# Patient Record
Sex: FEMALE | Race: WHITE | NOT HISPANIC OR LATINO | Employment: OTHER | URBAN - METROPOLITAN AREA
[De-identification: names, ages, dates, MRNs, and addresses within clinical notes are randomized per-mention and may not be internally consistent; named-entity substitution may affect disease eponyms.]

---

## 2017-01-02 DIAGNOSIS — E78.5 HYPERLIPIDEMIA: Chronic | ICD-10-CM

## 2017-01-03 RX ORDER — SIMVASTATIN 40 MG
TABLET ORAL
Qty: 90 TABLET | Refills: 0 | Status: SHIPPED | OUTPATIENT
Start: 2017-01-03 | End: 2017-03-31 | Stop reason: SDUPTHER

## 2017-02-15 ENCOUNTER — OFFICE VISIT (OUTPATIENT)
Dept: INTERNAL MEDICINE | Age: 72
End: 2017-02-15

## 2017-02-15 VITALS
WEIGHT: 129 LBS | HEIGHT: 62 IN | SYSTOLIC BLOOD PRESSURE: 132 MMHG | OXYGEN SATURATION: 98 % | DIASTOLIC BLOOD PRESSURE: 64 MMHG | HEART RATE: 70 BPM | BODY MASS INDEX: 23.74 KG/M2 | TEMPERATURE: 97.8 F

## 2017-02-15 DIAGNOSIS — E78.2 MIXED HYPERLIPIDEMIA: Chronic | ICD-10-CM

## 2017-02-15 DIAGNOSIS — I10 ESSENTIAL HYPERTENSION: Chronic | ICD-10-CM

## 2017-02-15 DIAGNOSIS — Z12.31 ENCOUNTER FOR SCREENING MAMMOGRAM FOR MALIGNANT NEOPLASM OF BREAST: ICD-10-CM

## 2017-02-15 DIAGNOSIS — E11.9 TYPE 2 DIABETES MELLITUS WITHOUT COMPLICATION, WITHOUT LONG-TERM CURRENT USE OF INSULIN (HCC): Primary | Chronic | ICD-10-CM

## 2017-02-15 PROCEDURE — 99214 OFFICE O/P EST MOD 30 MIN: CPT | Performed by: INTERNAL MEDICINE

## 2017-02-15 NOTE — PROGRESS NOTES
"Renee J From / 71 y.o. / female  02/15/2017    VITALS    Visit Vitals   • /64   • Pulse 70   • Temp 97.8 °F (36.6 °C)   • Ht 62\" (157.5 cm)   • Wt 129 lb (58.5 kg)   • SpO2 98%   • BMI 23.59 kg/m2     BP Readings from Last 3 Encounters:   02/15/17 132/64   11/27/16 159/83   08/12/16 160/70     Wt Readings from Last 3 Encounters:   02/15/17 129 lb (58.5 kg)   11/27/16 127 lb (57.6 kg)   08/12/16 132 lb (59.9 kg)      Body mass index is 23.59 kg/(m^2).    CC:  Main reason(s) for today's visit: Diabetes      HPI:     Chronic type 2 diabetes :   Home blood sugar levels: consistently in acceptable range. Compliant with medication(s).  Denies significant problems / side effects.      Most recent A1c level(s):    Lab Results   Component Value Date    HGBA1C 6.7 (H) 08/12/2016    HGBA1C 7.0 (H) 02/12/2016    HGBA1C 6.4 (H) 08/20/2015     Chronic essential hypertension :  Home BP readings: DOES NOT CHECK BP AT HOME. Compliant with medication(s).  Denies significant problems or side effects. Most recent in-office blood pressure readings:   BP Readings from Last 3 Encounters:   02/15/17 132/64   11/27/16 159/83   08/12/16 160/70     Chronic hyperlipidemia :  Compliant with therapy.  Denies significant problems with medication(s).  Most recent labs:   Lab Results   Component Value Date    LDL 69 08/12/2016    LDL 69 08/20/2015    HDL 48 08/12/2016    HDL 55 08/20/2015    TRIG 96 08/12/2016    TRIG 92 08/20/2015    CHOLHDLRATIO 2.8 08/12/2016    CHOLHDLRATIO 2.6 08/20/2015         ____________________________________________________________________    ASSESSMENT & PLAN:    Problem List Items Addressed This Visit        High    Type 2 diabetes mellitus - Primary (Chronic)    Overview     Stable. Continue metformin.          Relevant Medications    metFORMIN XR (GLUCOPHAGE-XR) 500 MG 24 hr tablet    glucose blood (MAIA CONTOUR NEXT TEST) test strip    Other Relevant Orders    Hemoglobin A1c    Microalbumin / Creatinine " Urine Ratio    Hypertension (Chronic)    Overview     Stable. Continue lisinopril.          Relevant Medications    lisinopril (PRINIVIL,ZESTRIL) 20 MG tablet    Hyperlipidemia (Chronic)    Overview     Stable. Continue simvastatin.           Relevant Medications    simvastatin (ZOCOR) 40 MG tablet      Other Visit Diagnoses     Encounter for screening mammogram for malignant neoplasm of breast         Relevant Orders    Mammo Screening Bilateral With CAD        Orders Placed This Encounter   Procedures   • Mammo Screening Bilateral With CAD   • Hemoglobin A1c   • Microalbumin / Creatinine Urine Ratio       Summary/Discussion:     ·       Return in about 6 months (around 8/15/2017) for 6 mos for chronic f/u;  mos for initial AWV (20 min).    Future Appointments  Date Time Provider Department Center   8/16/2017 9:20 AM [facility] None       ____________________________________________________________________    REVIEW OF SYSTEMS    Review of Systems  As noted per HPI  Constitutional neg   Resp neg   CV neg    Msk: trigger finger of right thumb (sees [facility])  Other: As noted per HPI      PHYSICAL EXAMINATION    Physical Exam  Constitutional  No distress   Cardiovascular Rate  normal . Rhythm: regular . Heart sounds:  normal    Pulmonary/Chest  Effort normal. Breath sounds:  normal   Psychiatric  Alert. Judgment and thought content normal. Mood normal      REVIEWED DATA:    Labs:   Lab Results   Component Value Date     08/12/2016    K 4.4 08/12/2016    AST 15 08/12/2016    ALT 17 08/12/2016    BUN 13 08/12/2016    CREATININE 0.83 08/12/2016    CREATININE 0.87 08/20/2015    CREATININE 0.84 07/17/2015    EGFRIFNONA 71 08/12/2016    EGFRIFAFRI 81 08/12/2016       Lab Results   Component Value Date     (H) 08/12/2016     (H) 08/20/2015     (H) 07/17/2015    HGBA1C 6.7 (H) 08/12/2016    HGBA1C 7.0 (H) 02/12/2016    HGBA1C 6.4 (H) 08/20/2015       Lab Results   Component Value Date     LDL 69 08/12/2016    LDL 69 08/20/2015    HDL 48 08/12/2016    TRIG 96 08/12/2016    CHOLHDLRATIO 2.8 08/12/2016       No results found for: TSH, FREET4     No results found for: WBC, HGB, PLT     Imaging:        Medical Tests:        Summary of old records / correspondence / consultant report:        Request outside records:          ALLERGIES:    Tetanus toxoids    MEDICATIONS:  Outpatient Medications Prior to Visit   Medication Sig Dispense Refill   • aspirin 81 MG EC tablet Take 81 mg by mouth daily.     • Cholecalciferol (VITAMIN D) 2000 UNITS capsule Take 1 capsule by mouth daily.     • dorzolamide-timolol (COSOPT) 22.3-6.8 MG/ML ophthalmic solution Apply 1 drop to eye 2 (two) times a day.     • lisinopril (PRINIVIL,ZESTRIL) 20 MG tablet TAKE 1 TABLET BY MOUTH EVERY DAY 90 tablet 1   • mesalamine (LIALDA) 1.2 G EC tablet Take 1,200 mg by mouth 2 (two) times a day.     • metFORMIN XR (GLUCOPHAGE-XR) 500 MG 24 hr tablet Take 2 tablets by mouth 2 (Two) Times a Day. 360 tablet 1   • omeprazole (PriLOSEC) 40 MG capsule Take 1 capsule by mouth daily.     • simvastatin (ZOCOR) 40 MG tablet TAKE 1 TABLET BY MOUTH EVERY NIGHT AT BEDTIME 90 tablet 0   • glucose blood test strip FreeStyle Lite Test In Vitro Strip; Patient Sig: FreeStyle Lite Test In Vitro Strip TEST 1-2 TIMES DAILY AS DIRECTED; 1; 3; 05-May-2014; Active       No facility-administered medications prior to visit.        [facility]    The following portions of the patient's history were reviewed and updated as appropriate: Allergies / Current Medications / Past Medical History / Surgical History / Social History / Family History    PROBLEM LIST:    Patient Active Problem List   Diagnosis   • Type 2 diabetes mellitus   • Osteopenia   • Hypertension   • Hyperlipidemia   • Colon polyp   • Esophageal reflux       PAST MEDICAL HX:    Past Medical History   Diagnosis Date   • Diabetes mellitus    • Dizziness    • GERD (gastroesophageal reflux disease)    • Glaucoma     • Hyperlipidemia    • Knee fracture, left        PAST SURGICAL HX:    Past Surgical History   Procedure Laterality Date   • Cataract extraction     • Eye surgery       cataract surgery       SOCIAL HX:    Social History     Social History   • Marital status:      Spouse name: N/A   • Number of children: N/A   • Years of education: N/A     Occupational History   • retail      retired     Social History Main Topics   • Smoking status: Never Smoker   • Smokeless tobacco: Never Used   • Alcohol use No   • Drug use: None   • Sexual activity: Not Asked     Other Topics Concern   • None     Social History Narrative   • None       FAMILY HX:    Family History   Problem Relation Age of Onset   • Heart disease Mother    • Heart disease Father    • Heart disease Sister    • Heart disease Brother    • Hypertension Other    • Diabetes Other      type 2

## 2017-02-16 LAB
ALBUMIN/CREAT UR: 14.2 MG/G CREAT (ref 0–30)
CREAT UR-MCNC: 112.3 MG/DL
HBA1C MFR BLD: 6.7 % (ref 4.8–5.6)
MICROALBUMIN UR-MCNC: 15.9 UG/ML

## 2017-02-22 ENCOUNTER — APPOINTMENT (OUTPATIENT)
Dept: WOMENS IMAGING | Facility: HOSPITAL | Age: 72
End: 2017-02-22

## 2017-02-22 PROCEDURE — G0202 SCR MAMMO BI INCL CAD: HCPCS | Performed by: RADIOLOGY

## 2017-02-22 PROCEDURE — 77063 BREAST TOMOSYNTHESIS BI: CPT | Performed by: RADIOLOGY

## 2017-02-28 DIAGNOSIS — E11.9 TYPE 2 DIABETES MELLITUS WITHOUT COMPLICATION (HCC): Chronic | ICD-10-CM

## 2017-02-28 RX ORDER — METFORMIN HYDROCHLORIDE 500 MG/1
1000 TABLET, EXTENDED RELEASE ORAL 2 TIMES DAILY
Qty: 360 TABLET | Refills: 1 | Status: SHIPPED | OUTPATIENT
Start: 2017-02-28 | End: 2017-12-07 | Stop reason: SDUPTHER

## 2017-03-29 DIAGNOSIS — I10 ESSENTIAL HYPERTENSION: Chronic | ICD-10-CM

## 2017-03-29 RX ORDER — LISINOPRIL 20 MG/1
TABLET ORAL
Qty: 90 TABLET | Refills: 1 | Status: SHIPPED | OUTPATIENT
Start: 2017-03-29 | End: 2017-09-11 | Stop reason: SDUPTHER

## 2017-03-31 DIAGNOSIS — E78.5 HYPERLIPIDEMIA: Chronic | ICD-10-CM

## 2017-03-31 RX ORDER — SIMVASTATIN 40 MG
TABLET ORAL
Qty: 90 TABLET | Refills: 1 | Status: SHIPPED | OUTPATIENT
Start: 2017-03-31 | End: 2018-02-10 | Stop reason: SDUPTHER

## 2017-04-10 ENCOUNTER — TELEPHONE (OUTPATIENT)
Dept: ORTHOPEDIC SURGERY | Facility: CLINIC | Age: 72
End: 2017-04-10

## 2017-04-10 ENCOUNTER — OFFICE VISIT (OUTPATIENT)
Dept: ORTHOPEDIC SURGERY | Facility: CLINIC | Age: 72
End: 2017-04-10

## 2017-04-10 VITALS — TEMPERATURE: 97.8 F | BODY MASS INDEX: 23.37 KG/M2 | HEIGHT: 62 IN | WEIGHT: 127 LBS

## 2017-04-10 DIAGNOSIS — S49.92XA SHOULDER INJURY, LEFT, INITIAL ENCOUNTER: Primary | ICD-10-CM

## 2017-04-10 PROCEDURE — 99214 OFFICE O/P EST MOD 30 MIN: CPT | Performed by: ORTHOPAEDIC SURGERY

## 2017-04-10 PROCEDURE — 73020 X-RAY EXAM OF SHOULDER: CPT | Performed by: ORTHOPAEDIC SURGERY

## 2017-04-10 RX ORDER — HYDROCODONE BITARTRATE AND ACETAMINOPHEN 5; 325 MG/1; MG/1
1 TABLET ORAL EVERY 4 HOURS PRN
Qty: 50 TABLET | Refills: 0 | Status: SHIPPED | OUTPATIENT
Start: 2017-04-10 | End: 2017-05-19

## 2017-04-10 NOTE — PROGRESS NOTES
History & Physical       Patient: Renee PARIKH From    YOB: 1945    Medical Record Number: [id number]    Attending Physician: No att. providers found    Chief Complaints: Left shoulder injury    History of Present Illness: 72 y.o. female presents for evaluation of her left shoulder.  She fell this past Sunday when her left knee gave out.  She landed awkwardly on her left shoulder.  She noticed immediate pain and swelling of the upper arm.  She describes her current pain as moderate, intermittent, and aching.  She has noticed associated bruising and swelling down the arm.  Pain is worse with any movement.  Ice, Tylenol, and use of the sling have all helped.  She is right-hand-dominant but she was very active and independent before this injury.  She had full use and function of her left arm prior to this injury as well.  Denies having suffered loss of consciousness.  Denies any other associated complaints or issues.    Allergies:   Allergies   Allergen Reactions   • Tetanus Toxoids Swelling     Hand and arm       Home Medications:      Current Outpatient Prescriptions:   •  aspirin 81 MG EC tablet, Take 81 mg by mouth daily., Disp: , Rfl:   •  Cholecalciferol (VITAMIN D) 2000 UNITS capsule, Take 1 capsule by mouth daily., Disp: , Rfl:   •  dorzolamide-timolol (COSOPT) 22.3-6.8 MG/ML ophthalmic solution, Apply 1 drop to eye 2 (two) times a day., Disp: , Rfl:   •  glucose blood (MAIA CONTOUR NEXT TEST) test strip, Check BS once daily, Disp: 100 each, Rfl: 3  •  lisinopril (PRINIVIL,ZESTRIL) 20 MG tablet, TAKE 1 TABLET BY MOUTH EVERY DAY, Disp: 90 tablet, Rfl: 1  •  mesalamine (LIALDA) 1.2 G EC tablet, Take 1,200 mg by mouth 2 (two) times a day., Disp: , Rfl:   •  metFORMIN ER (GLUCOPHAGE-XR) 500 MG 24 hr tablet, Take 2 tablets by mouth 2 (Two) Times a Day., Disp: 360 tablet, Rfl: 1  •  metFORMIN XR (GLUCOPHAGE-XR) 500 MG 24 hr tablet, Take 2 tablets by mouth 2 (Two) Times a Day., Disp: 360 tablet, Rfl:  1  •  omeprazole (PriLOSEC) 40 MG capsule, Take 1 capsule by mouth daily., Disp: , Rfl:   •  simvastatin (ZOCOR) 40 MG tablet, TAKE 1 TABLET BY MOUTH EVERY NIGHT AT BEDTIME, Disp: 90 tablet, Rfl: 1  •  HYDROcodone-acetaminophen (NORCO) 5-325 MG per tablet, Take 1 tablet by mouth Every 4 (Four) Hours As Needed for Moderate Pain (4-6)., Disp: 50 tablet, Rfl: 0    Past Medical History:   Diagnosis Date   • Diabetes mellitus    • Dizziness    • GERD (gastroesophageal reflux disease)    • Glaucoma    • Hyperlipidemia    • Knee fracture, left           Past Surgical History:   Procedure Laterality Date   • CATARACT EXTRACTION     • EYE SURGERY      cataract surgery          Social History     Occupational History   • retail      retired     Social History Main Topics   • Smoking status: Never Smoker   • Smokeless tobacco: Never Used   • Alcohol use No   • Drug use: No   • Sexual activity: Defer      Social History     Social History Narrative          Family History   Problem Relation Age of Onset   • Heart disease Mother    • Heart disease Father    • Heart disease Sister    • Heart disease Brother    • Hypertension Other    • Diabetes Other      type 2       Review of Systems:      Constitutional: Denies fever, shaking or chills   Eyes: Denies change in visual acuity   HEENT: Denies nasal congestion or sore throat   Respiratory: Denies cough or shortness of breath   Cardiovascular: Denies chest pain or edema  Endocrine: Denies tremors, palpitations, intolerance of heat or cold, polyuria, polydipsia.  GI: Denies abdominal pain, nausea, vomiting, bloody stools or diarrhea  : Denies frequency, urgency, incontinence, retention, or nocturia.  Musculoskeletal: Denies numbness tingling or loss of motor function except as above  Integument: Denies rash, lesion or ulceration   Neurologic: Denies headache or focal weakness, deficits  Heme: Denies epistaxis, spontaneous or excessive bleeding, epistaxis, hematuria, melena,  "fatigue, enlarged or tender lymph nodes.      All other pertinent positives and negatives as noted above in HPI.    Physical Exam: 72 y.o. female    Vitals:    04/10/17 1622   Temp: 97.8 °F (36.6 °C)   Weight: 127 lb (57.6 kg)   Height: 62\" (157.5 cm)       General:  Patient is awake and alert.  Appears in no acute distress or discomfort.    Psych:  Affect and demeanor are appropriate.    Eyes:  Conjunctiva and sclera appear grossly normal.  Eyes track well and EOM seem to be intact.    Dentition:  No gross abnormalities noted.    Ears:  No gross abnormalities.  Hearing adequate for the exam.    Cardiovascular:  Regular rate and rhythm.    Lungs:  Good chest expansion.  Breathing unlabored.    Lymph:  No palpable masses or adenopathy in the affected extremity    Left upper extremity:  The sling was in place and removed.  Skin appears benign.  No gross malalignment, lacerations or abrasions.  She does have ecchymosis down the upper arm.  Focal tenderness noted over the proximal humerus.  No palpable masses or adenopathy.  Compartments soft.  Painful, limited ROM of the shoulder.  Could not assess instability due to her very limited motion..  No tenderness noted over the lower arm, elbow, forearm, wrist, or hand.  Good strength in the hand and wrist with flexion, extension, , pinch, finger and thumb abduction.  Intact sensation.  Brisk cap refill.      Diagnostic Tests:  Lab Results   Component Value Date    CALCIUM 9.6 08/12/2016     08/12/2016    K 4.4 08/12/2016    CO2 23 08/12/2016     08/12/2016    BUN 13 08/12/2016    CREATININE 0.83 08/12/2016    EGFRIFAFRI 81 08/12/2016    EGFRIFNONA 71 08/12/2016    BCR 16 08/12/2016     No results found for: WBC, HGB, HCT, MCV, PLT  No results found for: INR, PROTIME    Imaging:  AP and scapular Y views of the left shoulder are ordered and reviewed to evaluate the patient's complaint.  No comparison films are immediately available.  She has what appears to be " a three-part proximal humerus fracture with displacement of the greater tuberosity.  I measured the displacement at right at 5 mm but it is difficult to evaluate on the limited views provided.  The scapular Y view gives the appearance that the fragment may be displaced posteriorly more so than the AP view suggests.    Assessment:  Left proximal humerus fracture    Plan:  I can't tell exactly how displaced the tuberosity is.  On the limited views provided, I suspect that it's in acceptable position for closed treatment.  I want to get a CT scan to better evaluate this.  If the displacement is more significant, we may need to consider fixing this to optimize her outcome.  I'm going to order a CT scan today and we will get that done urgently.  I will see her back Friday to discuss the results and come up with a plan for her.  We will have her continue use of the sling in the interim.  I did agree to give her a prescription for Lortab 5 mg.  The risk of this medicine were discussed.    Ministerio Wells MD    CC to Esequiel Pacheco MD

## 2017-04-12 ENCOUNTER — HOSPITAL ENCOUNTER (OUTPATIENT)
Dept: CT IMAGING | Facility: HOSPITAL | Age: 72
Discharge: HOME | End: 2017-04-12
Attending: ORTHOPAEDIC SURGERY | Admitting: ORTHOPAEDIC SURGERY

## 2017-04-12 DIAGNOSIS — S49.92XA SHOULDER INJURY, LEFT, INITIAL ENCOUNTER: ICD-10-CM

## 2017-04-12 PROCEDURE — 73200 CT UPPER EXTREMITY W/O DYE: CPT

## 2017-04-14 ENCOUNTER — OFFICE VISIT (OUTPATIENT)
Dept: ORTHOPEDIC SURGERY | Facility: CLINIC | Age: 72
End: 2017-04-14

## 2017-04-14 DIAGNOSIS — Z87.81 HISTORY OF FRACTURE OF HUMERUS: Primary | ICD-10-CM

## 2017-04-14 DIAGNOSIS — S49.92XA SHOULDER INJURY, LEFT, INITIAL ENCOUNTER: ICD-10-CM

## 2017-04-14 PROCEDURE — 73030 X-RAY EXAM OF SHOULDER: CPT | Performed by: ORTHOPAEDIC SURGERY

## 2017-04-14 PROCEDURE — 23600 CLTX PROX HUMRL FX W/O MNPJ: CPT | Performed by: ORTHOPAEDIC SURGERY

## 2017-04-14 PROCEDURE — 99024 POSTOP FOLLOW-UP VISIT: CPT | Performed by: ORTHOPAEDIC SURGERY

## 2017-04-16 NOTE — PROGRESS NOTES
Chief Complaint:  Left proximal humerus fracture    HPI:  Mrs. Stevenson comes in today for follow-up of the left shoulder.  She has been compliant with use of the sling.  Denies any new problems or issues.  Describes her current pain as mild, constant, and aching.    Exam:  Contour of her shoulder looks normal with the exception of mild edema over the proximal humerus.  There is also ecchymosis tracking down the inner aspect of her upper arm.  Mild tenderness over the proximal humerus.  The arm and forearm are soft.  No tenderness along the lower arm, elbow, forearm, wrist, or hand.  Good strength in her wrist and hand.  Intact sensation.  Brisk capillary refill.    Imaging:  Repeat AP and scapular Y views of left shoulder are ordered and reviewed today to compare to her previous x-rays.  I have also reviewed her recent CT scan and the associated report.  She has what appears to be a comminuted fracture of the proximal humerus.  There is approximately 5 mm of displacement of the tuberosity.  The majority of the displacement appears to be posteroateral rather than superior.    Assessment:  Left proximal humerus fracture    Plan:  I do not know that surgical fixation is going to improve her outcome.  She came in today with her son and I showed them the x-rays and CT scan and we discussed options. We discussed surgery and all that that would entail.  We also discussed closed treatment and the associated risks including nonunion, malunion, persistent pain and/or dysfunction as result of potential further displacement of the fracture.   I'm not convinced that surgery would really improve her outcome here.  If we can get it to heal in its current alignment, I expect that she should do well.  She and her son agree.  We will have her continue use the sling.  I want to see her back in 2 weeks for repeat x-rays.     Ministerio Wells MD  04/14/2017

## 2017-05-05 ENCOUNTER — OFFICE VISIT (OUTPATIENT)
Dept: ORTHOPEDIC SURGERY | Facility: CLINIC | Age: 72
End: 2017-05-05

## 2017-05-05 DIAGNOSIS — Z87.81 HISTORY OF FRACTURE OF HUMERUS: Primary | ICD-10-CM

## 2017-05-05 DIAGNOSIS — Z09 FRACTURE FOLLOW-UP: ICD-10-CM

## 2017-05-05 PROCEDURE — 73030 X-RAY EXAM OF SHOULDER: CPT | Performed by: ORTHOPAEDIC SURGERY

## 2017-05-05 PROCEDURE — 99024 POSTOP FOLLOW-UP VISIT: CPT | Performed by: ORTHOPAEDIC SURGERY

## 2017-05-19 ENCOUNTER — OFFICE VISIT (OUTPATIENT)
Dept: ORTHOPEDIC SURGERY | Facility: CLINIC | Age: 72
End: 2017-05-19

## 2017-05-19 VITALS — WEIGHT: 127 LBS | TEMPERATURE: 97.7 F | HEIGHT: 62 IN | BODY MASS INDEX: 23.37 KG/M2

## 2017-05-19 DIAGNOSIS — Z87.81 HISTORY OF FRACTURE OF HUMERUS: Primary | ICD-10-CM

## 2017-05-19 PROCEDURE — 73030 X-RAY EXAM OF SHOULDER: CPT | Performed by: ORTHOPAEDIC SURGERY

## 2017-05-19 PROCEDURE — 99024 POSTOP FOLLOW-UP VISIT: CPT | Performed by: ORTHOPAEDIC SURGERY

## 2017-05-22 ENCOUNTER — OFFICE (OUTPATIENT)
Dept: URBAN - METROPOLITAN AREA CLINIC 2 | Facility: CLINIC | Age: 72
End: 2017-05-22
Payer: COMMERCIAL

## 2017-05-22 VITALS
HEART RATE: 69 BPM | DIASTOLIC BLOOD PRESSURE: 76 MMHG | HEIGHT: 61 IN | WEIGHT: 127 LBS | SYSTOLIC BLOOD PRESSURE: 124 MMHG

## 2017-05-22 DIAGNOSIS — Z86.010 PERSONAL HISTORY OF COLONIC POLYPS: ICD-10-CM

## 2017-05-22 DIAGNOSIS — K92.1 MELENA: ICD-10-CM

## 2017-05-22 DIAGNOSIS — K21.9 GASTRO-ESOPHAGEAL REFLUX DISEASE WITHOUT ESOPHAGITIS: ICD-10-CM

## 2017-05-22 DIAGNOSIS — K64.8 OTHER HEMORRHOIDS: ICD-10-CM

## 2017-05-22 DIAGNOSIS — R19.7 DIARRHEA, UNSPECIFIED: ICD-10-CM

## 2017-05-22 DIAGNOSIS — K51.50 LEFT SIDED COLITIS WITHOUT COMPLICATIONS: ICD-10-CM

## 2017-05-22 DIAGNOSIS — F45.8 OTHER SOMATOFORM DISORDERS: ICD-10-CM

## 2017-05-22 PROCEDURE — 99213 OFFICE O/P EST LOW 20 MIN: CPT | Performed by: INTERNAL MEDICINE

## 2017-05-22 RX ORDER — MESALAMINE 1.2 G/1
TABLET, DELAYED RELEASE ORAL
Qty: 120 | Refills: 5 | Status: ACTIVE

## 2017-06-16 ENCOUNTER — APPOINTMENT (OUTPATIENT)
Dept: CARDIOLOGY | Facility: HOSPITAL | Age: 72
End: 2017-06-16
Attending: INTERNAL MEDICINE

## 2017-06-16 ENCOUNTER — HOSPITAL ENCOUNTER (INPATIENT)
Facility: HOSPITAL | Age: 72
LOS: 2 days | Discharge: HOME | End: 2017-06-18
Attending: EMERGENCY MEDICINE | Admitting: INTERNAL MEDICINE

## 2017-06-16 ENCOUNTER — APPOINTMENT (OUTPATIENT)
Dept: GENERAL RADIOLOGY | Facility: HOSPITAL | Age: 72
End: 2017-06-16

## 2017-06-16 DIAGNOSIS — I21.4 NON-STEMI (NON-ST ELEVATED MYOCARDIAL INFARCTION) (HCC): Primary | ICD-10-CM

## 2017-06-16 LAB
ALBUMIN SERPL-MCNC: 4.1 G/DL (ref 3.5–5.2)
ALBUMIN/GLOB SERPL: 1.3 G/DL
ALP SERPL-CCNC: 55 U/L (ref 39–117)
ALT SERPL W P-5'-P-CCNC: 12 U/L (ref 1–33)
ANION GAP SERPL CALCULATED.3IONS-SCNC: 15.2 MMOL/L
AORTIC ARCH: 2.4 CM
ASCENDING AORTA: 2.7 CM
AST SERPL-CCNC: 12 U/L (ref 1–32)
BASOPHILS # BLD AUTO: 0.01 10*3/MM3 (ref 0–0.2)
BASOPHILS NFR BLD AUTO: 0.2 % (ref 0–1.5)
BH CV ECHO MEAS - ACS: 1.4 CM
BH CV ECHO MEAS - AO MAX PG: 8 MMHG
BH CV ECHO MEAS - AO MEAN PG (FULL): 2 MMHG
BH CV ECHO MEAS - AO MEAN PG: 4 MMHG
BH CV ECHO MEAS - AO ROOT AREA (BSA CORRECTED): 1.7
BH CV ECHO MEAS - AO ROOT AREA: 5.3 CM^2
BH CV ECHO MEAS - AO ROOT DIAM: 2.6 CM
BH CV ECHO MEAS - AO V2 MAX: 137 CM/SEC
BH CV ECHO MEAS - AO V2 MEAN: 99.7 CM/SEC
BH CV ECHO MEAS - AO V2 VTI: 30.4 CM
BH CV ECHO MEAS - ASC AORTA: 2.7 CM
BH CV ECHO MEAS - AVA(I,A): 1.8 CM^2
BH CV ECHO MEAS - AVA(I,D): 1.8 CM^2
BH CV ECHO MEAS - BSA(HAYCOCK): 1.6 M^2
BH CV ECHO MEAS - BSA: 1.6 M^2
BH CV ECHO MEAS - BZI_BMI: 23 KILOGRAMS/M^2
BH CV ECHO MEAS - BZI_METRIC_HEIGHT: 157.5 CM
BH CV ECHO MEAS - BZI_METRIC_WEIGHT: 57.2 KG
BH CV ECHO MEAS - CONTRAST EF (2CH): 41.3 ML/M^2
BH CV ECHO MEAS - CONTRAST EF 4CH: 42.4 ML/M^2
BH CV ECHO MEAS - EDV(CUBED): 74.1 ML
BH CV ECHO MEAS - EDV(MOD-SP2): 92 ML
BH CV ECHO MEAS - EDV(MOD-SP4): 99 ML
BH CV ECHO MEAS - EDV(TEICH): 78.6 ML
BH CV ECHO MEAS - EF(CUBED): 63.6 %
BH CV ECHO MEAS - EF(MOD-SP2): 41.3 %
BH CV ECHO MEAS - EF(MOD-SP4): 42.4 %
BH CV ECHO MEAS - EF(TEICH): 55.5 %
BH CV ECHO MEAS - ESV(CUBED): 27 ML
BH CV ECHO MEAS - ESV(MOD-SP2): 54 ML
BH CV ECHO MEAS - ESV(MOD-SP4): 57 ML
BH CV ECHO MEAS - ESV(TEICH): 35 ML
BH CV ECHO MEAS - FS: 28.6 %
BH CV ECHO MEAS - IVS/LVPW: 0.88
BH CV ECHO MEAS - IVSD: 0.7 CM
BH CV ECHO MEAS - LAT PEAK E' VEL: 7 CM/SEC
BH CV ECHO MEAS - LV DIASTOLIC VOL/BSA (35-75): 63 ML/M^2
BH CV ECHO MEAS - LV MASS(C)D: 93 GRAMS
BH CV ECHO MEAS - LV MASS(C)DI: 59.2 GRAMS/M^2
BH CV ECHO MEAS - LV MEAN PG: 2 MMHG
BH CV ECHO MEAS - LV SYSTOLIC VOL/BSA (12-30): 36.3 ML/M^2
BH CV ECHO MEAS - LV V1 MAX: 77 CM/SEC
BH CV ECHO MEAS - LV V1 MEAN: 58.5 CM/SEC
BH CV ECHO MEAS - LV V1 VTI: 19.1 CM
BH CV ECHO MEAS - LVIDD: 4.2 CM
BH CV ECHO MEAS - LVIDS: 3 CM
BH CV ECHO MEAS - LVLD AP2: 7 CM
BH CV ECHO MEAS - LVLD AP4: 7.5 CM
BH CV ECHO MEAS - LVLS AP2: 6.1 CM
BH CV ECHO MEAS - LVLS AP4: 6.7 CM
BH CV ECHO MEAS - LVOT AREA (M): 2.8 CM^2
BH CV ECHO MEAS - LVOT AREA: 2.8 CM^2
BH CV ECHO MEAS - LVOT DIAM: 1.9 CM
BH CV ECHO MEAS - LVPWD: 0.8 CM
BH CV ECHO MEAS - MED PEAK E' VEL: 7 CM/SEC
BH CV ECHO MEAS - MV A DUR: 0.1 SEC
BH CV ECHO MEAS - MV A MAX VEL: 123 CM/SEC
BH CV ECHO MEAS - MV DEC SLOPE: 681 CM/SEC^2
BH CV ECHO MEAS - MV DEC TIME: 0.16 SEC
BH CV ECHO MEAS - MV E MAX VEL: 97.2 CM/SEC
BH CV ECHO MEAS - MV E/A: 0.79
BH CV ECHO MEAS - MV MAX PG: 7 MMHG
BH CV ECHO MEAS - MV MEAN PG: 2 MMHG
BH CV ECHO MEAS - MV P1/2T MAX VEL: 107 CM/SEC
BH CV ECHO MEAS - MV P1/2T: 46 MSEC
BH CV ECHO MEAS - MV V2 MEAN: 61.3 CM/SEC
BH CV ECHO MEAS - MV V2 VTI: 35.7 CM
BH CV ECHO MEAS - MVA P1/2T LCG: 2.1 CM^2
BH CV ECHO MEAS - MVA(P1/2T): 4.8 CM^2
BH CV ECHO MEAS - MVA(VTI): 1.5 CM^2
BH CV ECHO MEAS - PA ACC SLOPE: 23.4 CM/SEC^2
BH CV ECHO MEAS - PA ACC TIME: 0.12 SEC
BH CV ECHO MEAS - PA MAX PG (FULL): 0.67 MMHG
BH CV ECHO MEAS - PA MAX PG: 3.9 MMHG
BH CV ECHO MEAS - PA PR(ACCEL): 26.8 MMHG
BH CV ECHO MEAS - PA V2 MAX: 99.1 CM/SEC
BH CV ECHO MEAS - PULM A REVS DUR: 0.1 SEC
BH CV ECHO MEAS - PULM A REVS VEL: 39 CM/SEC
BH CV ECHO MEAS - PULM DIAS VEL: 47.9 CM/SEC
BH CV ECHO MEAS - PULM S/D: 1.5
BH CV ECHO MEAS - PULM SYS VEL: 72.1 CM/SEC
BH CV ECHO MEAS - PVA(V,A): 2.3 CM^2
BH CV ECHO MEAS - PVA(V,D): 2.3 CM^2
BH CV ECHO MEAS - QP/QS: 0.92
BH CV ECHO MEAS - RV MAX PG: 3.3 MMHG
BH CV ECHO MEAS - RV MEAN PG: 2 MMHG
BH CV ECHO MEAS - RV V1 MAX: 90.2 CM/SEC
BH CV ECHO MEAS - RV V1 MEAN: 61.6 CM/SEC
BH CV ECHO MEAS - RV V1 VTI: 19.5 CM
BH CV ECHO MEAS - RVOT AREA: 2.5 CM^2
BH CV ECHO MEAS - RVOT DIAM: 1.8 CM
BH CV ECHO MEAS - RVSP: 32 MMHG
BH CV ECHO MEAS - SI(AO): 102.8 ML/M^2
BH CV ECHO MEAS - SI(CUBED): 30 ML/M^2
BH CV ECHO MEAS - SI(LVOT): 34.5 ML/M^2
BH CV ECHO MEAS - SI(MOD-SP2): 24.2 ML/M^2
BH CV ECHO MEAS - SI(MOD-SP4): 26.7 ML/M^2
BH CV ECHO MEAS - SI(TEICH): 27.7 ML/M^2
BH CV ECHO MEAS - SUP REN AO DIAM: 1.8 CM
BH CV ECHO MEAS - SV(AO): 161.4 ML
BH CV ECHO MEAS - SV(CUBED): 47.1 ML
BH CV ECHO MEAS - SV(LVOT): 54.2 ML
BH CV ECHO MEAS - SV(MOD-SP2): 38 ML
BH CV ECHO MEAS - SV(MOD-SP4): 42 ML
BH CV ECHO MEAS - SV(RVOT): 49.6 ML
BH CV ECHO MEAS - SV(TEICH): 43.6 ML
BH CV ECHO MEAS - TAPSE (>1.6): 1.6 CM2
BH CV ECHO MEAS - TR MAX V: 40 MMHG
BH CV ECHO MEAS - TR MAX VEL: 281 CM/SEC
BH CV VAS BP RIGHT ARM: NORMAL MMHG
BH CV XLRA - RV BASE: 2.8 CM
BH CV XLRA - TDI S': 13 CM/SEC
BILIRUB SERPL-MCNC: 0.2 MG/DL (ref 0.1–1.2)
BUN BLD-MCNC: 11 MG/DL (ref 8–23)
BUN/CREAT SERPL: 13.1 (ref 7–25)
CALCIUM SPEC-SCNC: 9.2 MG/DL (ref 8.6–10.5)
CHLORIDE SERPL-SCNC: 99 MMOL/L (ref 98–107)
CO2 SERPL-SCNC: 21.8 MMOL/L (ref 22–29)
CREAT BLD-MCNC: 0.84 MG/DL (ref 0.57–1)
DEPRECATED RDW RBC AUTO: 45.4 FL (ref 37–54)
E/E' RATIO: 14
EOSINOPHIL # BLD AUTO: 0.01 10*3/MM3 (ref 0–0.7)
EOSINOPHIL NFR BLD AUTO: 0.2 % (ref 0.3–6.2)
ERYTHROCYTE [DISTWIDTH] IN BLOOD BY AUTOMATED COUNT: 13.1 % (ref 11.7–13)
GFR SERPL CREATININE-BSD FRML MDRD: 66 ML/MIN/1.73
GLOBULIN UR ELPH-MCNC: 3.2 GM/DL
GLUCOSE BLD-MCNC: 194 MG/DL (ref 65–99)
GLUCOSE BLDC GLUCOMTR-MCNC: 143 MG/DL (ref 70–130)
HCT VFR BLD AUTO: 33.7 % (ref 35.6–45.5)
HGB BLD-MCNC: 10.6 G/DL (ref 11.9–15.5)
HOLD SPECIMEN: NORMAL
HOLD SPECIMEN: NORMAL
IMM GRANULOCYTES # BLD: 0.02 10*3/MM3 (ref 0–0.03)
IMM GRANULOCYTES NFR BLD: 0.3 % (ref 0–0.5)
INR PPP: 0.98 (ref 0.9–1.1)
LEFT ATRIUM VOLUME INDEX: 18 ML/M2
LYMPHOCYTES # BLD AUTO: 1.87 10*3/MM3 (ref 0.9–4.8)
LYMPHOCYTES NFR BLD AUTO: 30.6 % (ref 19.6–45.3)
MCH RBC QN AUTO: 29.7 PG (ref 26.9–32)
MCHC RBC AUTO-ENTMCNC: 31.5 G/DL (ref 32.4–36.3)
MCV RBC AUTO: 94.4 FL (ref 80.5–98.2)
MONOCYTES # BLD AUTO: 0.38 10*3/MM3 (ref 0.2–1.2)
MONOCYTES NFR BLD AUTO: 6.2 % (ref 5–12)
NEUTROPHILS # BLD AUTO: 3.83 10*3/MM3 (ref 1.9–8.1)
NEUTROPHILS NFR BLD AUTO: 62.5 % (ref 42.7–76)
PLATELET # BLD AUTO: 227 10*3/MM3 (ref 140–500)
PMV BLD AUTO: 9.9 FL (ref 6–12)
POTASSIUM BLD-SCNC: 4.5 MMOL/L (ref 3.5–5.2)
PROT SERPL-MCNC: 7.3 G/DL (ref 6–8.5)
PROTHROMBIN TIME: 12.6 SECONDS (ref 11.7–14.2)
RBC # BLD AUTO: 3.57 10*6/MM3 (ref 3.9–5.2)
SODIUM BLD-SCNC: 136 MMOL/L (ref 136–145)
TROPONIN T SERPL-MCNC: 0.12 NG/ML (ref 0–0.03)
WBC NRBC COR # BLD: 6.12 10*3/MM3 (ref 4.5–10.7)
WHOLE BLOOD HOLD SPECIMEN: NORMAL
WHOLE BLOOD HOLD SPECIMEN: NORMAL

## 2017-06-16 PROCEDURE — 93005 ELECTROCARDIOGRAM TRACING: CPT | Performed by: EMERGENCY MEDICINE

## 2017-06-16 PROCEDURE — 93458 L HRT ARTERY/VENTRICLE ANGIO: CPT | Performed by: INTERNAL MEDICINE

## 2017-06-16 PROCEDURE — 99152 MOD SED SAME PHYS/QHP 5/>YRS: CPT | Performed by: INTERNAL MEDICINE

## 2017-06-16 PROCEDURE — B2151ZZ FLUOROSCOPY OF LEFT HEART USING LOW OSMOLAR CONTRAST: ICD-10-PCS | Performed by: INTERNAL MEDICINE

## 2017-06-16 PROCEDURE — C8929 TTE W OR WO FOL WCON,DOPPLER: HCPCS

## 2017-06-16 PROCEDURE — 99153 MOD SED SAME PHYS/QHP EA: CPT | Performed by: INTERNAL MEDICINE

## 2017-06-16 PROCEDURE — C1769 GUIDE WIRE: HCPCS | Performed by: INTERNAL MEDICINE

## 2017-06-16 PROCEDURE — 80053 COMPREHEN METABOLIC PANEL: CPT | Performed by: EMERGENCY MEDICINE

## 2017-06-16 PROCEDURE — B2111ZZ FLUOROSCOPY OF MULTIPLE CORONARY ARTERIES USING LOW OSMOLAR CONTRAST: ICD-10-PCS | Performed by: INTERNAL MEDICINE

## 2017-06-16 PROCEDURE — 85610 PROTHROMBIN TIME: CPT | Performed by: EMERGENCY MEDICINE

## 2017-06-16 PROCEDURE — C1894 INTRO/SHEATH, NON-LASER: HCPCS | Performed by: INTERNAL MEDICINE

## 2017-06-16 PROCEDURE — 84484 ASSAY OF TROPONIN QUANT: CPT | Performed by: EMERGENCY MEDICINE

## 2017-06-16 PROCEDURE — 4A023N7 MEASUREMENT OF CARDIAC SAMPLING AND PRESSURE, LEFT HEART, PERCUTANEOUS APPROACH: ICD-10-PCS | Performed by: INTERNAL MEDICINE

## 2017-06-16 PROCEDURE — 99223 1ST HOSP IP/OBS HIGH 75: CPT | Performed by: INTERNAL MEDICINE

## 2017-06-16 PROCEDURE — 85025 COMPLETE CBC W/AUTO DIFF WBC: CPT | Performed by: EMERGENCY MEDICINE

## 2017-06-16 PROCEDURE — 25010000002 FENTANYL CITRATE (PF) 100 MCG/2ML SOLUTION: Performed by: INTERNAL MEDICINE

## 2017-06-16 PROCEDURE — 93010 ELECTROCARDIOGRAM REPORT: CPT | Performed by: INTERNAL MEDICINE

## 2017-06-16 PROCEDURE — 0 IOPAMIDOL PER 1 ML: Performed by: INTERNAL MEDICINE

## 2017-06-16 PROCEDURE — 25010000002 MIDAZOLAM PER 1 MG: Performed by: INTERNAL MEDICINE

## 2017-06-16 PROCEDURE — 71010 HC CHEST PA OR AP: CPT

## 2017-06-16 PROCEDURE — 99285 EMERGENCY DEPT VISIT HI MDM: CPT

## 2017-06-16 PROCEDURE — 25010000002 PERFLUTREN (DEFINITY) 8.476 MG IN SODIUM CHLORIDE 10 ML INJECTION: Performed by: INTERNAL MEDICINE

## 2017-06-16 PROCEDURE — 82962 GLUCOSE BLOOD TEST: CPT

## 2017-06-16 PROCEDURE — 93306 TTE W/DOPPLER COMPLETE: CPT | Performed by: INTERNAL MEDICINE

## 2017-06-16 RX ORDER — LIDOCAINE HYDROCHLORIDE 20 MG/ML
INJECTION, SOLUTION INFILTRATION; PERINEURAL AS NEEDED
Status: DISCONTINUED | OUTPATIENT
Start: 2017-06-16 | End: 2017-06-16 | Stop reason: HOSPADM

## 2017-06-16 RX ORDER — MULTIVIT-MIN/IRON/FOLIC ACID/K 18-600-40
2000 CAPSULE ORAL DAILY
Status: DISCONTINUED | OUTPATIENT
Start: 2017-06-16 | End: 2017-06-17 | Stop reason: SDUPTHER

## 2017-06-16 RX ORDER — PANTOPRAZOLE SODIUM 40 MG/1
40 TABLET, DELAYED RELEASE ORAL EVERY MORNING
Status: DISCONTINUED | OUTPATIENT
Start: 2017-06-17 | End: 2017-06-18 | Stop reason: HOSPADM

## 2017-06-16 RX ORDER — DORZOLAMIDE HYDROCHLORIDE AND TIMOLOL MALEATE 20; 5 MG/ML; MG/ML
1 SOLUTION/ DROPS OPHTHALMIC 2 TIMES DAILY
Status: DISCONTINUED | OUTPATIENT
Start: 2017-06-16 | End: 2017-06-18 | Stop reason: HOSPADM

## 2017-06-16 RX ORDER — MESALAMINE 1.2 G/1
1200 TABLET, DELAYED RELEASE ORAL 2 TIMES DAILY
Status: DISCONTINUED | OUTPATIENT
Start: 2017-06-16 | End: 2017-06-18 | Stop reason: HOSPADM

## 2017-06-16 RX ORDER — NITROGLYCERIN 5 MG/ML
INJECTION, SOLUTION INTRAVENOUS AS NEEDED
Status: DISCONTINUED | OUTPATIENT
Start: 2017-06-16 | End: 2017-06-16 | Stop reason: HOSPADM

## 2017-06-16 RX ORDER — FENTANYL CITRATE 50 UG/ML
INJECTION, SOLUTION INTRAMUSCULAR; INTRAVENOUS AS NEEDED
Status: DISCONTINUED | OUTPATIENT
Start: 2017-06-16 | End: 2017-06-16 | Stop reason: HOSPADM

## 2017-06-16 RX ORDER — SODIUM CHLORIDE 0.9 % (FLUSH) 0.9 %
10 SYRINGE (ML) INJECTION AS NEEDED
Status: DISCONTINUED | OUTPATIENT
Start: 2017-06-16 | End: 2017-06-18 | Stop reason: HOSPADM

## 2017-06-16 RX ORDER — MIDAZOLAM HYDROCHLORIDE 1 MG/ML
INJECTION INTRAMUSCULAR; INTRAVENOUS AS NEEDED
Status: DISCONTINUED | OUTPATIENT
Start: 2017-06-16 | End: 2017-06-16 | Stop reason: HOSPADM

## 2017-06-16 RX ORDER — SODIUM CHLORIDE 9 MG/ML
INJECTION, SOLUTION INTRAVENOUS CONTINUOUS PRN
Status: DISCONTINUED | OUTPATIENT
Start: 2017-06-16 | End: 2017-06-16 | Stop reason: HOSPADM

## 2017-06-16 RX ORDER — SODIUM CHLORIDE 0.9 % (FLUSH) 0.9 %
10 SYRINGE (ML) INJECTION AS NEEDED
Status: DISCONTINUED | OUTPATIENT
Start: 2017-06-16 | End: 2017-06-16

## 2017-06-16 RX ORDER — ASPIRIN 81 MG/1
81 TABLET ORAL DAILY
Status: DISCONTINUED | OUTPATIENT
Start: 2017-06-16 | End: 2017-06-18 | Stop reason: HOSPADM

## 2017-06-16 RX ORDER — METFORMIN HYDROCHLORIDE 500 MG/1
1000 TABLET, EXTENDED RELEASE ORAL 2 TIMES DAILY
Status: DISCONTINUED | OUTPATIENT
Start: 2017-06-16 | End: 2017-06-17

## 2017-06-16 RX ORDER — NITROGLYCERIN 0.4 MG/1
0.4 TABLET SUBLINGUAL
Status: DISCONTINUED | OUTPATIENT
Start: 2017-06-16 | End: 2017-06-18 | Stop reason: HOSPADM

## 2017-06-16 RX ORDER — LISINOPRIL 20 MG/1
20 TABLET ORAL
Status: DISCONTINUED | OUTPATIENT
Start: 2017-06-16 | End: 2017-06-18 | Stop reason: HOSPADM

## 2017-06-16 RX ORDER — ATORVASTATIN CALCIUM 20 MG/1
20 TABLET, FILM COATED ORAL DAILY
Status: DISCONTINUED | OUTPATIENT
Start: 2017-06-16 | End: 2017-06-17

## 2017-06-16 RX ORDER — ASPIRIN 325 MG
325 TABLET ORAL ONCE
Status: DISCONTINUED | OUTPATIENT
Start: 2017-06-16 | End: 2017-06-16

## 2017-06-16 RX ORDER — SODIUM CHLORIDE 9 MG/ML
100 INJECTION, SOLUTION INTRAVENOUS CONTINUOUS
Status: ACTIVE | OUTPATIENT
Start: 2017-06-16 | End: 2017-06-16

## 2017-06-16 RX ORDER — DIPHENOXYLATE HYDROCHLORIDE AND ATROPINE SULFATE 2.5; .025 MG/1; MG/1
1 TABLET ORAL DAILY
Status: DISCONTINUED | OUTPATIENT
Start: 2017-06-16 | End: 2017-06-18 | Stop reason: HOSPADM

## 2017-06-16 RX ADMIN — METOPROLOL TARTRATE 25 MG: 25 TABLET ORAL at 20:31

## 2017-06-16 RX ADMIN — ATORVASTATIN CALCIUM 20 MG: 20 TABLET, FILM COATED ORAL at 20:30

## 2017-06-16 RX ADMIN — Medication 1 TABLET: at 17:51

## 2017-06-16 RX ADMIN — SODIUM CHLORIDE 100 ML/HR: 9 INJECTION, SOLUTION INTRAVENOUS at 15:54

## 2017-06-16 RX ADMIN — MESALAMINE 1.2 G: 1.2 TABLET, DELAYED RELEASE ORAL at 17:52

## 2017-06-16 RX ADMIN — NITROGLYCERIN 1 INCH: 20 OINTMENT TOPICAL at 12:00

## 2017-06-16 RX ADMIN — NITROGLYCERIN 0.4 MG: 0.4 TABLET SUBLINGUAL at 11:42

## 2017-06-16 RX ADMIN — DORZOLAMIDE HYDROCHLORIDE AND TIMOLOL MALEATE 1 DROP: 20; 5 SOLUTION/ DROPS OPHTHALMIC at 20:30

## 2017-06-16 RX ADMIN — PERFLUTREN 2 ML: 6.52 INJECTION, SUSPENSION INTRAVENOUS at 18:33

## 2017-06-16 NOTE — NURSING NOTE
"Met with pt, , & son Terry at [facility] on CVI s/p heart cath & Takotsubo diagnosis.  Provided & reviewed [place] [facility] article \"A Different Kind of Heart Attack\".  We discussed stress/stress management.  Pt doesn't feel that she has been stressed recently but her brother-in-law is very ill and she has had several falls with broken bones in the past 6-9 months.  Discussed benefits of cardiac rehab and provided Phase II information packet which includes [facility] Cardiac Rehab Programs handout, [employer] \"What Is Cardiac Rehab?\" information sheet, and two [place] [facility] articles that stress the importance of cardiac rehab after a heart event.  Also provided [facility] booklet \"Understanding Cardiac Rehabilitation.\"  She lives closest to [facility] and will likely do her cardiac rehab here, but she does not wish to make an appointment at this time.  She prefers to look over the information and possibly discuss it with her doctor.   Encouraged to call as soon as possible after discharge to set up her first appointment.  Also encouraged pt to call her insurance company to determine if she has any co-pays or deductibles and to take her discharge instructions (AVS) from this hospitalization to her first cardiac rehab appointment.  Provided my name & phone number if pt/family have any questions later.  "

## 2017-06-16 NOTE — ED PROVIDER NOTES
Subjective   HPI Comments: The patient is a 72-year-old female with a chief complaint of chest pain that started approximately 90 minutes prior to arrival.  She describes it as tightness located in the center of her chest that does not radiate.  She said she was rather nauseated, however she did not vomit.  She also describes shortness of breath.  She has had 2×325 mg of aspirin by mouth.  She was given one sublingual nitroglycerin and ambulance that eased her pain.  He denies any prior history of coronary artery disease, however her family history is positive.      History provided by:  Parent and EMS personnel   used: No        Review of Systems    Past Medical History:   Diagnosis Date   • Diabetes mellitus    • Dizziness    • GERD (gastroesophageal reflux disease)    • Glaucoma    • Hyperlipidemia    • Knee fracture, left        Allergies   Allergen Reactions   • Tetanus Toxoids Swelling     Hand and arm       Past Surgical History:   Procedure Laterality Date   • CATARACT EXTRACTION     • EYE SURGERY      cataract surgery       Family History   Problem Relation Age of Onset   • Heart disease Mother    • Heart disease Father    • Heart disease Sister    • Heart disease Brother    • Hypertension Other    • Diabetes Other      type 2       Social History     Social History   • Marital status:      Spouse name: N/A   • Number of children: N/A   • Years of education: N/A     Occupational History   • retail      retired     Social History Main Topics   • Smoking status: Never Smoker   • Smokeless tobacco: Never Used   • Alcohol use No   • Drug use: No   • Sexual activity: Defer     Other Topics Concern   • None     Social History Narrative           Objective   Physical Exam    Procedures         ED Course  ED Course                  [facility]    Final diagnoses:   None            Tad Villalobos  06/16/17 1114

## 2017-06-16 NOTE — ED PROVIDER NOTES
" EMERGENCY DEPARTMENT ENCOUNTER    CHIEF COMPLAINT  Chief Complaint: Chest Pain  History given by: Patient  History limited by: N/A  Room Number: 14/14  PMD: Esequiel Pacheco MD      HPI:  Pt is a 72 y.o. female who presents complaining of chest pain that started approximately 90 minutes prior to arrival.  She describes it as tightness located in the center of her chest that does not radiate.  She said she was rather nauseated, however she did not vomit.  She also describes shortness of breath.  She has had 2×325 mg of aspirin by mouth PTA.  She was given one sublingual nitroglycerin and ambulance that eased her pain.  He denies any prior history of coronary artery disease, however her family history is strongly positive.    Duration: 90 minutes  Onset: Gradual  Timing: Constant  Location: Chest  Radiation: Does not radiate  Quality: \"tightness\"  Intensity/Severity: Moderate  Progression: Worsening  Associated Symptoms: Nausea and SOA  Aggravating Factors: None  Alleviating Factors: None  Previous Episodes: None  Treatment before arrival: 2 Aspirin 325mg per EMS    PAST MEDICAL HISTORY  Active Ambulatory Problems     Diagnosis Date Noted   • Type 2 diabetes mellitus 01/19/2016   • Osteopenia 01/19/2016   • Hypertension 01/19/2016   • Hyperlipidemia 01/19/2016   • Colon polyp 01/19/2016   • Esophageal reflux 01/19/2016     Resolved Ambulatory Problems     Diagnosis Date Noted   • Non-specific colitis 01/19/2016   • Atopic rhinitis 01/19/2016     Past Medical History:   Diagnosis Date   • Diabetes mellitus    • Dizziness    • GERD (gastroesophageal reflux disease)    • Glaucoma    • Hyperlipidemia    • Knee fracture, left        PAST SURGICAL HISTORY  Past Surgical History:   Procedure Laterality Date   • CATARACT EXTRACTION     • EYE SURGERY      cataract surgery       FAMILY HISTORY  Family History   Problem Relation Age of Onset   • Heart disease Mother    • Heart disease Father    • Heart disease Sister    • Heart " disease Brother    • Hypertension Other    • Diabetes Other      type 2       SOCIAL HISTORY  Social History     Social History   • Marital status:      Spouse name: N/A   • Number of children: N/A   • Years of education: N/A     Occupational History   • retail      retired     Social History Main Topics   • Smoking status: Never Smoker   • Smokeless tobacco: Never Used   • Alcohol use No   • Drug use: No   • Sexual activity: Defer     Other Topics Concern   • Not on file     Social History Narrative       ALLERGIES  Tetanus toxoids    REVIEW OF SYSTEMS  Review of Systems   Constitutional: Negative for chills and fever.   HENT: Negative for congestion and sore throat.    Respiratory: Positive for chest tightness and shortness of breath. Negative for cough.    Cardiovascular: Positive for chest pain.   Gastrointestinal: Positive for nausea. Negative for abdominal pain, diarrhea and vomiting.   Genitourinary: Negative for difficulty urinating and dysuria.   Musculoskeletal: Negative for neck pain.   Skin: Negative for pallor and rash.   Neurological: Negative for weakness, numbness and headaches.   All other systems reviewed and are negative.      PHYSICAL EXAM  ED Triage Vitals   Temp Pulse Resp BP SpO2   -- -- -- -- --             Temp src Heart Rate Source Patient Position BP Location FiO2 (%)   -- -- -- -- --              Physical Exam   Constitutional: She is oriented to person, place, and time and well-developed, well-nourished, and in no distress. No distress.   HENT:   Head: Normocephalic and atraumatic.   Eyes: EOM are normal. Pupils are equal, round, and reactive to light.   Neck: Normal range of motion. Neck supple.   Cardiovascular: Normal rate, regular rhythm and normal heart sounds.    Pulmonary/Chest: Effort normal and breath sounds normal. No respiratory distress.   Abdominal: Soft. There is no tenderness. There is no rebound and no guarding.   Musculoskeletal: Normal range of motion. She  exhibits no edema.   Neurological: She is alert and oriented to person, place, and time. She has normal sensation and normal strength.   Skin: Skin is warm and dry. No rash noted.   Psychiatric: Mood and affect normal.   Nursing note and vitals reviewed.      LAB RESULTS  Lab Results (last 24 hours)     Procedure Component Value Units Date/Time    CBC & Differential [[id number]] Collected:  06/16/17 1116    Specimen:  Blood Updated:  06/16/17 1132    Narrative:       The following orders were created for panel order CBC & Differential.  Procedure                               Abnormality         Status                     ---------                               -----------         ------                     CBC Auto Differential[[id number]]        Abnormal            Final result                 Please view results for these tests on the individual orders.    Comprehensive Metabolic Panel [[id number]]  (Abnormal) Collected:  06/16/17 1116    Specimen:  Blood Updated:  06/16/17 1151     Glucose 194 (H) mg/dL      BUN 11 mg/dL      Creatinine 0.84 mg/dL      Sodium 136 mmol/L      Potassium 4.5 mmol/L      Chloride 99 mmol/L      CO2 21.8 (L) mmol/L      Calcium 9.2 mg/dL      Total Protein 7.3 g/dL      Albumin 4.10 g/dL      ALT (SGPT) 12 U/L      AST (SGOT) 12 U/L      Alkaline Phosphatase 55 U/L      Total Bilirubin 0.2 mg/dL      eGFR Non African Amer 66 mL/min/1.73      Globulin 3.2 gm/dL      A/G Ratio 1.3 g/dL      BUN/Creatinine Ratio 13.1     Anion Gap 15.2 mmol/L     Narrative:       The MDRD GFR formula is only valid for adults with stable renal function between ages 18 and 70.    Troponin [[id number]]  (Abnormal) Collected:  06/16/17 1116    Specimen:  Blood Updated:  06/16/17 1153     Troponin T 0.123 (C) ng/mL     Narrative:       Troponin T Reference Ranges:  Less than 0.03 ng/mL:    Negative for AMI  0.03 to 0.09 ng/mL:      Indeterminant for AMI  Greater than 0.09 ng/mL: Positive for AMI    CBC Auto  Differential [[id number]]  (Abnormal) Collected:  06/16/17 1116    Specimen:  Blood Updated:  06/16/17 1132     WBC 6.12 10*3/mm3      RBC 3.57 (L) 10*6/mm3      Hemoglobin 10.6 (L) g/dL      Hematocrit 33.7 (L) %      MCV 94.4 fL      MCH 29.7 pg      MCHC 31.5 (L) g/dL      RDW 13.1 (H) %      RDW-SD 45.4 fl      MPV 9.9 fL      Platelets 227 10*3/mm3      Neutrophil % 62.5 %      Lymphocyte % 30.6 %      Monocyte % 6.2 %      Eosinophil % 0.2 (L) %      Basophil % 0.2 %      Immature Grans % 0.3 %      Neutrophils, Absolute 3.83 10*3/mm3      Lymphocytes, Absolute 1.87 10*3/mm3      Monocytes, Absolute 0.38 10*3/mm3      Eosinophils, Absolute 0.01 10*3/mm3      Basophils, Absolute 0.01 10*3/mm3      Immature Grans, Absolute 0.02 10*3/mm3     Protime-INR [[id number]]  (Normal) Collected:  06/16/17 1116    Specimen:  Blood Updated:  06/16/17 1143     Protime 12.6 Seconds      INR 0.98          I ordered the above labs and reviewed the results.    RADIOLOGY  XR Chest 1 View    (Results Pending)      CXR - Shows NAD. Interpreted by the radiologist and reviewed by me.    I ordered the above noted radiological studies. Interpreted by radiologist. Reviewed by me in PACS.     EMS EKG (pre nitro)   EKG time: 1032  Rhythm/Rate: NSR at 75 bpm  P waves and WI: Normal  QRS, axis: Normal  ST and T waves: ST depression in lead 3 and AVF with minimal ST elevation in lead AVL  Interpreted contemporaneously by me and independently viewed.    ER EKG         EKG time: 1100  Rhythm/Rate: NSR at 61 bpm  P waves and WI: Normal  QRS, axis: Normal  ST and T waves: Normal  Interpreted contemporaneously by me and independently viewed.  Changed compared to prior (6/16/2017, today).        PROCEDURES  Procedures      PROGRESS AND CONSULTS  ED Course     11:00 AM:  Vitals: BP: 124/69 HR: 61 Temp: 97.9 °F (36.6 °C) (Tympanic) O2 sat: 100%  D/w pt plan for sublingual nitro, Aspirin, CXR, EKG, and labs for further evaluation. Will consult  cardio.    Time 1115  Discussed case with Dr. Vale  Reviewed history, exam, results and treatments. Discussed concerns and plan of care. Dr. Vale agrees to consult the pt.    Time 1208  Discussed case with Dr. Vale  Reviewed history, exam, results and treatments. Discussed concerns and plan of care. Updated him on the pt's elevated troponin. Dr. Vale accepts pt to be admitted to telemetry.    12:10 PM:  Vitals: BP: 110/70 HR: 58 Temp:   O2 sat: 100%  Rechecked pt. Pt is resting comfortably. Discussed with pt test results showing an elevated troponin and plan for a cardiac stent. Discussed with pt plan for admission. Pt understands and agrees with the plan, all questions answered.      MEDICAL DECISION MAKING  Results were reviewed/discussed with the patient and they were also made aware of online access. Pt also made aware that some labs, such as cultures, will not be resulted during ER visit and follow up with PMD is necessary.     MDM  Number of Diagnoses or Management Options     Amount and/or Complexity of Data Reviewed  Clinical lab tests: ordered and reviewed  Tests in the radiology section of CPT®: ordered and reviewed  Tests in the medicine section of CPT®: reviewed and ordered  Decide to obtain previous medical records or to obtain history from someone other than the patient: yes  Review and summarize past medical records: yes (The EMS EKG shows ST depression in lead 3 and AF and elevation in AVL. Pt's current EKG looks normal.)           DIAGNOSIS  Final diagnoses:   Non-STEMI (non-ST elevated myocardial infarction)       DISPOSITION  12:08 PM - Pt will be admitted by Dr. Vale to a telemetry bed. Pt is stable at this time.     Latest Documented Vital Signs:  As of 12:19 PM  BP- 124/69 HR- 61 Temp- 97.9 °F (36.6 °C) (Tympanic) O2 sat- 100%    --  Documentation assistance provided by jagruti Villalobos for Enrique Sher MD.  Information recorded by the jagruti was done at my direction and  has been verified and validated by me.       Tad Villalobos  06/16/17 1220       Enrique Sher MD  06/16/17 1955

## 2017-06-16 NOTE — H&P
Patient Name: Renee PARIKH From  :1945  72 y.o.    Date of Admission: 2017  Date of Consultation:  17  Encounter Provider: Abhay Wooten MD  Place of Service: [facility] CARDIOLOGY  Referring Provider: Wil Vale MD  Patient Care Team:  Esequiel Pacheco MD as PCP - General  Lisette Woods MD as PCP - Claims Attributed  Andre Adams MD as Consulting Physician (Gastroenterology)      Chief complaint:  angina    History of Present Illness:     The patient is a 72 year old woman with history of DM.  She has HTN, hyperlipidemia, and a family history of CAD.  She now presents with one day of intermittent angina associated with nausea.    She now has less than 1/10 pain.    Echo 12  Conclusions:    There is normal left ventricular systolic function.    There is mild to moderate mitral regurgitation observed.    There is moderate tricuspid regurgitation.    There is mild pulmonic regurgitation present.    There is evidence of borderline pulmonary hypertension.    Past Medical History:   Diagnosis Date   • Diabetes mellitus    • Dizziness    • GERD (gastroesophageal reflux disease)    • Glaucoma    • Hyperlipidemia    • Knee fracture, left        Past Surgical History:   Procedure Laterality Date   • CATARACT EXTRACTION     • EYE SURGERY      cataract surgery         Prior to Admission medications    Medication Sig Start Date End Date Taking? Authorizing Provider   Acetaminophen (TYLENOL PO) Take  by mouth As Needed.    Historical Provider, MD   aspirin 81 MG EC tablet Take 81 mg by mouth daily. 14   Esequiel Pacheco MD   Cholecalciferol (VITAMIN D) 2000 UNITS capsule Take 1 capsule by mouth daily. 13   Historical Provider, MD   dorzolamide-timolol (COSOPT) 22.3-6.8 MG/ML ophthalmic solution Apply 1 drop to eye 2 (two) times a day. 13   Historical Provider, MD   glucose blood (MAIA CONTOUR NEXT TEST) test strip Check BS once daily 2/15/17   Esequiel Pacheco MD  "  lisinopril (PRINIVIL,ZESTRIL) 20 MG tablet TAKE 1 TABLET BY MOUTH EVERY DAY 3/29/17   Esequiel Pacheco MD   mesalamine (LIALDA) 1.2 G EC tablet Take 1,200 mg by mouth 2 (two) times a day. 8/25/15   Historical Provider, MD   metFORMIN ER (GLUCOPHAGE-XR) 500 MG 24 hr tablet Take 2 tablets by mouth 2 (Two) Times a Day. 2/28/17   Esequiel Pacheco MD   Multiple Vitamin (MULTI-VITAMIN PO) Take  by mouth.    Historical Provider, MD   omeprazole (PriLOSEC) 40 MG capsule Take 1 capsule by mouth daily. 8/20/15   Esequiel Pacheco MD   simvastatin (ZOCOR) 40 MG tablet TAKE 1 TABLET BY MOUTH EVERY NIGHT AT BEDTIME 3/31/17   Esequiel Pacheco MD       Allergies   Allergen Reactions   • Tetanus Toxoids Swelling     Hand and arm       Social History     Social History   • Marital status:      Spouse name: N/A   • Number of children: N/A   • Years of education: N/A     Occupational History   • retail      retired     Social History Main Topics   • Smoking status: Never Smoker   • Smokeless tobacco: Never Used   • Alcohol use No   • Drug use: No   • Sexual activity: Defer     Other Topics Concern   • None     Social History Narrative       Family History   Problem Relation Age of Onset   • Heart disease Mother    • Heart disease Father    • Heart disease Sister    • Heart disease Brother    • Hypertension Other    • Diabetes Other      type 2       REVIEW OF SYSTEMS:   All systems reviewed.  Pertinent positives identified in HPI.  All other systems are negative.      Objective:     Vitals:    06/16/17 1152 06/16/17 1208 06/16/17 1213 06/16/17 1253   BP: 110/70 124/69  116/71   BP Location:    Right arm   Patient Position:  Lying  Lying   Pulse:  61  76   Resp:    20   Temp:   97.9 °F (36.6 °C) 97.5 °F (36.4 °C)   TempSrc:   Tympanic Oral   SpO2: 100% 100%  100%   Weight:    126 lb 3.2 oz (57.2 kg)   Height:    62\" (157.5 cm)     Body mass index is 23.08 kg/(m^2).    General Appearance:    Alert, cooperative, in no acute distress   Head:    " Normocephalic, without obvious abnormality, atraumatic   Eyes:            Lids and lashes normal, conjunctivae and sclerae normal, no   icterus, no pallor, corneas clear, PERRLA   Ears:    Ears appear intact with no abnormalities noted   Throat:   No oral lesions, no thrush, oral mucosa moist   Neck:   No adenopathy, supple, trachea midline, no thyromegaly, no   carotid bruit, no JVD   Back:     No kyphosis present, no scoliosis present, no skin lesions, erythema or scars, no tenderness to percussion or palpation, range of motion normal   Lungs:     Clear to auscultation,respirations regular, even and unlabored    Heart:    Regular rhythm and normal rate, normal S1 and S2, no murmur, no gallop, no rub, no click   Chest Wall:    No abnormalities observed   Abdomen:     Normal bowel sounds, no masses, no organomegaly, soft        non-tender, non-distended, no guarding, no rebound  tenderness   Extremities:   Moves all extremities well, no edema, no cyanosis, no redness   Pulses:   Pulses palpable and equal bilaterally. Normal radial, carotid, femoral, dorsalis pedis and posterior tibial pulses bilaterally. Normal abdominal aorta   Skin:  Psychiatric:   No bleeding, bruising or rash    Alert and oriented x 3, normal mood and affect   Lab Review:       Results from last 7 days  Lab Units 06/16/17  1116   SODIUM mmol/L 136   POTASSIUM mmol/L 4.5   CHLORIDE mmol/L 99   TOTAL CO2 mmol/L 21.8*   BUN mg/dL 11   CREATININE mg/dL 0.84   CALCIUM mg/dL 9.2   BILIRUBIN mg/dL 0.2   ALK PHOS U/L 55   ALT (SGPT) U/L 12   AST (SGOT) U/L 12   GLUCOSE mg/dL 194*       Results from last 7 days  Lab Units 06/16/17  1116   TROPONIN T ng/mL 0.123*       Results from last 7 days  Lab Units 06/16/17  1116   WBC 10*3/mm3 6.12   HEMOGLOBIN g/dL 10.6*   HEMATOCRIT % 33.7*   PLATELETS 10*3/mm3 227        Results from last 7 days  Lab Units 06/16/17  1116   INR  0.98                       I personally viewed and interpreted the patient's  EKG/Telemetry data.        Assessment and Plan:       Unstable angina/NSTEMI.  Positive troponin.  EKG shows inferolateral dynamic ST/T wave abnormalities.  I would recommend cath.    Abhay Wooten MD  06/16/17  1:24 PM

## 2017-06-17 LAB
ANION GAP SERPL CALCULATED.3IONS-SCNC: 13.4 MMOL/L
BUN BLD-MCNC: 9 MG/DL (ref 8–23)
BUN/CREAT SERPL: 14.1 (ref 7–25)
CALCIUM SPEC-SCNC: 8.8 MG/DL (ref 8.6–10.5)
CHLORIDE SERPL-SCNC: 104 MMOL/L (ref 98–107)
CO2 SERPL-SCNC: 22.6 MMOL/L (ref 22–29)
CREAT BLD-MCNC: 0.64 MG/DL (ref 0.57–1)
DEPRECATED RDW RBC AUTO: 45.5 FL (ref 37–54)
ERYTHROCYTE [DISTWIDTH] IN BLOOD BY AUTOMATED COUNT: 13.3 % (ref 11.7–13)
GFR SERPL CREATININE-BSD FRML MDRD: 91 ML/MIN/1.73
GLUCOSE BLD-MCNC: 147 MG/DL (ref 65–99)
GLUCOSE BLDC GLUCOMTR-MCNC: 185 MG/DL (ref 70–130)
HCT VFR BLD AUTO: 31.4 % (ref 35.6–45.5)
HGB BLD-MCNC: 10.1 G/DL (ref 11.9–15.5)
MCH RBC QN AUTO: 30.1 PG (ref 26.9–32)
MCHC RBC AUTO-ENTMCNC: 32.2 G/DL (ref 32.4–36.3)
MCV RBC AUTO: 93.7 FL (ref 80.5–98.2)
PLATELET # BLD AUTO: 194 10*3/MM3 (ref 140–500)
PMV BLD AUTO: 9.9 FL (ref 6–12)
POTASSIUM BLD-SCNC: 4.2 MMOL/L (ref 3.5–5.2)
RBC # BLD AUTO: 3.35 10*6/MM3 (ref 3.9–5.2)
SODIUM BLD-SCNC: 140 MMOL/L (ref 136–145)
WBC NRBC COR # BLD: 7.41 10*3/MM3 (ref 4.5–10.7)

## 2017-06-17 PROCEDURE — 80048 BASIC METABOLIC PNL TOTAL CA: CPT | Performed by: INTERNAL MEDICINE

## 2017-06-17 PROCEDURE — 82962 GLUCOSE BLOOD TEST: CPT

## 2017-06-17 PROCEDURE — 93010 ELECTROCARDIOGRAM REPORT: CPT | Performed by: INTERNAL MEDICINE

## 2017-06-17 PROCEDURE — 99232 SBSQ HOSP IP/OBS MODERATE 35: CPT | Performed by: INTERNAL MEDICINE

## 2017-06-17 PROCEDURE — 85027 COMPLETE CBC AUTOMATED: CPT | Performed by: INTERNAL MEDICINE

## 2017-06-17 PROCEDURE — 93005 ELECTROCARDIOGRAM TRACING: CPT | Performed by: INTERNAL MEDICINE

## 2017-06-17 RX ORDER — MELATONIN
2000 DAILY
Status: DISCONTINUED | OUTPATIENT
Start: 2017-06-17 | End: 2017-06-18 | Stop reason: HOSPADM

## 2017-06-17 RX ORDER — METOPROLOL TARTRATE 50 MG/1
50 TABLET, FILM COATED ORAL EVERY 12 HOURS SCHEDULED
Status: DISCONTINUED | OUTPATIENT
Start: 2017-06-17 | End: 2017-06-18 | Stop reason: HOSPADM

## 2017-06-17 RX ORDER — ATORVASTATIN CALCIUM 20 MG/1
20 TABLET, FILM COATED ORAL NIGHTLY
Status: DISCONTINUED | OUTPATIENT
Start: 2017-06-17 | End: 2017-06-18 | Stop reason: HOSPADM

## 2017-06-17 RX ADMIN — Medication 1 TABLET: at 08:53

## 2017-06-17 RX ADMIN — MESALAMINE 1.2 G: 1.2 TABLET, DELAYED RELEASE ORAL at 08:53

## 2017-06-17 RX ADMIN — DORZOLAMIDE HYDROCHLORIDE AND TIMOLOL MALEATE 1 DROP: 20; 5 SOLUTION/ DROPS OPHTHALMIC at 08:53

## 2017-06-17 RX ADMIN — VITAMIN D, TAB 1000IU (100/BT) 2000 UNITS: 25 TAB at 08:53

## 2017-06-17 RX ADMIN — PANTOPRAZOLE SODIUM 40 MG: 40 TABLET, DELAYED RELEASE ORAL at 06:14

## 2017-06-17 RX ADMIN — ATORVASTATIN CALCIUM 20 MG: 20 TABLET, FILM COATED ORAL at 20:20

## 2017-06-17 RX ADMIN — MESALAMINE 1.2 G: 1.2 TABLET, DELAYED RELEASE ORAL at 17:51

## 2017-06-17 RX ADMIN — ASPIRIN 81 MG: 81 TABLET ORAL at 08:53

## 2017-06-17 RX ADMIN — LISINOPRIL 20 MG: 20 TABLET ORAL at 08:54

## 2017-06-17 RX ADMIN — DORZOLAMIDE HYDROCHLORIDE AND TIMOLOL MALEATE 1 DROP: 20; 5 SOLUTION/ DROPS OPHTHALMIC at 17:51

## 2017-06-17 RX ADMIN — METOPROLOL TARTRATE 25 MG: 25 TABLET ORAL at 08:54

## 2017-06-17 NOTE — PROGRESS NOTES
Hospital Follow Up    LOS:  LOS: 1 day   Patient Name: Renee Stevenson  Age/Sex: 72 y.o. female  : 1945  MRN: [id number]    Day of Service: 17   Length of Stay: 1  Encounter Provider: Wil Vale MD  Place of Service: [facility] CARDIOLOGY  Patient Care Team:  Esequiel Pacheco MD as PCP - General  R Ministerio Garibay MD as PCP - Claims Attributed  Andre Adams MD as Consulting Physician (Gastroenterology)    Subjective:     Chief Complaint:Takotsubo Cardiomyopathy.    Interval History: Patient doing well today catheter site looks great.    Objective:     Objective:  Temp:  [97.2 °F (36.2 °C)-98 °F (36.7 °C)] 97.6 °F (36.4 °C)  Heart Rate:  [55-76] 74  Resp:  [16-20] 18  BP: ()/(51-71) 114/68     Intake/Output Summary (Last 24 hours) at 17 0905  Last data filed at 17 1409   Gross per 24 hour   Intake              100 ml   Output                0 ml   Net              100 ml     Body mass index is 23.08 kg/(m^2).  Last 3 weights    17  1120 17  1253   Weight: 127 lb (57.6 kg) 126 lb 3.2 oz (57.2 kg)     Weight change:       Physical Exam:   General : Alert, cooperative, in no acute distress.  Neuro: alert,cooperative and oriented  Lungs: CTAB. Normal respiratory effort and rate.  CV:: Regular rate and rhythm, normal S1 and S2, no murmurs, gallops or rubs.  ABD: Soft, nontender, non-distended. positive bowel sounds  Extr: No edema or cyanosis, moves all extremities    Lab Review:     Results from last 7 days  Lab Units 17  0447 17  1116   SODIUM mmol/L 140 136   POTASSIUM mmol/L 4.2 4.5   CHLORIDE mmol/L 104 99   TOTAL CO2 mmol/L 22.6 21.8*   BUN mg/dL 9 11   CREATININE mg/dL 0.64 0.84   GLUCOSE mg/dL 147* 194*   CALCIUM mg/dL 8.8 9.2   AST (SGOT) U/L  --  12   ALT (SGPT) U/L  --  12       Results from last 7 days  Lab Units 17  1116   TROPONIN T ng/mL 0.123*       Results from last 7 days  Lab Units 17  0447 17  1116    WBC 10*3/mm3 7.41 6.12   HEMOGLOBIN g/dL 10.1* 10.6*   HEMATOCRIT % 31.4* 33.7*   PLATELETS 10*3/mm3 194 227       Results from last 7 days  Lab Units 06/16/17  1116   INR  0.98                 Current Medications:   Scheduled Meds:  aspirin 81 mg Oral Daily   atorvastatin 20 mg Oral Daily   cholecalciferol 2,000 Units Oral Daily   dorzolamide-timolol 1 drop Both Eyes BID   lisinopril 20 mg Oral Q24H   mesalamine 1,200 mg Oral BID   metFORMIN ER 1,000 mg Oral BID   metoprolol tartrate 25 mg Oral Q12H   multivitamin 1 tablet Oral Daily   pantoprazole 40 mg Oral QAM     Continuous Infusions:     Allergies:  Allergies   Allergen Reactions   • Tetanus Toxoids Swelling     Hand and arm       Assessment:     Active Problems:    Non-STEMI (non-ST elevated myocardial infarction)        Plan:      1. Takotsubo Cardiomyopathy.  Clinically patient's doing okay this morning.  I am going to increase her beta blocker some more.  We'll have her ambulate follow heart rates if doing well plan on discharge tomorrow.  She has had 2 family members die in the last year and currently her brother-in-law who is very ill in [place].  This is the only stressors she can take up off hand    Wil Vale MD  06/17/17  9:05 AM

## 2017-06-17 NOTE — PLAN OF CARE
Problem: Patient Care Overview (Adult)  Goal: Plan of Care Review  Outcome: Ongoing (interventions implemented as appropriate)    06/17/17 1619   Coping/Psychosocial Response Interventions   Plan Of Care Reviewed With patient   Patient Care Overview   Progress improving   Outcome Evaluation   Outcome Summary/Follow up Plan s/p heart cath yesterday. dx with Takotsubo. Beta blocker increased today, pt tolerating well. No c/o noted. Will continue to monitor/       Goal: Adult Individualization and Mutuality  Outcome: Ongoing (interventions implemented as appropriate)  Goal: Discharge Needs Assessment  Outcome: Ongoing (interventions implemented as appropriate)    06/16/17 1645 06/16/17 1648   Living Environment   Transportation Available --  car   Self-Care   Equipment Currently Used at Home none --          Problem: Acute Coronary Syndrome (ACS) (Adult)  Goal: Signs and Symptoms of Listed Potential Problems Will be Absent or Manageable (Acute Coronary Syndrome)  Outcome: Ongoing (interventions implemented as appropriate)    06/17/17 1619   Acute Coronary Syndrome (ACS)   Problems Assessed (Acute Coronary Syndrome (ACS)) all   Problems Present (Acute Coronary Syndrome (ACS)) none

## 2017-06-18 VITALS
RESPIRATION RATE: 18 BRPM | WEIGHT: 126.2 LBS | BODY MASS INDEX: 23.22 KG/M2 | HEART RATE: 60 BPM | HEIGHT: 62 IN | SYSTOLIC BLOOD PRESSURE: 140 MMHG | TEMPERATURE: 97.9 F | DIASTOLIC BLOOD PRESSURE: 78 MMHG | OXYGEN SATURATION: 100 %

## 2017-06-18 PROCEDURE — 99238 HOSP IP/OBS DSCHRG MGMT 30/<: CPT | Performed by: INTERNAL MEDICINE

## 2017-06-18 RX ORDER — METOPROLOL TARTRATE 50 MG/1
50 TABLET, FILM COATED ORAL EVERY 12 HOURS SCHEDULED
Qty: 60 TABLET | Refills: 11 | Status: SHIPPED | OUTPATIENT
Start: 2017-06-18 | End: 2017-06-23 | Stop reason: SDUPTHER

## 2017-06-18 RX ADMIN — Medication 1 TABLET: at 08:45

## 2017-06-18 RX ADMIN — LISINOPRIL 20 MG: 20 TABLET ORAL at 08:45

## 2017-06-18 RX ADMIN — MESALAMINE 1.2 G: 1.2 TABLET, DELAYED RELEASE ORAL at 08:45

## 2017-06-18 RX ADMIN — METOPROLOL TARTRATE 50 MG: 50 TABLET ORAL at 08:45

## 2017-06-18 RX ADMIN — ASPIRIN 81 MG: 81 TABLET ORAL at 08:45

## 2017-06-18 RX ADMIN — DORZOLAMIDE HYDROCHLORIDE AND TIMOLOL MALEATE 1 DROP: 20; 5 SOLUTION/ DROPS OPHTHALMIC at 08:46

## 2017-06-18 RX ADMIN — VITAMIN D, TAB 1000IU (100/BT) 2000 UNITS: 25 TAB at 08:45

## 2017-06-18 RX ADMIN — PANTOPRAZOLE SODIUM 40 MG: 40 TABLET, DELAYED RELEASE ORAL at 06:28

## 2017-06-18 NOTE — DISCHARGE SUMMARY
Hospital Discharge    Patient Name: Renee Stevenson  Age/Sex: 72 y.o. female  : 1945  MRN: [id number]    Encounter Provider: Wil Vale MD  Referring Provider: Wil Vale MD  Place of Service: [facility] CARDIOLOGY  Patient Care Team:  Esequiel Pacheco MD as PCP - General  R Ministerio Garibay MD as PCP - Claims Attributed  Andre Adams MD as Consulting Physician (Gastroenterology)         Date of Discharge:  2017   Date of Admit: 2017    Discharge Condition: Good  Discharge Diagnosis:  Active Problems:    Non-STEMI (non-ST elevated myocardial infarction)      Hospital Course:   Renee Stevenson is a 72 y.o. female who presented with Elevated troponin and unstable angina.  Ultimately she was diagnosed with Takosubo cardiomyopathy.  Patient was treated with a beta blocker she rapidly defervesced.  She ambulate with no issues and was discharged home.     Objective:  Temp:  [97.6 °F (36.4 °C)-98.1 °F (36.7 °C)] 97.9 °F (36.6 °C)  Heart Rate:  [54-60] 60  Resp:  [16-20] 18  BP: ()/(53-78) 140/78    Intake/Output Summary (Last 24 hours) at 17 0929  Last data filed at 17 1756   Gross per 24 hour   Intake              480 ml   Output                0 ml   Net              480 ml     Body mass index is 23.08 kg/(m^2).  Last 3 weights    17  1120 17  1253   Weight: 127 lb (57.6 kg) 126 lb 3.2 oz (57.2 kg)     Weight change:     Physical Exam:  CV regular rate and rhythm  Lungs clear  Abdomen soft  Extremities cath site.  No edema    Procedures Performed  Procedure(s):  Left Heart Cath  Left ventriculography  Coronary angiography       Consults:  Consults     Date and Time Order Name Status Description    2017 1158 LCG (on-call MD unless specified) Completed     2017 1107 LCG (on-call MD unless specified) Completed           Pertinent Test Results:    Results from last 7 days  Lab Units 17  0447 17  1116   SODIUM mmol/L 140  136   POTASSIUM mmol/L 4.2 4.5   CHLORIDE mmol/L 104 99   TOTAL CO2 mmol/L 22.6 21.8*   BUN mg/dL 9 11   CREATININE mg/dL 0.64 0.84   GLUCOSE mg/dL 147* 194*   CALCIUM mg/dL 8.8 9.2   AST (SGOT) U/L  --  12   ALT (SGPT) U/L  --  12       Results from last 7 days  Lab Units 06/16/17  1116   TROPONIN T ng/mL 0.123*       Results from last 7 days  Lab Units 06/17/17  0447 06/16/17  1116   WBC 10*3/mm3 7.41 6.12   HEMOGLOBIN g/dL 10.1* 10.6*   HEMATOCRIT % 31.4* 33.7*   PLATELETS 10*3/mm3 194 227       Results from last 7 days  Lab Units 06/16/17  1116   INR  0.98                       Discharge Medications   FromRenee   [place] Medication Instructions DARRELL:[id number]    Printed on:06/18/17 2330   Medication Information                      Acetaminophen (TYLENOL PO)  Take  by mouth As Needed.             aspirin 81 MG EC tablet  Take 81 mg by mouth daily.             Cholecalciferol (VITAMIN D) 2000 UNITS capsule  Take 1 capsule by mouth daily.             dorzolamide-timolol (COSOPT) 22.3-6.8 MG/ML ophthalmic solution  Apply 1 drop to eye 2 (two) times a day.             glucose blood (MAIA CONTOUR NEXT TEST) test strip  Check BS once daily             lisinopril (PRINIVIL,ZESTRIL) 20 MG tablet  TAKE 1 TABLET BY MOUTH EVERY DAY             mesalamine (LIALDA) 1.2 G EC tablet  Take 1,200 mg by mouth 2 (two) times a day.             metFORMIN ER (GLUCOPHAGE-XR) 500 MG 24 hr tablet  Take 2 tablets by mouth 2 (Two) Times a Day.             metoprolol tartrate (LOPRESSOR) 50 MG tablet  Take 1 tablet by mouth Every 12 (Twelve) Hours.             Multiple Vitamin (MULTI-VITAMIN PO)  Take  by mouth.             omeprazole (PriLOSEC) 40 MG capsule  Take 1 capsule by mouth daily.             simvastatin (ZOCOR) 40 MG tablet  TAKE 1 TABLET BY MOUTH EVERY NIGHT AT BEDTIME                 Discharge Diet:         Dietary Orders            Start     Ordered    06/16/17 1523  Diet Regular; Cardiac  Diet Effective Now     Question  Answer Comment   Diet Texture / Consistency Regular    Common Modifiers Cardiac        06/16/17 1522          Activity at Discharge:    Activity Instructions     Return to normal activity slowly  You may shower  No submerging site in any water for 7 - 10 days  No lifting anything heavier than 10 lbs for 7 - 10 days  Watch for signs and symptoms of bleeding  Watch for signs and symptoms of infection  Return to ER for any reoccurring symptoms                Discharge disposition: home     Discharge Instructions and Follow ups:  Future Appointments  Date Time Provider Department Center   6/30/2017 4:10 PM MD AVERY [facility] None   8/16/2017 9:20 AM MD AVERY [facility] None     Follow-up Information     Follow up with Esequiel Pacheco MD .    Specialty:  Internal Medicine    Contact information:    [address] [address]  [place] [address]  [phone]          Follow up with PEE Cruz. Schedule an appointment as soon as possible for a visit in 1 week(s).    Specialty:  Cardiology    Contact information:    [address] [address]  [place] [address]  [phone]          Follow up with Richard Larose MD. Schedule an appointment as soon as possible for a visit in 4 week(s).    Specialty:  Cardiology    Contact information:    [address] [facility] [address]  [place] [address]  [phone]            Test Results Pending at Discharge:      Wil Vale MD  06/18/17  9:29 AM    Time: 25    EMR Dragon/Transcription disclaimer:   Much of this encounter note is an electronic transcription/translation of spoken language to printed text. The electronic translation of spoken language may permit erroneous, or at times, nonsensical words or phrases to be inadvertently transcribed; Although I have reviewed the note for such errors, some may still exist.

## 2017-06-18 NOTE — DISCHARGE INSTR - ACTIVITY
Return to normal activity slowly  You may shower  No submerging site in any water for 7 - 10 days  No lifting anything heavier than 10 lbs for 7 - 10 days  Watch for signs and symptoms of bleeding  Watch for signs and symptoms of infection  Return to ER for any reoccurring symptoms

## 2017-06-18 NOTE — PLAN OF CARE
Problem: Patient Care Overview (Adult)  Goal: Plan of Care Review  Outcome: Ongoing (interventions implemented as appropriate)  Goal: Adult Individualization and Mutuality  Outcome: Ongoing (interventions implemented as appropriate)  Goal: Discharge Needs Assessment  Outcome: Ongoing (interventions implemented as appropriate)    Problem: Cardiac Catheterization with/without PCI (Adult)  Goal: Signs and Symptoms of Listed Potential Problems Will be Absent or Manageable (Cardiac Catheterization with/without PCI)  Outcome: Outcome(s) achieved Date Met:  06/17/17    Problem: Acute Coronary Syndrome (ACS) (Adult)  Goal: Signs and Symptoms of Listed Potential Problems Will be Absent or Manageable (Acute Coronary Syndrome)  Outcome: Ongoing (interventions implemented as appropriate)

## 2017-06-22 ENCOUNTER — OFFICE VISIT (OUTPATIENT)
Dept: CARDIOLOGY | Facility: CLINIC | Age: 72
End: 2017-06-22

## 2017-06-22 VITALS
HEART RATE: 60 BPM | WEIGHT: 127 LBS | BODY MASS INDEX: 23.37 KG/M2 | DIASTOLIC BLOOD PRESSURE: 70 MMHG | HEIGHT: 62 IN | SYSTOLIC BLOOD PRESSURE: 128 MMHG | OXYGEN SATURATION: 99 %

## 2017-06-22 DIAGNOSIS — I25.10 CORONARY ARTERY DISEASE INVOLVING NATIVE CORONARY ARTERY OF NATIVE HEART WITHOUT ANGINA PECTORIS: ICD-10-CM

## 2017-06-22 DIAGNOSIS — I10 ESSENTIAL HYPERTENSION: Chronic | ICD-10-CM

## 2017-06-22 DIAGNOSIS — E78.2 MIXED HYPERLIPIDEMIA: Primary | Chronic | ICD-10-CM

## 2017-06-22 DIAGNOSIS — I51.81 TAKOTSUBO CARDIOMYOPATHY: ICD-10-CM

## 2017-06-22 PROBLEM — H40.9 GLAUCOMA: Status: ACTIVE | Noted: 2017-06-22

## 2017-06-22 PROBLEM — E03.9 HYPOTHYROIDISM: Status: ACTIVE | Noted: 2017-06-22

## 2017-06-22 PROBLEM — I21.4 NON-STEMI (NON-ST ELEVATED MYOCARDIAL INFARCTION): Status: RESOLVED | Noted: 2017-06-16 | Resolved: 2017-06-22

## 2017-06-22 PROCEDURE — 99214 OFFICE O/P EST MOD 30 MIN: CPT | Performed by: PHYSICIAN ASSISTANT

## 2017-06-22 PROCEDURE — 93000 ELECTROCARDIOGRAM COMPLETE: CPT | Performed by: PHYSICIAN ASSISTANT

## 2017-06-22 RX ORDER — GLIMEPIRIDE 2 MG/1
1 TABLET ORAL 2 TIMES DAILY
COMMUNITY
End: 2018-10-02 | Stop reason: SDUPTHER

## 2017-06-23 ENCOUNTER — OFFICE VISIT (OUTPATIENT)
Dept: INTERNAL MEDICINE | Age: 72
End: 2017-06-23

## 2017-06-23 VITALS
SYSTOLIC BLOOD PRESSURE: 120 MMHG | DIASTOLIC BLOOD PRESSURE: 63 MMHG | OXYGEN SATURATION: 99 % | BODY MASS INDEX: 23.37 KG/M2 | WEIGHT: 127 LBS | TEMPERATURE: 97.5 F | HEIGHT: 62 IN | HEART RATE: 63 BPM

## 2017-06-23 DIAGNOSIS — E78.2 MIXED HYPERLIPIDEMIA: Chronic | ICD-10-CM

## 2017-06-23 DIAGNOSIS — I51.81 TAKOTSUBO CARDIOMYOPATHY: Primary | Chronic | ICD-10-CM

## 2017-06-23 DIAGNOSIS — E11.59 TYPE 2 DIABETES MELLITUS WITH OTHER CIRCULATORY COMPLICATION, WITHOUT LONG-TERM CURRENT USE OF INSULIN (HCC): Chronic | ICD-10-CM

## 2017-06-23 DIAGNOSIS — I10 ESSENTIAL HYPERTENSION: Chronic | ICD-10-CM

## 2017-06-23 DIAGNOSIS — I21.4 NON-STEMI (NON-ST ELEVATED MYOCARDIAL INFARCTION) (HCC): ICD-10-CM

## 2017-06-23 PROBLEM — I25.2 HISTORY OF NON-ST ELEVATION MYOCARDIAL INFARCTION (NSTEMI): Chronic | Status: ACTIVE | Noted: 2017-06-16

## 2017-06-23 PROBLEM — I25.10 CORONARY ARTERY DISEASE INVOLVING NATIVE CORONARY ARTERY OF NATIVE HEART WITHOUT ANGINA PECTORIS: Chronic | Status: ACTIVE | Noted: 2017-06-23

## 2017-06-23 PROCEDURE — 99214 OFFICE O/P EST MOD 30 MIN: CPT | Performed by: INTERNAL MEDICINE

## 2017-06-23 RX ORDER — ASPIRIN 81 MG/1
81 TABLET ORAL DAILY
COMMUNITY
Start: 2017-06-23 | End: 2018-11-04 | Stop reason: HOSPADM

## 2017-06-23 RX ORDER — METOPROLOL TARTRATE 50 MG/1
50 TABLET, FILM COATED ORAL EVERY 12 HOURS SCHEDULED
Qty: 60 TABLET | Refills: 11 | COMMUNITY
Start: 2017-06-23 | End: 2018-10-02 | Stop reason: SDUPTHER

## 2017-06-23 NOTE — PROGRESS NOTES
"Renee PARIKH From / 72 y.o. / female  06/23/2017    ASSESSMENT & PLAN:    1. Takotsubo cardiomyopathy    2. Non-STEMI (non-ST elevated myocardial infarction)    3. Essential hypertension    4. Mixed hyperlipidemia    5. Type 2 diabetes mellitus with other circulatory complication, without long-term current use of insulin      No orders of the defined types were placed in this encounter.         Summary/Discussion:     · Continue ASA, bblocker, statin, acei  · Continue all other meds  · F/u cards as directed      Return for Next scheduled follow up.    Future Appointments  Date Time Provider Department Center   6/30/2017 4:10 PM MD AVERY Zabala [facility] None   7/19/2017 11:45 AM MD AVERY [facility] None   8/16/2017 9:20 AM MD AVERY [facility] None       ____________________________________________________________________    VITALS    /63  Pulse 63  Temp 97.5 °F (36.4 °C)  Ht 62\" (157.5 cm)  Wt 127 lb (57.6 kg)  SpO2 99%  BMI 23.23 kg/m2  BP Readings from Last 3 Encounters:   06/23/17 120/63   06/22/17 128/70   06/18/17 140/78     Wt Readings from Last 3 Encounters:   06/23/17 127 lb (57.6 kg)   06/22/17 127 lb (57.6 kg)   06/16/17 126 lb 3.2 oz (57.2 kg)      Body mass index is 23.23 kg/(m^2).    CC:  Main reason(s) for today's visit: Myocardial infarction (hospital ER F/U 6/16/17)      HPI:     S/p nstemi 6/16/17 cath mild cad, LVEF mildly reduced 41%, dx'ed Takotsubo cardiomyopathy.  Started on metoprolol tart 50 bid, on lisinopril, ASA, statin med.   Denies recurrent chest pain, hernández, palpitations, pnd/orthopnea, edema.    Chronic essential hypertension: Home BP readings: DOES NOT CHECK BP AT HOME. Compliant with medication(s).  Denies significant problems or side effects. Most recent in-office blood pressure readings:   BP Readings from Last 3 Encounters:   06/23/17 120/63   06/22/17 128/70   06/18/17 140/78     Chronic type 2 diabetes     Home blood sugar levels: about the same as " before  Current therapy: metformin    Most recent relevant labs:   Lab Results   Component Value Date    HGBA1C 6.70 (H) 02/15/2017    HGBA1C 6.7 (H) 08/12/2016    HGBA1C 7.0 (H) 02/12/2016    CREATININE 0.64 06/17/2017    LDL 69 08/12/2016    MICROALBUR 15.9 02/15/2017     Chronic hyperlipidemia: Current therapy include simvastatin.  Denies significant problems with medication(s).  Most recent labs:   Lab Results   Component Value Date    LDL 69 08/12/2016    LDL 69 08/20/2015    HDL 48 08/12/2016    HDL 55 08/20/2015    TRIG 96 08/12/2016    TRIG 92 08/20/2015    CHOLHDLRATIO 2.8 08/12/2016    CHOLHDLRATIO 2.6 08/20/2015        Patient Care Team:  Esequiel Pacheco MD as PCP - General  R Ministerio Garibay MD as PCP - Claims Attributed  Andre Adams MD as Consulting Physician (Gastroenterology)  Ministerio Wells MD as Consulting Physician (Orthopedic Surgery)  Abhay Wooten MD as Consulting Physician (Cardiology)  ____________________________________________________________________    REVIEW OF SYSTEMS    Review of Systems  As noted per HPI  Constitutional neg   Resp neg   CV neg    Other: As noted per HPI      PHYSICAL EXAMINATION    Physical Exam  Constitutional  No distress   Cardiovascular Rate  normal . Rhythm: regular . Heart sounds:  normal    Pulmonary/Chest  Effort normal. Breath sounds:  normal   Psychiatric  Alert. Judgment and thought content normal. Mood normal      REVIEWED DATA:    Labs:   Lab Results   Component Value Date     06/17/2017    K 4.2 06/17/2017    AST 12 06/16/2017    ALT 12 06/16/2017    BUN 9 06/17/2017    CREATININE 0.64 06/17/2017    CREATININE 0.84 06/16/2017    CREATININE 0.83 08/12/2016    EGFRIFNONA 91 06/17/2017    EGFRIFAFRI 81 08/12/2016       Lab Results   Component Value Date     (H) 08/12/2016     (H) 08/20/2015     (H) 07/17/2015    HGBA1C 6.70 (H) 02/15/2017    HGBA1C 6.7 (H) 08/12/2016    HGBA1C 7.0 (H) 02/12/2016    LevasyALBUR 15.9 02/15/2017       Lab  Results   Component Value Date    LDL 69 08/12/2016    LDL 69 08/20/2015    HDL 48 08/12/2016    TRIG 96 08/12/2016    CHOLHDLRATIO 2.8 08/12/2016       No results found for: TSH, FREET4       Lab Results   Component Value Date    WBC 7.41 06/17/2017    HGB 10.1 (L) 06/17/2017    HGB 10.6 (L) 06/16/2017     06/17/2017        Imaging:        Medical Tests:   Acquired echocardiogram 2D complete with contrast   Interpretation Summary   · Calculated EF = 42.4%  · Left ventricular systolic function is mildly decreased.  · The left ventricular cavity is mildly dilated.  · The findings are consistent with stress-induced (Takotsubo) cardiomyopathy.  · Left ventricular diastolic dysfunction.  · Mild mitral valve regurgitation is present  · Moderate tricuspid valve regurgitation is present.  · Calculated right ventricular systolic pressure from tricuspid regurgitation is 32 mmHg.     CARDIAC CATHETERIZATION REPORT     DATE OF PROCEDURE: 6/16/17     INDICATION FOR PROCEDURE: NSTEMI     PROCEDURE PERFORMED: Left heart catheterization, left ventriculography, coronary angiography.     FINDINGS:     1. HEMODYNAMICS: /6, /48/68.     2. LEFT VENTRICULOGRAPHY: EF 45%, focal anterior and inferior hypokinesis.     3. CORONARY ANGIOGRAPHY: Right dominant system, mild coronary disease. The left main is normal. The proximal LAD has 10% stenosis. The mid LAD has 30% stenosis. The distal LAD has 30% stenosis. There are two small diagonal branches with 50% proximal stenosis. The circumflex is has 20% mid vessel stenosis. The right coronary is normal.     SUMMARY: Mild LV systolic dysfunction with mild CAD.     RECOMMENDATIONS: Medical management for takotsubo cardiomyopathy.         Summary of old records / correspondence / consultant report:        Request outside records:        Allergies   Allergen Reactions   • Tetanus Toxoids Swelling     Hand and arm        Current Outpatient Prescriptions   Medication Sig Dispense  Refill   • Acetaminophen (TYLENOL PO) Take 2 tablets by mouth As Needed.     • aspirin 81 MG EC tablet Take 1 tablet by mouth Daily.     • Cholecalciferol (VITAMIN D) 2000 UNITS capsule Take 1 capsule by mouth daily.     • dorzolamide-timolol (COSOPT) 22.3-6.8 MG/ML ophthalmic solution Apply 1 drop to eye 2 (two) times a day.     • glucose blood (MAIA CONTOUR NEXT TEST) test strip Check BS once daily 100 each 3   • lisinopril (PRINIVIL,ZESTRIL) 20 MG tablet TAKE 1 TABLET BY MOUTH EVERY DAY 90 tablet 1   • mesalamine (LIALDA) 1.2 G EC tablet Take 1,200 mg by mouth 2 (two) times a day.     • metFORMIN ER (GLUCOPHAGE-XR) 500 MG 24 hr tablet Take 2 tablets by mouth 2 (Two) Times a Day. 360 tablet 1   • metoprolol tartrate (LOPRESSOR) 50 MG tablet Take 1 tablet by mouth Every 12 (Twelve) Hours. 60 tablet 11   • Multiple Vitamin (MULTI-VITAMIN PO) Take 1 tablet by mouth Daily.     • omeprazole (PriLOSEC) 40 MG capsule Take 1 capsule by mouth daily.     • simvastatin (ZOCOR) 40 MG tablet TAKE 1 TABLET BY MOUTH EVERY NIGHT AT BEDTIME 90 tablet 1   • timolol (TIMOPTIC) 0.25 % ophthalmic solution 1 drop 2 (Two) Times a Day.       No current facility-administered medications for this visit.      Current outpatient and discharge medications have been reconciled for the patient.  Esequiel Pacheco MD     [facility]:     The following portions of the patient's history were reviewed and updated as appropriate: Allergies / Current Medications / Past Medical History / Surgical History / Social History / Family History    Patient Active Problem List   Diagnosis   • Type 2 diabetes mellitus with circulatory disorder   • Osteopenia   • Hypertension   • Hyperlipidemia   • Colon polyp   • Gastroesophageal reflux disease   • History of non-ST elevation myocardial infarction (NSTEMI)   • Glaucoma   • Hypothyroidism   • Coronary artery disease involving native coronary artery of native heart without angina pectoris   • Takotsubo cardiomyopathy   •  Coronary artery disease involving native coronary artery of native heart without angina pectoris       Past Medical History:   Diagnosis Date   • Diabetes mellitus    • Dizziness    • GERD (gastroesophageal reflux disease)    • Glaucoma    • Hyperlipidemia    • Knee fracture, left    • Non-STEMI (non-ST elevated myocardial infarction) 6/16/2017       Past Surgical History:   Procedure Laterality Date   • CARDIAC CATHETERIZATION N/A 6/16/2017    Procedure: Left Heart Cath;  Surgeon: Abhay Wooten MD;  Location: [facility] CATH INVASIVE LOCATION;  Service:    • CARDIAC CATHETERIZATION N/A 6/16/2017    Procedure: Left ventriculography;  Surgeon: Abhay Wooten MD;  Location: [facility] CATH INVASIVE LOCATION;  Service:    • CARDIAC CATHETERIZATION N/A 6/16/2017    Procedure: Coronary angiography;  Surgeon: Abhay Wooten MD;  Location: [facility] CATH INVASIVE LOCATION;  Service:    • CATARACT EXTRACTION     • EYE SURGERY      cataract surgery       Social History     Social History   • Marital status:      Spouse name: N/A   • Number of children: N/A   • Years of education: N/A     Occupational History   • retail      retired     Social History Main Topics   • Smoking status: Never Smoker   • Smokeless tobacco: Never Used   • Alcohol use No   • Drug use: No   • Sexual activity: Defer     Other Topics Concern   • None     Social History Narrative       Family History   Problem Relation Age of Onset   • Heart disease Mother    • Heart disease Father    • Heart disease Sister    • Heart disease Brother    • Hypertension Other    • Diabetes Other      type 2   • No Known Problems Maternal Grandmother    • No Known Problems Maternal Grandfather    • No Known Problems Paternal Grandmother    • No Known Problems Paternal Grandfather          **Cheryl Disclaimer:   Much of this encounter note is an electronic transcription/translation of spoken language to printed text. The electronic translation of spoken language may permit erroneous, or  at times, nonsensical words or phrases to be inadvertently transcribed. Although I have reviewed the note for such errors, some may still exist.

## 2017-06-30 ENCOUNTER — OFFICE VISIT (OUTPATIENT)
Dept: ORTHOPEDIC SURGERY | Facility: CLINIC | Age: 72
End: 2017-06-30

## 2017-06-30 VITALS — TEMPERATURE: 97.6 F | WEIGHT: 127 LBS | HEIGHT: 62 IN | BODY MASS INDEX: 23.37 KG/M2

## 2017-06-30 DIAGNOSIS — Z09 FRACTURE FOLLOW-UP: ICD-10-CM

## 2017-06-30 DIAGNOSIS — Z87.81 HISTORY OF FRACTURE OF HUMERUS: Primary | ICD-10-CM

## 2017-06-30 PROCEDURE — 99024 POSTOP FOLLOW-UP VISIT: CPT | Performed by: ORTHOPAEDIC SURGERY

## 2017-06-30 PROCEDURE — 73030 X-RAY EXAM OF SHOULDER: CPT | Performed by: ORTHOPAEDIC SURGERY

## 2017-07-04 NOTE — PROGRESS NOTES
Renee PARIKH From : 1945 MRN: [id number] DATE: 2017      CC:  3 months s/p closed treatment left proximal humerus fracture    HPI: Pt. returns to clinic today stating pain is improved.  Motion is progressing.  Denies any new concerns or issues.  PT is helping.    Vitals:    17 1617   Temp: 97.6 °F (36.4 °C)         Current Outpatient Prescriptions:   •  Acetaminophen (TYLENOL PO), Take 2 tablets by mouth As Needed., Disp: , Rfl:   •  aspirin 81 MG EC tablet, Take 1 tablet by mouth Daily., Disp: , Rfl:   •  Cholecalciferol (VITAMIN D) 2000 UNITS capsule, Take 1 capsule by mouth daily., Disp: , Rfl:   •  dorzolamide-timolol (COSOPT) 22.3-6.8 MG/ML ophthalmic solution, Apply 1 drop to eye 2 (two) times a day., Disp: , Rfl:   •  glucose blood (MAIA CONTOUR NEXT TEST) test strip, Check BS once daily, Disp: 100 each, Rfl: 3  •  lisinopril (PRINIVIL,ZESTRIL) 20 MG tablet, TAKE 1 TABLET BY MOUTH EVERY DAY, Disp: 90 tablet, Rfl: 1  •  mesalamine (LIALDA) 1.2 G EC tablet, Take 1,200 mg by mouth 2 (two) times a day., Disp: , Rfl:   •  metFORMIN ER (GLUCOPHAGE-XR) 500 MG 24 hr tablet, Take 2 tablets by mouth 2 (Two) Times a Day., Disp: 360 tablet, Rfl: 1  •  metoprolol tartrate (LOPRESSOR) 50 MG tablet, Take 1 tablet by mouth Every 12 (Twelve) Hours., Disp: 60 tablet, Rfl: 11  •  Multiple Vitamin (MULTI-VITAMIN PO), Take 1 tablet by mouth Daily., Disp: , Rfl:   •  omeprazole (PriLOSEC) 40 MG capsule, Take 1 capsule by mouth daily., Disp: , Rfl:   •  simvastatin (ZOCOR) 40 MG tablet, TAKE 1 TABLET BY MOUTH EVERY NIGHT AT BEDTIME, Disp: 90 tablet, Rfl: 1  •  timolol (TIMOPTIC) 0.25 % ophthalmic solution, 1 drop 2 (Two) Times a Day., Disp: , Rfl:     Past Medical History:   Diagnosis Date   • Diabetes mellitus    • Dizziness    • GERD (gastroesophageal reflux disease)    • Glaucoma    • Hyperlipidemia    • Knee fracture, left    • Non-STEMI (non-ST elevated myocardial infarction) 2017       Past Surgical  History:   Procedure Laterality Date   • CARDIAC CATHETERIZATION N/A 6/16/2017    Procedure: Left Heart Cath;  Surgeon: Abhay Wooten MD;  Location:  [facility] CATH INVASIVE LOCATION;  Service:    • CARDIAC CATHETERIZATION N/A 6/16/2017    Procedure: Left ventriculography;  Surgeon: Abhay Wooten MD;  Location:  [facility] CATH INVASIVE LOCATION;  Service:    • CARDIAC CATHETERIZATION N/A 6/16/2017    Procedure: Coronary angiography;  Surgeon: Abhay Wooten MD;  Location: [facility] CATH INVASIVE LOC[facility];  Service:    • CATARACT EXTRACTION     • EYE SURGERY      cataract surgery       Family History   Problem Relation Age of Onset   • Heart disease Mother    • Heart disease Father    • Heart disease Sister    • Heart disease Brother    • Hypertension Other    • Diabetes Other      type 2   • No Known Problems Maternal Grandmother    • No Known Problems Maternal Grandfather    • No Known Problems Paternal Grandmother    • No Known Problems Paternal Grandfather        Social History     Social History   • Marital status:      Spouse name: N/A   • Number of children: N/A   • Years of education: N/A     Occupational History   • retail      retired     Social History Main Topics   • Smoking status: Never Smoker   • Smokeless tobacco: Never Used   • Alcohol use No   • Drug use: No   • Sexual activity: Defer     Other Topics Concern   • Not on file     Social History Narrative     Exam:   Contour of shoulder appears normal.  Skin intact and benign.  Arm and forearm soft.  Motion is improved.  She remains limited relative to the contralateral side but has functional motion at this point.  Good motor and sensory function distally.  Palpable pulses with good cap refill.      Imaging   2v xrays including AP, scapular Y are ordered and reviewed by me to evaluate alignment and for comparison purposes.  No new or concerning findings noted. There has been interval callous formation.    Impression:  3 months s/p closed treatment left proximal  humerus fracture    Plan:    1.  Progress ROM and strengthening as tolerated.  2.  Continue PT.  3.  NO restrictions at this point.  4.  Follow up as needed.

## 2017-07-19 ENCOUNTER — OFFICE VISIT (OUTPATIENT)
Dept: CARDIOLOGY | Facility: CLINIC | Age: 72
End: 2017-07-19

## 2017-07-19 VITALS
DIASTOLIC BLOOD PRESSURE: 66 MMHG | HEIGHT: 62 IN | BODY MASS INDEX: 23.37 KG/M2 | HEART RATE: 56 BPM | SYSTOLIC BLOOD PRESSURE: 128 MMHG | WEIGHT: 127 LBS

## 2017-07-19 DIAGNOSIS — I51.81 TAKOTSUBO CARDIOMYOPATHY: Primary | ICD-10-CM

## 2017-07-19 DIAGNOSIS — I25.10 CORONARY ARTERY DISEASE INVOLVING NATIVE CORONARY ARTERY OF NATIVE HEART WITHOUT ANGINA PECTORIS: ICD-10-CM

## 2017-07-19 PROCEDURE — 99213 OFFICE O/P EST LOW 20 MIN: CPT | Performed by: INTERNAL MEDICINE

## 2017-07-25 PROCEDURE — 93000 ELECTROCARDIOGRAM COMPLETE: CPT | Performed by: INTERNAL MEDICINE

## 2017-07-26 NOTE — PROGRESS NOTES
Subjective:     Encounter Date:07/19/2017      Patient ID: Renee Stevenson is a 72 y.o. female.    Chief Complaint: takotsubo cardiomyopathy    History of Present Illness    Dear Dr. Pacheco,    I had the pleasure of seeing the patient in cardiac follow-up today.  As you know well, she is a meliza 72-year-old woman with history of diabetes, hypertension, hyperlipidemia, and family history of coronary disease.  She presented to the hospital with complaint of angina.  Her troponin was positive.  Cardiac catheterization demonstrated moderate branch vessel coronary disease.  Her echocardiogram showed an ejection fraction of 42%.  She was diagnosed with takotsubo cardiomyopathy.    Since her return home she has reported no complaints of angina or heart failure.  In retrospect her triggering event may have been the anniversary of her sister's death.    Review of Systems   All other systems reviewed and are negative.        ECG 12 Lead  Date/Time: 7/25/2017 8:53 PM  Performed by: ZACK JARAMILLO  Authorized by: ZACK JARAMILLO   Comparison: compared with previous ECG   Similar to previous ECG  Rhythm: sinus rhythm  BPM: 56  T elevation: V1, V2, I and aVL                 Objective:     Physical Exam   Constitutional: She is oriented to person, place, and time. She appears well-developed and well-nourished.   HENT:   Head: Normocephalic and atraumatic.   Neck: Normal range of motion. Neck supple.   Cardiovascular: Normal rate, regular rhythm and normal heart sounds.    Pulmonary/Chest: Effort normal and breath sounds normal.   Abdominal: Soft. Bowel sounds are normal.   Musculoskeletal: Normal range of motion.   Neurological: She is alert and oriented to person, place, and time.   Skin: Skin is warm and dry.   Psychiatric: She has a normal mood and affect. Her behavior is normal. Thought content normal.   Vitals reviewed.      Lab Review:       Assessment:          Diagnosis Plan   1. Takotsubo cardiomyopathy  Adult Transthoracic Echo  Complete   2. Coronary artery disease involving native coronary artery of native heart without angina pectoris            Plan:       It was a pleasure to see the patient in cardiac follow-up today.  She has recovered very well from her acute cardiomyopathy.  She is on a good medical regimen for recovery of her LV function.  I will check an echocardiogram to document the normalization of her LV function.  She will see me again in 6 months.

## 2017-08-16 ENCOUNTER — OFFICE VISIT (OUTPATIENT)
Dept: INTERNAL MEDICINE | Age: 72
End: 2017-08-16

## 2017-08-16 VITALS
SYSTOLIC BLOOD PRESSURE: 118 MMHG | TEMPERATURE: 97.5 F | DIASTOLIC BLOOD PRESSURE: 72 MMHG | HEIGHT: 62 IN | OXYGEN SATURATION: 98 % | HEART RATE: 56 BPM | BODY MASS INDEX: 22.82 KG/M2 | WEIGHT: 124 LBS

## 2017-08-16 DIAGNOSIS — Z00.00 MEDICARE ANNUAL WELLNESS VISIT, INITIAL: Primary | ICD-10-CM

## 2017-08-16 DIAGNOSIS — I25.10 CORONARY ARTERY DISEASE INVOLVING NATIVE CORONARY ARTERY OF NATIVE HEART WITHOUT ANGINA PECTORIS: Chronic | ICD-10-CM

## 2017-08-16 DIAGNOSIS — E11.59 TYPE 2 DIABETES MELLITUS WITH OTHER CIRCULATORY COMPLICATION, WITHOUT LONG-TERM CURRENT USE OF INSULIN (HCC): Chronic | ICD-10-CM

## 2017-08-16 LAB — HBA1C MFR BLD: 6.7 % (ref 4.8–5.6)

## 2017-08-16 PROCEDURE — G0438 PPPS, INITIAL VISIT: HCPCS | Performed by: INTERNAL MEDICINE

## 2017-08-16 PROCEDURE — 99213 OFFICE O/P EST LOW 20 MIN: CPT | Performed by: INTERNAL MEDICINE

## 2017-08-16 NOTE — PATIENT INSTRUCTIONS
Medicare Wellness  Personal Prevention Plan of Service     Date of Office Visit:  2017  Encounter Provider:  Esequiel Pacheco MD  Place of Service:  [facility] PRIMARY [facility]ARE  Patient Name: Renee PARIKH From  :  1945    As part of the Medicare Wellness portion of your visit today, we are providing you with this personalized preventive plan of services (PPPS). This plan is based upon recommendations of the [place] Preventive Services Task Force (USPSTF) and the Advisory Committee on Immunization Practices (ACIP).    This lists the preventive care services that should be considered, and provides dates of when you are due. Items listed as completed are up-to-date and do not require any further intervention.      Age-Appropriate Screening Schedule:  (Refer to the list below for future screening recommendations based on patient's age, sex and/or medical conditions. Orders for these recommended tests are listed in the plan section. The patient has been provided with a written plan)    Health Maintenance Topics  Health Maintenance   Topic Date Due   • DIABETIC FOOT EXAM  2017   • LIPID PANEL  2017   • HEMOGLOBIN A1C  08/15/2017   • INFLUENZA VACCINE  2017   • DXA SCAN  2018   • URINE MICROALBUMIN  02/15/2018   • DIABETIC EYE EXAM  2018   • MAMMOGRAM  2019   • COLONOSCOPY  2020   • PNEUMOCOCCAL VACCINES (65+ LOW/MEDIUM RISK)  Completed   • ZOSTER VACCINE  Addressed   • TDAP/TD VACCINES  Excluded       Health Maintenance Topics Due or Over-Due  Health Maintenance Due   Topic Date Due   • DIABETIC FOOT EXAM  2017   • LIPID PANEL  2017   • HEMOGLOBIN A1C  08/15/2017           ADDITIONAL RESOURCES:    For excellent information on senior health and wellness refer to the National [place] of Health web-site at:    [email or web address] .[email or web address]

## 2017-08-16 NOTE — PROGRESS NOTES
"08/16/2017    MEDICARE ANNUAL WELLNESS VISIT    Renee Stevenson is a 72 y.o. female who presents for INITIAL AWV & DIABETES    Patient's general assessment of her health since a year ago:     - Compared to one year ago, she feels her physical health is about the same without significant change.    - Compared to one year ago, she feels her mental health is about the same without significant change.      HPI for other active medical problems:     Chronic type 2 diabetes:  Home blood sugar levels: consistently in acceptable range  Current therapy: metformin.  Most recent relevant labs:   Lab Results   Component Value Date    HGBA1C 6.70 (H) 02/15/2017    HGBA1C 6.7 (H) 08/12/2016    HGBA1C 7.0 (H) 02/12/2016    CREATININE 0.64 06/17/2017    LDL 69 08/12/2016    MICROALBUR 15.9 02/15/2017      Coronary artery disease: She complains of no change in symptoms.   Denies exertional chest pain, MUNGUIA and heart palpitations. Current medications include aspirin, statin, beta blocker and ACE inhibitor/ARB.     * The required components of Health Risk Assessment (HRA) that were completed by the patient and/or my staff are contained within this note and in the scanned documents titled \"Health Risk Assessment\" within the media section of the patient's chart in [employer].       HISTORY    Recent Hospitalizations:    Recent hospitalization? : No    If YES, location, date, and diagnoses:     · Location:   · Date:   · Principle Discharge Dx:   · Secondary Dx:       Patient Care Team:    Patient Care Team:  Esequiel Pacheco MD as PCP - General  R Ministerio Garibay MD as PCP - Claims Attributed  Andre Adams MD as Consulting Physician (Gastroenterology)  Ministerio Wells MD as Consulting Physician (Orthopedic Surgery)  Abhay Wooten MD as Consulting Physician (Cardiology)  Cisco Husain MD as Consulting Physician (Otolaryngology)      Allergies:  Tetanus toxoids    Medications:  Outpatient Medications Prior to Visit   Medication Sig Dispense Refill "   • aspirin 81 MG EC tablet Take 1 tablet by mouth Daily.     • Cholecalciferol (VITAMIN D) 2000 UNITS capsule Take 1 capsule by mouth daily.     • dorzolamide-timolol (COSOPT) 22.3-6.8 MG/ML ophthalmic solution Apply 1 drop to eye 2 (two) times a day.     • glucose blood (MAIA CONTOUR NEXT TEST) test strip Check BS once daily 100 each 3   • lisinopril (PRINIVIL,ZESTRIL) 20 MG tablet TAKE 1 TABLET BY MOUTH EVERY DAY 90 tablet 1   • mesalamine (LIALDA) 1.2 G EC tablet Take 1,200 mg by mouth 2 (two) times a day.     • metFORMIN ER (GLUCOPHAGE-XR) 500 MG 24 hr tablet Take 2 tablets by mouth 2 (Two) Times a Day. 360 tablet 1   • metoprolol tartrate (LOPRESSOR) 50 MG tablet Take 1 tablet by mouth Every 12 (Twelve) Hours. 60 tablet 11   • Multiple Vitamin (MULTI-VITAMIN PO) Take 1 tablet by mouth Daily.     • omeprazole (PriLOSEC) 40 MG capsule Take 1 capsule by mouth daily.     • simvastatin (ZOCOR) 40 MG tablet TAKE 1 TABLET BY MOUTH EVERY NIGHT AT BEDTIME 90 tablet 1   • Acetaminophen (TYLENOL PO) Take 2 tablets by mouth As Needed.     • timolol (TIMOPTIC) 0.25 % ophthalmic solution 1 drop 2 (Two) Times a Day.       No facility-administered medications prior to visit.        PFSH:     The following portions of the patient's history were reviewed and updated as appropriate: Allergies / Current Medications / Past Medical History / Surgical History / Social History / Family History    Problem List:  Patient Active Problem List   Diagnosis   • Type 2 diabetes mellitus with circulatory disorder   • Osteopenia   • Hypertension   • Hyperlipidemia   • Colon polyp   • Gastroesophageal reflux disease   • History of non-ST elevation myocardial infarction (NSTEMI)   • Glaucoma   • Hypothyroidism   • Coronary artery disease involving native coronary artery of native heart without angina pectoris   • Takotsubo cardiomyopathy   • Coronary artery disease involving native coronary artery of native heart without angina pectoris        Past Medical History:  Past Medical History:   Diagnosis Date   • Allergic rhinitis    • Colitis    • Colon polyps    • Diabetes mellitus     type 2   • Dizziness    • Encounter for breast cancer screening other than mammogram    • GERD (gastroesophageal reflux disease)    • Glaucoma    • Hyperlipidemia    • Hypertension    • Hypothyroidism    • Knee fracture, left    • Non-STEMI (non-ST elevated myocardial infarction) 6/16/2017   • Osteopenia        Past Surgical History:  Past Surgical History:   Procedure Laterality Date   • CARDIAC CATHETERIZATION N/A 6/16/2017    Procedure: Left Heart Cath;  Surgeon: Abhay Wooten MD;  Location: [facility] CATH INVASIVE LOCATION;  Service:    • CARDIAC CATHETERIZATION N/A 6/16/2017    Procedure: Left ventriculography;  Surgeon: Abhay Wooten MD;  Location: [facility] CATH INVASIVE LOCATION;  Service:    • CARDIAC CATHETERIZATION N/A 6/16/2017    Procedure: Coronary angiography;  Surgeon: Abhay Wooten MD;  Location: [facility] CATH INVASIVE LOCATION;  Service:    • CATARACT EXTRACTION     • EYE SURGERY      cataract surgery   • PAP SMEAR         Social History:  Social History     Social History   • Marital status:      Spouse name: N/A   • Number of children: 1   • Years of education: N/A     Occupational History   • retail      retired     Social History Main Topics   • Smoking status: Never Smoker   • Smokeless tobacco: Never Used   • Alcohol use No   • Drug use: No   • Sexual activity: Defer     Other Topics Concern   • None     Social History Narrative   • None       Family History:  Family History   Problem Relation Age of Onset   • Heart disease Mother    • Hypertension Mother    • Diabetes Mother    • Heart disease Father    • Hypertension Father    • Heart disease Sister    • Heart disease Brother    • Hypertension Other    • Diabetes Other      type 2   • No Known Problems Maternal Grandmother    • No Known Problems Maternal Grandfather    • No Known Problems Paternal  "Grandmother    • No Known Problems Paternal Grandfather          PATIENT ASSESSMENT    Vitals:  /72  Pulse 56  Temp 97.5 °F (36.4 °C)  Ht 62\" (157.5 cm)  Wt 124 lb (56.2 kg)  SpO2 98%  BMI 22.68 kg/m2  BP Readings from Last 3 Encounters:   08/16/17 118/72   07/19/17 128/66   06/23/17 120/63     Wt Readings from Last 3 Encounters:   08/16/17 124 lb (56.2 kg)   07/19/17 127 lb (57.6 kg)   06/30/17 127 lb (57.6 kg)      Body mass index is 22.68 kg/(m^2).    Pain Score    08/16/17 0918   PainSc: 0-No pain  Comment: paitient states no pain         Review of Systems:    Review of Systems   Constitutional: Negative.    Respiratory: Negative.    Cardiovascular: Negative.      As noted per HPI      Physical Exam:    Physical Exam   Constitutional: No distress.   Cardiovascular: Normal rate and regular rhythm.    Pulmonary/Chest: Effort normal and breath sounds normal.   Psychiatric: Judgment and thought content normal.         Reviewed Data:    Labs:   Lab Results   Component Value Date     06/17/2017    K 4.2 06/17/2017    AST 12 06/16/2017    ALT 12 06/16/2017    BUN 9 06/17/2017    CREATININE 0.64 06/17/2017    CREATININE 0.84 06/16/2017    CREATININE 0.83 08/12/2016    EGFRIFNONA 91 06/17/2017    EGFRIFAFRI 81 08/12/2016       Lab Results   Component Value Date     (H) 08/12/2016     (H) 08/20/2015     (H) 07/17/2015    HGBA1C 6.70 (H) 02/15/2017    HGBA1C 6.7 (H) 08/12/2016    HGBA1C 7.0 (H) 02/12/2016    MICROALBUR 15.9 02/15/2017       Lab Results   Component Value Date    LDL 69 08/12/2016    LDL 69 08/20/2015    HDL 48 08/12/2016    TRIG 96 08/12/2016    CHOLHDLRATIO 2.8 08/12/2016       No results found for: TSH, FREET4       Lab Results   Component Value Date    WBC 7.41 06/17/2017    HGB 10.1 (L) 06/17/2017    HGB 10.6 (L) 06/16/2017     06/17/2017        Imaging:        Medical Tests:          Screening for Glaucoma:  Previous screening for glaucoma?: Yes (has " glaucoma)      Hearing Loss Screen:  Finger Rub Hearing Test (right ear): passed  Finger Rub Hearing Test (left ear): passed      Urinary Incontinence Screen:  Episodes of urinary incontinence? : No      Depression Screen:  PHQ-9 Depression Screening 8/16/2017   Little interest or pleasure in doing things 0   Feeling down, depressed, or hopeless 0   PHQ-9 Total Score 0        PHQ-2: 0 (Not depressed)    PHQ-9:        FUNCTIONAL, FALL RISK, & COGNITIVE SCREENING (Components below):    DATA:    Functional & Cognitive Status 8/16/2017   Do you have difficulty preparing food and eating? No   Do you have difficulty bathing yourself? No   Do you have difficulty getting dressed? No   Do you have difficulty using the toilet? No   Do you have difficulty moving around from place to place? No   In the past year have you fallen or experienced a near fall? No   Do you need help using the phone?  No   Are you deaf or do you have serious difficulty hearing?  No   Do you need help with transportation? No   Do you need help shopping? No   Do you need help preparing meals?  No   Do you need help with housework?  No   Do you need help with laundry? No   Do you need help taking your medications? No   Do you need help managing money? No   Do you have difficulty concentrating, remembering or making decisions? No     She did fall in April 2017 (tripped up on her own feet and fracture left upper arm/shoulder)    Fall Risk Assessment  Fallen in past 6 months: 5--> Yes  Mental Status: 0--> no mental status change  Mobility: 0--> No mobility issues  Medications: 0--> No meds  Total Fall Risk Score: 7    A) Assessment of Functional Ability:  (Assessment of ability to perform ADL's (showering/bathing, using toilet, dressing, feeding self, moving self around) and IADL's (use telephone, shop, prepare food, housekeep, do laundry, transport independently, take medications independently, and handle finances)    Degree of functional impairment: MILD  (based on assessment noted above)      B) Assessment of Fall Risk:     - Fall within the last year? : Yes   - Feel unsteady when standing or walking? : No   - Worry about falling? : No    Need for further evaluation of gait, strength, and balance? : Yes    Timed Up and Go (TUG):   (>= 12 seconds indicates high risk for falling)    Observable abnormalities included: Normal gait pattern       C. Assessment of Cognitive Function:    Mini-Cog Test:     1) Registration (3 objects): Yes   2) Clock Draw: Passed? : Yes   3) Number of objects recalled: 3    Further evaluation required? : No      COUNSELING    A. Identification of Health Risk Factors:    Risk factors include: cardiovascular risk factors and increased fall risk      B. Age-Appropriate Screening Schedule:  (Refer to the list below for future screening recommendations based on patient's age, sex and/or medical conditions. Orders for these recommended tests are listed in the plan section. The patient has been provided with a written plan)    Health Maintenance Topics  Health Maintenance   Topic Date Due   • DIABETIC FOOT EXAM  08/12/2017   • LIPID PANEL  08/12/2017   • HEMOGLOBIN A1C  08/15/2017   • INFLUENZA VACCINE  09/01/2017   • DXA SCAN  01/12/2018   • URINE MICROALBUMIN  02/15/2018   • DIABETIC EYE EXAM  07/27/2018   • MAMMOGRAM  02/22/2019   • COLONOSCOPY  08/18/2020   • PNEUMOCOCCAL VACCINES (65+ LOW/MEDIUM RISK)  Completed   • ZOSTER VACCINE  Addressed   • TDAP/TD VACCINES  Excluded       Health Maintenance Topics Due or Over-Due  Health Maintenance Due   Topic Date Due   • DIABETIC FOOT EXAM  08/12/2017   • LIPID PANEL  08/12/2017   • HEMOGLOBIN A1C  08/15/2017         C. Advanced Care Planning:    power of  for healthcare is NOT on file, has NO advanced directive - information provided to the patient today      D. Patient Self-Management and Personalized Health Advice:    She has been provided with personalized counseling/information (including  brochures/handouts) about:     -- optimizing diet/nutrition plans, improving exercise / conditioning, reducing risk for cardiovascular disease (heart, stroke, vascular), fall prevention, designing advance directives and designating POA    She has been recommended for the following preventative services which has been performed today, will be ordered today or ordered/performed on upcoming follow-up visit:     -- nutrition counseling provided, exercise counseling provided, counseling for cardiovascular disease risk reduction, fall risk assessment / plan of care completed, urinary incontinence assessment done      E. Miscellaneous Items:    -Aspirin use counseling: Taking ASA appropriately as indicated    -Discussed BMI with her. The BMI is in the acceptable range    -Reviewed use of high risk medication in the elderly: YES    -Reviewed for potential of harmful drug interactions in the elderly: YES      IV. WRAP-UP    Assessment & Plan:    1) MEDICARE ANNUAL WELLNESS VISIT    2) OTHER MEDICAL CONDITIONS ADDRESSED TODAY:              Problem List Items Addressed This Visit     Type 2 diabetes mellitus with circulatory disorder (Chronic)    Overview     Stable. Continue metformin.          Relevant Medications    glucose blood (MAIA CONTOUR NEXT TEST) test strip    metFORMIN ER (GLUCOPHAGE-XR) 500 MG 24 hr tablet    Coronary artery disease involving native coronary artery of native heart without angina pectoris (Chronic)    Overview     Stable. Continue ASA, bblocker, statin, acei         Relevant Medications    metoprolol tartrate (LOPRESSOR) 50 MG tablet      Other Visit Diagnoses     Medicare annual wellness visit, initial    -  Primary                  No orders of the defined types were placed in this encounter.      Discussion / Summary:  ·         Medications as of TODAY:              Current Outpatient Prescriptions   Medication Sig Dispense Refill   • aspirin 81 MG EC tablet Take 1 tablet by mouth Daily.     •  Cholecalciferol (VITAMIN D) 2000 UNITS capsule Take 1 capsule by mouth daily.     • dorzolamide-timolol (COSOPT) 22.3-6.8 MG/ML ophthalmic solution Apply 1 drop to eye 2 (two) times a day.     • glucose blood (MAIA CONTOUR NEXT TEST) test strip Check BS once daily 100 each 3   • lisinopril (PRINIVIL,ZESTRIL) 20 MG tablet TAKE 1 TABLET BY MOUTH EVERY DAY 90 tablet 1   • mesalamine (LIALDA) 1.2 G EC tablet Take 1,200 mg by mouth 2 (two) times a day.     • metFORMIN ER (GLUCOPHAGE-XR) 500 MG 24 hr tablet Take 2 tablets by mouth 2 (Two) Times a Day. 360 tablet 1   • metoprolol tartrate (LOPRESSOR) 50 MG tablet Take 1 tablet by mouth Every 12 (Twelve) Hours. 60 tablet 11   • Multiple Vitamin (MULTI-VITAMIN PO) Take 1 tablet by mouth Daily.     • omeprazole (PriLOSEC) 40 MG capsule Take 1 capsule by mouth daily.     • simvastatin (ZOCOR) 40 MG tablet TAKE 1 TABLET BY MOUTH EVERY NIGHT AT BEDTIME 90 tablet 1   • Acetaminophen (TYLENOL PO) Take 2 tablets by mouth As Needed.     • timolol (TIMOPTIC) 0.25 % ophthalmic solution 1 drop 2 (Two) Times a Day.       No current facility-administered medications for this visit.          FOLLOW-UP:            Return in about 6 months (around 2/16/2018) for F/U chronic medical problems, Diabetes.              Future Appointments  Date Time Provider Department Center   9/27/2017 1:00 PM [facility]   1/31/2018 1:15 PM Abhay Wooten MD [facility]  [facility] None         ______________________________________________________________________      A printed After Visit Summary (AVS) including the Personalized Prevention  Plan Services (PPPS) was given to the patient at check-out today.         **Dragon Disclaimer:   Much of this encounter note is an electronic transcription/translation of spoken language to printed text. The electronic translation of spoken language may permit erroneous, or at times, nonsensical words or phrases to be inadvertently transcribed. Although I  have reviewed the note for such errors, some may still exist.

## 2017-09-09 DIAGNOSIS — E11.9 TYPE 2 DIABETES MELLITUS WITHOUT COMPLICATION (HCC): Chronic | ICD-10-CM

## 2017-09-11 DIAGNOSIS — I10 ESSENTIAL HYPERTENSION: Chronic | ICD-10-CM

## 2017-09-11 RX ORDER — LISINOPRIL 20 MG/1
TABLET ORAL
Qty: 90 TABLET | Refills: 0 | Status: SHIPPED | OUTPATIENT
Start: 2017-09-11 | End: 2017-12-08 | Stop reason: SDUPTHER

## 2017-09-11 RX ORDER — METFORMIN HYDROCHLORIDE 500 MG/1
TABLET, EXTENDED RELEASE ORAL
Qty: 360 TABLET | Refills: 0 | Status: SHIPPED | OUTPATIENT
Start: 2017-09-11 | End: 2017-12-07 | Stop reason: SDUPTHER

## 2017-09-27 ENCOUNTER — HOSPITAL ENCOUNTER (OUTPATIENT)
Dept: CARDIOLOGY | Facility: HOSPITAL | Age: 72
Discharge: HOME | End: 2017-09-27
Attending: INTERNAL MEDICINE | Admitting: INTERNAL MEDICINE

## 2017-09-27 VITALS
BODY MASS INDEX: 22.82 KG/M2 | SYSTOLIC BLOOD PRESSURE: 100 MMHG | HEIGHT: 62 IN | WEIGHT: 124 LBS | DIASTOLIC BLOOD PRESSURE: 60 MMHG | HEART RATE: 63 BPM

## 2017-09-27 DIAGNOSIS — I51.81 TAKOTSUBO CARDIOMYOPATHY: ICD-10-CM

## 2017-09-27 LAB
ASCENDING AORTA: 2.8 CM
BH CV ECHO MEAS - ACS: 1.5 CM
BH CV ECHO MEAS - AO MAX PG (FULL): 2.2 MMHG
BH CV ECHO MEAS - AO MAX PG: 6 MMHG
BH CV ECHO MEAS - AO MEAN PG (FULL): 1.4 MMHG
BH CV ECHO MEAS - AO MEAN PG: 3.9 MMHG
BH CV ECHO MEAS - AO ROOT AREA (BSA CORRECTED): 1.7
BH CV ECHO MEAS - AO ROOT AREA: 5.7 CM^2
BH CV ECHO MEAS - AO ROOT DIAM: 2.7 CM
BH CV ECHO MEAS - AO V2 MAX: 122.8 CM/SEC
BH CV ECHO MEAS - AO V2 MEAN: 93.3 CM/SEC
BH CV ECHO MEAS - AO V2 VTI: 33.2 CM
BH CV ECHO MEAS - AVA(I,A): 1.7 CM^2
BH CV ECHO MEAS - AVA(I,D): 1.7 CM^2
BH CV ECHO MEAS - AVA(V,A): 1.8 CM^2
BH CV ECHO MEAS - AVA(V,D): 1.8 CM^2
BH CV ECHO MEAS - BSA(HAYCOCK): 1.6 M^2
BH CV ECHO MEAS - BSA: 1.6 M^2
BH CV ECHO MEAS - BZI_BMI: 22.7 KILOGRAMS/M^2
BH CV ECHO MEAS - BZI_METRIC_HEIGHT: 157.5 CM
BH CV ECHO MEAS - BZI_METRIC_WEIGHT: 56.2 KG
BH CV ECHO MEAS - CONTRAST EF (2CH): 62.5 ML/M^2
BH CV ECHO MEAS - CONTRAST EF 4CH: 65.8 ML/M^2
BH CV ECHO MEAS - EDV(MOD-SP2): 64 ML
BH CV ECHO MEAS - EDV(MOD-SP4): 76 ML
BH CV ECHO MEAS - EDV(TEICH): 108.8 ML
BH CV ECHO MEAS - EF(CUBED): 63.7 %
BH CV ECHO MEAS - EF(MOD-SP2): 62.5 %
BH CV ECHO MEAS - EF(MOD-SP4): 65.8 %
BH CV ECHO MEAS - EF(TEICH): 55.1 %
BH CV ECHO MEAS - ESV(MOD-SP2): 24 ML
BH CV ECHO MEAS - ESV(MOD-SP4): 26 ML
BH CV ECHO MEAS - ESV(TEICH): 48.8 ML
BH CV ECHO MEAS - FS: 28.7 %
BH CV ECHO MEAS - IVS/LVPW: 0.92
BH CV ECHO MEAS - IVSD: 0.68 CM
BH CV ECHO MEAS - LAT PEAK E' VEL: 9 CM/SEC
BH CV ECHO MEAS - LV DIASTOLIC VOL/BSA (35-75): 48.7 ML/M^2
BH CV ECHO MEAS - LV MASS(C)D: 109.1 GRAMS
BH CV ECHO MEAS - LV MASS(C)DI: 70 GRAMS/M^2
BH CV ECHO MEAS - LV MAX PG: 3.8 MMHG
BH CV ECHO MEAS - LV MEAN PG: 2.4 MMHG
BH CV ECHO MEAS - LV SYSTOLIC VOL/BSA (12-30): 16.7 ML/M^2
BH CV ECHO MEAS - LV V1 MAX: 97.9 CM/SEC
BH CV ECHO MEAS - LV V1 MEAN: 75.3 CM/SEC
BH CV ECHO MEAS - LV V1 VTI: 26.2 CM
BH CV ECHO MEAS - LVIDD: 4.8 CM
BH CV ECHO MEAS - LVIDS: 3.4 CM
BH CV ECHO MEAS - LVLD AP2: 7 CM
BH CV ECHO MEAS - LVLD AP4: 7.1 CM
BH CV ECHO MEAS - LVLS AP2: 6 CM
BH CV ECHO MEAS - LVLS AP4: 5.5 CM
BH CV ECHO MEAS - LVOT AREA (M): 2.3 CM^2
BH CV ECHO MEAS - LVOT AREA: 2.2 CM^2
BH CV ECHO MEAS - LVOT DIAM: 1.7 CM
BH CV ECHO MEAS - LVPWD: 0.74 CM
BH CV ECHO MEAS - MED PEAK E' VEL: 10 CM/SEC
BH CV ECHO MEAS - MR MAX PG: 32.9 MMHG
BH CV ECHO MEAS - MR MAX VEL: 287 CM/SEC
BH CV ECHO MEAS - MV A DUR: 0.12 SEC
BH CV ECHO MEAS - MV A MAX VEL: 108.8 CM/SEC
BH CV ECHO MEAS - MV DEC SLOPE: 479.3 CM/SEC^2
BH CV ECHO MEAS - MV DEC TIME: 0.15 SEC
BH CV ECHO MEAS - MV E MAX VEL: 79.3 CM/SEC
BH CV ECHO MEAS - MV E/A: 0.73
BH CV ECHO MEAS - MV MAX PG: 5.6 MMHG
BH CV ECHO MEAS - MV MEAN PG: 1.7 MMHG
BH CV ECHO MEAS - MV P1/2T MAX VEL: 80.4 CM/SEC
BH CV ECHO MEAS - MV P1/2T: 49.1 MSEC
BH CV ECHO MEAS - MV V2 MAX: 118.1 CM/SEC
BH CV ECHO MEAS - MV V2 MEAN: 58.4 CM/SEC
BH CV ECHO MEAS - MV V2 VTI: 31.1 CM
BH CV ECHO MEAS - MVA P1/2T LCG: 2.7 CM^2
BH CV ECHO MEAS - MVA(P1/2T): 4.5 CM^2
BH CV ECHO MEAS - MVA(VTI): 1.9 CM^2
BH CV ECHO MEAS - PA ACC TIME: 0.14 SEC
BH CV ECHO MEAS - PA MAX PG (FULL): 2.2 MMHG
BH CV ECHO MEAS - PA MAX PG: 3.1 MMHG
BH CV ECHO MEAS - PA PR(ACCEL): 14 MMHG
BH CV ECHO MEAS - PA V2 MAX: 88.2 CM/SEC
BH CV ECHO MEAS - PULM A REVS DUR: 0.11 SEC
BH CV ECHO MEAS - PULM A REVS VEL: 36.6 CM/SEC
BH CV ECHO MEAS - PULM DIAS VEL: 42.7 CM/SEC
BH CV ECHO MEAS - PULM S/D: 1.4
BH CV ECHO MEAS - PULM SYS VEL: 60.2 CM/SEC
BH CV ECHO MEAS - PVA(V,A): 1.1 CM^2
BH CV ECHO MEAS - PVA(V,D): 1.1 CM^2
BH CV ECHO MEAS - QP/QS: 0.45
BH CV ECHO MEAS - RAP SYSTOLE: 8 MMHG
BH CV ECHO MEAS - RV MAX PG: 0.96 MMHG
BH CV ECHO MEAS - RV MEAN PG: 0.6 MMHG
BH CV ECHO MEAS - RV V1 MAX: 49 CM/SEC
BH CV ECHO MEAS - RV V1 MEAN: 37.9 CM/SEC
BH CV ECHO MEAS - RV V1 VTI: 13.2 CM
BH CV ECHO MEAS - RVOT AREA: 2 CM^2
BH CV ECHO MEAS - RVOT DIAM: 1.6 CM
BH CV ECHO MEAS - RVSP: 45 MMHG
BH CV ECHO MEAS - SI(AO): 121.1 ML/M^2
BH CV ECHO MEAS - SI(CUBED): 45.8 ML/M^2
BH CV ECHO MEAS - SI(LVOT): 37.1 ML/M^2
BH CV ECHO MEAS - SI(MOD-SP2): 25.6 ML/M^2
BH CV ECHO MEAS - SI(MOD-SP4): 32 ML/M^2
BH CV ECHO MEAS - SI(TEICH): 38.4 ML/M^2
BH CV ECHO MEAS - SUP REN AO DIAM: 2 CM
BH CV ECHO MEAS - SV(AO): 189 ML
BH CV ECHO MEAS - SV(CUBED): 71.5 ML
BH CV ECHO MEAS - SV(LVOT): 57.8 ML
BH CV ECHO MEAS - SV(MOD-SP2): 40 ML
BH CV ECHO MEAS - SV(MOD-SP4): 50 ML
BH CV ECHO MEAS - SV(RVOT): 25.8 ML
BH CV ECHO MEAS - SV(TEICH): 60 ML
BH CV ECHO MEAS - TAPSE (>1.6): 2.1 CM2
BH CV ECHO MEAS - TR MAX VEL: 265.7 CM/SEC
BH CV XLRA - RV BASE: 2.6 CM
BH CV XLRA - TDI S': 11 CM/SEC
E/E' RATIO: 8.5
LEFT ATRIUM VOLUME INDEX: 24 ML/M2
LV EF 2D ECHO EST: 65 %
SINUS: 2.5 CM
STJ: 1.9 CM

## 2017-09-27 PROCEDURE — 93306 TTE W/DOPPLER COMPLETE: CPT

## 2017-09-27 PROCEDURE — 93306 TTE W/DOPPLER COMPLETE: CPT | Performed by: INTERNAL MEDICINE

## 2017-10-06 ENCOUNTER — TELEPHONE (OUTPATIENT)
Dept: CARDIOLOGY | Facility: CLINIC | Age: 72
End: 2017-10-06

## 2017-12-07 DIAGNOSIS — E11.9 TYPE 2 DIABETES MELLITUS WITHOUT COMPLICATION (HCC): Chronic | ICD-10-CM

## 2017-12-07 RX ORDER — METFORMIN HYDROCHLORIDE 500 MG/1
TABLET, EXTENDED RELEASE ORAL
Qty: 360 TABLET | Refills: 0 | Status: SHIPPED | OUTPATIENT
Start: 2017-12-07 | End: 2018-05-31 | Stop reason: SDUPTHER

## 2017-12-08 DIAGNOSIS — I10 ESSENTIAL HYPERTENSION: Chronic | ICD-10-CM

## 2017-12-08 RX ORDER — LISINOPRIL 20 MG/1
TABLET ORAL
Qty: 90 TABLET | Refills: 1 | Status: SHIPPED | OUTPATIENT
Start: 2017-12-08 | End: 2018-05-31 | Stop reason: SDUPTHER

## 2018-01-22 DIAGNOSIS — E11.9 TYPE 2 DIABETES MELLITUS WITHOUT COMPLICATION, WITHOUT LONG-TERM CURRENT USE OF INSULIN (HCC): Chronic | ICD-10-CM

## 2018-01-31 ENCOUNTER — OFFICE VISIT (OUTPATIENT)
Dept: CARDIOLOGY | Facility: CLINIC | Age: 73
End: 2018-01-31

## 2018-01-31 VITALS
HEART RATE: 56 BPM | DIASTOLIC BLOOD PRESSURE: 60 MMHG | HEIGHT: 62 IN | BODY MASS INDEX: 23 KG/M2 | SYSTOLIC BLOOD PRESSURE: 118 MMHG | WEIGHT: 125 LBS

## 2018-01-31 DIAGNOSIS — I51.81 TAKOTSUBO CARDIOMYOPATHY: Primary | ICD-10-CM

## 2018-01-31 DIAGNOSIS — I25.10 CORONARY ARTERY DISEASE INVOLVING NATIVE CORONARY ARTERY OF NATIVE HEART WITHOUT ANGINA PECTORIS: ICD-10-CM

## 2018-01-31 PROCEDURE — 99213 OFFICE O/P EST LOW 20 MIN: CPT | Performed by: INTERNAL MEDICINE

## 2018-01-31 PROCEDURE — 93000 ELECTROCARDIOGRAM COMPLETE: CPT | Performed by: INTERNAL MEDICINE

## 2018-02-10 DIAGNOSIS — E78.5 HYPERLIPIDEMIA: Chronic | ICD-10-CM

## 2018-02-12 RX ORDER — SIMVASTATIN 40 MG
TABLET ORAL
Qty: 90 TABLET | Refills: 0 | Status: SHIPPED | OUTPATIENT
Start: 2018-02-12 | End: 2018-05-10 | Stop reason: SDUPTHER

## 2018-02-14 NOTE — PROGRESS NOTES
Subjective:     Encounter Date:01/31/2018      Patient ID: Renee Stevenson is a 72 y.o. female.    Chief Complaint: takotsubo cardiomyopathy, CAD    History of Present Illness    Dear Dr. Pacheco,     I had the pleasure of seeing your patient in cardiac followup today.  As you well know, she is a meliza 72-year-old woman with history of diabetes, hypertension and hyperlipidemia.  She had an abnormal troponin and angina.  Cardiac catheterization demonstrated takotsubo cardiomyopathy.  Her ejection fraction was 42%.  She had moderate branch vessel coronary disease.      Since I have last seen her, she reports doing very well.  She has recovered from the takotsubo nicely.  She has no stress in her life currently.  She has no complaints of angina or heart failure.          Review of Systems   Cardiovascular: Negative for orthopnea.   Respiratory: Negative for shortness of breath.    All other systems reviewed and are negative.        ECG 12 Lead  Date/Time: 1/31/2018 10:45 AM  Performed by: ZACK JARAMILLO  Authorized by: ZACK JARAMILLO   Comparison: compared with previous ECG   Similar to previous ECG  Rhythm: sinus rhythm  BPM: 56  Comments: [id number]               Objective:     Physical Exam   Constitutional: She is oriented to person, place, and time. She appears well-developed and well-nourished.   HENT:   Head: Normocephalic and atraumatic.   Neck: Normal range of motion. Neck supple. No JVD present.   Cardiovascular: Normal rate, regular rhythm and normal heart sounds.  Exam reveals no S3 and no S4.    Pulmonary/Chest: Effort normal. She has no rales.   Abdominal: Soft. Bowel sounds are normal. She exhibits no ascites. There is no hepatomegaly.   Musculoskeletal: Normal range of motion.   Neurological: She is alert and oriented to person, place, and time.   Skin: Skin is warm and dry.   Psychiatric: She has a normal mood and affect. Her behavior is normal. Thought content normal.   Vitals reviewed.      Lab Review:        Assessment:          Diagnosis Plan   1. Takotsubo cardiomyopathy     2. Coronary artery disease involving native coronary artery of native heart without angina pectoris            Plan:       It was a pleasure to see your patient in cardiac followup today.  She is doing well from the cardiac standpoint without any complaints of angina or heart failure.  She is on a good preventative regimen for branch vessel coronary disease.  She has recovered nicely after her cardiomyopathy.  She will see me again in one year or sooner if symptoms warrant.      Coronary Artery Disease  Assessment  • The patient has no angina  • There is a new diagnosis of stable angina in the past 12 months  • The patient is having symptoms consistent with unstable angina     Plan  • Lifestyle modifications discussed include adhering to a heart healthy diet, avoidance of tobacco products, maintenance of a healthy weight, medication compliance, regular exercise and regular monitoring of cholesterol and blood pressure    Subjective - Objective  • There is a history of past MI  • Current antiplatelet therapy includes aspirin 81 mg    Heart Failure  Assessment  • NYHA class I - There is no limitation of physical activity. Physical activity does not cause fatigue, palpitations or shortness of breath.  • Left ventricular function is normal by qualitative assessment    Plan  • The patient has received heart failure education on the following topics: prognosis/end-of-life issues, dietary sodium restriction, medication instructions, smoking cessation, minimizing or avoiding NSAID use, symptom management, physical activity, weight monitoring, minimizing alcohol intake and referral for visiting nurse, heart failure education program, or management program  • The heart failure care plan was discussed with the patient today including: continuing the current program    Subjective/Objective    • Physical exam findings negative for rales, peripheral edema,  elevated JVP, S3 gallop, S4 gallop, hepatomegaly and ascites.

## 2018-02-19 ENCOUNTER — OFFICE VISIT (OUTPATIENT)
Dept: INTERNAL MEDICINE | Age: 73
End: 2018-02-19

## 2018-02-19 VITALS
BODY MASS INDEX: 23 KG/M2 | HEIGHT: 62 IN | OXYGEN SATURATION: 98 % | HEART RATE: 60 BPM | TEMPERATURE: 97.4 F | SYSTOLIC BLOOD PRESSURE: 132 MMHG | WEIGHT: 125 LBS | DIASTOLIC BLOOD PRESSURE: 68 MMHG

## 2018-02-19 DIAGNOSIS — Z78.0 POSTMENOPAUSAL STATE: ICD-10-CM

## 2018-02-19 DIAGNOSIS — E78.2 MIXED HYPERLIPIDEMIA: Chronic | ICD-10-CM

## 2018-02-19 DIAGNOSIS — Z13.820 ENCOUNTER FOR SCREENING FOR OSTEOPOROSIS: ICD-10-CM

## 2018-02-19 DIAGNOSIS — I25.10 CORONARY ARTERY DISEASE INVOLVING NATIVE CORONARY ARTERY OF NATIVE HEART WITHOUT ANGINA PECTORIS: Chronic | ICD-10-CM

## 2018-02-19 DIAGNOSIS — I10 ESSENTIAL HYPERTENSION: Chronic | ICD-10-CM

## 2018-02-19 DIAGNOSIS — E11.59 TYPE 2 DIABETES MELLITUS WITH OTHER CIRCULATORY COMPLICATION, WITHOUT LONG-TERM CURRENT USE OF INSULIN (HCC): Primary | Chronic | ICD-10-CM

## 2018-02-19 PROCEDURE — 99214 OFFICE O/P EST MOD 30 MIN: CPT | Performed by: INTERNAL MEDICINE

## 2018-02-19 NOTE — PROGRESS NOTES
"[facility] [place] INTERNAL MEDICINE  ILA PATIÑO M.D.      Renee PARIKH From / 72 y.o. / female  02/19/2018      MEDICATIONS  Current Outpatient Prescriptions   Medication Sig Dispense Refill   • Acetaminophen (TYLENOL PO) Take 2 tablets by mouth As Needed.     • aspirin 81 MG EC tablet Take 1 tablet by mouth Daily.     • MAIA CONTOUR NEXT TEST test strip CHECK BLOOD SUGAR ONCE DAILY 100 each 12   • Cholecalciferol (VITAMIN D) 2000 UNITS capsule Take 1 capsule by mouth daily.     • dorzolamide-timolol (COSOPT) 22.3-6.8 MG/ML ophthalmic solution Apply 1 drop to eye 2 (two) times a day.     • lisinopril (PRINIVIL,ZESTRIL) 20 MG tablet TAKE 1 TABLET BY MOUTH EVERY DAY 90 tablet 1   • mesalamine (LIALDA) 1.2 G EC tablet Take 1,200 mg by mouth 2 (two) times a day.     • metFORMIN ER (GLUCOPHAGE-XR) 500 MG 24 hr tablet TAKE 2 TABLETS BY MOUTH TWICE DAILY 360 tablet 0   • metoprolol tartrate (LOPRESSOR) 50 MG tablet Take 1 tablet by mouth Every 12 (Twelve) Hours. 60 tablet 11   • Multiple Vitamin (MULTI-VITAMIN PO) Take 1 tablet by mouth Daily.     • omeprazole (PriLOSEC) 40 MG capsule Take 1 capsule by mouth daily.     • simvastatin (ZOCOR) 40 MG tablet TAKE 1 TABLET BY MOUTH EVERY NIGHT AT BEDTIME 90 tablet 0   • timolol (TIMOPTIC) 0.25 % ophthalmic solution 1 drop 2 (Two) Times a Day.       No current facility-administered medications for this visit.          VITALS    Visit Vitals   • /68   • Pulse 60   • Temp 97.4 °F (36.3 °C)   • Ht 157.5 cm (62.01\")   • Wt 56.7 kg (125 lb)   • SpO2 98%   • BMI 22.86 kg/m2       BP Readings from Last 3 Encounters:   02/19/18 132/68   01/31/18 118/60   09/27/17 100/60     Wt Readings from Last 3 Encounters:   02/19/18 56.7 kg (125 lb)   01/31/18 56.7 kg (125 lb)   09/27/17 56.2 kg (124 lb)      Body mass index is 22.86 kg/(m^2).    CC:  Main reason(s) for today's visit: Follow-up for diabetes and related medical problems    HPI:     Chronic type 2 diabetes:  Home blood sugar levels: consistently " in acceptable range, has no significant low sugar symptoms/problems. Current therapy: metformin.  Most recent relevant labs:   Lab Results   Component Value Date    HGBA1C 6.70 (H) 08/16/2017    HGBA1C 6.70 (H) 02/15/2017    HGBA1C 6.7 (H) 08/12/2016    CREATININE 0.64 06/17/2017    LDL 69 08/12/2016    MICROALBUR 15.9 02/15/2017     Chronic essential hypertension:  Since prior visit: compliant with medication(s), does not check blood pressure at home and denies significant problems with medication(s).  Most recent in-office blood pressure readings:   BP Readings from Last 3 Encounters:   02/19/18 132/68   01/31/18 118/60   09/27/17 100/60     Chronic hyperlipidemia:  Current therapy include simvastatin.    Most recent labs:   Lab Results   Component Value Date    LDL 69 08/12/2016    LDL 69 08/20/2015    HDL 48 08/12/2016    HDL 55 08/20/2015    TRIG 96 08/12/2016    CHOLHDLRATIO 2.8 08/12/2016         Patient Care Team:  Esequiel Pacheco MD as PCP - General  Esequiel Pacheco MD as PCP - Claims Attributed  Andre Adams MD as Consulting Physician (Gastroenterology)  Ministerio Wells MD as Consulting Physician (Orthopedic Surgery)  Abhay Wooten MD as Consulting Physician (Cardiology)  Cisco Husain MD as Consulting Physician (Otolaryngology)  ____________________________________________________________________    ASSESSMENT & PLAN:    Problem List Items Addressed This Visit        High    Type 2 diabetes mellitus with circulatory disorder - Primary (Chronic)    Overview     Stable. Continue metformin.          Relevant Medications    metFORMIN ER (GLUCOPHAGE-XR) 500 MG 24 hr tablet    MAIA CONTOUR NEXT TEST test strip    Other Relevant Orders    Hemoglobin A1c    Comprehensive Metabolic Panel    Microalbumin / Creatinine Urine Ratio - Urine, Clean Catch       Medium    Coronary artery disease involving native coronary artery of native heart without angina pectoris (Chronic)    Overview     Stable. Continue ASA, bblocker,  statin, acei         Relevant Medications    metoprolol tartrate (LOPRESSOR) 50 MG tablet       Low    Hypertension (Chronic)    Overview     Stable. Continue lisinopril.          Relevant Medications    metoprolol tartrate (LOPRESSOR) 50 MG tablet    lisinopril (PRINIVIL,ZESTRIL) 20 MG tablet    Hyperlipidemia (Chronic)    Overview     Stable. Continue simvastatin.           Relevant Medications    simvastatin (ZOCOR) 40 MG tablet    Other Relevant Orders    Lipid Panel With / Chol / HDL Ratio      Other Visit Diagnoses     Encounter for screening for osteoporosis        Relevant Orders    DEXA Bone Density Axial    Postmenopausal state        Relevant Orders    DEXA Bone Density Axial        Orders Placed This Encounter   Procedures   • DEXA Bone Density Axial   • Hemoglobin A1c   • Lipid Panel With / Chol / HDL Ratio   • Comprehensive Metabolic Panel   • Microalbumin / Creatinine Urine Ratio - Urine, Clean Catch       Summary/Discussion:  ·     Return in about 4 months (around 6/19/2018) for Diabetes and co-morbid conditions.    Future Appointments  Date Time Provider Department Center   6/19/2018 9:40 AM MD AVERY [facility] None   1/31/2019 1:00 PM PEE Cruz [facility] None     ____________________________________________________________________    REVIEW OF SYSTEMS    Review of Systems  As noted per HPI  Constitutional neg  Resp neg  CV neg      PHYSICAL EXAMINATION    Physical Exam    Renee had a diabetic foot exam performed today.   During the foot exam she had a monofilament test performed (normal).    Vascular Status -  Her exam exhibits right foot vasculature normal. Her exam exhibits no right foot edema. Her exam exhibits left foot vasculature normal. Her exam exhibits no left foot edema.   Skin Integrity  -  Her right foot skin is intact.     Renee 's left foot skin is intact. .    Constitutional  No distress  Cardiovascular Rate  normal . Rhythm: regular . Heart sounds:   normal  Pulmonary/Chest  Effort normal. Breath sounds:  normal  Psychiatric  Alert. Judgment and thought content normal. Mood normal    REVIEWED DATA:    Labs:   Lab Results   Component Value Date     06/17/2017    K 4.2 06/17/2017    AST 12 06/16/2017    ALT 12 06/16/2017    BUN 9 06/17/2017    CREATININE 0.64 06/17/2017    CREATININE 0.84 06/16/2017    CREATININE 0.83 08/12/2016    EGFRIFNONA 91 06/17/2017    EGFRIFAFRI 81 08/12/2016       Lab Results   Component Value Date    HGBA1C 6.70 (H) 08/16/2017    HGBA1C 6.70 (H) 02/15/2017    HGBA1C 6.7 (H) 08/12/2016     (H) 08/12/2016     (H) 08/20/2015     (H) 07/17/2015    MICROALBUR 15.9 02/15/2017       Lab Results   Component Value Date    LDL 69 08/12/2016    LDL 69 08/20/2015    HDL 48 08/12/2016    HDL 55 08/20/2015    TRIG 96 08/12/2016    CHOLHDLRATIO 2.8 08/12/2016       No results found for: TSH, FREET4       Lab Results   Component Value Date    WBC 7.41 06/17/2017    HGB 10.1 (L) 06/17/2017    HGB 10.6 (L) 06/16/2017     06/17/2017        Imaging:        Medical Tests:        Summary of old records / correspondence / consultant report:        Request outside records:        ALLERGIES  Allergies   Allergen Reactions   • Tetanus Toxoids Swelling     Hand and arm        PFSH:     The following portions of the patient's history were reviewed and updated as appropriate: Allergies / Current Medications / Past Medical History / Surgical History / Social History / Family History    PROBLEM LIST   Patient Active Problem List   Diagnosis   • Type 2 diabetes mellitus with circulatory disorder   • Osteopenia   • Hypertension   • Hyperlipidemia   • Colon polyp   • Gastroesophageal reflux disease   • History of non-ST elevation myocardial infarction (NSTEMI)   • Glaucoma   • Hypothyroidism   • Coronary artery disease involving native coronary artery of native heart without angina pectoris   • Takotsubo cardiomyopathy   • Coronary  artery disease involving native coronary artery of native heart without angina pectoris       PAST MEDICAL HISTORY  Past Medical History:   Diagnosis Date   • Allergic rhinitis    • Colitis    • Colon polyps    • Diabetes mellitus     type 2   • Dizziness    • Encounter for breast cancer screening other than mammogram    • GERD (gastroesophageal reflux disease)    • Glaucoma    • Hyperlipidemia    • Hypertension    • Hypothyroidism    • Knee fracture, left    • Non-STEMI (non-ST elevated myocardial infarction) 6/16/2017   • Osteopenia        SURGICAL HISTORY  Past Surgical History:   Procedure Laterality Date   • CARDIAC CATHETERIZATION N/A 6/16/2017    Procedure: Left Heart Cath;  Surgeon: Abhay Wooten MD;  Location: [facility] CATH INVASIVE LOCATION;  Service:    • CARDIAC CATHETERIZATION N/A 6/16/2017    Procedure: Left ventriculography;  Surgeon: Abhay Wooten MD;  Location[facility] CATH INVASIVE LOC[facility];  Service:    • CARDIAC CATHETERIZATION N/A 6/16/2017    Procedure: Coronary angiography;  Surgeon: Abhay Wooten MD;  Location: [facility] CATH INVASIVE LOCATION;  Service:    • CATARACT EXTRACTION     • EYE SURGERY      cataract surgery   • PAP SMEAR         SOCIAL HISTORY  Social History     Social History   • Marital status:      Spouse name: N/A   • Number of children: 1   • Years of education: N/A     Occupational History   • retail      retired     Social History Main Topics   • Smoking status: Never Smoker   • Smokeless tobacco: Never Used   • Alcohol use No   • Drug use: No   • Sexual activity: Defer     Other Topics Concern   • None     Social History Narrative       FAMILY HISTORY  Family History   Problem Relation Age of Onset   • Heart disease Mother    • Hypertension Mother    • Diabetes Mother    • Heart disease Father    • Hypertension Father    • Heart disease Sister    • Heart disease Brother    • Hypertension Other    • Diabetes Other      type 2   • No Known Problems Maternal Grandmother    • No Known  Problems Maternal Grandfather    • No Known Problems Paternal Grandmother    • No Known Problems Paternal Grandfather          **Cheryl Disclaimer:   Much of this encounter note is an electronic transcription/translation of spoken language to printed text. The electronic translation of spoken language may permit erroneous, or at times, nonsensical words or phrases to be inadvertently transcribed. Although I have reviewed the note for such errors, some may still exist.

## 2018-02-20 LAB
ALBUMIN SERPL-MCNC: 4.3 G/DL (ref 3.5–5.2)
ALBUMIN/CREAT UR: 29.5 MG/G CREAT (ref 0–30)
ALBUMIN/GLOB SERPL: 1.4 G/DL
ALP SERPL-CCNC: 51 U/L (ref 39–117)
ALT SERPL-CCNC: 6 U/L (ref 1–33)
AST SERPL-CCNC: 12 U/L (ref 1–32)
BILIRUB SERPL-MCNC: 0.3 MG/DL (ref 0.1–1.2)
BUN SERPL-MCNC: 16 MG/DL (ref 8–23)
BUN/CREAT SERPL: 20 (ref 7–25)
CALCIUM SERPL-MCNC: 9.3 MG/DL (ref 8.6–10.5)
CHLORIDE SERPL-SCNC: 101 MMOL/L (ref 98–107)
CHOLEST SERPL-MCNC: 125 MG/DL (ref 0–200)
CHOLEST/HDLC SERPL: 2.91 {RATIO}
CO2 SERPL-SCNC: 26.6 MMOL/L (ref 22–29)
CREAT SERPL-MCNC: 0.8 MG/DL (ref 0.57–1)
CREAT UR-MCNC: 92.5 MG/DL
GFR SERPLBLD CREATININE-BSD FMLA CKD-EPI: 70 ML/MIN/1.73
GFR SERPLBLD CREATININE-BSD FMLA CKD-EPI: 85 ML/MIN/1.73
GLOBULIN SER CALC-MCNC: 3 GM/DL
GLUCOSE SERPL-MCNC: 115 MG/DL (ref 65–99)
HBA1C MFR BLD: 6.02 % (ref 4.8–5.6)
HDLC SERPL-MCNC: 43 MG/DL (ref 40–60)
LDLC SERPL CALC-MCNC: 61 MG/DL (ref 0–100)
MICROALBUMIN UR-MCNC: 27.3 UG/ML
POTASSIUM SERPL-SCNC: 4.3 MMOL/L (ref 3.5–5.2)
PROT SERPL-MCNC: 7.3 G/DL (ref 6–8.5)
SODIUM SERPL-SCNC: 141 MMOL/L (ref 136–145)
TRIGL SERPL-MCNC: 104 MG/DL (ref 0–150)
VLDLC SERPL CALC-MCNC: 20.8 MG/DL (ref 5–40)

## 2018-02-28 ENCOUNTER — APPOINTMENT (OUTPATIENT)
Dept: WOMENS IMAGING | Facility: HOSPITAL | Age: 73
End: 2018-02-28

## 2018-02-28 PROCEDURE — MDREVIEWSP: Performed by: RADIOLOGY

## 2018-02-28 PROCEDURE — 77067 SCR MAMMO BI INCL CAD: CPT | Performed by: RADIOLOGY

## 2018-02-28 PROCEDURE — 77063 BREAST TOMOSYNTHESIS BI: CPT | Performed by: RADIOLOGY

## 2018-03-02 ENCOUNTER — HOSPITAL ENCOUNTER (OUTPATIENT)
Dept: BONE DENSITY | Facility: HOSPITAL | Age: 73
Discharge: HOME | End: 2018-03-02
Admitting: INTERNAL MEDICINE

## 2018-03-02 PROCEDURE — 77080 DXA BONE DENSITY AXIAL: CPT

## 2018-03-05 DIAGNOSIS — E11.9 TYPE 2 DIABETES MELLITUS WITHOUT COMPLICATION (HCC): Chronic | ICD-10-CM

## 2018-03-05 RX ORDER — METFORMIN HYDROCHLORIDE 500 MG/1
TABLET, EXTENDED RELEASE ORAL
Qty: 360 TABLET | Refills: 0 | Status: SHIPPED | OUTPATIENT
Start: 2018-03-05 | End: 2018-05-31 | Stop reason: SDUPTHER

## 2018-04-06 RX ORDER — METOPROLOL TARTRATE 50 MG/1
TABLET, FILM COATED ORAL
Qty: 180 TABLET | Refills: 1 | Status: SHIPPED | OUTPATIENT
Start: 2018-04-06 | End: 2018-07-30 | Stop reason: SDUPTHER

## 2018-05-08 ENCOUNTER — TELEPHONE (OUTPATIENT)
Dept: INTERNAL MEDICINE | Age: 73
End: 2018-05-08

## 2018-05-08 NOTE — TELEPHONE ENCOUNTER
Pt called complaining of diarrhea, nausea, chills and took temp 97.7 that started this morning. Pt said she tried drinking a sip of coffee but it came back up, so she tried some 7-UP and it stayed down.  Pt doesn't know if her nausea is caused from her allergy drainage. And ptt said she recently had the Hep A vaccine because she was exposed to the Hep A. Pt hasn't taken anything over the counter.  [facility]  Pt's # [phone]  Thanks SP

## 2018-05-08 NOTE — TELEPHONE ENCOUNTER
Pt states that she has taken a nap and feels much better but I told her if she wanted to call back and get in with milka she could or if she got worse later to go in to [facility] or ED if she felbad or had vomiting and nausea / fever. CESIA

## 2018-05-10 DIAGNOSIS — E78.5 HYPERLIPIDEMIA: Chronic | ICD-10-CM

## 2018-05-10 RX ORDER — SIMVASTATIN 40 MG
TABLET ORAL
Qty: 90 TABLET | Refills: 0 | Status: SHIPPED | OUTPATIENT
Start: 2018-05-10 | End: 2018-11-29

## 2018-05-21 ENCOUNTER — OFFICE (OUTPATIENT)
Dept: URBAN - METROPOLITAN AREA CLINIC 2 | Facility: CLINIC | Age: 73
End: 2018-05-21

## 2018-05-21 VITALS
DIASTOLIC BLOOD PRESSURE: 66 MMHG | HEART RATE: 56 BPM | SYSTOLIC BLOOD PRESSURE: 110 MMHG | HEIGHT: 61 IN | WEIGHT: 123 LBS

## 2018-05-21 DIAGNOSIS — K21.9 GASTRO-ESOPHAGEAL REFLUX DISEASE WITHOUT ESOPHAGITIS: ICD-10-CM

## 2018-05-21 DIAGNOSIS — F45.8 OTHER SOMATOFORM DISORDERS: ICD-10-CM

## 2018-05-21 DIAGNOSIS — K51.50 LEFT SIDED COLITIS WITHOUT COMPLICATIONS: ICD-10-CM

## 2018-05-21 DIAGNOSIS — K64.8 OTHER HEMORRHOIDS: ICD-10-CM

## 2018-05-21 DIAGNOSIS — R19.7 DIARRHEA, UNSPECIFIED: ICD-10-CM

## 2018-05-21 DIAGNOSIS — Z86.010 PERSONAL HISTORY OF COLONIC POLYPS: ICD-10-CM

## 2018-05-21 DIAGNOSIS — K92.1 MELENA: ICD-10-CM

## 2018-05-21 PROCEDURE — 99214 OFFICE O/P EST MOD 30 MIN: CPT | Performed by: INTERNAL MEDICINE

## 2018-05-21 RX ORDER — MESALAMINE 1.2 G/1
TABLET, DELAYED RELEASE ORAL
Qty: 120 | Refills: 5 | Status: ACTIVE

## 2018-05-31 DIAGNOSIS — I10 ESSENTIAL HYPERTENSION: Chronic | ICD-10-CM

## 2018-05-31 DIAGNOSIS — E11.9 TYPE 2 DIABETES MELLITUS WITHOUT COMPLICATION (HCC): Chronic | ICD-10-CM

## 2018-05-31 RX ORDER — LISINOPRIL 20 MG/1
TABLET ORAL
Qty: 90 TABLET | Refills: 1 | Status: SHIPPED | OUTPATIENT
Start: 2018-05-31 | End: 2018-11-23 | Stop reason: SDUPTHER

## 2018-05-31 RX ORDER — METFORMIN HYDROCHLORIDE 500 MG/1
TABLET, EXTENDED RELEASE ORAL
Qty: 360 TABLET | Refills: 1 | Status: SHIPPED | OUTPATIENT
Start: 2018-05-31 | End: 2018-11-26 | Stop reason: SDUPTHER

## 2018-07-30 ENCOUNTER — OFFICE VISIT (OUTPATIENT)
Dept: INTERNAL MEDICINE | Age: 73
End: 2018-07-30

## 2018-07-30 VITALS
SYSTOLIC BLOOD PRESSURE: 158 MMHG | DIASTOLIC BLOOD PRESSURE: 66 MMHG | BODY MASS INDEX: 21.71 KG/M2 | OXYGEN SATURATION: 100 % | TEMPERATURE: 98 F | WEIGHT: 118 LBS | HEART RATE: 66 BPM | HEIGHT: 62 IN

## 2018-07-30 DIAGNOSIS — I25.10 CORONARY ARTERY DISEASE INVOLVING NATIVE CORONARY ARTERY OF NATIVE HEART WITHOUT ANGINA PECTORIS: Chronic | ICD-10-CM

## 2018-07-30 DIAGNOSIS — E11.59 TYPE 2 DIABETES MELLITUS WITH OTHER CIRCULATORY COMPLICATION, WITHOUT LONG-TERM CURRENT USE OF INSULIN (HCC): Primary | Chronic | ICD-10-CM

## 2018-07-30 DIAGNOSIS — I10 ESSENTIAL HYPERTENSION: Chronic | ICD-10-CM

## 2018-07-30 DIAGNOSIS — E78.2 MIXED HYPERLIPIDEMIA: Chronic | ICD-10-CM

## 2018-07-30 LAB — HBA1C MFR BLD: 6.3 % (ref 4.8–5.6)

## 2018-07-30 PROCEDURE — 99214 OFFICE O/P EST MOD 30 MIN: CPT | Performed by: INTERNAL MEDICINE

## 2018-07-30 NOTE — PROGRESS NOTES
[facility] [place] INTERNAL MEDICINE  ILA PATIÑO M.D.      Renee PARIKH From / 73 y.o. / female  07/30/2018      ASSESSMENT & PLAN:    Problem List Items Addressed This Visit        High    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) - Primary (Chronic)    Overview     Stable. Continue metformin.          Relevant Medications    MAIA CONTOUR NEXT TEST test strip    metFORMIN ER (GLUCOPHAGE-XR) 500 MG 24 hr tablet    Other Relevant Orders    Hemoglobin A1c       Medium    Hypertension (Chronic)    Overview     Stable. Continue lisinopril.          Relevant Medications    metoprolol tartrate (LOPRESSOR) 50 MG tablet    lisinopril (PRINIVIL,ZESTRIL) 20 MG tablet    Hyperlipidemia (Chronic)    Overview     Stable. Continue simvastatin.           Relevant Medications    simvastatin (ZOCOR) 40 MG tablet    Coronary artery disease involving native coronary artery of native heart without angina pectoris (Chronic)    Overview     Stable. Continue ASA, bblocker, statin, acei         Relevant Medications    metoprolol tartrate (LOPRESSOR) 50 MG tablet        Orders Placed This Encounter   Procedures   • Hemoglobin A1c     No orders of the defined types were placed in this encounter.      Summary/Discussion:      Return in about 4 months (around 11/30/2018) for Diabetes and co-morbid conditions, Schedule AWV with Yane within 2 months.  ____________________________________________________________________    MEDICATIONS  Current Outpatient Prescriptions   Medication Sig Dispense Refill   • Acetaminophen (TYLENOL PO) Take 2 tablets by mouth As Needed.     • aspirin 81 MG EC tablet Take 1 tablet by mouth Daily.     • MAIA CONTOUR NEXT TEST test strip CHECK BLOOD SUGAR ONCE DAILY 100 each 12   • Cholecalciferol (VITAMIN D) 2000 UNITS capsule Take 1 capsule by mouth daily.     • dorzolamide-timolol (COSOPT) 22.3-6.8 MG/ML ophthalmic solution Apply 1 drop to eye 2 (two) times a day.     • lisinopril (PRINIVIL,ZESTRIL) 20 MG tablet  "TAKE 1 TABLET BY MOUTH EVERY DAY 90 tablet 1   • mesalamine (LIALDA) 1.2 G EC tablet Take 1,200 mg by mouth 2 (two) times a day.     • metFORMIN ER (GLUCOPHAGE-XR) 500 MG 24 hr tablet TAKE 2 TABLETS BY MOUTH TWICE DAILY 360 tablet 1   • metoprolol tartrate (LOPRESSOR) 50 MG tablet Take 1 tablet by mouth Every 12 (Twelve) Hours. 60 tablet 11   • Multiple Vitamin (MULTI-VITAMIN PO) Take 1 tablet by mouth Daily.     • omeprazole (PriLOSEC) 40 MG capsule Take 1 capsule by mouth daily.     • simvastatin (ZOCOR) 40 MG tablet TAKE 1 TABLET BY MOUTH EVERY NIGHT AT BEDTIME 90 tablet 0   • timolol (TIMOPTIC) 0.25 % ophthalmic solution 1 drop 2 (Two) Times a Day.       No current facility-administered medications for this visit.          VITALS    Visit Vitals  /66 (BP Location: Right arm)   Pulse 66   Temp 98 °F (36.7 °C)   Ht 157.5 cm (62.01\")   Wt 53.5 kg (118 lb)   SpO2 100%   BMI 21.58 kg/m²       BP Readings from Last 3 Encounters:   07/30/18 158/66   02/19/18 132/68   01/31/18 118/60     Wt Readings from Last 3 Encounters:   07/30/18 53.5 kg (118 lb)   02/19/18 56.7 kg (125 lb)   01/31/18 56.7 kg (125 lb)      Body mass index is 21.58 kg/m².    CC:  Main reason(s) for today's visit: Follow-up for diabetes and related medical problems    HPI:     Chronic type 2 diabetes:  Home blood sugar levels: about the same as before, has no significant low sugar symptoms/problems. Current therapy: metformin.  Most recent relevant labs:   Lab Results   Component Value Date    HGBA1C 6.02 (H) 02/19/2018    HGBA1C 6.70 (H) 08/16/2017    HGBA1C 6.70 (H) 02/15/2017    CREATININE 0.80 02/19/2018    LDL 61 02/19/2018    MICROALBUR 27.3 02/19/2018     Chronic essential hypertension:  Since prior visit: compliant with medication(s) and denies significant problems with medication(s).  Most recent in-office blood pressure readings:   BP Readings from Last 3 Encounters:   07/30/18 158/66   02/19/18 132/68   01/31/18 118/60     Chronic " hyperlipidemia:  Current therapy include simvastatin, denies problems with medication.    Most recent labs:   Lab Results   Component Value Date    LDL 61 02/19/2018    LDL 69 08/12/2016    LDL 69 08/20/2015    HDL 43 02/19/2018    HDL 48 08/12/2016    TRIG 104 02/19/2018    CHOLHDLRATIO 2.91 02/19/2018     CAD s/p MI without angina. On ASA.     Patient Care Team:  Esequiel Pacheco MD as PCP - General  Esequiel Pacheco MD as PCP - Claims Attributed  Andre Adams MD as Consulting Physician (Gastroenterology)  Ministerio Wells MD as Consulting Physician (Orthopedic Surgery)  Abhay Wooten MD as Consulting Physician (Cardiology)  Cisco Husain MD as Consulting Physician (Otolaryngology)  EZIO Garibay MD as Consulting Physician (Ophthalmology)  ____________________________________________________________________    REVIEW OF SYSTEMS    Review of Systems  As noted per HPI  Constitutional neg, 7 lbs weight loss, no change in appetite, no night sweat   Resp neg  CV neg  GI neg; to have colonoscopy soon    PHYSICAL EXAMINATION    Physical Exam  Constitutional  No distress  Cardiovascular Rate  normal . Rhythm: regular . Heart sounds:  Normal. No edema.   Pulmonary/Chest  Effort normal. Breath sounds:  normal  Psychiatric  Alert. Judgment and thought content normal. Mood normal    REVIEWED DATA:    Labs:   Lab Results   Component Value Date     02/19/2018    K 4.3 02/19/2018    AST 12 02/19/2018    ALT 6 02/19/2018    BUN 16 02/19/2018    CREATININE 0.80 02/19/2018    CREATININE 0.64 06/17/2017    CREATININE 0.84 06/16/2017    EGFRIFNONA 70 02/19/2018    EGFRIFAFRI 85 02/19/2018       Lab Results   Component Value Date    HGBA1C 6.02 (H) 02/19/2018    HGBA1C 6.70 (H) 08/16/2017    HGBA1C 6.70 (H) 02/15/2017    GLUCOSE 147 (H) 06/17/2017    GLUCOSE 194 (H) 06/16/2017    MICROALBUR 27.3 02/19/2018       Lab Results   Component Value Date    LDL 61 02/19/2018    LDL 69 08/12/2016    LDL 69 08/20/2015    HDL 43 02/19/2018     HDL 48 08/12/2016    TRIG 104 02/19/2018    CHOLHDLRATIO 2.91 02/19/2018       No results found for: TSH, FREET4       Lab Results   Component Value Date    WBC 7.41 06/17/2017    HGB 10.1 (L) 06/17/2017    HGB 10.6 (L) 06/16/2017     06/17/2017        Imaging:        Medical Tests:        Summary of old records / correspondence / consultant report:        Request outside records:        ALLERGIES  Allergies   Allergen Reactions   • Tetanus Toxoids Swelling     Hand and arm        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)   • Osteopenia   • Hypertension   • Hyperlipidemia   • Colon polyp   • Gastroesophageal reflux disease   • History of non-ST elevation myocardial infarction (NSTEMI)   • Glaucoma   • Hypothyroidism   • Coronary artery disease involving native coronary artery of native heart without angina pectoris   • Takotsubo cardiomyopathy   • Coronary artery disease involving native coronary artery of native heart without angina pectoris       PAST MEDICAL HISTORY  Past Medical History:   Diagnosis Date   • Allergic rhinitis    • Colitis    • Colon polyps    • Diabetes mellitus (CMS/HCC)     type 2   • Dizziness    • Encounter for breast cancer screening other than mammogram    • GERD (gastroesophageal reflux disease)    • Glaucoma    • Hyperlipidemia    • Hypertension    • Hypothyroidism    • Knee fracture, left    • Non-STEMI (non-ST elevated myocardial infarction) (CMS/HCC) 6/16/2017   • Osteopenia        SURGICAL HISTORY  Past Surgical History:   Procedure Laterality Date   • CARDIAC CATHETERIZATION N/A 6/16/2017    Procedure: Left Heart Cath;  Surgeon: Abhay Wooten MD;  Location: [facility] INVASIVE LOCATION;   Service:    • CARDIAC CATHETERIZATION N/A 6/16/2017    Procedure: Left ventriculography;  Surgeon: Abhay Wooten MD;  Location: [facility] INVASIVE LOCATION;  Service:    • CARDIAC CATHETERIZATION N/A 6/16/2017    Procedure: Coronary angiography;  Surgeon: Abhay Wooten MD;  Location: [facility] INVASIVE LOCATION;  Service:    • CATARACT EXTRACTION     • EYE SURGERY      cataract surgery   • PAP SMEAR         SOCIAL HISTORY  Social History     Social History   • Marital status:    • Number of children: 1     Occupational History   • retail      retired     Social History Main Topics   • Smoking status: Never Smoker   • Smokeless tobacco: Never Used   • Alcohol use No   • Drug use: No   • Sexual activity: Defer     Other Topics Concern   • Not on file       FAMILY HISTORY  Family History   Problem Relation Age of Onset   • Heart disease Mother    • Hypertension Mother    • Diabetes Mother    • Heart disease Father    • Hypertension Father    • Heart disease Sister    • Heart disease Brother    • Hypertension Other    • Diabetes Other         type 2   • No Known Problems Maternal Grandmother    • No Known Problems Maternal Grandfather    • No Known Problems Paternal Grandmother    • No Known Problems Paternal Grandfather          **Dragon Disclaimer:   Much of this encounter note is an electronic transcription/translation of spoken language to printed text. The electronic translation of spoken language may permit erroneous, or at times, nonsensical words or phrases to be inadvertently transcribed. Although I have reviewed the note for such errors, some may still exist.

## 2018-08-13 VITALS
DIASTOLIC BLOOD PRESSURE: 55 MMHG | DIASTOLIC BLOOD PRESSURE: 77 MMHG | SYSTOLIC BLOOD PRESSURE: 124 MMHG | SYSTOLIC BLOOD PRESSURE: 119 MMHG | HEART RATE: 59 BPM | DIASTOLIC BLOOD PRESSURE: 68 MMHG | RESPIRATION RATE: 16 BRPM | SYSTOLIC BLOOD PRESSURE: 179 MMHG | DIASTOLIC BLOOD PRESSURE: 65 MMHG | SYSTOLIC BLOOD PRESSURE: 140 MMHG | SYSTOLIC BLOOD PRESSURE: 161 MMHG | HEIGHT: 61 IN | TEMPERATURE: 98.2 F | SYSTOLIC BLOOD PRESSURE: 116 MMHG | RESPIRATION RATE: 20 BRPM | DIASTOLIC BLOOD PRESSURE: 76 MMHG | OXYGEN SATURATION: 99 % | HEART RATE: 58 BPM | TEMPERATURE: 96.5 F | OXYGEN SATURATION: 100 % | DIASTOLIC BLOOD PRESSURE: 72 MMHG | HEART RATE: 62 BPM | WEIGHT: 122 LBS | RESPIRATION RATE: 17 BRPM | RESPIRATION RATE: 26 BRPM | SYSTOLIC BLOOD PRESSURE: 136 MMHG | RESPIRATION RATE: 18 BRPM | DIASTOLIC BLOOD PRESSURE: 58 MMHG | HEART RATE: 67 BPM | RESPIRATION RATE: 19 BRPM | OXYGEN SATURATION: 98 % | DIASTOLIC BLOOD PRESSURE: 70 MMHG | HEART RATE: 61 BPM

## 2018-08-14 ENCOUNTER — OFFICE (OUTPATIENT)
Dept: URBAN - METROPOLITAN AREA PATHOLOGY 4 | Facility: PATHOLOGY | Age: 73
End: 2018-08-14

## 2018-08-14 ENCOUNTER — AMBULATORY SURGICAL CENTER (OUTPATIENT)
Dept: URBAN - METROPOLITAN AREA SURGERY 17 | Facility: SURGERY | Age: 73
End: 2018-08-14

## 2018-08-14 DIAGNOSIS — Z86.010 PERSONAL HISTORY OF COLONIC POLYPS: ICD-10-CM

## 2018-08-14 DIAGNOSIS — K51.90 ULCERATIVE COLITIS, UNSPECIFIED, WITHOUT COMPLICATIONS: ICD-10-CM

## 2018-08-14 DIAGNOSIS — K64.4 RESIDUAL HEMORRHOIDAL SKIN TAGS: ICD-10-CM

## 2018-08-14 DIAGNOSIS — D12.2 BENIGN NEOPLASM OF ASCENDING COLON: ICD-10-CM

## 2018-08-14 DIAGNOSIS — D12.3 BENIGN NEOPLASM OF TRANSVERSE COLON: ICD-10-CM

## 2018-08-14 DIAGNOSIS — K63.3 ULCER OF INTESTINE: ICD-10-CM

## 2018-08-14 LAB
GI HISTOLOGY: A. UNSPECIFIED: (no result)
GI HISTOLOGY: B. UNSPECIFIED: (no result)
GI HISTOLOGY: PDF REPORT: (no result)

## 2018-08-14 PROCEDURE — 45385 COLONOSCOPY W/LESION REMOVAL: CPT | Mod: PT | Performed by: INTERNAL MEDICINE

## 2018-08-14 PROCEDURE — 88305 TISSUE EXAM BY PATHOLOGIST: CPT | Performed by: INTERNAL MEDICINE

## 2018-08-14 NOTE — SERVICEHPINOTES
Ms. casey is here for followup. She has a history of left-sided colitis. She continues to do well on Lialda. She had blood work done in January or February per her history. I will get a copy of her results. The most part her stools are formed, she has occasional diarrhea.She does tell me she has intermittent rectal bleeding, she attributes this to her known internal hemorrhoids. She'll have blood in the water and on the tissue. The bleeding is painless. She does not have bloody diarrhea. She does tell me she had hemorrhoid banding done years ago when she may be a candidate once again for hemorrhoid banding.She also has a history of large polyps. She needs a colonoscopy in August.Her reflux is controlled on omeprazole 40 mg by mouth daily. There is no dysphagia, odynophagia, nausea, vomiting, melena or hematemesis. She is in no distress, she does not look acutely ill. Otherwise there is no change in her past medical or past surgical history.

## 2018-09-28 RX ORDER — METOPROLOL TARTRATE 50 MG/1
TABLET, FILM COATED ORAL
Qty: 180 TABLET | Refills: 0 | Status: SHIPPED | OUTPATIENT
Start: 2018-09-28 | End: 2018-12-26 | Stop reason: SDUPTHER

## 2018-10-02 ENCOUNTER — OFFICE VISIT (OUTPATIENT)
Dept: INTERNAL MEDICINE | Age: 73
End: 2018-10-02

## 2018-10-02 VITALS
HEART RATE: 62 BPM | HEIGHT: 62 IN | WEIGHT: 119.8 LBS | OXYGEN SATURATION: 99 % | TEMPERATURE: 96.9 F | DIASTOLIC BLOOD PRESSURE: 64 MMHG | SYSTOLIC BLOOD PRESSURE: 132 MMHG | BODY MASS INDEX: 22.05 KG/M2

## 2018-10-02 DIAGNOSIS — Z23 ENCOUNTER FOR IMMUNIZATION: ICD-10-CM

## 2018-10-02 DIAGNOSIS — Z00.00 MEDICARE ANNUAL WELLNESS VISIT, SUBSEQUENT: Primary | ICD-10-CM

## 2018-10-02 PROCEDURE — G0008 ADMIN INFLUENZA VIRUS VAC: HCPCS | Performed by: NURSE PRACTITIONER

## 2018-10-02 PROCEDURE — 90662 IIV NO PRSV INCREASED AG IM: CPT | Performed by: NURSE PRACTITIONER

## 2018-10-02 PROCEDURE — G0439 PPPS, SUBSEQ VISIT: HCPCS | Performed by: NURSE PRACTITIONER

## 2018-10-02 NOTE — PATIENT INSTRUCTIONS
Medicare Wellness  Personal Prevention Plan of Service     Date of Office Visit:  10/02/2018  Encounter Provider:  OLIVA Rosa  Place of Service:  [facility] PRIMARY CARE  Patient Name: Renee PARIKH From  :  1945    As part of the Medicare Wellness portion of your visit today, we are providing you with this personalized preventive plan of services (PPPS). This plan is based upon recommendations of the [place] Preventive Services Task Force (USPSTF) and the Advisory Committee on Immunization Practices (ACIP).    This lists the preventive care services that should be considered, and provides dates of when you are due. Items listed as completed are up-to-date and do not require any further intervention.      Age-Appropriate Screening Schedule:  (Refer to the list below for future screening recommendations based on patient's age, sex and/or medical conditions. Orders for these recommended tests are listed in the plan section. The patient has been provided with a written plan)    Health Maintenance Topics  Health Maintenance   Topic Date Due   • ZOSTER VACCINE (3 of 3) 2017   • INFLUENZA VACCINE  2018   • HEMOGLOBIN A1C  2019   • DIABETIC FOOT EXAM  2019   • LIPID PANEL  2019   • URINE MICROALBUMIN  2019   • MAMMOGRAM  2019   • DIABETIC EYE EXAM  2019   • DXA SCAN  2020   • COLONOSCOPY  2021   • PNEUMOCOCCAL VACCINES (65+ LOW/MEDIUM RISK)  Completed   • TDAP/TD VACCINES  Excluded       Health Maintenance Topics Due or Over-Due  Health Maintenance Due   Topic Date Due   • ZOSTER VACCINE (3 of 3) 2017   • INFLUENZA VACCINE  2018       ADDITIONAL RESOURCES:    For excellent information on senior health and wellness refer to the [employer] web-site at:    WWW .NIHSENIORHEALTH.GOV

## 2018-10-02 NOTE — PROGRESS NOTES
"10/02/2018    MEDICARE ANNUAL WELLNESS VISIT    Renee Stevenson is a 73 y.o. female who presents for SUBSEQUENT AWV    Patient's general assessment of her health since a year ago:     - Compared to one year ago, she feels her physical health is about the same without significant change.    - Compared to one year ago, she feels her mental health is about the same without significant change.      HPI for other active medical problems:           * The required components of Health Risk Assessment (HRA) that were completed by the patient and/or my staff are contained within this note and in the scanned documents titled \"Health Risk Assessment\" within the media section of the patient's chart in [email or web address].       HISTORY    Recent Hospitalizations:     Recent hospitalization? : No    If YES, location, date, and diagnoses:     · Location:   · Date:   · Principle Discharge Dx:   · Secondary Dx:       Patient Care Team:    Patient Care Team:  Esequiel Pacheco MD as PCP - General  Esequiel Pacheco MD as PCP - Claims Attributed  Andre Adams MD as Consulting Physician (Gastroenterology)  Ministerio Wells MD as Consulting Physician (Orthopedic Surgery)  Abhay Wooten MD as Consulting Physician (Cardiology)  Cisco Husain MD as Consulting Physician (Otolaryngology)  EZIO Garibay MD as Consulting Physician (Ophthalmology)      Allergies:  Tetanus toxoids    Medications:  Outpatient Medications Prior to Visit   Medication Sig Dispense Refill   • Acetaminophen (TYLENOL PO) Take 2 tablets by mouth As Needed.     • aspirin 81 MG EC tablet Take 1 tablet by mouth Daily.     • MAIA CONTOUR NEXT TEST test strip CHECK BLOOD SUGAR ONCE DAILY 100 each 12   • Cholecalciferol (VITAMIN D) 2000 UNITS capsule Take 1 capsule by mouth daily.     • dorzolamide-timolol (COSOPT) 22.3-6.8 MG/ML ophthalmic solution Apply 1 drop to eye 2 (two) times a day.     • lisinopril (PRINIVIL,ZESTRIL) 20 MG tablet TAKE 1 TABLET BY MOUTH EVERY DAY 90 tablet 1   • " mesalamine (LIALDA) 1.2 G EC tablet Take 1,200 mg by mouth 2 (two) times a day.     • metFORMIN ER (GLUCOPHAGE-XR) 500 MG 24 hr tablet TAKE 2 TABLETS BY MOUTH TWICE DAILY 360 tablet 1   • metoprolol tartrate (LOPRESSOR) 50 MG tablet TAKE 1 TABLET BY MOUTH EVERY 12 HOURS 180 tablet 0   • Multiple Vitamin (MULTI-VITAMIN PO) Take 1 tablet by mouth Daily.     • omeprazole (PriLOSEC) 40 MG capsule Take 1 capsule by mouth daily.     • simvastatin (ZOCOR) 40 MG tablet TAKE 1 TABLET BY MOUTH EVERY NIGHT AT BEDTIME 90 tablet 0   • metoprolol tartrate (LOPRESSOR) 50 MG tablet Take 1 tablet by mouth Every 12 (Twelve) Hours. 60 tablet 11   • timolol (TIMOPTIC) 0.25 % ophthalmic solution 1 drop 2 (Two) Times a Day.       No facility-administered medications prior to visit.        PFSH:     The following portions of the patient's history were reviewed and updated as appropriate: Allergies / Current Medications / Past Medical History / Surgical History / Social History / Family History    Problem List:  Patient Active Problem List   Diagnosis   • 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)   • Osteopenia   • Hypertension   • Hyperlipidemia   • Colon polyp   • Gastroesophageal reflux disease   • History of non-ST elevation myocardial infarction (NSTEMI)   • Glaucoma   • Hypothyroidism   • Coronary artery disease involving native coronary artery of native heart without angina pectoris   • Takotsubo cardiomyopathy   • Coronary artery disease involving native coronary artery of native heart without angina pectoris       Past Medical History:  Past Medical History:   Diagnosis Date   • Allergic rhinitis    • Colitis    • Colon polyps    • Diabetes mellitus (CMS/HCC)     type 2   • Dizziness    • Encounter for breast cancer screening other than mammogram    • GERD (gastroesophageal reflux disease)    • Glaucoma    • Hyperlipidemia    • Hypertension    • Hypothyroidism    • Knee fracture, left    • Non-STEMI (non-ST  "elevated myocardial infarction) (CMS/[facility]) 6/16/2017   • Osteopenia        Past Surgical History:  Past Surgical History:   Procedure Laterality Date   • CARDIAC CATHETERIZATION N/A 6/16/2017    Procedure: Left Heart Cath;  Surgeon: Abhay Wooten MD;  Location: [facility] CATH INVASIVE LOCATION;  Service:    • CARDIAC CATHETERIZATION N/A 6/16/2017    Procedure: Left ventriculography;  Surgeon: Abhay Wooten MD;  [facility];  Service:    • CARDIAC CATHETERIZATION N/A 6/16/2017    Procedure: Coronary angiography;  Surgeon: Abhay Wooten MD;  [facility];  Service:    • CATARACT EXTRACTION     • COLONOSCOPY  08/14/2018   • EYE SURGERY      cataract surgery   • PAP SMEAR         Social History:  Social History     Social History   • Marital status:    • Number of children: 1     Occupational History   • retail      retired     Social History Main Topics   • Smoking status: Never Smoker   • Smokeless tobacco: Never Used   • Alcohol use No   • Drug use: No   • Sexual activity: Defer     Other Topics Concern   • Not on file       Family History:  Family History   Problem Relation Age of Onset   • Heart disease Mother    • Hypertension Mother    • Diabetes Mother    • Heart disease Father    • Hypertension Father    • Heart disease Sister    • Heart disease Brother    • Hypertension Other    • Diabetes Other         type 2   • No Known Problems Maternal Grandmother    • No Known Problems Maternal Grandfather    • No Known Problems Paternal Grandmother    • No Known Problems Paternal Grandfather          PATIENT ASSESSMENT    Vitals:  /64   Pulse 62   Temp 96.9 °F (36.1 °C)   Ht 157.5 cm (62.01\")   Wt 54.3 kg (119 lb 12.8 oz)   SpO2 99%   BMI 21.90 kg/m²   BP Readings from Last 3 Encounters:   10/02/18 132/64   07/30/18 158/66   02/19/18 132/68     Wt Readings from Last 3 Encounters:   10/02/18 54.3 kg (119 lb 12.8 oz)   07/30/18 53.5 kg (118 lb)   02/19/18 56.7 " kg (125 lb)      Body mass index is 21.9 kg/m².    Pain Score    10/02/18 0805   PainSc: 0-No pain         Review of Systems:    Review of Systems   HENT: Negative for hearing loss.    Respiratory: Negative for shortness of breath.    Cardiovascular: Negative for chest pain.   Neurological: Negative for dizziness, weakness and light-headedness.         Physical Exam:    Physical Exam   Constitutional: She is oriented to person, place, and time. Vital signs are normal. She appears well-developed and well-nourished. She is cooperative. She does not appear ill. No distress.   Cardiovascular: Normal rate, regular rhythm, S1 normal, S2 normal and normal heart sounds.    No murmur heard.  Pulmonary/Chest: Effort normal and breath sounds normal. She has no decreased breath sounds. She has no wheezes. She has no rhonchi. She has no rales.   Neurological: She is alert and oriented to person, place, and time.   Skin: Skin is warm, dry and intact.   Psychiatric: She has a normal mood and affect. Her speech is normal and behavior is normal. Judgment and thought content normal. Cognition and memory are normal.   Nursing note and vitals reviewed.        Reviewed Data:    Labs:   Lab Results   Component Value Date     02/19/2018    K 4.3 02/19/2018    AST 12 02/19/2018    ALT 6 02/19/2018    BUN 16 02/19/2018    CREATININE 0.80 02/19/2018    CREATININE 0.64 06/17/2017    CREATININE 0.84 06/16/2017    EGFRIFNONA 70 02/19/2018    EGFRIFAFRI 85 02/19/2018       Lab Results   Component Value Date     (H) 02/19/2018     (H) 08/12/2016     (H) 08/20/2015    HGBA1C 6.30 (H) 07/30/2018    HGBA1C 6.02 (H) 02/19/2018    HGBA1C 6.70 (H) 08/16/2017    MICROALBUR 27.3 02/19/2018       Lab Results   Component Value Date    LDL 61 02/19/2018    LDL 69 08/12/2016    LDL 69 08/20/2015    HDL 43 02/19/2018    TRIG 104 02/19/2018    CHOLHDLRATIO 2.91 02/19/2018       No results found for: TSH, FREET4       Lab Results    Component Value Date    WBC 7.41 06/17/2017    HGB 10.1 (L) 06/17/2017    HGB 10.6 (L) 06/16/2017     06/17/2017                 No results found for: PSA    Imaging:          Medical Tests:          Screening for Glaucoma:  Previous screening for glaucoma?: N/A, currently receiving treatment       Hearing Loss Screen:  Finger Rub Hearing Test (right ear): passed  Finger Rub Hearing Test (left ear): passed      Urinary Incontinence Screen:  Episodes of urinary incontinence? : No      Depression Screen:  PHQ-2/PHQ-9 Depression Screening 10/2/2018   Little interest or pleasure in doing things 0   Feeling down, depressed, or hopeless 0   Total Score 0        PHQ-2: 0 (Not depressed)    PHQ-9: 0 (Negative screening for depression)       FUNCTIONAL, FALL RISK, & COGNITIVE SCREENING (Components below):    DATA:    Functional & Cognitive Status 10/2/2018   Do you have difficulty preparing food and eating? No   Do you have difficulty bathing yourself, getting dressed or grooming yourself? No   Do you have difficulty using the toilet? No   Do you have difficulty moving around from place to place? No   Do you have trouble with steps or getting out of a bed or a chair? No   In the past year have you fallen or experienced a near fall? No   Do you need help using the phone?  No   Are you deaf or do you have serious difficulty hearing?  No   Do you need help with transportation? No   Do you need help shopping? No   Do you need help preparing meals?  No   Do you need help with housework?  No   Do you need help with laundry? No   Do you need help taking your medications? No   Do you need help managing money? No   Do you ever drive or ride in a car without wearing a seat belt? No   Do you have difficulty concentrating, remembering or making decisions? No       Fall Risk Assessment  Fallen in past 6 months: 0--> No  Mental Status: 0--> no mental status change  Mobility: 0--> No mobility issues  Medications: 0--> No meds,  1--> Insulin/ Oral hypoglycemic  Total Fall Risk Score: 3    A) Assessment of Functional Ability:  (Assessment of ability to perform ADL's (showering/bathing, using toilet, dressing, feeding self, moving self around) and IADL's (use telephone, shop, prepare food, housekeep, do laundry, transport independently, take medications independently, and handle finances)    Degree of functional impairment: NONE (based on assessment noted above)      B) Assessment of Fall Risk:     - Fall within the last year? : No   - Feel unsteady when standing or walking? : No   - Worry about falling? : No    Need for further evaluation of gait, strength, and balance? : No    Timed Up and Go (TUG):   (>= 12 seconds indicates high risk for falling)    Observable abnormalities included: Normal gait pattern       C. Assessment of Cognitive Function:    Mini-Cog Test:     1) Registration (3 objects): N/A   2) Clock Draw: Passed? : Yes   3) Number of objects recalled: 3    Further evaluation required? : No      COUNSELING    A. Identification of Health Risk Factors:    Risk factors include: polypharmacy      B. Age-Appropriate Screening Schedule:  (Refer to the list below for future screening recommendations based on patient's age, sex and/or medical conditions. Orders for these recommended tests are listed in the plan section. The patient has been provided with a written plan)    Health Maintenance Topics  Health Maintenance   Topic Date Due   • ZOSTER VACCINE (3 of 3) 04/12/2017   • INFLUENZA VACCINE  08/01/2018   • HEMOGLOBIN A1C  01/30/2019   • DIABETIC FOOT EXAM  02/19/2019   • LIPID PANEL  02/19/2019   • URINE MICROALBUMIN  02/19/2019   • MAMMOGRAM  02/22/2019   • DIABETIC EYE EXAM  09/14/2019   • DXA SCAN  03/02/2020   • COLONOSCOPY  08/14/2021   • PNEUMOCOCCAL VACCINES (65+ LOW/MEDIUM RISK)  Completed   • TDAP/TD VACCINES  Excluded       Health Maintenance Topics Due or Over-Due  Health Maintenance Due   Topic Date Due   • ZOSTER  VACCINE (3 of 3) 04/12/2017   • INFLUENZA VACCINE  08/01/2018         C. Advanced Care Planning:    does not have an advanced directive nor a medical power of  (brochure on advanced directive provided to patient)      D. Patient Self-Management and Personalized Health Advice:    She has been provided with personalized counseling/information (including brochures/handouts) about:     -- designing advance directives and designating POA    She has been recommended for the following preventative services which has been performed today, will be ordered today or ordered/performed on upcoming follow-up visit:     -- fall risk assessment / plan of care completed, vaccination for influenza administered/recommended, vaccination for Shingrix recommended at pharmacy      E. Miscellaneous Items:    -Aspirin use counseling: Taking ASA appropriately as indicated    -Discussed BMI with her. The BMI is in the acceptable range    -Reviewed use of high risk medication in the elderly: YES    -Reviewed for potential of harmful drug interactions in the elderly: YES      IV. WRAP-UP    Assessment & Plan:    1) MEDICARE ANNUAL WELLNESS VISIT    2) OTHER MEDICAL CONDITIONS ADDRESSED TODAY:              Problem List Items Addressed This Visit     None      Visit Diagnoses     Medicare annual wellness visit, subsequent    -  Primary    Encounter for immunization        Relevant Orders    Fluzone High Dose =>65Years (Completed)                    Orders Placed This Encounter   Procedures   • Fluzone High Dose =>65Years       Discussion / Summary:    1. Medicare annual wellness visit, subsequent      2. Encounter for immunization    - Fluzone High Dose =>64Years      Medications as of TODAY:              Current Outpatient Prescriptions   Medication Sig Dispense Refill   • Acetaminophen (TYLENOL PO) Take 2 tablets by mouth As Needed.     • aspirin 81 MG EC tablet Take 1 tablet by mouth Daily.     • MAIA CONTOUR NEXT TEST test strip  CHECK BLOOD SUGAR ONCE DAILY 100 each 12   • Cholecalciferol (VITAMIN D) 2000 UNITS capsule Take 1 capsule by mouth daily.     • dorzolamide-timolol (COSOPT) 22.3-6.8 MG/ML ophthalmic solution Apply 1 drop to eye 2 (two) times a day.     • lisinopril (PRINIVIL,ZESTRIL) 20 MG tablet TAKE 1 TABLET BY MOUTH EVERY DAY 90 tablet 1   • mesalamine (LIALDA) 1.2 G EC tablet Take 1,200 mg by mouth 2 (two) times a day.     • metFORMIN ER (GLUCOPHAGE-XR) 500 MG 24 hr tablet TAKE 2 TABLETS BY MOUTH TWICE DAILY 360 tablet 1   • metoprolol tartrate (LOPRESSOR) 50 MG tablet TAKE 1 TABLET BY MOUTH EVERY 12 HOURS 180 tablet 0   • Multiple Vitamin (MULTI-VITAMIN PO) Take 1 tablet by mouth Daily.     • omeprazole (PriLOSEC) 40 MG capsule Take 1 capsule by mouth daily.     • simvastatin (ZOCOR) 40 MG tablet TAKE 1 TABLET BY MOUTH EVERY NIGHT AT BEDTIME 90 tablet 0     No current facility-administered medications for this visit.          FOLLOW-UP:            No Follow-up on file.              Future Appointments  Date Time Provider Department Center   11/29/2018 9:45 AM Esequiel Pacheco MD [facility] None   1/31/2019 1:00 PM Brandie Herring PA [facility] None         ______________________________________________________________________      A printed After Visit Summary (AVS) including the Personalized Prevention  Plan Services (PPPS) was given to the patient at check-out today.         **Dragon Disclaimer:   Much of this encounter note is an electronic transcription/translation of spoken language to printed text. The electronic translation of spoken language may permit erroneous, or at times, nonsensical words or phrases to be inadvertently transcribed. Although I have reviewed the note for such errors, some may still exist.

## 2018-10-31 ENCOUNTER — HOSPITAL ENCOUNTER (INPATIENT)
Facility: HOSPITAL | Age: 73
LOS: 3 days | Discharge: HOME | End: 2018-11-04
Attending: EMERGENCY MEDICINE | Admitting: INTERNAL MEDICINE

## 2018-10-31 ENCOUNTER — APPOINTMENT (OUTPATIENT)
Dept: CT IMAGING | Facility: HOSPITAL | Age: 73
End: 2018-10-31

## 2018-10-31 DIAGNOSIS — D64.9 ANEMIA, UNSPECIFIED TYPE: ICD-10-CM

## 2018-10-31 DIAGNOSIS — K52.9 COLITIS: Primary | ICD-10-CM

## 2018-10-31 DIAGNOSIS — K62.5 BRBPR (BRIGHT RED BLOOD PER RECTUM): ICD-10-CM

## 2018-10-31 LAB
ABO GROUP BLD: NORMAL
ALBUMIN SERPL-MCNC: 4.2 G/DL (ref 3.5–5.2)
ALBUMIN/GLOB SERPL: 1.3 G/DL
ALP SERPL-CCNC: 50 U/L (ref 39–117)
ALT SERPL W P-5'-P-CCNC: 7 U/L (ref 1–33)
ANION GAP SERPL CALCULATED.3IONS-SCNC: 12.9 MMOL/L
AST SERPL-CCNC: 10 U/L (ref 1–32)
BASOPHILS # BLD AUTO: 0.01 10*3/MM3 (ref 0–0.2)
BASOPHILS NFR BLD AUTO: 0.1 % (ref 0–1.5)
BILIRUB SERPL-MCNC: 0.2 MG/DL (ref 0.1–1.2)
BLD GP AB SCN SERPL QL: NEGATIVE
BUN BLD-MCNC: 15 MG/DL (ref 8–23)
BUN/CREAT SERPL: 17.2 (ref 7–25)
CALCIUM SPEC-SCNC: 9.2 MG/DL (ref 8.6–10.5)
CHLORIDE SERPL-SCNC: 103 MMOL/L (ref 98–107)
CO2 SERPL-SCNC: 23.1 MMOL/L (ref 22–29)
CREAT BLD-MCNC: 0.87 MG/DL (ref 0.57–1)
DEPRECATED RDW RBC AUTO: 46.2 FL (ref 37–54)
EOSINOPHIL # BLD AUTO: 0 10*3/MM3 (ref 0–0.7)
EOSINOPHIL NFR BLD AUTO: 0 % (ref 0.3–6.2)
ERYTHROCYTE [DISTWIDTH] IN BLOOD BY AUTOMATED COUNT: 15.8 % (ref 11.7–13)
GFR SERPL CREATININE-BSD FRML MDRD: 64 ML/MIN/1.73
GLOBULIN UR ELPH-MCNC: 3.2 GM/DL
GLUCOSE BLD-MCNC: 128 MG/DL (ref 65–99)
HCT VFR BLD AUTO: 26.7 % (ref 35.6–45.5)
HGB BLD-MCNC: 7.4 G/DL (ref 11.9–15.5)
IMM GRANULOCYTES # BLD: 0.02 10*3/MM3 (ref 0–0.03)
IMM GRANULOCYTES NFR BLD: 0.3 % (ref 0–0.5)
LYMPHOCYTES # BLD AUTO: 1.8 10*3/MM3 (ref 0.9–4.8)
LYMPHOCYTES NFR BLD AUTO: 26.2 % (ref 19.6–45.3)
MCH RBC QN AUTO: 22.3 PG (ref 26.9–32)
MCHC RBC AUTO-ENTMCNC: 27.7 G/DL (ref 32.4–36.3)
MCV RBC AUTO: 80.4 FL (ref 80.5–98.2)
MONOCYTES # BLD AUTO: 0.56 10*3/MM3 (ref 0.2–1.2)
MONOCYTES NFR BLD AUTO: 8.2 % (ref 5–12)
NEUTROPHILS # BLD AUTO: 4.48 10*3/MM3 (ref 1.9–8.1)
NEUTROPHILS NFR BLD AUTO: 65.2 % (ref 42.7–76)
PLATELET # BLD AUTO: 230 10*3/MM3 (ref 140–500)
PMV BLD AUTO: 9.2 FL (ref 6–12)
POTASSIUM BLD-SCNC: 4.1 MMOL/L (ref 3.5–5.2)
PROT SERPL-MCNC: 7.4 G/DL (ref 6–8.5)
RBC # BLD AUTO: 3.32 10*6/MM3 (ref 3.9–5.2)
RH BLD: POSITIVE
SODIUM BLD-SCNC: 139 MMOL/L (ref 136–145)
T&S EXPIRATION DATE: NORMAL
WBC NRBC COR # BLD: 6.87 10*3/MM3 (ref 4.5–10.7)

## 2018-10-31 PROCEDURE — 84466 ASSAY OF TRANSFERRIN: CPT | Performed by: INTERNAL MEDICINE

## 2018-10-31 PROCEDURE — G0378 HOSPITAL OBSERVATION PER HR: HCPCS

## 2018-10-31 PROCEDURE — 86850 RBC ANTIBODY SCREEN: CPT | Performed by: NURSE PRACTITIONER

## 2018-10-31 PROCEDURE — 83540 ASSAY OF IRON: CPT | Performed by: INTERNAL MEDICINE

## 2018-10-31 PROCEDURE — 82728 ASSAY OF FERRITIN: CPT | Performed by: INTERNAL MEDICINE

## 2018-10-31 PROCEDURE — 86923 COMPATIBILITY TEST ELECTRIC: CPT

## 2018-10-31 PROCEDURE — 86900 BLOOD TYPING SEROLOGIC ABO: CPT | Performed by: NURSE PRACTITIONER

## 2018-10-31 PROCEDURE — 74177 CT ABD & PELVIS W/CONTRAST: CPT

## 2018-10-31 PROCEDURE — 86901 BLOOD TYPING SEROLOGIC RH(D): CPT | Performed by: NURSE PRACTITIONER

## 2018-10-31 PROCEDURE — 80053 COMPREHEN METABOLIC PANEL: CPT | Performed by: NURSE PRACTITIONER

## 2018-10-31 PROCEDURE — 25010000002 IOPAMIDOL 61 % SOLUTION: Performed by: NURSE PRACTITIONER

## 2018-10-31 PROCEDURE — 99283 EMERGENCY DEPT VISIT LOW MDM: CPT

## 2018-10-31 PROCEDURE — 85025 COMPLETE CBC W/AUTO DIFF WBC: CPT | Performed by: NURSE PRACTITIONER

## 2018-10-31 RX ORDER — SODIUM CHLORIDE 0.9 % (FLUSH) 0.9 %
10 SYRINGE (ML) INJECTION AS NEEDED
Status: DISCONTINUED | OUTPATIENT
Start: 2018-10-31 | End: 2018-11-04 | Stop reason: HOSPADM

## 2018-10-31 RX ADMIN — IOPAMIDOL 85 ML: 612 INJECTION, SOLUTION INTRAVENOUS at 21:41

## 2018-10-31 RX ADMIN — SODIUM CHLORIDE 500 ML: 9 INJECTION, SOLUTION INTRAVENOUS at 21:17

## 2018-11-01 PROBLEM — K62.5 RECTAL BLEEDING: Status: ACTIVE | Noted: 2018-11-01

## 2018-11-01 PROBLEM — D62 ACUTE POST-HEMORRHAGIC ANEMIA: Status: ACTIVE | Noted: 2018-11-01

## 2018-11-01 PROBLEM — K51.911 ULCERATIVE COLITIS WITH RECTAL BLEEDING (HCC): Status: ACTIVE | Noted: 2018-10-31

## 2018-11-01 LAB
ADV 40+41 DNA STL QL NAA+NON-PROBE: NOT DETECTED
ANION GAP SERPL CALCULATED.3IONS-SCNC: 9.3 MMOL/L
ASTRO TYP 1-8 RNA STL QL NAA+NON-PROBE: NOT DETECTED
BUN BLD-MCNC: 11 MG/DL (ref 8–23)
BUN/CREAT SERPL: 14.5 (ref 7–25)
C CAYETANENSIS DNA STL QL NAA+NON-PROBE: NOT DETECTED
C DIFF TOX GENS STL QL NAA+PROBE: NEGATIVE
CALCIUM SPEC-SCNC: 8.7 MG/DL (ref 8.6–10.5)
CAMPY SP DNA.DIARRHEA STL QL NAA+PROBE: NOT DETECTED
CHLORIDE SERPL-SCNC: 105 MMOL/L (ref 98–107)
CO2 SERPL-SCNC: 25.7 MMOL/L (ref 22–29)
CREAT BLD-MCNC: 0.76 MG/DL (ref 0.57–1)
CRYPTOSP STL CULT: NOT DETECTED
E COLI DNA SPEC QL NAA+PROBE: NOT DETECTED
E HISTOLYT AG STL-ACNC: NOT DETECTED
EAEC PAA PLAS AGGR+AATA ST NAA+NON-PRB: NOT DETECTED
EC STX1 + STX2 GENES STL NAA+PROBE: NOT DETECTED
EPEC EAE GENE STL QL NAA+NON-PROBE: NOT DETECTED
ETEC LTA+ST1A+ST1B TOX ST NAA+NON-PROBE: NOT DETECTED
FERRITIN SERPL-MCNC: 4.82 NG/ML (ref 13–150)
G LAMBLIA DNA SPEC QL NAA+PROBE: NOT DETECTED
GFR SERPL CREATININE-BSD FRML MDRD: 74 ML/MIN/1.73
GLUCOSE BLD-MCNC: 149 MG/DL (ref 65–99)
GLUCOSE BLDC GLUCOMTR-MCNC: 127 MG/DL (ref 70–130)
GLUCOSE BLDC GLUCOMTR-MCNC: 142 MG/DL (ref 70–130)
GLUCOSE BLDC GLUCOMTR-MCNC: 146 MG/DL (ref 70–130)
GLUCOSE BLDC GLUCOMTR-MCNC: 185 MG/DL (ref 70–130)
GLUCOSE BLDC GLUCOMTR-MCNC: 203 MG/DL (ref 70–130)
HCT VFR BLD AUTO: 23.4 % (ref 35.6–45.5)
HCT VFR BLD AUTO: 26.8 % (ref 35.6–45.5)
HGB BLD-MCNC: 6.7 G/DL (ref 11.9–15.5)
HGB BLD-MCNC: 7.3 G/DL (ref 11.9–15.5)
IRON 24H UR-MRATE: 16 MCG/DL (ref 37–145)
IRON SATN MFR SERPL: 3 % (ref 20–50)
NOROVIRUS GI+II RNA STL QL NAA+NON-PROBE: NOT DETECTED
P SHIGELLOIDES DNA STL QL NAA+NON-PROBE: NOT DETECTED
POTASSIUM BLD-SCNC: 4 MMOL/L (ref 3.5–5.2)
RV RNA STL NAA+PROBE: NOT DETECTED
SALMONELLA DNA SPEC QL NAA+PROBE: NOT DETECTED
SAPO I+II+IV+V RNA STL QL NAA+NON-PROBE: NOT DETECTED
SHIGELLA SP+EIEC IPAH STL QL NAA+PROBE: NOT DETECTED
SODIUM BLD-SCNC: 140 MMOL/L (ref 136–145)
TIBC SERPL-MCNC: 495 MCG/DL (ref 298–536)
TRANSFERRIN SERPL-MCNC: 332 MG/DL (ref 200–360)
V CHOLERAE DNA SPEC QL NAA+PROBE: NOT DETECTED
VIBRIO DNA SPEC NAA+PROBE: NOT DETECTED
VIT B12 BLD-MCNC: <150 PG/ML (ref 211–946)
YERSINIA STL CULT: NOT DETECTED

## 2018-11-01 PROCEDURE — 82607 VITAMIN B-12: CPT | Performed by: INTERNAL MEDICINE

## 2018-11-01 PROCEDURE — 86901 BLOOD TYPING SEROLOGIC RH(D): CPT

## 2018-11-01 PROCEDURE — 99204 OFFICE O/P NEW MOD 45 MIN: CPT | Performed by: INTERNAL MEDICINE

## 2018-11-01 PROCEDURE — 36430 TRANSFUSION BLD/BLD COMPNT: CPT

## 2018-11-01 PROCEDURE — 80048 BASIC METABOLIC PNL TOTAL CA: CPT | Performed by: INTERNAL MEDICINE

## 2018-11-01 PROCEDURE — 63710000001 INSULIN ASPART PER 5 UNITS: Performed by: INTERNAL MEDICINE

## 2018-11-01 PROCEDURE — 87999 UNLISTED MICROBIOLOGY PX: CPT | Performed by: INTERNAL MEDICINE

## 2018-11-01 PROCEDURE — 25010000002 LEVOFLOXACIN PER 250 MG: Performed by: INTERNAL MEDICINE

## 2018-11-01 PROCEDURE — 85018 HEMOGLOBIN: CPT | Performed by: INTERNAL MEDICINE

## 2018-11-01 PROCEDURE — 86140 C-REACTIVE PROTEIN: CPT | Performed by: NURSE PRACTITIONER

## 2018-11-01 PROCEDURE — 86900 BLOOD TYPING SEROLOGIC ABO: CPT

## 2018-11-01 PROCEDURE — 85014 HEMATOCRIT: CPT | Performed by: INTERNAL MEDICINE

## 2018-11-01 PROCEDURE — 82962 GLUCOSE BLOOD TEST: CPT

## 2018-11-01 PROCEDURE — 87493 C DIFF AMPLIFIED PROBE: CPT | Performed by: INTERNAL MEDICINE

## 2018-11-01 PROCEDURE — P9016 RBC LEUKOCYTES REDUCED: HCPCS

## 2018-11-01 RX ORDER — ONDANSETRON 4 MG/1
4 TABLET, FILM COATED ORAL EVERY 6 HOURS PRN
Status: DISCONTINUED | OUTPATIENT
Start: 2018-11-01 | End: 2018-11-04 | Stop reason: HOSPADM

## 2018-11-01 RX ORDER — ATORVASTATIN CALCIUM 20 MG/1
20 TABLET, FILM COATED ORAL DAILY
Status: DISCONTINUED | OUTPATIENT
Start: 2018-11-01 | End: 2018-11-04 | Stop reason: HOSPADM

## 2018-11-01 RX ORDER — FERROUS SULFATE 325(65) MG
325 TABLET ORAL
Status: DISCONTINUED | OUTPATIENT
Start: 2018-11-01 | End: 2018-11-04 | Stop reason: HOSPADM

## 2018-11-01 RX ORDER — MELATONIN
2000 DAILY
Status: DISCONTINUED | OUTPATIENT
Start: 2018-11-01 | End: 2018-11-04 | Stop reason: HOSPADM

## 2018-11-01 RX ORDER — LEVOFLOXACIN 5 MG/ML
500 INJECTION, SOLUTION INTRAVENOUS EVERY 24 HOURS
Status: DISCONTINUED | OUTPATIENT
Start: 2018-11-01 | End: 2018-11-02

## 2018-11-01 RX ORDER — SODIUM CHLORIDE 0.9 % (FLUSH) 0.9 %
3-10 SYRINGE (ML) INJECTION AS NEEDED
Status: DISCONTINUED | OUTPATIENT
Start: 2018-11-01 | End: 2018-11-04 | Stop reason: HOSPADM

## 2018-11-01 RX ORDER — MESALAMINE 1000 MG/1
1000 SUPPOSITORY RECTAL NIGHTLY
Status: DISCONTINUED | OUTPATIENT
Start: 2018-11-01 | End: 2018-11-03

## 2018-11-01 RX ORDER — DEXTROSE MONOHYDRATE 25 G/50ML
25 INJECTION, SOLUTION INTRAVENOUS
Status: DISCONTINUED | OUTPATIENT
Start: 2018-11-01 | End: 2018-11-04 | Stop reason: HOSPADM

## 2018-11-01 RX ORDER — POLYETHYLENE GLYCOL 3350 17 G/17G
17 POWDER, FOR SOLUTION ORAL DAILY
Status: DISCONTINUED | OUTPATIENT
Start: 2018-11-01 | End: 2018-11-02

## 2018-11-01 RX ORDER — ONDANSETRON 4 MG/1
4 TABLET, ORALLY DISINTEGRATING ORAL EVERY 6 HOURS PRN
Status: DISCONTINUED | OUTPATIENT
Start: 2018-11-01 | End: 2018-11-04 | Stop reason: HOSPADM

## 2018-11-01 RX ORDER — METOPROLOL TARTRATE 50 MG/1
50 TABLET, FILM COATED ORAL EVERY 12 HOURS
Status: DISCONTINUED | OUTPATIENT
Start: 2018-11-01 | End: 2018-11-04 | Stop reason: HOSPADM

## 2018-11-01 RX ORDER — LISINOPRIL 20 MG/1
20 TABLET ORAL DAILY
Status: DISCONTINUED | OUTPATIENT
Start: 2018-11-01 | End: 2018-11-04 | Stop reason: HOSPADM

## 2018-11-01 RX ORDER — SODIUM CHLORIDE 0.9 % (FLUSH) 0.9 %
3 SYRINGE (ML) INJECTION EVERY 12 HOURS SCHEDULED
Status: DISCONTINUED | OUTPATIENT
Start: 2018-11-01 | End: 2018-11-04 | Stop reason: HOSPADM

## 2018-11-01 RX ORDER — NICOTINE POLACRILEX 4 MG
15 LOZENGE BUCCAL
Status: DISCONTINUED | OUTPATIENT
Start: 2018-11-01 | End: 2018-11-04 | Stop reason: HOSPADM

## 2018-11-01 RX ORDER — DORZOLAMIDE HYDROCHLORIDE AND TIMOLOL MALEATE 20; 5 MG/ML; MG/ML
1 SOLUTION/ DROPS OPHTHALMIC 2 TIMES DAILY
Status: DISCONTINUED | OUTPATIENT
Start: 2018-11-01 | End: 2018-11-01 | Stop reason: ALTCHOICE

## 2018-11-01 RX ORDER — TIMOLOL MALEATE 5 MG/ML
1 SOLUTION/ DROPS OPHTHALMIC EVERY 12 HOURS SCHEDULED
Status: DISCONTINUED | OUTPATIENT
Start: 2018-11-01 | End: 2018-11-04 | Stop reason: HOSPADM

## 2018-11-01 RX ORDER — DORZOLAMIDE HCL 20 MG/ML
1 SOLUTION/ DROPS OPHTHALMIC 2 TIMES DAILY
Status: DISCONTINUED | OUTPATIENT
Start: 2018-11-01 | End: 2018-11-04 | Stop reason: HOSPADM

## 2018-11-01 RX ORDER — MESALAMINE 1.2 G/1
1200 TABLET, DELAYED RELEASE ORAL
Status: DISCONTINUED | OUTPATIENT
Start: 2018-11-01 | End: 2018-11-04 | Stop reason: HOSPADM

## 2018-11-01 RX ORDER — ONDANSETRON 2 MG/ML
4 INJECTION INTRAMUSCULAR; INTRAVENOUS EVERY 6 HOURS PRN
Status: DISCONTINUED | OUTPATIENT
Start: 2018-11-01 | End: 2018-11-04 | Stop reason: HOSPADM

## 2018-11-01 RX ORDER — PANTOPRAZOLE SODIUM 40 MG/1
40 TABLET, DELAYED RELEASE ORAL
Status: DISCONTINUED | OUTPATIENT
Start: 2018-11-01 | End: 2018-11-04 | Stop reason: HOSPADM

## 2018-11-01 RX ORDER — ACETAMINOPHEN 325 MG/1
650 TABLET ORAL EVERY 4 HOURS PRN
Status: DISCONTINUED | OUTPATIENT
Start: 2018-11-01 | End: 2018-11-04 | Stop reason: HOSPADM

## 2018-11-01 RX ADMIN — PANTOPRAZOLE SODIUM 40 MG: 40 TABLET, DELAYED RELEASE ORAL at 07:37

## 2018-11-01 RX ADMIN — TIMOLOL MALEATE 1 DROP: 5 SOLUTION/ DROPS OPHTHALMIC at 11:52

## 2018-11-01 RX ADMIN — MESALAMINE 1.2 G: 1.2 TABLET, DELAYED RELEASE ORAL at 11:54

## 2018-11-01 RX ADMIN — Medication 3 ML: at 22:08

## 2018-11-01 RX ADMIN — INSULIN ASPART 3 UNITS: 100 INJECTION, SOLUTION INTRAVENOUS; SUBCUTANEOUS at 18:57

## 2018-11-01 RX ADMIN — LISINOPRIL 20 MG: 20 TABLET ORAL at 11:54

## 2018-11-01 RX ADMIN — Medication 3 ML: at 02:00

## 2018-11-01 RX ADMIN — FERROUS SULFATE TAB 325 MG (65 MG ELEMENTAL FE) 325 MG: 325 (65 FE) TAB at 11:54

## 2018-11-01 RX ADMIN — VITAMIN D, TAB 1000IU (100/BT) 2000 UNITS: 25 TAB at 11:54

## 2018-11-01 RX ADMIN — HYDROCORTISONE 2.5%: 25 CREAM TOPICAL at 20:14

## 2018-11-01 RX ADMIN — INSULIN ASPART 2 UNITS: 100 INJECTION, SOLUTION INTRAVENOUS; SUBCUTANEOUS at 22:08

## 2018-11-01 RX ADMIN — METOPROLOL TARTRATE 50 MG: 50 TABLET, FILM COATED ORAL at 20:14

## 2018-11-01 RX ADMIN — METRONIDAZOLE 500 MG: 500 INJECTION, SOLUTION INTRAVENOUS at 03:23

## 2018-11-01 RX ADMIN — HYDROCORTISONE 2.5%: 25 CREAM TOPICAL at 11:52

## 2018-11-01 RX ADMIN — METOPROLOL TARTRATE 50 MG: 50 TABLET, FILM COATED ORAL at 11:53

## 2018-11-01 RX ADMIN — METRONIDAZOLE 500 MG: 500 INJECTION, SOLUTION INTRAVENOUS at 11:53

## 2018-11-01 RX ADMIN — Medication 3 ML: at 11:57

## 2018-11-01 RX ADMIN — LEVOFLOXACIN 500 MG: 5 INJECTION, SOLUTION INTRAVENOUS at 04:34

## 2018-11-01 RX ADMIN — ATORVASTATIN CALCIUM 20 MG: 20 TABLET, FILM COATED ORAL at 11:54

## 2018-11-01 RX ADMIN — METRONIDAZOLE 500 MG: 500 INJECTION, SOLUTION INTRAVENOUS at 18:57

## 2018-11-01 RX ADMIN — DORZOLAMIDE HYDROCHLORIDE 1 DROP: 20 SOLUTION/ DROPS OPHTHALMIC at 20:14

## 2018-11-01 RX ADMIN — TIMOLOL MALEATE 1 DROP: 5 SOLUTION/ DROPS OPHTHALMIC at 20:14

## 2018-11-01 RX ADMIN — MESALAMINE 1000 MG: 1000 SUPPOSITORY RECTAL at 20:14

## 2018-11-01 RX ADMIN — DORZOLAMIDE HYDROCHLORIDE 1 DROP: 20 SOLUTION/ DROPS OPHTHALMIC at 11:52

## 2018-11-01 NOTE — H&P
Patient Name:  Renee Stevenson  YOB: 1945  MRN:  [id number]  Admit Date:  10/31/2018  Patient Care Team:  Esequiel Pacheco MD as PCP - General  Esequiel Pacheco MD as PCP - Claims Attributed  Andre Adams MD as Consulting Physician (Gastroenterology)  Ministerio Wells MD as Consulting Physician (Orthopedic Surgery)  Abhay Wooten MD as Consulting Physician (Cardiology)  Cisco Husain MD as Consulting Physician (Otolaryngology)  EZIO Garibay MD as Consulting Physician (Ophthalmology)      Chief Complaint   Patient presents with   • Rectal Bleeding     Subjective   Ms. Stevenson is a 73 y.o. female  with a history of ulcerative colitis, hemorrhoids, hypertension, type 2 diabetes controlled with metformin you presented to the hospital with 1 day history of rectal bleeding.  It is bright red blood and not similar to previous episodes of UC flares.  She has not had any abdominal pain, or worsening diarrhea.  She feels like symptoms are consistent with previous episodes of hemorrhoidal bleeding.  She called her gastroenterologist, Dr. Adams, but did not hear back from him so she decided to come to the emergency room.  She gets most of her care here at [facility] to presented here.  She had a hemoglobin of 7.4 which was down from previous over one year ago at 10.1.  She is not symptomatic with this.  A CT abdomen showed diffusely thickened wall from the sigmoid colon to the rectum with pericolonic soft tissue stranding.  The patient denies any recent antibiotics, change in diet, sick contacts.  She reports symptoms started shortly after having multiple hard bowel movements yesterday.        History of Present Illness    Past Medical History:   Diagnosis Date   • Allergic rhinitis    • Colitis    • Colon polyps    • Diabetes mellitus (CMS/HCC)     type 2   • Dizziness    • Encounter for breast cancer screening other than mammogram    • GERD (gastroesophageal reflux disease)    • Glaucoma    • Hyperlipidemia     • Hypertension    • Hypothyroidism    • Knee fracture, left    • Non-STEMI (non-ST elevated myocardial infarction) (CMS/[facility]) 6/16/2017   • Osteopenia      Past Surgical History:   Procedure Laterality Date   • CARDIAC CATHETERIZATION N/A 6/16/2017    Procedure: Left Heart Cath;  Surgeon: Abhya Wooten MD;  Location: [facility] CATH INVASIVE LOCATION;  Service:    • CARDIAC CATHETERIZATION N/A 6/16/2017    Procedure: Left ventriculography;  Surgeon: Abhay Wooten MD;  Location: [facility] CATH INVASIVE LOCATION;  Service:    • CARDIAC CATHETERIZATION N/A 6/16/2017    Procedure: Coronary angiography;  Surgeon: Abhay Wooten MD;  Location: [facility] CATH INVASIVE LOCATION;  Service:    • CATARACT EXTRACTION     • COLONOSCOPY  08/14/2018   • EYE SURGERY      cataract surgery   • PAP SMEAR       Family History   Problem Relation Age of Onset   • Heart disease Mother    • Hypertension Mother    • Diabetes Mother    • Heart disease Father    • Hypertension Father    • Heart disease Sister    • Heart disease Brother    • Hypertension Other    • Diabetes Other         type 2   • No Known Problems Maternal Grandmother    • No Known Problems Maternal Grandfather    • No Known Problems Paternal Grandmother    • No Known Problems Paternal Grandfather      Social History   Substance Use Topics   • Smoking status: Never Smoker   • Smokeless tobacco: Never Used   • Alcohol use No     Prescriptions Prior to Admission   Medication Sig Dispense Refill Last Dose   • Acetaminophen (TYLENOL PO) Take 2 tablets by mouth As Needed.   Taking   • Cholecalciferol (VITAMIN D) 2000 UNITS capsule Take 1 capsule by mouth daily.   Taking   • Multiple Vitamin (MULTI-VITAMIN PO) Take 1 tablet by mouth Daily.   Taking   • aspirin 81 MG EC tablet Take 1 tablet by mouth Daily.   10/25/2018   • MAIA CONTOUR NEXT TEST test strip CHECK BLOOD SUGAR ONCE DAILY 100 each 12 Taking   • dorzolamide-timolol (COSOPT) 22.3-6.8 MG/ML ophthalmic solution Apply 1 drop to eye 2 (two)  times a day.   10/31/2018 at 0900   • lisinopril (PRINIVIL,ZESTRIL) 20 MG tablet TAKE 1 TABLET BY MOUTH EVERY DAY 90 tablet 1 10/31/2018 at 0900   • mesalamine (LIALDA) 1.2 G EC tablet Take 1,200 mg by mouth 2 (two) times a day.   10/31/2018 at 0900   • metFORMIN ER (GLUCOPHAGE-XR) 500 MG 24 hr tablet TAKE 2 TABLETS BY MOUTH TWICE DAILY 360 tablet 1 10/31/2018 at 0800   • metoprolol tartrate (LOPRESSOR) 50 MG tablet TAKE 1 TABLET BY MOUTH EVERY 12 HOURS 180 tablet 0 10/31/2018 at 0800   • omeprazole (PriLOSEC) 40 MG capsule Take 1 capsule by mouth daily.   10/31/2018 at 0800   • simvastatin (ZOCOR) 40 MG tablet TAKE 1 TABLET BY MOUTH EVERY NIGHT AT BEDTIME 90 tablet 0 10/30/2018 at 2100     Allergies:    Allergies   Allergen Reactions   • Tetanus Toxoids Swelling     Hand and arm       Review of Systems   Constitutional: Positive for fatigue. Negative for activity change and appetite change.   HENT: Negative for nosebleeds, sore throat and trouble swallowing.    Eyes: Negative for pain and visual disturbance.   Respiratory: Negative for cough, chest tightness and shortness of breath.    Cardiovascular: Negative for chest pain, palpitations and leg swelling.   Gastrointestinal: Positive for anal bleeding, blood in stool and diarrhea (Frequency has been unchanged from her chronic). Negative for abdominal pain, constipation, nausea and vomiting.   Endocrine:        Negative for Diabetes or thyroid disease   Genitourinary: Negative for difficulty urinating and hematuria.   Musculoskeletal: Negative.  Negative for back pain, neck pain and neck stiffness.   Skin: Positive for pallor. Negative for rash and wound.   Neurological: Positive for weakness. Negative for dizziness, syncope, light-headedness and headaches.   Hematological: Negative for adenopathy. Does not bruise/bleed easily.   Psychiatric/Behavioral: Negative for agitation, behavioral problems and confusion.        Objective    Vital Signs  Temp:  [98.4 °F (36.9  °C)-98.7 °F (37.1 °C)] 98.4 °F (36.9 °C)  Heart Rate:  [62-72] 63  Resp:  [16-18] 17  BP: (139-170)/(62-70) 164/70  SpO2:  [100 %] 100 %  on   ;   Device (Oxygen Therapy): room air  Body mass index is 21.95 kg/m².    Physical Exam   Constitutional: She is oriented to person, place, and time. No distress.   Eyes: Pupils are equal, round, and reactive to light. EOM are normal. No scleral icterus.   Neck: No JVD present.   Cardiovascular: Normal rate and regular rhythm.  Exam reveals no friction rub.    No murmur heard.  Pulmonary/Chest: Effort normal and breath sounds normal. No respiratory distress. She has no wheezes.   Abdominal: Soft. Bowel sounds are normal. She exhibits no distension.   Genitourinary:   Genitourinary Comments: Deferred   Musculoskeletal: She exhibits no edema or tenderness.   Neurological: She is alert and oriented to person, place, and time.   Skin: Skin is warm and dry. She is not diaphoretic. No erythema. There is pallor.   Psychiatric: She has a normal mood and affect. Her behavior is normal.   Vitals reviewed.      Results Review:  I reviewed the patient's new clinical results.  I reviewed the patient's new imaging results and agree with the interpretation.  Discussed with ED provider.      Lab Results (last 24 hours)     Procedure Component Value Units Date/Time    Comprehensive Metabolic Panel [[id number]]  (Abnormal) Collected:  10/31/18 1911    Specimen:  Blood Updated:  10/31/18 1951     Glucose 128 (H) mg/dL      BUN 15 mg/dL      Creatinine 0.87 mg/dL      Sodium 139 mmol/L      Potassium 4.1 mmol/L      Chloride 103 mmol/L      CO2 23.1 mmol/L      Calcium 9.2 mg/dL      Total Protein 7.4 g/dL      Albumin 4.20 g/dL      ALT (SGPT) 7 U/L      AST (SGOT) 10 U/L      Alkaline Phosphatase 50 U/L      Total Bilirubin 0.2 mg/dL      eGFR Non African Amer 64 mL/min/1.73      Globulin 3.2 gm/dL      A/G Ratio 1.3 g/dL      BUN/Creatinine Ratio 17.2     Anion Gap 12.9 mmol/L     Narrative:        The MDRD GFR formula is only valid for adults with stable renal function between ages 18 and 70.    CBC & Differential [[id number]] Collected:  10/31/18 1911    Specimen:  Blood Updated:  10/31/18 1924    Narrative:       The following orders were created for panel order CBC & Differential.  Procedure                               Abnormality         Status                     ---------                               -----------         ------                     CBC Auto Differential[[id number]]        Abnormal            Final result                 Please view results for these tests on the individual orders.    CBC Auto Differential [[id number]]  (Abnormal) Collected:  10/31/18 1911    Specimen:  Blood Updated:  10/31/18 1924     WBC 6.87 10*3/mm3      RBC 3.32 (L) 10*6/mm3      Hemoglobin 7.4 (L) g/dL      Hematocrit 26.7 (L) %      MCV 80.4 (L) fL      MCH 22.3 (L) pg      MCHC 27.7 (L) g/dL      RDW 15.8 (H) %      RDW-SD 46.2 fl      MPV 9.2 fL      Platelets 230 10*3/mm3      Neutrophil % 65.2 %      Lymphocyte % 26.2 %      Monocyte % 8.2 %      Eosinophil % 0.0 (L) %      Basophil % 0.1 %      Immature Grans % 0.3 %      Neutrophils, Absolute 4.48 10*3/mm3      Lymphocytes, Absolute 1.80 10*3/mm3      Monocytes, Absolute 0.56 10*3/mm3      Eosinophils, Absolute 0.00 10*3/mm3      Basophils, Absolute 0.01 10*3/mm3      Immature Grans, Absolute 0.02 10*3/mm3     Ferritin [[id number]] Collected:  10/31/18 1911    Specimen:  Blood Updated:  11/01/18 0115    Iron Profile [[id number]]  (Abnormal) Collected:  10/31/18 1911    Specimen:  Blood Updated:  11/01/18 0132     Iron 16 (L) mcg/dL      Iron Saturation 3 (L) %      Transferrin 332 mg/dL      TIBC 495 mcg/dL     POC Glucose Once [[id number]]  (Abnormal) Collected:  11/01/18 0130    Specimen:  Blood Updated:  11/01/18 0132     Glucose 142 (H) mg/dL     Narrative:       Meter: [id number] : [id number] Yesica Appiah RN          Imaging Results (last 24  hours)     Procedure Component Value Units Date/Time    CT Abdomen Pelvis With Contrast [[id number]] Collected:  10/31/18 2159     Updated:  10/31/18 2210    Narrative:       CT OF THE ABDOMEN AND PELVIS WITH CONTRAST     HISTORY: Abdominal pain     COMPARISON: None available.     TECHNIQUE: Axial CT imaging was obtained from the dome of the diaphragm  through symphysis pubis following the administration of IV contrast.     FINDINGS:  Images through the lung bases demonstrate mild bibasilar atelectasis.  The stomach and proximal small bowel appear unremarkable, as are the  adrenal glands. Spleen is within normal limits, as is the pancreas. The  gallbladder appears unremarkable. No focal hepatic lesions are seen.  Cysts are identified on both kidneys. There is atherosclerotic  involvement of the abdominal aorta. There is a small fat-containing  umbilical hernia. The appendix is visualized, and is within normal  limits. Urinary bladder is normal. Uterus appears unremarkable. This  patient's colon appears thick walled, extending from the sigmoid colon  through the rectum. There is some mild pericolonic soft tissue  stranding. Findings are felt to be characteristic of colitis. There is  some focal fatty infiltration seen within the bowel wall. This is  particularly pronounced within the rectosigmoid, and at the terminal  ileum and cecum. This finding can be seen in the setting of chronic  inflammatory bowel disease. Correlation with history is suggested. No  pneumatosis or free air is seen. There is no evidence of obstruction.  There is some mild presacral soft tissue stranding /fluid. This is  likely reactive. Review of bony windows does not demonstrate any  aggressive osseous abnormalities.       Impression:        IMPRESSION:  Diffusely thick-walled appearance to the bowel seen extending from the  sigmoid colon to the rectum, with some mild associated pericolonic soft  tissue stranding. Findings are felt to be  characteristic of colitis.  There is some focal fatty infiltration identified within the wall of the  bowel. This finding can be seen in the setting of chronic inflammatory  bowel disease. Correlation with history is suggested. No pneumatosis,  free air, or evidence of obstruction is seen.     Radiation dose reduction techniques were utilized, including automated  exposure control and exposure modulation based on body size.     This report was finalized on 10/31/2018 10:07 PM by Dr. Elvie Vazquez M.D.             No orders to display     Assessment/Plan   Active Hospital Problems    Diagnosis Date Noted   • Rectal bleeding [K62.5] 11/01/2018   • Acute post-hemorrhagic anemia [D62] 11/01/2018   • Colitis [K52.9] 10/31/2018   • Coronary artery disease involving native coronary artery of native heart without angina pectoris [I25.10] 06/22/2017   • Type 2 diabetes mellitus with circulatory disorder (CMS/[facility]) [E11.59] 01/19/2016   • Hypertension [I10] 01/19/2016      Resolved Hospital Problems    Diagnosis Date Noted Date Resolved   No resolved problems to display.     I suspect the bright red blood per rectum is more related to hemorrhoidal bleeding rather than UC flare.  T scan does show colitis we'll place her on IV antibiotics with Flagyl and Levaquin.  I do not believe she requires IV steroids at this time.  We'll consult our gastroenterologist though.  We'll trend her hemoglobin which is currently 7.4 and transfuse if she develops symptoms or if it is less than 7.  I will check an iron profile as well.  Hold her metformin here in the hospital place her on a sliding scale insulin.  She is hypertensive and I'll continue her antihypertensive medications for now.  SCDs for DVT prophylaxis.  The patient's  is her healthcare surrogate and she wishes to be a full code    I discussed the patients findings and my recommendations with patient and nursing staff.      Trell Rodriguez MD  [facility]  11/01/18  1:36 AM

## 2018-11-01 NOTE — ED PROVIDER NOTES
"Pt presents to the ED c/o rectal bleeding for one day w/ a hx of hemorrhoids. She describes it as \"bright red\". She denies abdominal pain or other complaints.    PHYSICAL EXAM  GENERAL: not distressed  HENT: nares patent  EYES: EOMI, PERRL  NECK: FROM  CV: regular rhythm, regular rate  RESPIRATORY: normal effort  ABDOMEN: soft, mild LLQ tenderness, nondistended, no rebound, no guarding  MUSCULOSKELETAL: no deformity  NEURO: alert, oriented X 3  SKIN: warm, dry    Vital signs and nursing notes reviewed.    LAB RESULTS AND RADIOLOGY  I have reviewed the patient's labs and imaging studies.  Hemoglobin 7.4    PROCEDURE    PROGRESS NOTES  2303  Spoke to midlevel provider OLIVA Osborn, about the pt. After performing my own physical exam, I agree w/ the plan of care which includes admission and consult w/ the colorectal surgeon.    DIAGNOSIS  Final diagnoses:   Colitis   BRBPR (bright red blood per rectum)   Anemia, unspecified type         DISPOSITION  ADMISSION    Discussed treatment plan and reason for admission with pt/family and admitting physician.  Pt/family voiced understanding of the plan for admission for further testing/treatment as needed.     Attestation:    The MARITA and I have discussed this patient's history, physical exam, and treatment plan.  I have reviewed the documentation and personally had a face to face interaction with the patient. I affirm the documentation and agree with the treatment and plan.  The attached note describes my personal findings.    Documentation assistance provided by jagruti Shetty for Dr. Jones. Information recorded by the jagruti was done at my direction and has been verified and validated by me.     Yolanda Shetty  10/31/18 1664       Russ Jones MD  11/01/18 7678    "

## 2018-11-01 NOTE — PROGRESS NOTES
Adult Nutrition  Assessment/PES    Patient Name:  Renee PARIKH From  YOB: 1945  MRN: [id number]  Admit Date:  10/31/2018    Assessment Date:  11/1/2018    Comments:  Screened patient d/t MST score of 3 per nurse admission screen.  Admitted with rectal bleeding.  Evaluated by GI.     No PO intake available per chart.  No signs of weight loss per chart weight report.    RD to continue to follow.          Reason for Assessment     Row Name 11/01/18 1335          Reason for Assessment    Reason For Assessment identified at risk by screening criteria     Diagnosis gastrointestinal disease;diabetes diagnosis/complications;cardiac disease;endocrine conditions     Identified At Risk by Screening Criteria MST SCORE 2+               Anthropometrics     Row Name 11/01/18 1335          Admit Weight    Admit Weight --   120# 11/1        Body Mass Index (BMI)    BMI Assessment BMI 18.5-24.9: normal             Labs/Tests/Procedures/Meds     Row Name 11/01/18 1335          Labs/Procedures/Meds    Lab Results Reviewed reviewed, pertinent     Lab Results Comments Hgb, Hct        Diagnostic Tests/Procedures    Diagnostic Test/Procedure Reviewed reviewed, pertinent        Medications    Pertinent Medications Reviewed reviewed, pertinent     Pertinent Medications Comments vit D3, FeSO4, insulin, protonix, miralax             Physical Findings     Row Name 11/01/18 1336          Physical Findings    Skin --   B=21, intact               Nutrition Prescription Ordered     Row Name 11/01/18 1336          Nutrition Prescription PO    Current PO Diet Regular     Common Modifiers Consistent Carbohydrate             Evaluation of Received Nutrient/Fluid Intake     Row Name 11/01/18 1337          PO Evaluation    Number of Days PO Intake Evaluated Insufficient Data             Problem/Interventions:        Problem 1     Row Name 11/01/18 1339          Nutrition Diagnoses Problem 1    Problem 1 Predicted Suboptimal Intake     Etiology  (related to) Medical Diagnosis     Gastrointestinal Ulcerative colitis     Signs/Symptoms (evidenced by) Report/Observation                     Intervention Goal     Row Name 11/01/18 8900          Intervention Goal    General Maintain nutrition;Reduce/improve symptoms;Disease management/therapy     PO Establish PO;Tolerate PO;PO intake (%)     PO Intake % 75 %     Weight Maintain weight             Nutrition Intervention     Row Name 11/01/18 8634          Nutrition Intervention    RD/Tech Action Follow Tx progress;Care plan reviewd               Education/Evaluation     Row Name 11/01/18 6613          Education    Education Will Instruct as appropriate        Monitor/Evaluation    Monitor Per protocol;PO intake;Pertinent labs;Weight;Symptoms         Electronically signed by:  Lawanda Gonzalez RD  11/01/18 1:38 PM

## 2018-11-01 NOTE — ED PROVIDER NOTES
EMERGENCY DEPARTMENT ENCOUNTER    Room Number:  27/27  Date seen:  10/31/2018  Time seen: 9:09 PM  PCP: Esequiel Pacheco MD    HPI:  Chief complaint:Rectal Bleeding   Context:Renee Stevenson is a 73 y.o. female who presents to the ED with c/o rectal bleeding that began 1 say ago. Pt states she has problems with hemorrhoids and has ulcerative colitis. Pt denies abd pain. Pt states she is only bleeding when she uses the restroom.     Timing:intermittent   Duration: 1 day ago   Location:  Radiation:none  Quality:pain when using the restroom, has to strain   Intensity/Severity:moderate  Associated Symptoms: rectal pain   Aggravating Factors:using the restroom   Alleviating Factors:none  Previous Episodes:Pt has problems with hemorrhoids.   Treatment before arrival:none stated     MEDICAL RECORD REVIEW      ALLERGIES  Tetanus toxoids    PAST MEDICAL HISTORY  Active Ambulatory Problems     Diagnosis Date Noted   • 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) 01/19/2016   • Osteopenia 01/19/2016   • Hypertension 01/19/2016   • Hyperlipidemia 01/19/2016   • Colon polyp 01/19/2016   • Gastroesophageal reflux disease 01/19/2016   • History of non-ST elevation myocardial infarction (NSTEMI) 06/16/2017   • Glaucoma 06/22/2017   • Hypothyroidism 06/22/2017   • Coronary artery disease involving native coronary artery of native heart without angina pectoris 06/22/2017   • Takotsubo cardiomyopathy 06/22/2017   • Coronary artery disease involving native coronary artery of native heart without angina pectoris 06/23/2017     Resolved Ambulatory Problems     Diagnosis Date Noted   • Non-specific colitis 01/19/2016   • Atopic rhinitis 01/19/2016     Past Medical History:   Diagnosis Date   • Allergic rhinitis    • Colitis    • Colon polyps    • Diabetes mellitus (CMS/HCC)    • Dizziness    • Encounter for breast cancer screening other than mammogram    • GERD (gastroesophageal reflux disease)    • Glaucoma    • Hyperlipidemia     • Hypertension    • Hypothyroidism    • Knee fracture, left    • Non-STEMI (non-ST elevated myocardial infarction) (CMS/[facility]) 6/16/2017   • Osteopenia        PAST SURGICAL HISTORY  Past Surgical History:   Procedure Laterality Date   • CARDIAC CATHETERIZATION N/A 6/16/2017    Procedure: Left Heart Cath;  Surgeon: Abhay Wooten MD;  Location: [facility] CATH INVASIVE LOCATION;  Service:    • CARDIAC CATHETERIZATION N/A 6/16/2017    Procedure: Left ventriculography;  Surgeon: Abhay Wooten MD;  Location: [facility] CATH INVASIVE LOCATION;  Service:    • CARDIAC CATHETERIZATION N/A 6/16/2017    Procedure: Coronary angiography;  Surgeon: Abhay Wooten MD;  Location: [facility] CATH INVASIVE LOCATION;  Service:    • CATARACT EXTRACTION     • COLONOSCOPY  08/14/2018   • EYE SURGERY      cataract surgery   • PAP SMEAR         FAMILY HISTORY  Family History   Problem Relation Age of Onset   • Heart disease Mother    • Hypertension Mother    • Diabetes Mother    • Heart disease Father    • Hypertension Father    • Heart disease Sister    • Heart disease Brother    • Hypertension Other    • Diabetes Other         type 2   • No Known Problems Maternal Grandmother    • No Known Problems Maternal Grandfather    • No Known Problems Paternal Grandmother    • No Known Problems Paternal Grandfather        SOCIAL HISTORY  Social History     Social History   • Marital status:      Spouse name: N/A   • Number of children: 1   • Years of education: N/A     Occupational History   • retail      retired     Social History Main Topics   • Smoking status: Never Smoker   • Smokeless tobacco: Never Used   • Alcohol use No   • Drug use: No   • Sexual activity: Defer     Other Topics Concern   • Not on file     Social History Narrative   • No narrative on file       REVIEW OF SYSTEMS  Review of Systems   Constitutional: Negative for activity change, appetite change, diaphoresis and fever.   HENT: Negative for trouble swallowing.    Eyes: Negative for visual  disturbance.   Respiratory: Negative for cough, chest tightness, shortness of breath and wheezing.    Cardiovascular: Negative for chest pain, palpitations and leg swelling.   Gastrointestinal: Positive for rectal pain. Negative for abdominal pain, diarrhea, nausea and vomiting.   Genitourinary: Negative for dysuria.        Rectal bleeding.    Musculoskeletal: Negative for back pain.   Skin: Negative for rash.   Neurological: Negative for dizziness, speech difficulty and light-headedness.       PHYSICAL EXAM  ED Triage Vitals   Temp Heart Rate Resp BP SpO2   10/31/18 1727 10/31/18 1727 10/31/18 1727 10/31/18 1904 10/31/18 1727   98.7 °F (37.1 °C) 72 18 170/67 100 %      Temp src Heart Rate Source Patient Position BP Location FiO2 (%)   10/31/18 1727 10/31/18 1727 -- -- --   Tympanic Monitor        Physical Exam   Constitutional: She is oriented to person, place, and time and well-developed, well-nourished, and in no distress. No distress.   HENT:   Head: Normocephalic and atraumatic.   Mouth/Throat: Uvula is midline and mucous membranes are normal.   Neck: Normal range of motion. Neck supple.   Cardiovascular: S1 normal, S2 normal and normal heart sounds.  Exam reveals no gallop and no friction rub.    No murmur heard.  Pulmonary/Chest: Effort normal and breath sounds normal. She has no decreased breath sounds. She has no wheezes. She has no rhonchi. She has no rales.   Abdominal: Soft. Normal appearance. There is no rebound and no guarding.   Genitourinary:   Genitourinary Comments: Heme positive, light brown stool noted on finger. On rectal exam, 2 soft, fleshy hemorrhoids found that are not thrombosed.    Musculoskeletal: Normal range of motion.   Neurological: She is alert and oriented to person, place, and time.   Skin: Skin is warm, dry and intact.   Psychiatric: Affect and judgment normal.   Nursing note and vitals reviewed.      LAB RESULTS  Recent Results (from the past 24 hour(s))   Comprehensive Metabolic  Panel    Collection Time: 10/31/18  7:11 PM   Result Value Ref Range    Glucose 128 (H) 65 - 99 mg/dL    BUN 15 8 - 23 mg/dL    Creatinine 0.87 0.57 - 1.00 mg/dL    Sodium 139 136 - 145 mmol/L    Potassium 4.1 3.5 - 5.2 mmol/L    Chloride 103 98 - 107 mmol/L    CO2 23.1 22.0 - 29.0 mmol/L    Calcium 9.2 8.6 - 10.5 mg/dL    Total Protein 7.4 6.0 - 8.5 g/dL    Albumin 4.20 3.50 - 5.20 g/dL    ALT (SGPT) 7 1 - 33 U/L    AST (SGOT) 10 1 - 32 U/L    Alkaline Phosphatase 50 39 - 117 U/L    Total Bilirubin 0.2 0.1 - 1.2 mg/dL    eGFR Non African Amer 64 >60 mL/min/1.73    Globulin 3.2 gm/dL    A/G Ratio 1.3 g/dL    BUN/Creatinine Ratio 17.2 7.0 - 25.0    Anion Gap 12.9 mmol/L   CBC Auto Differential    Collection Time: 10/31/18  7:11 PM   Result Value Ref Range    WBC 6.87 4.50 - 10.70 10*3/mm3    RBC 3.32 (L) 3.90 - 5.20 10*6/mm3    Hemoglobin 7.4 (L) 11.9 - 15.5 g/dL    Hematocrit 26.7 (L) 35.6 - 45.5 %    MCV 80.4 (L) 80.5 - 98.2 fL    MCH 22.3 (L) 26.9 - 32.0 pg    MCHC 27.7 (L) 32.4 - 36.3 g/dL    RDW 15.8 (H) 11.7 - 13.0 %    RDW-SD 46.2 37.0 - 54.0 fl    MPV 9.2 6.0 - 12.0 fL    Platelets 230 140 - 500 10*3/mm3    Neutrophil % 65.2 42.7 - 76.0 %    Lymphocyte % 26.2 19.6 - 45.3 %    Monocyte % 8.2 5.0 - 12.0 %    Eosinophil % 0.0 (L) 0.3 - 6.2 %    Basophil % 0.1 0.0 - 1.5 %    Immature Grans % 0.3 0.0 - 0.5 %    Neutrophils, Absolute 4.48 1.90 - 8.10 10*3/mm3    Lymphocytes, Absolute 1.80 0.90 - 4.80 10*3/mm3    Monocytes, Absolute 0.56 0.20 - 1.20 10*3/mm3    Eosinophils, Absolute 0.00 0.00 - 0.70 10*3/mm3    Basophils, Absolute 0.01 0.00 - 0.20 10*3/mm3    Immature Grans, Absolute 0.02 0.00 - 0.03 10*3/mm3       I ordered the above labs and reviewed the results    RADIOLOGY  CT Abdomen Pelvis With Contrast   Final Result    IMPRESSION:   Diffusely thick-walled appearance to the bowel seen extending from the   sigmoid colon to the rectum, with some mild associated pericolonic soft   tissue stranding. Findings are  felt to be characteristic of colitis.   There is some focal fatty infiltration identified within the wall of the   bowel. This finding can be seen in the setting of chronic inflammatory   bowel disease. Correlation with history is suggested. No pneumatosis,   free air, or evidence of obstruction is seen.       Radiation dose reduction techniques were utilized, including automated   exposure control and exposure modulation based on body size.       This report was finalized on 10/31/2018 10:07 PM by Dr. Elvie Vazquez M.D.              I ordered the above noted radiological studies and reviewed the images on the PACS system.        MEDICATIONS GIVEN IN ER  Medications   sodium chloride 0.9 % flush 10 mL (not administered)   sodium chloride 0.9 % bolus 500 mL (0 mL Intravenous Stopped 10/31/18 2212)   iopamidol (ISOVUE-300) 61 % injection 100 mL (85 mL Intravenous Given by Other 10/31/18 2141)       EKG  Interpreted by ED Physician    PROCEDURES  Procedures    COURSE & MEDICAL DECISION MAKING  Pertinent Labs and Imaging studies that were ordered and reviewed are noted above.  Results were reviewed/discussed with the patient and they were also made aware of online access.  Pt also made aware that some labs, such as cultures, will not be resulted during ER visit and follow up with PMD is necessary.     PROGRESS AND CONSULTS    Progress Notes:    ED Course as of Oct 31 2323   Wed Oct 31, 2018   1907 Bright red rectal bleeding  Hx of hemorrhoids and colitis  Colonoscopy performed in August  [KG]      ED Course User Index  [KG] Rain Stein, APRN     2044  , CT Abd Pelvis ordered.     2111  Notified pt of low hemoglobin level of 7.4 which may be needed to admit pt to hospital.     2237  Type and Screen ordered.     2238  Rechecked pt. Notified pt of colitis found in CT Abd Pelvis. Discussed plan to admit pt to hospital. Pt understands and agrees with the plan, all questions answered. Call placed to [facility].  "    2248  Discussed pt's case with Dr. Rodriguez ([facility]) who will admit pt to hospital.       2248  Pt to be admitted. Based on the patient's lab findings and presenting symptoms, the doctor and I feel it is appropriate to admit the patient for further management, evaluation, and treatment.  I have discussed this with the admitting team.  I have also discussed this with the patient/family.  They are in agreement with admission.      2250  Reviewed pt's history and workup with Dr. Jones.  After a bedside evaluation, Dr. Jones agrees with the plan of care.    Disposition vitals:  /67   Pulse 72   Temp 98.7 °F (37.1 °C) (Tympanic)   Resp 18   Ht 157.5 cm (62\")   Wt 53.5 kg (118 lb)   SpO2 100%   BMI 21.58 kg/m²       DIAGNOSIS  Final diagnoses:   Colitis   BRBPR (bright red blood per rectum)   Anemia, unspecified type       Documentation assistance provided by jagruti Banerjee for OLIVA Osborn.  Information recorded by the tammyibyen was done at my direction and has been verified and validated by me.  Electronically signed by Edwige Banerjee on 10/31/2018 at time 11:23 PM           Edwige Tavera  10/31/18 9347       Kia Weeks APRN  10/31/18 1109    "

## 2018-11-01 NOTE — PROGRESS NOTES
Discharge Planning Assessment  [facility]     Patient Name: Renee Stevenson  MRN: [id number]  Today's Date: 11/1/2018    Admit Date: 10/31/2018          Discharge Needs Assessment     Row Name 11/01/18 1322       Living Environment    Lives With spouse    Current Living Arrangements home/apartment/condo    Primary Care Provided by self    Provides Primary Care For no one    Family Caregiver if Needed spouse;child(marni), adult    Family Caregiver Names Eliazar From and Terry From    Quality of Family Relationships supportive    Able to Return to Prior Arrangements yes       Resource/Environmental Concerns    Resource/Environmental Concerns none       Transition Planning    Patient/Family Anticipates Transition to home with family    Patient/Family Anticipated Services at Transition none    Transportation Anticipated family or friend will provide       Discharge Needs Assessment    Readmission Within the Last 30 Days no previous admission in last 30 days    Concerns to be Addressed no discharge needs identified;denies needs/concerns at this time    Equipment Currently Used at Home none    Anticipated Changes Related to Illness none    Equipment Needed After Discharge none    Offered/Gave Vendor List yes    Discharge Coordination/Progress Pt plans home; follow for needs.             Discharge Plan     Row Name 11/01/18 2265       Plan    Plan Pt plans home; follow for needs.     Patient/Family in Agreement with Plan yes    Plan Comments Checked RIDER. Met with pt bedside. Confirmed facesheet correct. Explained role of CCP. Pt reports she lives with her spouse and adult son Terry in a two level house with bedroom on second level. Pt reports he is IADLs no DME used to ambulate. Pt reports no hx of SNF or HH. Pt's PCP is Dr. Pacheco and she uses [facility] [address] with no issues. Pt reports she plans home with family at d/c. Denies current needs. CCP to follow..NIRANJAN Alexandre        Destination     No service coordination  in this encounter.      Durable Medical Equipment     No service coordination in this encounter.      Dialysis/Infusion     No service coordination in this encounter.      Home Medical Care     No service coordination in this encounter.      Social Care     No service coordination in this encounter.                Demographic Summary     Row Name 11/01/18 1321       General Information    Admission Type observation    Required Notices Provided Observation Status Notice    Reason for Consult discharge planning    Preferred Language English       Contact Information    Permission Granted to Share Info With facility ;family/designee            Functional Status     Row Name 11/01/18 1322       Functional Status    Usual Activity Tolerance good    Current Activity Tolerance good       Functional Status, IADL    Medications independent    Meal Preparation independent    Housekeeping independent    Laundry independent    Shopping independent       Mental Status    General Appearance WDL WDL       Mental Status Summary    Recent Changes in Mental Status/Cognitive Functioning no changes            Psychosocial    No documentation.           Abuse/Neglect    No documentation.           Legal    No documentation.           Substance Abuse    No documentation.           Patient Forms    No documentation.         Kirti Kevin

## 2018-11-01 NOTE — CONSULTS
[facility]  Initial Inpatient Consult Note    Referring Provider: Dr Rodriguez    Reason for Consultation: Colitis    Subjective     History of present illness:  73-year-old woman with a history of ulcerative colitis and hemorrhoids, followed by Dr. Adams, admitted with rectal bleeding.  This is been a chronic issue, however 2 days ago she noticed that she was having more profuse bleeding with bowel movements.  She describes bright red blood per rectum following bowel movements, both with wiping and in the stool.  This is happen on all bowel movements which is been about 3-4 times per day.  Prior episodes were only small amounts that she attributed to her hemorrhoids.  She reports that she has been little more constipated and has been straining more to have bowel movements.  She has not been using anything, topical or otherwise, for her hemorrhoids.  Her last colonoscopy was in August with Dr. Adams.  She reports that some inflammation in her rectal area was found and that she was given a mesalamine suppository to treat this.  She has not been using this regularly.    Labs this admission show that she is quite anemic with a hemoglobin of 7.4.  Iron studies are consistent with a severe iron deficiency anemia.  Review of prior labs from June 2017 indicates that her hemoglobin was 10.1.    CT imaging shows some sigmoid and rectal inflammation.    She denies any nausea, vomiting, abdominal pain.    Past Medical History:  Past Medical History:   Diagnosis Date   • Allergic rhinitis    • Colitis    • Colon polyps    • Diabetes mellitus (CMS/HCC)     type 2   • Dizziness    • Encounter for breast cancer screening other than mammogram    • GERD (gastroesophageal reflux disease)    • Glaucoma    • Hyperlipidemia    • Hypertension    • Hypothyroidism    • Knee fracture, left    • Non-STEMI (non-ST elevated myocardial infarction) (CMS/HCC) 6/16/2017   • Osteopenia        Past Surgical History:  Past  Surgical History:   Procedure Laterality Date   • CARDIAC CATHETERIZATION N/A 6/16/2017    Procedure: Left Heart Cath;  Surgeon: Abhay Wooten MD;  Location:  [facility];  Service:    • CARDIAC CATHETERIZATION N/A 6/16/2017    Procedure: Left ventriculography;  Surgeon: Abhay Wooetn MD;  Location:  [facility];  Service:    • CARDIAC CATHETERIZATION N/A 6/16/2017    Procedure: Coronary angiography;  Surgeon: Abhay Wooten MD;  Location:  [facility];  Service:    • CATARACT EXTRACTION     • COLONOSCOPY  08/14/2018   • EYE SURGERY      cataract surgery   • PAP SMEAR          Social History:   Social History   Substance Use Topics   • Smoking status: Never Smoker   • Smokeless tobacco: Never Used   • Alcohol use No        Family History:  Family History   Problem Relation Age of Onset   • Heart disease Mother    • Hypertension Mother    • Diabetes Mother    • Heart disease Father    • Hypertension Father    • Heart disease Sister    • Heart disease Brother    • Hypertension Other    • Diabetes Other         type 2   • No Known Problems Maternal Grandmother    • No Known Problems Maternal Grandfather    • No Known Problems Paternal Grandmother    • No Known Problems Paternal Grandfather        Home Meds:  Prescriptions Prior to Admission   Medication Sig Dispense Refill Last Dose   • Acetaminophen (TYLENOL PO) Take 2 tablets by mouth As Needed.   Taking   • Cholecalciferol (VITAMIN D) 2000 UNITS capsule Take 1 capsule by mouth daily.   Taking   • Multiple Vitamin (MULTI-VITAMIN PO) Take 1 tablet by mouth Daily.   Taking   • aspirin 81 MG EC tablet Take 1 tablet by mouth Daily.   10/25/2018   • MAIA CONTOUR NEXT TEST test strip CHECK BLOOD SUGAR ONCE DAILY 100 each 12 Taking   • dorzolamide-timolol (COSOPT) 22.3-6.8 MG/ML ophthalmic solution Apply 1 drop to eye 2 (two) times a day.   10/31/2018 at 0900   • lisinopril (PRINIVIL,ZESTRIL) 20 MG tablet TAKE 1 TABLET BY MOUTH  EVERY DAY 90 tablet 1 10/31/2018 at 0900   • mesalamine (LIALDA) 1.2 G EC tablet Take 1,200 mg by mouth 2 (two) times a day.   10/31/2018 at 0900   • metFORMIN ER (GLUCOPHAGE-XR) 500 MG 24 hr tablet TAKE 2 TABLETS BY MOUTH TWICE DAILY 360 tablet 1 10/31/2018 at 0800   • metoprolol tartrate (LOPRESSOR) 50 MG tablet TAKE 1 TABLET BY MOUTH EVERY 12 HOURS 180 tablet 0 10/31/2018 at 0800   • omeprazole (PriLOSEC) 40 MG capsule Take 1 capsule by mouth daily.   10/31/2018 at 0800   • simvastatin (ZOCOR) 40 MG tablet TAKE 1 TABLET BY MOUTH EVERY NIGHT AT BEDTIME 90 tablet 0 10/30/2018 at 2100       Current Meds:     atorvastatin 20 mg Oral Daily   cholecalciferol 2,000 Units Oral Daily   dorzolamide 1 drop Both Eyes BID   insulin aspart 0-7 Units Subcutaneous 4x Daily With Meals & Nightly   levoFLOXacin 500 mg Intravenous Q24H   lisinopril 20 mg Oral Daily   mesalamine 1,200 mg Oral Daily With Breakfast   metoprolol tartrate 50 mg Oral Q12H   metroNIDAZOLE 500 mg Intravenous Q8H   pantoprazole 40 mg Oral Q AM   sodium chloride 3 mL Intravenous Q12H   timolol 1 drop Both Eyes Q12H       Allergies:  Allergies   Allergen Reactions   • Tetanus Toxoids Swelling     Hand and arm       Review of Systems  All systems were reviewed and negative except for:  Gastrointestinal: postitive for  bright red blood per rectum and constipation     Objective     Vital Signs  Temp:  [98.4 °F (36.9 °C)-98.7 °F (37.1 °C)] 98.4 °F (36.9 °C)  Heart Rate:  [62-72] 63  Resp:  [16-18] 17  BP: (139-170)/(62-70) 164/70    Physical Exam:  Constitutional:    Alert, cooperative, in no acute distress, appears stated age   Eyes:            Lids and lashes normal, conjunctivae and sclerae normal, no   icterus   Ears, nose, mouth and throat:   Normal appearance of external ears and nose, no oral lesions, no thrush, oral mucosa moist   Respiratory:     Clear to auscultation, respirations regular, even and                   unlabored    Cardiovascular:     Regular rhythm and normal rate, normal S1 and S2, no            murmur, no gallop, palpable distal pulses, no lower extremity edema   Gastrointestinal:    Soft, non-distended, non-tender to palpation, no guarding, no rebound tenderness, normal bowel sounds, no palpable masses or organomegaly  Rectal exam: Large external hemorrhoid but it is nonbleeding, and pink tinged stool on exam, no palpable internal masses    Musculoskeletal:   Normal station, no atrophy, no tenderness to palpation, normal digits and nails   Skin:   Normal color, no bleeding, bruising, rashes or lesions   Lymphatics:   No palpable cervical or supraclavicular adenopathy   Psychiatric:  Judgement and insight: normal   Orientation to person, place and time: normal   Mood and affect: normal       Results Review:   I reviewed the patient's new clinical results.      Results from last 7 days  Lab Units 10/31/18  1911   WBC 10*3/mm3 6.87   HEMOGLOBIN g/dL 7.4*   HEMATOCRIT % 26.7*   PLATELETS 10*3/mm3 230         Results from last 7 days  Lab Units 11/01/18  0533 10/31/18  1911   SODIUM mmol/L 140 139   POTASSIUM mmol/L 4.0 4.1   CHLORIDE mmol/L 105 103   CO2 mmol/L 25.7 23.1   BUN mg/dL 11 15   CREATININE mg/dL 0.76 0.87   CALCIUM mg/dL 8.7 9.2   BILIRUBIN mg/dL  --  0.2   ALK PHOS U/L  --  50   ALT (SGPT) U/L  --  7   AST (SGOT) U/L  --  10   GLUCOSE mg/dL 149* 128*             No results found for: LIPASE    Radiology:  Imaging Results (last 72 hours)     Procedure Component Value Units Date/Time    CT Abdomen Pelvis With Contrast [[id number]] Collected:  10/31/18 2159     Updated:  10/31/18 2210    Narrative:       CT OF THE ABDOMEN AND PELVIS WITH CONTRAST     HISTORY: Abdominal pain     COMPARISON: None available.     TECHNIQUE: Axial CT imaging was obtained from the dome of the diaphragm  through symphysis pubis following the administration of IV contrast.     FINDINGS:  Images through the lung bases demonstrate mild bibasilar atelectasis.  The  stomach and proximal small bowel appear unremarkable, as are the  adrenal glands. Spleen is within normal limits, as is the pancreas. The  gallbladder appears unremarkable. No focal hepatic lesions are seen.  Cysts are identified on both kidneys. There is atherosclerotic  involvement of the abdominal aorta. There is a small fat-containing  umbilical hernia. The appendix is visualized, and is within normal  limits. Urinary bladder is normal. Uterus appears unremarkable. This  patient's colon appears thick walled, extending from the sigmoid colon  through the rectum. There is some mild pericolonic soft tissue  stranding. Findings are felt to be characteristic of colitis. There is  some focal fatty infiltration seen within the bowel wall. This is  particularly pronounced within the rectosigmoid, and at the terminal  ileum and cecum. This finding can be seen in the setting of chronic  inflammatory bowel disease. Correlation with history is suggested. No  pneumatosis or free air is seen. There is no evidence of obstruction.  There is some mild presacral soft tissue stranding /fluid. This is  likely reactive. Review of bony windows does not demonstrate any  aggressive osseous abnormalities.       Impression:        IMPRESSION:  Diffusely thick-walled appearance to the bowel seen extending from the  sigmoid colon to the rectum, with some mild associated pericolonic soft  tissue stranding. Findings are felt to be characteristic of colitis.  There is some focal fatty infiltration identified within the wall of the  bowel. This finding can be seen in the setting of chronic inflammatory  bowel disease. Correlation with history is suggested. No pneumatosis,  free air, or evidence of obstruction is seen.     Radiation dose reduction techniques were utilized, including automated  exposure control and exposure modulation based on body size.     This report was finalized on 10/31/2018 10:07 PM by Dr. Elvie Vazquez M.D.              Assessment/Plan       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)    Hypertension    Coronary artery disease involving native coronary artery of native heart without angina pectoris    Ulcerative colitis with rectal bleeding (CMS/HCC)    Rectal bleeding    Acute post-hemorrhagic anemia      Impression  1.  Rectal bleeding: Differential of distal colitis versus hemorrhoidal vs both. Her external hemorrhoid, while large, does not seem to be bleeding at this time  2. Distal UC: appears to be active on imaging and per her reports of her recent colonoscopy.  Followed by Dr Adams.  On outpt lialda  3. Iron deficiency anemia: pronounced, most likely due to her disease    Plan  Start oral iron with daily MiraLAX  Resume mesalamine suppositories, topical Anusol to her external hemorrhoid  Follow CBC  Get records from her recent colonoscopy  She needs to follow-up with Dr. Adams within the next 4-6 weeks upon discharge    I discussed the patients findings and my recommendations with patient    Melissa Burleson MD  [facility]      Dictated utilizing Dragon dictation

## 2018-11-01 NOTE — PLAN OF CARE
Problem: Patient Care Overview  Goal: Plan of Care Review  Outcome: Ongoing (interventions implemented as appropriate)   11/01/18 0409   Coping/Psychosocial   Plan of Care Reviewed With patient   Plan of Care Review   Progress no change   OTHER   Outcome Summary Slept fine, hemorroids visible jupon assessment, some small bleeding when passed gas, IV antibx started, A/Ox4, to see Gastro in AM, will monitored closely     Goal: Individualization and Mutuality  Outcome: Ongoing (interventions implemented as appropriate)   11/01/18 0409   Individualization   Patient Specific Preferences none   Patient Specific Goals (Include Timeframe) monitor for anal bleeding, keep safe   Patient Specific Interventions Hgb monitored, bed alarm on       Problem: Bowel Disease, Inflammatory (Adult)  Goal: Signs and Symptoms of Listed Potential Problems Will be Absent, Minimized or Managed (Bowel Disease, Inflammatory)  Outcome: Ongoing (interventions implemented as appropriate)   11/01/18 0409   Goal/Outcome Evaluation   Problems Assessed (Inflammatory Bowel Disease) hemorrhage;situational response   Problems Present (Inflammatory Bowel) situational response;hemorrhage       Problem: Fall Risk (Adult)  Goal: Identify Related Risk Factors and Signs and Symptoms  Outcome: Ongoing (interventions implemented as appropriate)   11/01/18 0409   Fall Risk (Adult)   Related Risk Factors (Fall Risk) age-related changes;history of falls;fear of falling   Signs and Symptoms (Fall Risk) presence of risk factors     Goal: Absence of Fall  Outcome: Ongoing (interventions implemented as appropriate)   11/01/18 0409   Fall Risk (Adult)   Absence of Fall making progress toward outcome

## 2018-11-02 LAB
ABO + RH BLD: NORMAL
ABO + RH BLD: NORMAL
BH BB BLOOD EXPIRATION DATE: NORMAL
BH BB BLOOD EXPIRATION DATE: NORMAL
BH BB BLOOD TYPE BARCODE: 5100
BH BB BLOOD TYPE BARCODE: 5100
BH BB DISPENSE STATUS: NORMAL
BH BB DISPENSE STATUS: NORMAL
BH BB PRODUCT CODE: NORMAL
BH BB PRODUCT CODE: NORMAL
BH BB UNIT NUMBER: NORMAL
BH BB UNIT NUMBER: NORMAL
CRP SERPL-MCNC: 0.31 MG/DL (ref 0–0.5)
DEPRECATED RDW RBC AUTO: 44.7 FL (ref 37–54)
ERYTHROCYTE [DISTWIDTH] IN BLOOD BY AUTOMATED COUNT: 15.4 % (ref 11.7–13)
ERYTHROCYTE [SEDIMENTATION RATE] IN BLOOD: 14 MM/HR (ref 0–30)
GLUCOSE BLDC GLUCOMTR-MCNC: 113 MG/DL (ref 70–130)
GLUCOSE BLDC GLUCOMTR-MCNC: 150 MG/DL (ref 70–130)
GLUCOSE BLDC GLUCOMTR-MCNC: 164 MG/DL (ref 70–130)
GLUCOSE BLDC GLUCOMTR-MCNC: 182 MG/DL (ref 70–130)
HCT VFR BLD AUTO: 33 % (ref 35.6–45.5)
HGB BLD-MCNC: 10.4 G/DL (ref 11.9–15.5)
MCH RBC QN AUTO: 25.1 PG (ref 26.9–32)
MCHC RBC AUTO-ENTMCNC: 31.5 G/DL (ref 32.4–36.3)
MCV RBC AUTO: 79.5 FL (ref 80.5–98.2)
PLATELET # BLD AUTO: 223 10*3/MM3 (ref 140–500)
PMV BLD AUTO: 9 FL (ref 6–12)
RBC # BLD AUTO: 4.15 10*6/MM3 (ref 3.9–5.2)
UNIT  ABO: NORMAL
UNIT  ABO: NORMAL
UNIT  RH: NORMAL
UNIT  RH: NORMAL
WBC NRBC COR # BLD: 5.17 10*3/MM3 (ref 4.5–10.7)

## 2018-11-02 PROCEDURE — 36430 TRANSFUSION BLD/BLD COMPNT: CPT

## 2018-11-02 PROCEDURE — 25010000002 LEVOFLOXACIN PER 250 MG: Performed by: INTERNAL MEDICINE

## 2018-11-02 PROCEDURE — 82962 GLUCOSE BLOOD TEST: CPT

## 2018-11-02 PROCEDURE — 83993 ASSAY FOR CALPROTECTIN FECAL: CPT | Performed by: NURSE PRACTITIONER

## 2018-11-02 PROCEDURE — 86900 BLOOD TYPING SEROLOGIC ABO: CPT

## 2018-11-02 PROCEDURE — 85652 RBC SED RATE AUTOMATED: CPT | Performed by: NURSE PRACTITIONER

## 2018-11-02 PROCEDURE — 63710000001 INSULIN ASPART PER 5 UNITS: Performed by: INTERNAL MEDICINE

## 2018-11-02 PROCEDURE — P9016 RBC LEUKOCYTES REDUCED: HCPCS

## 2018-11-02 PROCEDURE — 99232 SBSQ HOSP IP/OBS MODERATE 35: CPT | Performed by: INTERNAL MEDICINE

## 2018-11-02 PROCEDURE — 85027 COMPLETE CBC AUTOMATED: CPT | Performed by: HOSPITALIST

## 2018-11-02 RX ORDER — LEVOFLOXACIN 500 MG/1
500 TABLET, FILM COATED ORAL EVERY 24 HOURS
Status: DISCONTINUED | OUTPATIENT
Start: 2018-11-03 | End: 2018-11-04 | Stop reason: HOSPADM

## 2018-11-02 RX ORDER — METRONIDAZOLE 500 MG/1
500 TABLET ORAL EVERY 8 HOURS SCHEDULED
Status: DISCONTINUED | OUTPATIENT
Start: 2018-11-02 | End: 2018-11-04 | Stop reason: HOSPADM

## 2018-11-02 RX ADMIN — TIMOLOL MALEATE 1 DROP: 5 SOLUTION/ DROPS OPHTHALMIC at 08:42

## 2018-11-02 RX ADMIN — Medication 3 ML: at 21:53

## 2018-11-02 RX ADMIN — METRONIDAZOLE 500 MG: 500 TABLET, FILM COATED ORAL at 21:37

## 2018-11-02 RX ADMIN — DORZOLAMIDE HYDROCHLORIDE 1 DROP: 20 SOLUTION/ DROPS OPHTHALMIC at 08:42

## 2018-11-02 RX ADMIN — PANTOPRAZOLE SODIUM 40 MG: 40 TABLET, DELAYED RELEASE ORAL at 08:40

## 2018-11-02 RX ADMIN — MESALAMINE 1.2 G: 1.2 TABLET, DELAYED RELEASE ORAL at 08:40

## 2018-11-02 RX ADMIN — MESALAMINE 1000 MG: 1000 SUPPOSITORY RECTAL at 21:37

## 2018-11-02 RX ADMIN — HYDROCORTISONE 2.5% 1 APPLICATION: 25 CREAM TOPICAL at 21:38

## 2018-11-02 RX ADMIN — METRONIDAZOLE 500 MG: 500 INJECTION, SOLUTION INTRAVENOUS at 03:27

## 2018-11-02 RX ADMIN — METOPROLOL TARTRATE 50 MG: 50 TABLET, FILM COATED ORAL at 23:14

## 2018-11-02 RX ADMIN — INSULIN ASPART 2 UNITS: 100 INJECTION, SOLUTION INTRAVENOUS; SUBCUTANEOUS at 12:12

## 2018-11-02 RX ADMIN — VITAMIN D, TAB 1000IU (100/BT) 2000 UNITS: 25 TAB at 08:40

## 2018-11-02 RX ADMIN — LEVOFLOXACIN 500 MG: 5 INJECTION, SOLUTION INTRAVENOUS at 04:35

## 2018-11-02 RX ADMIN — HYDROCORTISONE 2.5%: 25 CREAM TOPICAL at 08:42

## 2018-11-02 RX ADMIN — FERROUS SULFATE TAB 325 MG (65 MG ELEMENTAL FE) 325 MG: 325 (65 FE) TAB at 08:40

## 2018-11-02 RX ADMIN — ATORVASTATIN CALCIUM 20 MG: 20 TABLET, FILM COATED ORAL at 08:40

## 2018-11-02 RX ADMIN — LISINOPRIL 20 MG: 20 TABLET ORAL at 08:40

## 2018-11-02 RX ADMIN — METOPROLOL TARTRATE 50 MG: 50 TABLET, FILM COATED ORAL at 08:44

## 2018-11-02 RX ADMIN — INSULIN ASPART 2 UNITS: 100 INJECTION, SOLUTION INTRAVENOUS; SUBCUTANEOUS at 23:15

## 2018-11-02 RX ADMIN — METRONIDAZOLE 500 MG: 500 INJECTION, SOLUTION INTRAVENOUS at 10:00

## 2018-11-02 RX ADMIN — DORZOLAMIDE HYDROCHLORIDE 1 DROP: 20 SOLUTION/ DROPS OPHTHALMIC at 21:38

## 2018-11-02 RX ADMIN — TIMOLOL MALEATE 1 DROP: 5 SOLUTION/ DROPS OPHTHALMIC at 21:38

## 2018-11-02 RX ADMIN — Medication 3 ML: at 08:43

## 2018-11-02 NOTE — PROGRESS NOTES
[facility]/GI Progress Note   Chief Complaint:  Colitis     Subjective     Interval History:   S/p 2units PRBC. 3 BMs this morning. Loose with bright red blood. Denies abdominal pain, nausea or vomiting.   Colonoscopy records including any pathology reports are being requested by Donita [facility] this morning.     History taken from: patient chart RN    Review of Systems:    All systems were reviewed and negative except for:  Gastrointestinal: postitive for  bright red blood per rectum and diarrhea    Objective     Vital Signs  Temp:  [98 °F (36.7 °C)-98.4 °F (36.9 °C)] 98.3 °F (36.8 °C)  Heart Rate:  [54-76] 59  Resp:  [16-20] 16  BP: (150-163)/(71-83) 163/83  Body mass index is 21.95 kg/m².    Intake/Output Summary (Last 24 hours) at 11/02/18 0837  Last data filed at 11/02/18 0823   Gross per 24 hour   Intake             2200 ml   Output              900 ml   Net             1300 ml     I/O this shift:  In: 120 [P.O.:120]  Out: -     Physical Exam:   General: patient awake, alert and cooperative   Eyes: Normal lids and lashes, no scleral icterus, no conjunctival pallor   Neck: supple, normal ROM, no tracheal deviation, no thyromegaly   Skin: warm and dry, not jaundiced   Cardiovascular: regular rhythm and rate, no murmurs auscultated   Pulm: clear to auscultation bilaterally, regular and unlabored   Abdomen: soft, nontender, nondistended; normal bowel sounds   Rectal: deferred   Extremities: no rash or edema   Neurologic: Normal mood and behavior    All Medications Have Been Reviewed     Results Review:       Results from last 7 days  Lab Units 11/02/18  0528 11/01/18  1608 11/01/18  0747 10/31/18  1911   WBC 10*3/mm3 5.17  --   --  6.87   HEMOGLOBIN g/dL 10.4* 6.7* 7.3* 7.4*   HEMATOCRIT % 33.0* 23.4* 26.8* 26.7*   PLATELETS 10*3/mm3 223  --   --  230         Results from last 7 days  Lab Units 11/01/18  0533 10/31/18  1911   SODIUM mmol/L 140 139   POTASSIUM mmol/L 4.0 4.1   CHLORIDE mmol/L 105 103   CO2 mmol/L 25.7  23.1   BUN mg/dL 11 15   CREATININE mg/dL 0.76 0.87   CALCIUM mg/dL 8.7 9.2   BILIRUBIN mg/dL  --  0.2   ALK PHOS U/L  --  50   ALT (SGPT) U/L  --  7   AST (SGOT) U/L  --  10   GLUCOSE mg/dL 149* 128*             RADIOLOGY:    Imaging Results (last 72 hours)     Procedure Component Value Units Date/Time    CT Abdomen Pelvis With Contrast [[id number]] Collected:  10/31/18 2159     Updated:  10/31/18 2210    Narrative:       CT OF THE ABDOMEN AND PELVIS WITH CONTRAST     HISTORY: Abdominal pain     COMPARISON: None available.     TECHNIQUE: Axial CT imaging was obtained from the dome of the diaphragm  through symphysis pubis following the administration of IV contrast.     FINDINGS:  Images through the lung bases demonstrate mild bibasilar atelectasis.  The stomach and proximal small bowel appear unremarkable, as are the  adrenal glands. Spleen is within normal limits, as is the pancreas. The  gallbladder appears unremarkable. No focal hepatic lesions are seen.  Cysts are identified on both kidneys. There is atherosclerotic  involvement of the abdominal aorta. There is a small fat-containing  umbilical hernia. The appendix is visualized, and is within normal  limits. Urinary bladder is normal. Uterus appears unremarkable. This  patient's colon appears thick walled, extending from the sigmoid colon  through the rectum. There is some mild pericolonic soft tissue  stranding. Findings are felt to be characteristic of colitis. There is  some focal fatty infiltration seen within the bowel wall. This is  particularly pronounced within the rectosigmoid, and at the terminal  ileum and cecum. This finding can be seen in the setting of chronic  inflammatory bowel disease. Correlation with history is suggested. No  pneumatosis or free air is seen. There is no evidence of obstruction.  There is some mild presacral soft tissue stranding /fluid. This is  likely reactive. Review of bony windows does not demonstrate any  aggressive  osseous abnormalities.       Impression:        IMPRESSION:  Diffusely thick-walled appearance to the bowel seen extending from the  sigmoid colon to the rectum, with some mild associated pericolonic soft  tissue stranding. Findings are felt to be characteristic of colitis.  There is some focal fatty infiltration identified within the wall of the  bowel. This finding can be seen in the setting of chronic inflammatory  bowel disease. Correlation with history is suggested. No pneumatosis,  free air, or evidence of obstruction is seen.     Radiation dose reduction techniques were utilized, including automated  exposure control and exposure modulation based on body size.     This report was finalized on 10/31/2018 10:07 PM by Dr. Elvie Vazquez M.D.             Assessment/Plan     Patient Active Problem List   Diagnosis Code   • Type 2 diabetes mellitus with circulatory disorder (CMS/[facility]) E11.59   • Osteopenia M85.80   • Hypertension I10   • Hyperlipidemia E78.5   • Colon polyp K63.5   • Gastroesophageal reflux disease K21.9   • History of non-ST elevation myocardial infarction (NSTEMI) I25.2   • Glaucoma H40.9   • Hypothyroidism E03.9   • Coronary artery disease involving native coronary artery of native heart without angina pectoris I25.10   • Takotsubo cardiomyopathy I51.81   • Coronary artery disease involving native coronary artery of native heart without angina pectoris I25.10   • Ulcerative colitis with rectal bleeding (CMS/[facility]) K51.911   • Rectal bleeding K62.5   • Acute post-hemorrhagic anemia D62      Kirti W Faheem, APRN  11/02/18  8:37 AM    PE - VS seen  Lungs - CTA  CV - RRR  ABD - NT, soft  EXT - No CCE    mpression  1.  Rectal bleeding and diarrhea: Differential of distal colitis versus hemorrhoidal vs both. CDT and GI pcr panel negative.  Her external hemorrhoid, while large, does not seem to be bleeding at this time  2. Distal UC: appears to be active on imaging and per her reports of her  recent colonoscopy.  Followed by Dr Adams.  On outpt lialda  3. Iron deficiency anemia: pronounced, most likely due to her disease- improved after PRBC     Plan  Continue oral iron    Continue mesalamine suppositories, topical Anusol to her external hemorrhoid. If she continues to have rectal bleeding on Saturday then I would start he on IV steroids (though it will cause her blood sugars to skyrocket and she'll have to stay on insulin while on steroids?)  Follow H&H q 12 hr  Await records from recent colonoscopy  She needs to follow-up with Dr. Adams within the next 4-6 weeks upon discharge    Devang Woodward M.D.

## 2018-11-02 NOTE — PLAN OF CARE
Problem: Patient Care Overview  Goal: Plan of Care Review  Outcome: Ongoing (interventions implemented as appropriate)   11/02/18 0428   Coping/Psychosocial   Plan of Care Reviewed With patient   Plan of Care Review   Progress no change   OTHER   Outcome Summary Slept ok, 2u PRBC transfused, tolerated fine, VSS, up with assist. A/Ox4, no pain complaints, no hemorrhoid bleeding for now, passing gas, labs in AM, will monitor      Goal: Individualization and Mutuality  Outcome: Ongoing (interventions implemented as appropriate)   11/02/18 0428   Individualization   Patient Specific Preferences none   Patient Specific Goals (Include Timeframe) monitor for rectal bleeding due to hemorrhoids, keep safe   Patient Specific Interventions site checked, labs in AM, blood transfused, bed alarm on       Problem: Bowel Disease, Inflammatory (Adult)  Goal: Signs and Symptoms of Listed Potential Problems Will be Absent, Minimized or Managed (Bowel Disease, Inflammatory)  Outcome: Ongoing (interventions implemented as appropriate)   11/02/18 0428   Goal/Outcome Evaluation   Problems Assessed (Inflammatory Bowel Disease) hemorrhage;situational response;infection   Problems Present (Inflammatory Bowel) situational response;hemorrhage;infection       Problem: Fall Risk (Adult)  Goal: Identify Related Risk Factors and Signs and Symptoms  Outcome: Ongoing (interventions implemented as appropriate)   11/02/18 0428   Fall Risk (Adult)   Related Risk Factors (Fall Risk) environment unfamiliar;age-related changes   Signs and Symptoms (Fall Risk) presence of risk factors     Goal: Absence of Fall  Outcome: Ongoing (interventions implemented as appropriate)   11/02/18 0428   Fall Risk (Adult)   Absence of Fall making progress toward outcome

## 2018-11-02 NOTE — PROGRESS NOTES
"    DAILY PROGRESS NOTE  [facility]    Patient Identification:  Name: Renee PARIKH From  Age: 73 y.o.  Sex: female  :  1945  MRN: [id number]         Primary Care Physician: Esequiel Pacheco MD    Subjective:  Interval History: Feels much better today after receiving 2 units of blood.  She still states that she has bright red blood per rectum.  She's not having issues with nausea vomiting or abdominal pain and she denies any chest pain or shortness of breath    Objective: No family at bedside.  Case discussed in multidisciplinary Rounds    Scheduled Meds:  atorvastatin 20 mg Oral Daily   cholecalciferol 2,000 Units Oral Daily   dorzolamide 1 drop Both Eyes BID   ferrous sulfate 325 mg Oral Daily With Breakfast   hydrocortisone  Rectal BID   insulin aspart 0-7 Units Subcutaneous 4x Daily With Meals & Nightly   [START ON 11/3/2018] levoFLOXacin 500 mg Oral Q24H   lisinopril 20 mg Oral Daily   mesalamine 1,000 mg Rectal Nightly   mesalamine 1,200 mg Oral Daily With Breakfast   metoprolol tartrate 50 mg Oral Q12H   metroNIDAZOLE 500 mg Oral Q8H   pantoprazole 40 mg Oral Q AM   sodium chloride 3 mL Intravenous Q12H   timolol 1 drop Both Eyes Q12H     Continuous Infusions:     Vital signs in last 24 hours:  Temp:  [97.9 °F (36.6 °C)-98.4 °F (36.9 °C)] 97.9 °F (36.6 °C)  Heart Rate:  [54-76] 58  Resp:  [16-20] 16  BP: (150-163)/(70-83) 160/70    Intake/Output:    Intake/Output Summary (Last 24 hours) at 18 1350  Last data filed at 18 0823   Gross per 24 hour   Intake             1840 ml   Output              500 ml   Net             1340 ml       Exam:  /70 (BP Location: Left arm, Patient Position: Lying)   Pulse 58   Temp 97.9 °F (36.6 °C) (Oral)   Resp 16   Ht 157.5 cm (62\")   Wt 54.4 kg (120 lb)   SpO2 98%   BMI 21.95 kg/m²     General Appearance:    Alert, cooperative, no distress, AAOx3, sitting in bed reading the book                          Head:    Normocephalic, without " obvious abnormality, atraumatic                           Eyes:    PERRLA, positive conjunctival pallor                         Throat:   Lips, tongue, gums normal; oral mucosa pink and moist                           Neck:   Supple, symmetrical, trachea midline, no JVD                         Lungs:    Clear to auscultation bilaterally, respirations unlabored                          Heart:    Regular rate and rhythm, S1 and S2 normal                  Abdomen:     Soft, non-tender, bowel sounds active, no masses                 Extremities:   Extremities normal, atraumatic, no cyanosis or edema                        Pulses:   Pulses palpable in lower extremities                  Neurologic:   CNII-XII intact, moving all w/out focal deficits noted     Data Review:  Labs in chart were reviewed.    Assessment:  Active Hospital Problems    Diagnosis Date Noted   • Rectal bleeding [K62.5] 11/01/2018   • Acute post-hemorrhagic anemia [D62] 11/01/2018   • Ulcerative colitis with rectal bleeding (CMS/HCC) [K51.911] 10/31/2018   • Coronary artery disease involving native coronary artery of native heart without angina pectoris [I25.10] 06/22/2017   • 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) [E11.59] 01/19/2016   • Hypertension [I10] 01/19/2016      Resolved Hospital Problems    Diagnosis Date Noted Date Resolved   No resolved problems to display.       Plan:  Transfuse 2 units yesterday once blood count dropped to 6.8 and today is 10.4 though she is still having bright red blood per rectum   -DC frequent H&H checks - daily is likely sufficient s/p transfusion     GI consulted - patient may need transition towards steroids and will await their input.  Discontinue Levaquin and Flagyl unless felt necessary from GI standpoint.  On mesalamine by mouth and rectally    DM2 - bs decent     SCDs for DVT ppx     Leonid Hanson MD  11/2/2018  1:50 PM

## 2018-11-02 NOTE — PLAN OF CARE
Problem: Patient Care Overview  Goal: Plan of Care Review  Outcome: Ongoing (interventions implemented as appropriate)   11/02/18 0961   Coping/Psychosocial   Plan of Care Reviewed With patient   Plan of Care Review   Progress improving   OTHER   Outcome Summary no c/o pain n/v or soa. pt stated 2 bloody BM this shift. unable to get stool specimen, pt aware of need educated on sample. will continue to monitor

## 2018-11-03 LAB
DEPRECATED RDW RBC AUTO: 44 FL (ref 37–54)
ERYTHROCYTE [DISTWIDTH] IN BLOOD BY AUTOMATED COUNT: 15.8 % (ref 11.7–13)
GLUCOSE BLDC GLUCOMTR-MCNC: 126 MG/DL (ref 70–130)
GLUCOSE BLDC GLUCOMTR-MCNC: 142 MG/DL (ref 70–130)
GLUCOSE BLDC GLUCOMTR-MCNC: 162 MG/DL (ref 70–130)
GLUCOSE BLDC GLUCOMTR-MCNC: 179 MG/DL (ref 70–130)
HCT VFR BLD AUTO: 31.3 % (ref 35.6–45.5)
HGB BLD-MCNC: 10.1 G/DL (ref 11.9–15.5)
MCH RBC QN AUTO: 24.7 PG (ref 26.9–32)
MCHC RBC AUTO-ENTMCNC: 32.3 G/DL (ref 32.4–36.3)
MCV RBC AUTO: 76.5 FL (ref 80.5–98.2)
PLATELET # BLD AUTO: 217 10*3/MM3 (ref 140–500)
PMV BLD AUTO: 9 FL (ref 6–12)
RBC # BLD AUTO: 4.09 10*6/MM3 (ref 3.9–5.2)
WBC NRBC COR # BLD: 6.58 10*3/MM3 (ref 4.5–10.7)

## 2018-11-03 PROCEDURE — 85027 COMPLETE CBC AUTOMATED: CPT | Performed by: HOSPITALIST

## 2018-11-03 PROCEDURE — 63710000001 INSULIN ASPART PER 5 UNITS: Performed by: INTERNAL MEDICINE

## 2018-11-03 PROCEDURE — 82962 GLUCOSE BLOOD TEST: CPT

## 2018-11-03 PROCEDURE — 99232 SBSQ HOSP IP/OBS MODERATE 35: CPT | Performed by: INTERNAL MEDICINE

## 2018-11-03 RX ORDER — MESALAMINE 4 G/60ML
4 ENEMA RECTAL 2 TIMES DAILY
Status: DISCONTINUED | OUTPATIENT
Start: 2018-11-03 | End: 2018-11-04 | Stop reason: HOSPADM

## 2018-11-03 RX ADMIN — HYDROCORTISONE 2.5%: 25 CREAM TOPICAL at 08:46

## 2018-11-03 RX ADMIN — LEVOFLOXACIN 500 MG: 500 TABLET, FILM COATED ORAL at 06:49

## 2018-11-03 RX ADMIN — INSULIN ASPART 2 UNITS: 100 INJECTION, SOLUTION INTRAVENOUS; SUBCUTANEOUS at 20:53

## 2018-11-03 RX ADMIN — TIMOLOL MALEATE 1 DROP: 5 SOLUTION/ DROPS OPHTHALMIC at 08:46

## 2018-11-03 RX ADMIN — MESALAMINE 4 G: 4 ENEMA RECTAL at 11:38

## 2018-11-03 RX ADMIN — PANTOPRAZOLE SODIUM 40 MG: 40 TABLET, DELAYED RELEASE ORAL at 06:49

## 2018-11-03 RX ADMIN — MESALAMINE 4 G: 4 ENEMA RECTAL at 20:53

## 2018-11-03 RX ADMIN — VITAMIN D, TAB 1000IU (100/BT) 2000 UNITS: 25 TAB at 08:46

## 2018-11-03 RX ADMIN — Medication 3 ML: at 08:47

## 2018-11-03 RX ADMIN — METRONIDAZOLE 500 MG: 500 TABLET, FILM COATED ORAL at 06:49

## 2018-11-03 RX ADMIN — DORZOLAMIDE HYDROCHLORIDE 1 DROP: 20 SOLUTION/ DROPS OPHTHALMIC at 08:47

## 2018-11-03 RX ADMIN — MESALAMINE 1.2 G: 1.2 TABLET, DELAYED RELEASE ORAL at 08:46

## 2018-11-03 RX ADMIN — METOPROLOL TARTRATE 50 MG: 50 TABLET, FILM COATED ORAL at 08:46

## 2018-11-03 RX ADMIN — METRONIDAZOLE 500 MG: 500 TABLET, FILM COATED ORAL at 14:52

## 2018-11-03 RX ADMIN — METOPROLOL TARTRATE 50 MG: 50 TABLET, FILM COATED ORAL at 20:53

## 2018-11-03 RX ADMIN — METRONIDAZOLE 500 MG: 500 TABLET, FILM COATED ORAL at 21:02

## 2018-11-03 RX ADMIN — Medication 3 ML: at 20:54

## 2018-11-03 RX ADMIN — ATORVASTATIN CALCIUM 20 MG: 20 TABLET, FILM COATED ORAL at 08:46

## 2018-11-03 RX ADMIN — DORZOLAMIDE HYDROCHLORIDE 1 DROP: 20 SOLUTION/ DROPS OPHTHALMIC at 20:53

## 2018-11-03 RX ADMIN — TIMOLOL MALEATE 1 DROP: 5 SOLUTION/ DROPS OPHTHALMIC at 20:53

## 2018-11-03 RX ADMIN — HYDROCORTISONE 2.5%: 25 CREAM TOPICAL at 20:54

## 2018-11-03 RX ADMIN — LISINOPRIL 20 MG: 20 TABLET ORAL at 08:45

## 2018-11-03 RX ADMIN — FERROUS SULFATE TAB 325 MG (65 MG ELEMENTAL FE) 325 MG: 325 (65 FE) TAB at 11:40

## 2018-11-03 NOTE — PLAN OF CARE
Problem: Patient Care Overview  Goal: Plan of Care Review  Outcome: Ongoing (interventions implemented as appropriate)   11/03/18 0530   Coping/Psychosocial   Plan of Care Reviewed With patient   Plan of Care Review   Progress improving   OTHER   Outcome Summary no c/o pain n/v or soa. pt states that her stool is getting better. pt has had 2 bloody bm with moderate amount and 2 bm with scant blood. no s/s of acute distress.

## 2018-11-03 NOTE — PROGRESS NOTES
BGA/GI Progress Note   Chief Complaint:  Colitis     Subjective     Interval History:     Pt c/o two episdoes of rectal bleeding this am.  No pain.    Colonoscopy records including any pathology reports are being requested by Donita LA this morning.     History taken from: patient chart RN    Review of Systems:    All systems were reviewed and negative except for:  Gastrointestinal: postitive for  bright red blood per rectum and diarrhea    Objective     Vital Signs  Temp:  [97.9 °F (36.6 °C)-99.2 °F (37.3 °C)] 98.1 °F (36.7 °C)  Heart Rate:  [53-72] 57  Resp:  [16] 16  BP: (144-168)/(70-84) 156/81  Body mass index is 21.95 kg/m².    Intake/Output Summary (Last 24 hours) at 11/03/18 0809  Last data filed at 11/02/18 2341   Gross per 24 hour   Intake              780 ml   Output             1000 ml   Net             -220 ml     No intake/output data recorded.    Physical Exam:   General: patient awake, alert and cooperative   Abdomen: soft, nontender, nondistended; normal bowel sounds   Rectal: deferred   Extremities: no rash or edema   Neurologic: Normal mood and behavior    All Medications Have Been Reviewed     Results Review:       Results from last 7 days  Lab Units 11/03/18  0442 11/02/18  0528 11/01/18  1608  10/31/18  1911   WBC 10*3/mm3 6.58 5.17  --   --  6.87   HEMOGLOBIN g/dL 10.1* 10.4* 6.7*  < > 7.4*   HEMATOCRIT % 31.3* 33.0* 23.4*  < > 26.7*   PLATELETS 10*3/mm3 217 223  --   --  230   < > = values in this interval not displayed.      Results from last 7 days  Lab Units 11/01/18  0533 10/31/18  1911   SODIUM mmol/L 140 139   POTASSIUM mmol/L 4.0 4.1   CHLORIDE mmol/L 105 103   CO2 mmol/L 25.7 23.1   BUN mg/dL 11 15   CREATININE mg/dL 0.76 0.87   CALCIUM mg/dL 8.7 9.2   BILIRUBIN mg/dL  --  0.2   ALK PHOS U/L  --  50   ALT (SGPT) U/L  --  7   AST (SGOT) U/L  --  10   GLUCOSE mg/dL 149* 128*             RADIOLOGY:    Imaging Results (last 72 hours)     Procedure Component Value Units Date/Time     CT Abdomen Pelvis With Contrast [[id number]] Collected:  10/31/18 2159     Updated:  10/31/18 2210    Narrative:       CT OF THE ABDOMEN AND PELVIS WITH CONTRAST     HISTORY: Abdominal pain     COMPARISON: None available.     TECHNIQUE: Axial CT imaging was obtained from the dome of the diaphragm  through symphysis pubis following the administration of IV contrast.     FINDINGS:  Images through the lung bases demonstrate mild bibasilar atelectasis.  The stomach and proximal small bowel appear unremarkable, as are the  adrenal glands. Spleen is within normal limits, as is the pancreas. The  gallbladder appears unremarkable. No focal hepatic lesions are seen.  Cysts are identified on both kidneys. There is atherosclerotic  involvement of the abdominal aorta. There is a small fat-containing  umbilical hernia. The appendix is visualized, and is within normal  limits. Urinary bladder is normal. Uterus appears unremarkable. This  patient's colon appears thick walled, extending from the sigmoid colon  through the rectum. There is some mild pericolonic soft tissue  stranding. Findings are felt to be characteristic of colitis. There is  some focal fatty infiltration seen within the bowel wall. This is  particularly pronounced within the rectosigmoid, and at the terminal  ileum and cecum. This finding can be seen in the setting of chronic  inflammatory bowel disease. Correlation with history is suggested. No  pneumatosis or free air is seen. There is no evidence of obstruction.  There is some mild presacral soft tissue stranding /fluid. This is  likely reactive. Review of bony windows does not demonstrate any  aggressive osseous abnormalities.       Impression:        IMPRESSION:  Diffusely thick-walled appearance to the bowel seen extending from the  sigmoid colon to the rectum, with some mild associated pericolonic soft  tissue stranding. Findings are felt to be characteristic of colitis.  There is some focal fatty  infiltration identified within the wall of the  bowel. This finding can be seen in the setting of chronic inflammatory  bowel disease. Correlation with history is suggested. No pneumatosis,  free air, or evidence of obstruction is seen.     Radiation dose reduction techniques were utilized, including automated  exposure control and exposure modulation based on body size.     This report was finalized on 10/31/2018 10:07 PM by Dr. Elvie Vazquez M.D.             Assessment/Plan     Patient Active Problem List   Diagnosis Code   • Type 2 diabetes mellitus with circulatory disorder (CMS/[facility]) E11.59   • Osteopenia M85.80   • Hypertension I10   • Hyperlipidemia E78.5   • Colon polyp K63.5   • Gastroesophageal reflux disease K21.9   • History of non-ST elevation myocardial infarction (NSTEMI) I25.2   • Glaucoma H40.9   • Hypothyroidism E03.9   • Coronary artery disease involving native coronary artery of native heart without angina pectoris I25.10   • Takotsubo cardiomyopathy I51.81   • Coronary artery disease involving native coronary artery of native heart without angina pectoris I25.10   • Ulcerative colitis with rectal bleeding (CMS/[facility]) K51.911   • Rectal bleeding K62.5   • Acute post-hemorrhagic anemia D62        Impression  1.  Rectal bleeding and diarrhea: Differential of distal colitis versus hemorrhoidal vs both. CDT and GI pcr panel negative.  Her external hemorrhoid, while large, does not seem to be bleeding at this time  2. Distal UC: appears to be active on imaging and per her reports of her recent colonoscopy.  Followed by Dr Adams.  On outpt lialda  3. Iron deficiency anemia: pronounced, most likely due to her disease- improved after PRBC     Plan  Continue oral iron    Change mesalamine to enema given some involvement of sigmoid colon.  Continue anusol cream to external hemorrhoid  Follow H&H q 12 hr, Hb has been stable  Await records from recent colonoscopy  She needs to follow-up with Dr. Adams within  the next 4-6 weeks upon discharge          Juanito Davis M.D.  [facility]  [address] [place] [address]  Office: [phone]

## 2018-11-03 NOTE — PROGRESS NOTES
"    DAILY PROGRESS NOTE  [facility]    Patient Identification:  Name: Renee PARIKH From  Age: 73 y.o.  Sex: female  :  1945  MRN: [id number]         Primary Care Physician: Esequiel Pacheco MD    Subjective:  Interval History: rectal bleeding decreased and resolving - no abd pains/cp/ams/n/v    Objective:fm x1 at bs     Scheduled Meds:  atorvastatin 20 mg Oral Daily   cholecalciferol 2,000 Units Oral Daily   dorzolamide 1 drop Both Eyes BID   ferrous sulfate 325 mg Oral Daily With Breakfast   hydrocortisone  Rectal BID   insulin aspart 0-7 Units Subcutaneous 4x Daily With Meals & Nightly   levoFLOXacin 500 mg Oral Q24H   lisinopril 20 mg Oral Daily   mesalamine 1,200 mg Oral Daily With Breakfast   mesalamine 4 g Rectal BID   metoprolol tartrate 50 mg Oral Q12H   metroNIDAZOLE 500 mg Oral Q8H   pantoprazole 40 mg Oral Q AM   sodium chloride 3 mL Intravenous Q12H   timolol 1 drop Both Eyes Q12H     Continuous Infusions:     Vital signs in last 24 hours:  Temp:  [97.9 °F (36.6 °C)-99.2 °F (37.3 °C)] 98.1 °F (36.7 °C)  Heart Rate:  [53-72] 57  Resp:  [16] 16  BP: (144-168)/(70-84) 156/81    Intake/Output:    Intake/Output Summary (Last 24 hours) at 18 1330  Last data filed at 18 2341   Gross per 24 hour   Intake              660 ml   Output             1000 ml   Net             -340 ml       Exam:  /81 (BP Location: Left arm, Patient Position: Lying)   Pulse 57   Temp 98.1 °F (36.7 °C) (Oral)   Resp 16   Ht 157.5 cm (62\")   Wt 54.4 kg (120 lb)   SpO2 96%   BMI 21.95 kg/m²     General Appearance:    Alert, cooperative, no distress, AAOx3                         Throat:   Lips, tongue, gums normal; oral mucosa pink and moist                           Neck:   Supple, symmetrical, trachea midline, no JVD                         Lungs:    Clear to auscultation bilaterally, respirations unlabored                          Heart:    Regular rate and rhythm, S1 and S2 normal                 "  Abdomen:     Soft, non-tender, bowel sounds active                 Extremities:   Extremities normal, atraumatic, no cyanosis or edema                       Data Review:  Labs in chart were reviewed.    Assessment:  Active Hospital Problems    Diagnosis Date Noted   • Rectal bleeding [K62.5] 11/01/2018   • Acute post-hemorrhagic anemia [D62] 11/01/2018   • Ulcerative colitis with rectal bleeding (CMS/HCC) [K51.911] 10/31/2018   • Coronary artery disease involving native coronary artery of native heart without angina pectoris [I25.10] 06/22/2017   • Type 2 diabetes mellitus with circulatory disorder (CMS/[facility]) [E11.59] 01/19/2016   • Hypertension [I10] 01/19/2016      Resolved Hospital Problems    Diagnosis Date Noted Date Resolved   No resolved problems to display.       Plan:  Hgb stable s/p transfusion 10.4-10.1    GI changed mesalamine to enema - no plans for steroids or endoscopy now     Dispo - anticipate tomorrow pending rectal bleeding and GI input      Leonid Hanson MD  11/3/2018  1:30 PM

## 2018-11-04 VITALS
DIASTOLIC BLOOD PRESSURE: 82 MMHG | RESPIRATION RATE: 20 BRPM | HEART RATE: 63 BPM | TEMPERATURE: 98.3 F | OXYGEN SATURATION: 99 % | BODY MASS INDEX: 22.08 KG/M2 | SYSTOLIC BLOOD PRESSURE: 171 MMHG | WEIGHT: 120 LBS | HEIGHT: 62 IN

## 2018-11-04 LAB
GLUCOSE BLDC GLUCOMTR-MCNC: 138 MG/DL (ref 70–130)
GLUCOSE BLDC GLUCOMTR-MCNC: 154 MG/DL (ref 70–130)

## 2018-11-04 PROCEDURE — 99232 SBSQ HOSP IP/OBS MODERATE 35: CPT | Performed by: INTERNAL MEDICINE

## 2018-11-04 PROCEDURE — 82962 GLUCOSE BLOOD TEST: CPT

## 2018-11-04 PROCEDURE — 63710000001 INSULIN ASPART PER 5 UNITS: Performed by: INTERNAL MEDICINE

## 2018-11-04 RX ORDER — METRONIDAZOLE 500 MG/1
500 TABLET ORAL EVERY 8 HOURS SCHEDULED
Qty: 11 TABLET | Refills: 0 | Status: SHIPPED | OUTPATIENT
Start: 2018-11-04 | End: 2018-11-08

## 2018-11-04 RX ORDER — LEVOFLOXACIN 500 MG/1
500 TABLET, FILM COATED ORAL EVERY 24 HOURS
Qty: 3 TABLET | Refills: 0 | Status: SHIPPED | OUTPATIENT
Start: 2018-11-05 | End: 2018-11-08

## 2018-11-04 RX ORDER — MESALAMINE 4 G/60ML
4 ENEMA RECTAL 2 TIMES DAILY
Start: 2018-11-04 | End: 2018-11-29

## 2018-11-04 RX ORDER — FERROUS SULFATE 325(65) MG
325 TABLET ORAL
Qty: 30 TABLET | Refills: 0 | Status: SHIPPED | OUTPATIENT
Start: 2018-11-04

## 2018-11-04 RX ORDER — MESALAMINE 1.2 G/1
1200 TABLET, DELAYED RELEASE ORAL
Start: 2018-11-05 | End: 2018-12-16 | Stop reason: HOSPADM

## 2018-11-04 RX ADMIN — LEVOFLOXACIN 500 MG: 500 TABLET, FILM COATED ORAL at 06:09

## 2018-11-04 RX ADMIN — Medication 3 ML: at 09:23

## 2018-11-04 RX ADMIN — TIMOLOL MALEATE 1 DROP: 5 SOLUTION/ DROPS OPHTHALMIC at 09:23

## 2018-11-04 RX ADMIN — FERROUS SULFATE TAB 325 MG (65 MG ELEMENTAL FE) 325 MG: 325 (65 FE) TAB at 12:04

## 2018-11-04 RX ADMIN — MESALAMINE 4 G: 4 ENEMA RECTAL at 09:21

## 2018-11-04 RX ADMIN — VITAMIN D, TAB 1000IU (100/BT) 2000 UNITS: 25 TAB at 09:21

## 2018-11-04 RX ADMIN — ATORVASTATIN CALCIUM 20 MG: 20 TABLET, FILM COATED ORAL at 09:21

## 2018-11-04 RX ADMIN — HYDROCORTISONE 2.5% 1 APPLICATION: 25 CREAM TOPICAL at 09:22

## 2018-11-04 RX ADMIN — MESALAMINE 1.2 G: 1.2 TABLET, DELAYED RELEASE ORAL at 09:21

## 2018-11-04 RX ADMIN — PANTOPRAZOLE SODIUM 40 MG: 40 TABLET, DELAYED RELEASE ORAL at 06:09

## 2018-11-04 RX ADMIN — LISINOPRIL 20 MG: 20 TABLET ORAL at 09:21

## 2018-11-04 RX ADMIN — METOPROLOL TARTRATE 50 MG: 50 TABLET, FILM COATED ORAL at 09:21

## 2018-11-04 RX ADMIN — METRONIDAZOLE 500 MG: 500 TABLET, FILM COATED ORAL at 06:09

## 2018-11-04 RX ADMIN — INSULIN ASPART 2 UNITS: 100 INJECTION, SOLUTION INTRAVENOUS; SUBCUTANEOUS at 09:37

## 2018-11-04 RX ADMIN — DORZOLAMIDE HYDROCHLORIDE 1 DROP: 20 SOLUTION/ DROPS OPHTHALMIC at 09:22

## 2018-11-04 NOTE — PROGRESS NOTES
BGA/GI Progress Note   Chief Complaint:  Colitis     Subjective     Interval History:     Doing better overall.  Ready to go home.      History taken from: patient chart RN    Review of Systems:    All systems were reviewed and negative except for:  Gastrointestinal: postitive for  bright red blood per rectum and diarrhea    Objective     Vital Signs  Temp:  [98.1 °F (36.7 °C)-98.5 °F (36.9 °C)] 98.3 °F (36.8 °C)  Heart Rate:  [60-63] 63  Resp:  [16-20] 20  BP: (164-171)/(76-82) 171/82  Body mass index is 21.95 kg/m².  No intake or output data in the 24 hours ending 11/04/18 1208  No intake/output data recorded.    Physical Exam:   General: patient awake, alert and cooperative   Abdomen: soft, nontender, nondistended; normal bowel sounds   Rectal: deferred   Extremities: no rash or edema   Neurologic: Normal mood and behavior    All Medications Have Been Reviewed     Results Review:       Results from last 7 days  Lab Units 11/03/18  0442 11/02/18  0528 11/01/18  1608  10/31/18  1911   WBC 10*3/mm3 6.58 5.17  --   --  6.87   HEMOGLOBIN g/dL 10.1* 10.4* 6.7*  < > 7.4*   HEMATOCRIT % 31.3* 33.0* 23.4*  < > 26.7*   PLATELETS 10*3/mm3 217 223  --   --  230   < > = values in this interval not displayed.      Results from last 7 days  Lab Units 11/01/18  0533 10/31/18  1911   SODIUM mmol/L 140 139   POTASSIUM mmol/L 4.0 4.1   CHLORIDE mmol/L 105 103   CO2 mmol/L 25.7 23.1   BUN mg/dL 11 15   CREATININE mg/dL 0.76 0.87   CALCIUM mg/dL 8.7 9.2   BILIRUBIN mg/dL  --  0.2   ALK PHOS U/L  --  50   ALT (SGPT) U/L  --  7   AST (SGOT) U/L  --  10   GLUCOSE mg/dL 149* 128*         RADIOLOGY:    Imaging Results (last 72 hours)     Procedure Component Value Units Date/Time    CT Abdomen Pelvis With Contrast [[id number]] Collected:  10/31/18 2159     Updated:  10/31/18 2210    Narrative:       CT OF THE ABDOMEN AND PELVIS WITH CONTRAST     HISTORY: Abdominal pain     COMPARISON: None available.     TECHNIQUE: Axial CT imaging  was obtained from the dome of the diaphragm  through symphysis pubis following the administration of IV contrast.     FINDINGS:  Images through the lung bases demonstrate mild bibasilar atelectasis.  The stomach and proximal small bowel appear unremarkable, as are the  adrenal glands. Spleen is within normal limits, as is the pancreas. The  gallbladder appears unremarkable. No focal hepatic lesions are seen.  Cysts are identified on both kidneys. There is atherosclerotic  involvement of the abdominal aorta. There is a small fat-containing  umbilical hernia. The appendix is visualized, and is within normal  limits. Urinary bladder is normal. Uterus appears unremarkable. This  patient's colon appears thick walled, extending from the sigmoid colon  through the rectum. There is some mild pericolonic soft tissue  stranding. Findings are felt to be characteristic of colitis. There is  some focal fatty infiltration seen within the bowel wall. This is  particularly pronounced within the rectosigmoid, and at the terminal  ileum and cecum. This finding can be seen in the setting of chronic  inflammatory bowel disease. Correlation with history is suggested. No  pneumatosis or free air is seen. There is no evidence of obstruction.  There is some mild presacral soft tissue stranding /fluid. This is  likely reactive. Review of bony windows does not demonstrate any  aggressive osseous abnormalities.       Impression:        IMPRESSION:  Diffusely thick-walled appearance to the bowel seen extending from the  sigmoid colon to the rectum, with some mild associated pericolonic soft  tissue stranding. Findings are felt to be characteristic of colitis.  There is some focal fatty infiltration identified within the wall of the  bowel. This finding can be seen in the setting of chronic inflammatory  bowel disease. Correlation with history is suggested. No pneumatosis,  free air, or evidence of obstruction is seen.     Radiation dose  reduction techniques were utilized, including automated  exposure control and exposure modulation based on body size.     This report was finalized on 10/31/2018 10:07 PM by Dr. Elvie Vazquez M.D.             Assessment/Plan     Patient Active Problem List   Diagnosis Code   • Type 2 diabetes mellitus with circulatory disorder (CMS/[facility]) E11.59   • Osteopenia M85.80   • Hypertension I10   • Hyperlipidemia E78.5   • Colon polyp K63.5   • Gastroesophageal reflux disease K21.9   • History of non-ST elevation myocardial infarction (NSTEMI) I25.2   • Glaucoma H40.9   • Hypothyroidism E03.9   • Coronary artery disease involving native coronary artery of native heart without angina pectoris I25.10   • Takotsubo cardiomyopathy I51.81   • Coronary artery disease involving native coronary artery of native heart without angina pectoris I25.10   • Ulcerative colitis with rectal bleeding (CMS/[facility]) K51.911   • Rectal bleeding K62.5   • Acute post-hemorrhagic anemia D62        Impression:  1.  Rectal bleeding and diarrhea: Differential of distal colitis versus hemorrhoidal vs both. CDT and GI pcr panel negative.  Her external hemorrhoid, while large, does not seem to be bleeding at this time  2. Distal UC: appears to be active on imaging and per her reports of her recent colonoscopy.  Followed by Dr Adams.  On outpt lialda  3. Iron deficiency anemia: pronounced, most likely due to her disease- improved after PRBC     Plan:  Continue oral iron    Change mesalamine to enema given some involvement of sigmoid colon.  Continue anusol cream to external hemorrhoid  Ok for DC from Gi standpoint.  Would continue BID rowasa enema x 1 week, then once/day.  Continue oral mesalamine.    She needs to follow-up with Dr. Adams within the next 4-6 weeks upon discharge          Juanito Davis M.D.  [facility]  [address] [place] [address]  Office: [phone]

## 2018-11-04 NOTE — DISCHARGE SUMMARY
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     Procedures Performed       Consults     Date and Time Order Name Status Description    11/1/2018 0103 Inpatient Gastroenterology Consult      10/31/2018 2238 [facility] (on-call MD unless specified) Completed         Hospital Course  Please see history and physical for details. Patient is a 73 y.o. female initially admitted for GI bleed of bright red blood per rectum.  Patient is normally followed by Dr. Adams from an outlying GI group.  We did consult GI while here.  Patient was maintained on oral mesalamine and also transitioned to mesalamine enemas.  No further endoscopy was ascertained on this admission.  Patient has shown clinical improvement on a daily basis and on the day of discharge aspect of rectal bleeding pretty much resolved.  She did require transfusion of 2 units packed red blood cells.  Hemoglobin upon last check trended up to 10.1 with platelet count 217.  Vital signs of been stable as well as afebrile with no hemodynamic instability.  All questions answered to patient with son present at bedside both of which are amenable to disposition to home without any additional discharge needs.  We'll continue empiric Levaquin and Flagyl for a seven-day supply total.  We'll discharge off aspirin and defer resuming to  her PCP and outpatient GI.  Patient will maintain mesalamine enemas for the next week twice daily and then will transition to daily afterwards.  She claims she artery has a 30 day supply of these at home.        Condition on Discharge: Improved.     Temp:  [98.1 °F (36.7 °C)-98.5 °F (36.9 °C)] 98.3 °F (36.8 °C)  Heart Rate:  [60-63] 63  Resp:  [16-20] 20  BP: (164-171)/(76-82) 171/82  Body mass index is 21.95 kg/m².    Physical Exam   Constitutional: She is oriented to person, place, and time. She appears well-developed and well-nourished.   HENT:   Head: Normocephalic and atraumatic.   Eyes:   Conjunctival pallor   Neck: Neck supple. No JVD present.   Cardiovascular: Normal rate and regular rhythm.    Pulmonary/Chest: Effort normal and breath sounds normal. No respiratory distress.   Abdominal: Soft. Bowel sounds are normal. She exhibits no distension.   Musculoskeletal: She exhibits no edema.   Neurological: She is alert and oriented to person, place, and time.   Psychiatric: She has a normal mood and affect. Her behavior is normal. Thought content normal.        Discharge Medications      New Medications      Instructions Start Date   ferrous sulfate 325 (65 FE) MG tablet   325 mg, Oral, Daily With Breakfast      hydrocortisone 2.5 % rectal cream  Commonly known as:  ANUSOL-HC   Rectal, 2 Times Daily      levoFLOXacin 500 MG tablet  Commonly known as:  LEVAQUIN   500 mg, Oral, Every 24 Hours      metroNIDAZOLE 500 MG tablet  Commonly known as:  FLAGYL   500 mg, Oral, Every 8 Hours Scheduled         Changes to Medications      Instructions Start Date   mesalamine 4 g enema  Commonly known as:  ROWASA  What changed:  You were already taking a medication with the same name, and this prescription was added. Make sure you understand how and when to take each.   4 g, Rectal, 2 Times Daily      mesalamine 1.2 g EC tablet  Commonly known as:  LIALDA  What changed:  when to take this   1,200 mg, Oral, Daily With  Breakfast         Continue These Medications      Instructions Start Date   MAIA CONTOUR NEXT TEST test strip  Generic drug:  glucose blood   CHECK BLOOD SUGAR ONCE DAILY      dorzolamide-timolol 22.3-6.8 MG/ML ophthalmic solution  Commonly known as:  COSOPT   1 drop, Ophthalmic, 2 Times Daily      lisinopril 20 MG tablet  Commonly known as:  PRINIVIL,ZESTRIL   TAKE 1 TABLET BY MOUTH EVERY DAY      metFORMIN  MG 24 hr tablet  Commonly known as:  GLUCOPHAGE-XR   TAKE 2 TABLETS BY MOUTH TWICE DAILY      metoprolol tartrate 50 MG tablet  Commonly known as:  LOPRESSOR   TAKE 1 TABLET BY MOUTH EVERY 12 HOURS      MULTI-VITAMIN PO   1 tablet, Oral, Daily      omeprazole 40 MG capsule  Commonly known as:  priLOSEC   1 capsule, Oral, Daily      simvastatin 40 MG tablet  Commonly known as:  ZOCOR   TAKE 1 TABLET BY MOUTH EVERY NIGHT AT BEDTIME      TYLENOL PO   2 tablets, Oral, As Needed      Vitamin D 2000 units capsule   1 capsule, Oral, Daily         Stop These Medications    aspirin 81 MG EC tablet             Additional Instructions for the Follow-ups that You Need to Schedule     Discharge Follow-up with PCP    As directed      Currently Documented PCP:  Esequiel Pacheco MD  PCP Phone Number:  [phone]    Follow Up Details:  pcp 1-2 weeks - patient to call her GI MD Dr Adams on Monday for f/up           Follow-up Information     Esequiel Pacheco MD .    Specialty:  Internal Medicine  Why:  pcp 1-2 weeks - patient to call her GI MD Dr Adams on Monday for f/up  Contact information:  [facility] [address]  [phone]                 Test Results Pending at Discharge   Order Current Status    Calprotectin, Fecal - Stool, Per Rectum In process         Leonid Hanson MD  11/04/18  11:52 AM    Discharge time spent greater than 30 minutes.

## 2018-11-05 ENCOUNTER — READMISSION MANAGEMENT (OUTPATIENT)
Dept: CALL CENTER | Facility: HOSPITAL | Age: 73
End: 2018-11-05

## 2018-11-05 ENCOUNTER — TELEPHONE (OUTPATIENT)
Dept: INTERNAL MEDICINE | Age: 73
End: 2018-11-05

## 2018-11-05 NOTE — TELEPHONE ENCOUNTER
She should see me for hospital DC follow-up as specified and also keep her regular follow-up visit separate.

## 2018-11-05 NOTE — PROGRESS NOTES
Case Management Discharge Note    Final Note: Pt dc'd home     Destination     No service has been selected for the patient.      Durable Medical Equipment     No service has been selected for the patient.      Dialysis/Infusion     No service has been selected for the patient.      Home Medical Care     No service has been selected for the patient.      Social Care     No service has been selected for the patient.             Final Discharge Disposition Code: 01 - home or self-care

## 2018-11-05 NOTE — OUTREACH NOTE
Prep Survey      Responses   Facility patient discharged from?  [facility]   Is patient eligible?  Yes   Discharge diagnosis  Ulcerative colitis with rectal bleeding    Does the patient have one of the following disease processes/diagnoses(primary or secondary)?  Other   Does the patient have Home health ordered?  No   Is there a DME ordered?  No   Prep survey completed?  Yes          Jyoti Tony RN

## 2018-11-05 NOTE — TELEPHONE ENCOUNTER
Patient was discharge from [facility], [place], operated by [facility] on Sunday for a colitis. She went in wed and received two units of blood. She was told to follow up in 2 wks with us but she has an appt already on 11-29-18 and wants to know if she can just come in for that one or should she make an additional one for the hospital follow up.  Please call her at [phone]

## 2018-11-08 ENCOUNTER — OFFICE (OUTPATIENT)
Dept: URBAN - METROPOLITAN AREA CLINIC 75 | Facility: CLINIC | Age: 73
End: 2018-11-08

## 2018-11-08 ENCOUNTER — READMISSION MANAGEMENT (OUTPATIENT)
Dept: CALL CENTER | Facility: HOSPITAL | Age: 73
End: 2018-11-08

## 2018-11-08 VITALS
DIASTOLIC BLOOD PRESSURE: 74 MMHG | HEART RATE: 57 BPM | WEIGHT: 114 LBS | SYSTOLIC BLOOD PRESSURE: 128 MMHG | HEIGHT: 61 IN

## 2018-11-08 DIAGNOSIS — K64.4 RESIDUAL HEMORRHOIDAL SKIN TAGS: ICD-10-CM

## 2018-11-08 DIAGNOSIS — R19.7 DIARRHEA, UNSPECIFIED: ICD-10-CM

## 2018-11-08 DIAGNOSIS — K21.9 GASTRO-ESOPHAGEAL REFLUX DISEASE WITHOUT ESOPHAGITIS: ICD-10-CM

## 2018-11-08 DIAGNOSIS — K51.50 LEFT SIDED COLITIS WITHOUT COMPLICATIONS: ICD-10-CM

## 2018-11-08 DIAGNOSIS — K92.1 MELENA: ICD-10-CM

## 2018-11-08 DIAGNOSIS — K63.3 ULCER OF INTESTINE: ICD-10-CM

## 2018-11-08 DIAGNOSIS — Z86.010 PERSONAL HISTORY OF COLONIC POLYPS: ICD-10-CM

## 2018-11-08 DIAGNOSIS — F45.8 OTHER SOMATOFORM DISORDERS: ICD-10-CM

## 2018-11-08 DIAGNOSIS — K64.8 OTHER HEMORRHOIDS: ICD-10-CM

## 2018-11-08 PROBLEM — K51.90 ULCERATIVE COLITIS, UNSPECIFIED, WITHOUT COMPLICATIONS: Status: ACTIVE | Noted: 2018-08-14

## 2018-11-08 PROBLEM — D12.3 BENIGN NEOPLASM OF TRANSVERSE COLON: Status: ACTIVE | Noted: 2018-08-14

## 2018-11-08 PROBLEM — Z12.11 SURVEILLANCE DUE TO PRIOR COLONIC NEOPLASIA: Status: ACTIVE | Noted: 2018-08-14

## 2018-11-08 PROBLEM — D12.2 BENIGN NEOPLASM OF ASCENDING COLON: Status: ACTIVE | Noted: 2018-08-14

## 2018-11-08 PROCEDURE — 99214 OFFICE O/P EST MOD 30 MIN: CPT | Performed by: NURSE PRACTITIONER

## 2018-11-08 RX ORDER — MESALAMINE 1.2 G/1
TABLET, DELAYED RELEASE ORAL
Qty: 120 | Refills: 5 | Status: ACTIVE

## 2018-11-11 ENCOUNTER — READMISSION MANAGEMENT (OUTPATIENT)
Dept: CALL CENTER | Facility: HOSPITAL | Age: 73
End: 2018-11-11

## 2018-11-12 ENCOUNTER — READMISSION MANAGEMENT (OUTPATIENT)
Dept: CALL CENTER | Facility: HOSPITAL | Age: 73
End: 2018-11-12

## 2018-11-12 NOTE — OUTREACH NOTE
Medical Week 2 Survey      Responses   Facility patient discharged from?  [facility]   Does the patient have one of the following disease processes/diagnoses(primary or secondary)?  Other   Week 2 attempt successful?  Yes   Call start time  1151   Call end time  1201   Meds reviewed with patient/caregiver?  Yes   Is the patient having any side effects they believe may be caused by any medication additions or changes?  No   Does the patient have all medications ordered at discharge?  Yes   Is the patient taking all medications as directed (includes completed medication regime)?  Yes   Does the patient have a primary care provider?   Yes   Does the patient have an appointment with their PCP within 7 days of discharge?  Greater than 7 days   Nursing Interventions  Verified appointment date/time/provider [has appt with Dr. Pacheco 11/14/18]   Has the patient kept scheduled appointments due by today?  N/A   Psychosocial issues?  No   Did the patient receive a copy of their discharge instructions?  Yes   Nursing interventions  Reviewed instructions with patient   What is the patient's perception of their health status since discharge?  Improving   Is the patient/caregiver able to teach back signs and symptoms related to disease process for when to call PCP?  Yes   Is the patient/caregiver able to teach back signs and symptoms related to disease process for when to call 911?  Yes   Is the patient/caregiver able to teach back the hierarchy of who to call/visit for symptoms/problems? PCP, Specialist, Home health nurse, Urgent Care, ED, 911  Yes   Week 2 Call Completed?  Yes          Hannah Scott RN

## 2018-11-13 LAB — CALPROTECTIN STL-MCNT: 300 UG/G (ref 0–120)

## 2018-11-14 ENCOUNTER — OFFICE VISIT (OUTPATIENT)
Dept: INTERNAL MEDICINE | Age: 73
End: 2018-11-14

## 2018-11-14 VITALS
OXYGEN SATURATION: 99 % | DIASTOLIC BLOOD PRESSURE: 70 MMHG | SYSTOLIC BLOOD PRESSURE: 172 MMHG | WEIGHT: 115 LBS | BODY MASS INDEX: 21.16 KG/M2 | HEIGHT: 62 IN | HEART RATE: 62 BPM | TEMPERATURE: 97.8 F

## 2018-11-14 DIAGNOSIS — I25.10 CORONARY ARTERY DISEASE INVOLVING NATIVE CORONARY ARTERY OF NATIVE HEART WITHOUT ANGINA PECTORIS: Chronic | ICD-10-CM

## 2018-11-14 DIAGNOSIS — D62 ACUTE POST-HEMORRHAGIC ANEMIA: ICD-10-CM

## 2018-11-14 DIAGNOSIS — K51.311 ULCERATIVE RECTOSIGMOIDITIS WITH RECTAL BLEEDING (HCC): Primary | ICD-10-CM

## 2018-11-14 PROBLEM — K51.911 ULCERATIVE COLITIS WITH RECTAL BLEEDING: Chronic | Status: ACTIVE | Noted: 2018-10-31

## 2018-11-14 PROCEDURE — 99214 OFFICE O/P EST MOD 30 MIN: CPT | Performed by: INTERNAL MEDICINE

## 2018-11-14 NOTE — ASSESSMENT & PLAN NOTE
S/p hospitalization for acute bleeding, status post 2 U PRBC. Continue current medications and follow-up with GI.

## 2018-11-14 NOTE — PROGRESS NOTES
"[facility] [place] [facility]  ILA PACHECO M.D.      Renee PARIKH From / 73 y.o. / female  11/14/2018    VITALS    Visit Vitals  /70   Pulse 62   Temp 97.8 °F (36.6 °C)   Ht 157.5 cm (62.01\")   Wt 52.2 kg (115 lb)   SpO2 99%   BMI 21.03 kg/m²       BP Readings from Last 3 Encounters:   11/14/18 172/70   11/04/18 171/82   10/02/18 132/64     Wt Readings from Last 3 Encounters:   11/14/18 52.2 kg (115 lb)   11/01/18 54.4 kg (120 lb)   10/02/18 54.3 kg (119 lb 12.8 oz)      Body mass index is 21.03 kg/m².    CC:  Main reason(s) for today's visit: Hospital F/U colitis (10/31/2018 to 11/4/2018)      HPI:     Date of admission/discharge: 10/31/18 - 11/4/18  Hospital: [facility]  Principle Dx: Ulcerative colitis, LGIB  Secondary Dx: CAD, anemia due to acute blood loss  History prior to hospitalization: Recent flare of ulcerative colitis with colonoscopy 8/2018  Evaluation/Treatment: ASA held, transfused 2 u PRBC. Bleeding stabilized. Discharged on oral Antibiotic's and mesalamine (PO and enema).   Course: formed stools now without bleeding, no abdominal pain. Has followed up with GI week after discharge. On iron replacement. Denies chest pain, MUNGUIA.     Patient Care Team:  Esequiel Pacheco MD as PCP - General  Esequiel Pacheco MD as PCP - Claims Attributed  Andre Adams MD as Consulting Physician (Gastroenterology)  Ministerio Wells MD as Consulting Physician (Orthopedic Surgery)  Abhay Wooten MD as Consulting Physician (Cardiology)  Cisco Husain MD as Consulting Physician (Otolaryngology)  EZIO Garibay MD as Consulting Physician (Ophthalmology)  ____________________________________________________________________    ASSESSMENT & PLAN:    1. Ulcerative rectosigmoiditis with rectal bleeding (CMS/HCC)    2. Acute post-hemorrhagic anemia    3. Coronary artery disease involving native coronary artery of native heart without angina pectoris      No orders of the defined types were placed in this " encounter.      Summary/Discussion:  Continue to hold aspirin for now but discuss with gastroenterology as to when she would be safe to resume in light of her heart disease.    Continue mesalamine by mouth and enema.  Follow-up with GI as instructed.    Continue iron replacement.  Will plan to check CBC and iron levels on follow-up late November.        No Follow-up on file.    ____________________________________________________________________    REVIEW OF SYSTEMS    Review of Systems   Constitutional: Negative for chills and fever.   Respiratory: Negative.    Cardiovascular: Negative.    Gastrointestinal: Negative.  Negative for abdominal pain, blood in stool, diarrhea and rectal pain.         PHYSICAL EXAMINATION    Physical Exam   Constitutional: She does not have a sickly appearance. No distress.   Eyes: No scleral icterus.   Neck: No JVD present. Carotid bruit is not present. No thyroid mass and no thyromegaly present.   Cardiovascular: Normal rate, regular rhythm, normal heart sounds and intact distal pulses.   Pulmonary/Chest: Effort normal and breath sounds normal.   Abdominal: Soft. She exhibits no distension and no mass. There is no hepatomegaly. There is no tenderness.   Musculoskeletal: She exhibits no edema.   Neurological: She is alert.   Skin: Skin is warm. No rash noted. No pallor.   Psychiatric: She has a normal mood and affect. Her speech is normal and behavior is normal. Judgment and thought content normal. Cognition and memory are normal.         REVIEWED DATA:    Labs:   Admission on 10/31/2018, Discharged on 11/04/2018   Component Date Value Ref Range Status   • Glucose 10/31/2018 128* 65 - 99 mg/dL Final   • BUN 10/31/2018 15  8 - 23 mg/dL Final   • Creatinine 10/31/2018 0.87  0.57 - 1.00 mg/dL Final   • Sodium 10/31/2018 139  136 - 145 mmol/L Final   • Potassium 10/31/2018 4.1  3.5 - 5.2 mmol/L Final   • Chloride 10/31/2018 103  98 - 107 mmol/L Final   • CO2 10/31/2018 23.1  22.0 - 29.0  mmol/L Final   • Calcium 10/31/2018 9.2  8.6 - 10.5 mg/dL Final   • Total Protein 10/31/2018 7.4  6.0 - 8.5 g/dL Final   • Albumin 10/31/2018 4.20  3.50 - 5.20 g/dL Final   • ALT (SGPT) 10/31/2018 7  1 - 33 U/L Final   • AST (SGOT) 10/31/2018 10  1 - 32 U/L Final   • Alkaline Phosphatase 10/31/2018 50  39 - 117 U/L Final   • Total Bilirubin 10/31/2018 0.2  0.1 - 1.2 mg/dL Final   • eGFR Non  Amer 10/31/2018 64  >60 mL/min/1.73 Final   • Globulin 10/31/2018 3.2  gm/dL Final   • A/G Ratio 10/31/2018 1.3  g/dL Final   • BUN/Creatinine Ratio 10/31/2018 17.2  7.0 - 25.0 Final   • Anion Gap 10/31/2018 12.9  mmol/L Final   • WBC 10/31/2018 6.87  4.50 - 10.70 10*3/mm3 Final   • RBC 10/31/2018 3.32* 3.90 - 5.20 10*6/mm3 Final   • Hemoglobin 10/31/2018 7.4* 11.9 - 15.5 g/dL Final   • Hematocrit 10/31/2018 26.7* 35.6 - 45.5 % Final   • MCV 10/31/2018 80.4* 80.5 - 98.2 fL Final   • MCH 10/31/2018 22.3* 26.9 - 32.0 pg Final   • MCHC 10/31/2018 27.7* 32.4 - 36.3 g/dL Final   • RDW 10/31/2018 15.8* 11.7 - 13.0 % Final   • RDW-SD 10/31/2018 46.2  37.0 - 54.0 fl Final   • MPV 10/31/2018 9.2  6.0 - 12.0 fL Final   • Platelets 10/31/2018 230  140 - 500 10*3/mm3 Final   • Neutrophil % 10/31/2018 65.2  42.7 - 76.0 % Final   • Lymphocyte % 10/31/2018 26.2  19.6 - 45.3 % Final   • Monocyte % 10/31/2018 8.2  5.0 - 12.0 % Final   • Eosinophil % 10/31/2018 0.0* 0.3 - 6.2 % Final   • Basophil % 10/31/2018 0.1  0.0 - 1.5 % Final   • Immature Grans % 10/31/2018 0.3  0.0 - 0.5 % Final   • Neutrophils, Absolute 10/31/2018 4.48  1.90 - 8.10 10*3/mm3 Final   • Lymphocytes, Absolute 10/31/2018 1.80  0.90 - 4.80 10*3/mm3 Final   • Monocytes, Absolute 10/31/2018 0.56  0.20 - 1.20 10*3/mm3 Final   • Eosinophils, Absolute 10/31/2018 0.00  0.00 - 0.70 10*3/mm3 Final   • Basophils, Absolute 10/31/2018 0.01  0.00 - 0.20 10*3/mm3 Final   • Immature Grans, Absolute 10/31/2018 0.02  0.00 - 0.03 10*3/mm3 Final   • ABO Type 10/31/2018 O   Final   • RH  type 10/31/2018 Positive   Final   • Antibody Screen 10/31/2018 Negative   Final   • T&S Expiration Date 10/31/2018 11/3/2018 11:59:59 PM   Final   • Campylobacter 11/01/2018 Not Detected  Not Detected Final   • Plesiomonas shigelloides 11/01/2018 Not Detected  Not Detected Final   • Salmonella 11/01/2018 Not Detected  Not Detected Final   • Vibrio 11/01/2018 Not Detected  Not Detected Final   • Vibrio cholerae 11/01/2018 Not Detected  Not Detected Final   • Yersinia enterocolitica 11/01/2018 Not Detected  Not Detected Final   • Enteroaggregative E. coli (EAEC) 11/01/2018 Not Detected  Not Detected Final   • Enteropathogenic E. coli (EPEC) 11/01/2018 Not Detected  Not Detected Final   • Enterotoxigenic E. coli (ETEC) lt/* 11/01/2018 Not Detected  Not Detected Final   • Shiga-like toxin-producing E. coli* 11/01/2018 Not Detected  Not Detected Final   • E. coli O157 11/01/2018 Not Detected  Not Detected Final   • Shigella/Enteroinvasive E. coli (E* 11/01/2018 Not Detected  Not Detected Final   • Cryptosporidium 11/01/2018 Not Detected  Not Detected Final   • Cyclospora cayetanensis 11/01/2018 Not Detected  Not Detected Final   • Entamoeba histolytica 11/01/2018 Not Detected  Not Detected Final   • Giardia lamblia 11/01/2018 Not Detected  Not Detected Final   • Adenovirus F40/41 11/01/2018 Not Detected  Not Detected Final   • Astrovirus 11/01/2018 Not Detected  Not Detected Final   • Norovirus GI/GII 11/01/2018 Not Detected  Not Detected Final   • Rotavirus A 11/01/2018 Not Detected  Not Detected Final   • Sapovirus (I, II, IV or V) 11/01/2018 Not Detected  Not Detected Final   • Glucose 11/01/2018 149* 65 - 99 mg/dL Final   • BUN 11/01/2018 11  8 - 23 mg/dL Final   • Creatinine 11/01/2018 0.76  0.57 - 1.00 mg/dL Final   • Sodium 11/01/2018 140  136 - 145 mmol/L Final   • Potassium 11/01/2018 4.0  3.5 - 5.2 mmol/L Final   • Chloride 11/01/2018 105  98 - 107 mmol/L Final   • CO2 11/01/2018 25.7  22.0 - 29.0 mmol/L Final    • Calcium 11/01/2018 8.7  8.6 - 10.5 mg/dL Final   • eGFR Non African Amer 11/01/2018 74  >60 mL/min/1.73 Final   • BUN/Creatinine Ratio 11/01/2018 14.5  7.0 - 25.0 Final   • Anion Gap 11/01/2018 9.3  mmol/L Final   • Ferritin 10/31/2018 4.82* 13.00 - 150.00 ng/mL Final   • Iron 10/31/2018 16* 37 - 145 mcg/dL Final   • Iron Saturation 10/31/2018 3* 20 - 50 % Final   • Transferrin 10/31/2018 332  200 - 360 mg/dL Final   • TIBC 10/31/2018 495  298 - 536 mcg/dL Final   • Vitamin B-12 11/01/2018 <150* 211 - 946 pg/mL Final   • C. Difficile Toxins by PCR 11/01/2018 Negative  Negative Final   • Glucose 11/01/2018 142* 70 - 130 mg/dL Final   • Hemoglobin 11/01/2018 7.3* 11.9 - 15.5 g/dL Final   • Hematocrit 11/01/2018 26.8* 35.6 - 45.5 % Final   • Glucose 11/01/2018 146* 70 - 130 mg/dL Final   • Hemoglobin 11/01/2018 6.7* 11.9 - 15.5 g/dL Final   • Hematocrit 11/01/2018 23.4* 35.6 - 45.5 % Final   • Glucose 11/01/2018 127  70 - 130 mg/dL Final   • Glucose 11/01/2018 203* 70 - 130 mg/dL Final   • Product Code 11/02/2018 C5708S22   Final   • Unit Number 11/02/2018 [id number]   Final   • UNIT  ABO 11/02/2018 O   Final   • UNIT  RH 11/02/2018 POS   Final   • Dispense Status 11/02/2018 PT   Final   • Blood Type 11/02/2018 OPOS   Final   • Blood Expiration Date 11/02/2018 [id number]   Final   • Blood Type Barcode 11/02/2018 5100   Final   • Product Code 11/02/2018 [id number]   Final   • Unit Number 11/02/2018 [id number]   Final   • UNIT  ABO 11/02/2018 O   Final   • UNIT  RH 11/02/2018 POS   Final   • Dispense Status 11/02/2018 PT   Final   • Blood Type 11/02/2018 OPOS   Final   • Blood Expiration Date 11/02/2018 [id number]   Final   • Blood Type Barcode 11/02/2018 5100   Final   • Glucose 11/01/2018 185* 70 - 130 mg/dL Final   • WBC 11/02/2018 5.17  4.50 - 10.70 10*3/mm3 Final   • RBC 11/02/2018 4.15  3.90 - 5.20 10*6/mm3 Final   • Hemoglobin 11/02/2018 10.4* 11.9 - 15.5 g/dL Final   • Hematocrit 11/02/2018 33.0*  35.6 - 45.5 % Final   • MCV 11/02/2018 79.5* 80.5 - 98.2 fL Final   • MCH 11/02/2018 25.1* 26.9 - 32.0 pg Final   • MCHC 11/02/2018 31.5* 32.4 - 36.3 g/dL Final   • RDW 11/02/2018 15.4* 11.7 - 13.0 % Final   • RDW-SD 11/02/2018 44.7  37.0 - 54.0 fl Final   • MPV 11/02/2018 9.0  6.0 - 12.0 fL Final   • Platelets 11/02/2018 223  140 - 500 10*3/mm3 Final   • Glucose 11/02/2018 150* 70 - 130 mg/dL Final   • Sed Rate 11/02/2018 14  0 - 30 mm/hr Final   • C-Reactive Protein 11/01/2018 0.31  0.00 - 0.50 mg/dL Final   • Calprotectin, Fecal 11/02/2018 300* 0 - 120 ug/g Final    **Results verified by repeat testing**  Concentration     Interpretation   Follow-Up  <16 - 50 ug/g     Normal           None  >50 -120 ug/g     Borderline       Re-evaluate in 4-6 weeks      >120 ug/g     Abnormal         Repeat as clinically                                     indicated   • Glucose 11/02/2018 164* 70 - 130 mg/dL Final   • Glucose 11/02/2018 113  70 - 130 mg/dL Final   • Glucose 11/02/2018 182* 70 - 130 mg/dL Final   • WBC 11/03/2018 6.58  4.50 - 10.70 10*3/mm3 Final   • RBC 11/03/2018 4.09  3.90 - 5.20 10*6/mm3 Final   • Hemoglobin 11/03/2018 10.1* 11.9 - 15.5 g/dL Final   • Hematocrit 11/03/2018 31.3* 35.6 - 45.5 % Final   • MCV 11/03/2018 76.5* 80.5 - 98.2 fL Final   • MCH 11/03/2018 24.7* 26.9 - 32.0 pg Final   • MCHC 11/03/2018 32.3* 32.4 - 36.3 g/dL Final   • RDW 11/03/2018 15.8* 11.7 - 13.0 % Final   • RDW-SD 11/03/2018 44.0  37.0 - 54.0 fl Final   • MPV 11/03/2018 9.0  6.0 - 12.0 fL Final   • Platelets 11/03/2018 217  140 - 500 10*3/mm3 Final   • Glucose 11/03/2018 162* 70 - 130 mg/dL Final   • Glucose 11/03/2018 126  70 - 130 mg/dL Final   • Glucose 11/03/2018 142* 70 - 130 mg/dL Final   • Glucose 11/03/2018 179* 70 - 130 mg/dL Final   • Glucose 11/04/2018 154* 70 - 130 mg/dL Final   • Glucose 11/04/2018 138* 70 - 130 mg/dL Final       Imaging:   Ct Abdomen Pelvis With Contrast    Result Date: 10/31/2018  Narrative: CT OF  THE ABDOMEN AND PELVIS WITH CONTRAST  HISTORY: Abdominal pain  COMPARISON: None available.  TECHNIQUE: Axial CT imaging was obtained from the dome of the diaphragm through symphysis pubis following the administration of IV contrast.  FINDINGS: Images through the lung bases demonstrate mild bibasilar atelectasis. The stomach and proximal small bowel appear unremarkable, as are the adrenal glands. Spleen is within normal limits, as is the pancreas. The gallbladder appears unremarkable. No focal hepatic lesions are seen. Cysts are identified on both kidneys. There is atherosclerotic involvement of the abdominal aorta. There is a small fat-containing umbilical hernia. The appendix is visualized, and is within normal limits. Urinary bladder is normal. Uterus appears unremarkable. This patient's colon appears thick walled, extending from the sigmoid colon through the rectum. There is some mild pericolonic soft tissue stranding. Findings are felt to be characteristic of colitis. There is some focal fatty infiltration seen within the bowel wall. This is particularly pronounced within the rectosigmoid, and at the terminal ileum and cecum. This finding can be seen in the setting of chronic inflammatory bowel disease. Correlation with history is suggested. No pneumatosis or free air is seen. There is no evidence of obstruction. There is some mild presacral soft tissue stranding /fluid. This is likely reactive. Review of bony windows does not demonstrate any aggressive osseous abnormalities.      Impression:  IMPRESSION: Diffusely thick-walled appearance to the bowel seen extending from the sigmoid colon to the rectum, with some mild associated pericolonic soft tissue stranding. Findings are felt to be characteristic of colitis. There is some focal fatty infiltration identified within the wall of the bowel. This finding can be seen in the setting of chronic inflammatory bowel disease. Correlation with history is suggested. No  pneumatosis, free air, or evidence of obstruction is seen.  Radiation dose reduction techniques were utilized, including automated exposure control and exposure modulation based on body size.  This report was finalized on 10/31/2018 10:07 PM by Dr. Elvie Vazquez M.D.         Medical Tests:        Summary of old records / correspondence / consultant report:   DC summary re: issues addressed on HPI and colonoscopy report from 8/2018 showing UC    Request outside records:       ALLERGIES  Allergies   Allergen Reactions   • Tetanus Toxoids Swelling     Hand and arm        MEDICATIONS   Current Outpatient Medications   Medication Sig Dispense Refill   • Acetaminophen (TYLENOL PO) Take 2 tablets by mouth As Needed.     • MAIA CONTOUR NEXT TEST test strip CHECK BLOOD SUGAR ONCE DAILY 100 each 12   • Cholecalciferol (VITAMIN D) 2000 UNITS capsule Take 1 capsule by mouth daily.     • dorzolamide-timolol (COSOPT) 22.3-6.8 MG/ML ophthalmic solution Apply 1 drop to eye 2 (two) times a day.     • ferrous sulfate 325 (65 FE) MG tablet Take 1 tablet by mouth Daily With Breakfast. 30 tablet 0   • hydrocortisone (ANUSOL-HC) 2.5 % rectal cream Insert  into the rectum 2 (Two) Times a Day. 28 g 0   • lisinopril (PRINIVIL,ZESTRIL) 20 MG tablet TAKE 1 TABLET BY MOUTH EVERY DAY 90 tablet 1   • mesalamine (LIALDA) 1.2 g EC tablet Take 1 tablet by mouth Daily With Breakfast.     • mesalamine (ROWASA) 4 g enema Insert 1 enema into the rectum 2 (Two) Times a Day.     • metFORMIN ER (GLUCOPHAGE-XR) 500 MG 24 hr tablet TAKE 2 TABLETS BY MOUTH TWICE DAILY 360 tablet 1   • metoprolol tartrate (LOPRESSOR) 50 MG tablet TAKE 1 TABLET BY MOUTH EVERY 12 HOURS 180 tablet 0   • Multiple Vitamin (MULTI-VITAMIN PO) Take 1 tablet by mouth Daily.     • omeprazole (PriLOSEC) 40 MG capsule Take 1 capsule by mouth daily.     • simvastatin (ZOCOR) 40 MG tablet TAKE 1 TABLET BY MOUTH EVERY NIGHT AT BEDTIME 90 tablet 0     No current facility-administered  medications for this visit.        Current outpatient and discharge medications have been reconciled for the patient.  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)   • Osteopenia   • Hypertension   • Hyperlipidemia   • Colon polyp   • Gastroesophageal reflux disease   • History of non-ST elevation myocardial infarction (NSTEMI)   • Glaucoma   • Hypothyroidism   • Coronary artery disease involving native coronary artery of native heart without angina pectoris   • Takotsubo cardiomyopathy   • Coronary artery disease involving native coronary artery of native heart without angina pectoris   • Ulcerative colitis with rectal bleeding (CMS/HCC)   • Acute post-hemorrhagic anemia       PAST MEDICAL HISTORY  Past Medical History:   Diagnosis Date   • Allergic rhinitis    • Colitis    • Colon polyps    • Diabetes mellitus (CMS/HCC)     type 2   • Dizziness    • Encounter for breast cancer screening other than mammogram    • GERD (gastroesophageal reflux disease)    • Glaucoma    • Hyperlipidemia    • Hypertension    • Hypothyroidism    • Knee fracture, left    • Non-STEMI (non-ST elevated myocardial infarction) (CMS/HCC) 6/16/2017   • Osteopenia        SURGICAL HISTORY  Past Surgical History:   Procedure Laterality Date   • CATARACT EXTRACTION     • COLONOSCOPY  08/14/2018   • EYE SURGERY      cataract surgery   • PAP SMEAR         SOCIAL HISTORY  Social History     Socioeconomic History   • Marital status:      Spouse name: Not on file   • Number of children: 1   • Years of education: Not on file   • Highest education level: Not on file   Occupational History   • Occupation: retail     Comment: retired    Tobacco Use   • Smoking status: Never Smoker   • Smokeless tobacco: Never Used   Substance and Sexual Activity   • Alcohol use: No   • Drug use: No   • Sexual activity: Defer       FAMILY HISTORY  Family History   Problem Relation Age of Onset   • Heart disease Mother    • Hypertension Mother    • Diabetes Mother    • Heart disease Father    • Hypertension Father    • Heart disease Sister    • Heart disease Brother    • Hypertension Other    • Diabetes Other         type 2   • No Known Problems Maternal Grandmother    • No Known Problems Maternal Grandfather    • No Known Problems Paternal Grandmother    • No Known Problems Paternal Grandfather          **Cheryl Disclaimer:   Much of this encounter note is an electronic transcription/translation of spoken language to printed text. The electronic translation of spoken language may permit erroneous, or at times, nonsensical words or phrases to be inadvertently transcribed. Although I have reviewed the note for such errors, some may still exist.

## 2018-11-14 NOTE — ASSESSMENT & PLAN NOTE
ASA held in hospital due to acute LGIB from UC.   Continue to hold for now. It will be up to GI to determine when it's okay to resume ASA. I instructed her to discuss with Dr. Adams.

## 2018-11-19 ENCOUNTER — READMISSION MANAGEMENT (OUTPATIENT)
Dept: CALL CENTER | Facility: HOSPITAL | Age: 73
End: 2018-11-19

## 2018-11-19 NOTE — OUTREACH NOTE
Medical Week 3 Survey      Responses   Facility patient discharged from?  [facility]   Does the patient have one of the following disease processes/diagnoses(primary or secondary)?  Other   Week 3 attempt successful?  Yes   Call start time  1101   Call end time  1104   Discharge diagnosis  Ulcerative colitis with rectal bleeding    Is the patient having any side effects they believe may be caused by any medication additions or changes?  No   Does the patient have all medications ordered at discharge?  Yes   Is the patient taking all medications as directed (includes completed medication regime)?  Yes   Does the patient have a primary care provider?   Yes   Does the patient have an appointment with their PCP within 7 days of discharge?  Yes   Has the patient kept scheduled appointments due by today?  N/A   Has home health visited the patient within 72 hours of discharge?  N/A   Psychosocial issues?  No   Did the patient receive a copy of their discharge instructions?  Yes   Nursing interventions  Reviewed instructions with patient   What is the patient's perception of their health status since discharge?  Improving   Is the patient/caregiver able to teach back signs and symptoms related to disease process for when to call PCP?  Yes   Is the patient/caregiver able to teach back signs and symptoms related to disease process for when to call 911?  Yes   Is the patient/caregiver able to teach back the hierarchy of who to call/visit for symptoms/problems? PCP, Specialist, Home health nurse, Urgent Care, ED, 911  Yes   Week 3 Call Completed?  Yes          Pao Justice RN

## 2018-11-23 DIAGNOSIS — I10 ESSENTIAL HYPERTENSION: Chronic | ICD-10-CM

## 2018-11-23 DIAGNOSIS — E11.9 TYPE 2 DIABETES MELLITUS WITHOUT COMPLICATION (HCC): Chronic | ICD-10-CM

## 2018-11-26 RX ORDER — METFORMIN HYDROCHLORIDE 500 MG/1
TABLET, EXTENDED RELEASE ORAL
Qty: 360 TABLET | Refills: 1 | Status: SHIPPED | OUTPATIENT
Start: 2018-11-26 | End: 2020-05-12 | Stop reason: SDUPTHER

## 2018-11-26 RX ORDER — LISINOPRIL 20 MG/1
TABLET ORAL
Qty: 90 TABLET | Refills: 1 | Status: SHIPPED | OUTPATIENT
Start: 2018-11-26 | End: 2019-05-23 | Stop reason: SDUPTHER

## 2018-11-28 ENCOUNTER — READMISSION MANAGEMENT (OUTPATIENT)
Dept: CALL CENTER | Facility: HOSPITAL | Age: 73
End: 2018-11-28

## 2018-11-28 NOTE — OUTREACH NOTE
Medical Week 4 Survey      Responses   Facility patient discharged from?  [facility]   Does the patient have one of the following disease processes/diagnoses(primary or secondary)?  Other   Week 4 attempt successful?  No          Edith Palmer RN

## 2018-11-29 ENCOUNTER — OFFICE VISIT (OUTPATIENT)
Dept: INTERNAL MEDICINE | Age: 73
End: 2018-11-29

## 2018-11-29 VITALS
DIASTOLIC BLOOD PRESSURE: 70 MMHG | WEIGHT: 115 LBS | TEMPERATURE: 98.3 F | BODY MASS INDEX: 21.16 KG/M2 | SYSTOLIC BLOOD PRESSURE: 174 MMHG | OXYGEN SATURATION: 98 % | HEIGHT: 62 IN | HEART RATE: 56 BPM

## 2018-11-29 DIAGNOSIS — I10 ESSENTIAL HYPERTENSION: Primary | Chronic | ICD-10-CM

## 2018-11-29 DIAGNOSIS — I25.10 CORONARY ARTERY DISEASE INVOLVING NATIVE CORONARY ARTERY OF NATIVE HEART WITHOUT ANGINA PECTORIS: Chronic | ICD-10-CM

## 2018-11-29 DIAGNOSIS — E11.59 TYPE 2 DIABETES MELLITUS WITH OTHER CIRCULATORY COMPLICATION, WITHOUT LONG-TERM CURRENT USE OF INSULIN (HCC): Chronic | ICD-10-CM

## 2018-11-29 DIAGNOSIS — E78.2 MIXED HYPERLIPIDEMIA: Chronic | ICD-10-CM

## 2018-11-29 PROCEDURE — 99214 OFFICE O/P EST MOD 30 MIN: CPT | Performed by: INTERNAL MEDICINE

## 2018-11-29 RX ORDER — AMLODIPINE BESYLATE 5 MG/1
5 TABLET ORAL DAILY
Qty: 30 TABLET | Refills: 3 | Status: SHIPPED | OUTPATIENT
Start: 2018-11-29 | End: 2019-03-23 | Stop reason: SDUPTHER

## 2018-11-29 RX ORDER — ROSUVASTATIN CALCIUM 10 MG/1
10 TABLET, COATED ORAL DAILY
Qty: 30 TABLET | Refills: 3 | Status: SHIPPED | OUTPATIENT
Start: 2018-11-29 | End: 2019-03-23 | Stop reason: SDUPTHER

## 2018-11-29 NOTE — PROGRESS NOTES
[facility] INTERNAL MEDICINE  ILA PATIÑO M.D.      Renee PARIKH From / 73 y.o. / female  11/29/2018      ASSESSMENT & PLAN:    Problem List Items Addressed This Visit        High    Hypertension - Primary (Chronic)    Current Assessment & Plan     Accelerated hypertension: Start amlodipine 5 mg qd. Send blood pressure readings tomorrow and in 1 week. Continue lisinopril 20 mg and metoprolol tart 50 mg BID.          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 Continue metformin  mg 2 tabs BID.          Relevant Medications    MAIA CONTOUR NEXT TEST test strip    metFORMIN ER (GLUCOPHAGE-XR) 500 MG 24 hr tablet    Hyperlipidemia (Chronic)    Overview     Starting amlodipine 5 mg. DC simvastatin and start rosuvastatin 10 mg daily.          Relevant Medications    rosuvastatin (CRESTOR) 10 MG tablet    Coronary artery disease involving native coronary artery of native heart without angina pectoris (Chronic)    Overview     Stable. Continue statin, bblocker. Not on ASA due to recent LGIB         Relevant Medications    metoprolol tartrate (LOPRESSOR) 50 MG tablet    amLODIPine (NORVASC) 5 MG tablet        No orders of the defined types were placed in this encounter.    New Medications Ordered This Visit   Medications   • amLODIPine (NORVASC) 5 MG tablet     Sig: Take 1 tablet by mouth Daily.     Dispense:  30 tablet     Refill:  3   • rosuvastatin (CRESTOR) 10 MG tablet     Sig: Take 1 tablet by mouth Daily.     Dispense:  30 tablet     Refill:  3       Summary/Discussion:      Return in about 1 month (around 12/29/2018) for Hypertension.  ____________________________________________________________________    MEDICATIONS  Current Outpatient Medications   Medication Sig Dispense Refill   • Acetaminophen  "(TYLENOL PO) Take 2 tablets by mouth As Needed.     • MAIA CONTOUR NEXT TEST test strip CHECK BLOOD SUGAR ONCE DAILY 100 each 12   • Cholecalciferol (VITAMIN D) 2000 UNITS capsule Take 1 capsule by mouth daily.     • dorzolamide-timolol (COSOPT) 22.3-6.8 MG/ML ophthalmic solution Apply 1 drop to eye 2 (two) times a day.     • ferrous sulfate 325 (65 FE) MG tablet Take 1 tablet by mouth Daily With Breakfast. 30 tablet 0   • hydrocortisone (ANUSOL-HC) 2.5 % rectal cream Insert  into the rectum 2 (Two) Times a Day. 28 g 0   • lisinopril (PRINIVIL,ZESTRIL) 20 MG tablet TAKE 1 TABLET BY MOUTH EVERY DAY 90 tablet 1   • mesalamine (LIALDA) 1.2 g EC tablet Take 1 tablet by mouth Daily With Breakfast.     • metFORMIN ER (GLUCOPHAGE-XR) 500 MG 24 hr tablet TAKE 2 TABLETS BY MOUTH TWICE DAILY 360 tablet 1   • metoprolol tartrate (LOPRESSOR) 50 MG tablet TAKE 1 TABLET BY MOUTH EVERY 12 HOURS 180 tablet 0   • Multiple Vitamin (MULTI-VITAMIN PO) Take 1 tablet by mouth Daily.     • omeprazole (PriLOSEC) 40 MG capsule Take 1 capsule by mouth daily.     • simvastatin (ZOCOR) 40 MG tablet TAKE 1 TABLET BY MOUTH EVERY NIGHT AT BEDTIME 90 tablet 0     No current facility-administered medications for this visit.          VITALS    Visit Vitals  /70   Pulse 56   Temp 98.3 °F (36.8 °C)   Ht 157.5 cm (62.01\")   Wt 52.2 kg (115 lb)   SpO2 98%   BMI 21.03 kg/m²       BP Readings from Last 3 Encounters:   11/29/18 174/70   11/14/18 172/70   11/04/18 171/82     Wt Readings from Last 3 Encounters:   11/29/18 52.2 kg (115 lb)   11/14/18 52.2 kg (115 lb)   11/01/18 54.4 kg (120 lb)      Body mass index is 21.03 kg/m².    CC:  Main reason(s) for today's visit: Follow-up for diabetes and related medical problems    HPI:     Chronic type 2 diabetes:  Home blood sugar levels: about the same as before, has no significant low sugar symptoms/problems. Current therapy: metformin/metformin ER.  Most recent relevant labs:   Lab Results   Component " Value Date    HGBA1C 6.30 (H) 07/30/2018    HGBA1C 6.02 (H) 02/19/2018    HGBA1C 6.70 (H) 08/16/2017    CREATININE 0.76 11/01/2018    LDL 61 02/19/2018    MICROALBUR 27.3 02/19/2018     Chronic essential hypertension:  Since prior visit: compliant with medication(s) and denies significant problems with medication(s).  Most recent in-office blood pressure readings:   BP Readings from Last 3 Encounters:   11/29/18 174/70   11/14/18 172/70   11/04/18 171/82     Chronic hyperlipidemia:  Current therapy include simvastatin, denies problems with medication.    Most recent labs:   Lab Results   Component Value Date    LDL 61 02/19/2018    LDL 69 08/12/2016    LDL 69 08/20/2015    HDL 43 02/19/2018    HDL 48 08/12/2016    TRIG 104 02/19/2018    CHOLHDLRATIO 2.91 02/19/2018     CAD: stable without angina. Not on ASA due to recent bleed from ulcerative colitis.     Patient Care Team:  Esequiel Pacheco MD as PCP - General  Esequiel Pacheco MD as PCP - Claims Attributed  Andre Adams MD as Consulting Physician (Gastroenterology)  Ministerio Wells MD as Consulting Physician (Orthopedic Surgery)  Abhay Wooten MD as Consulting Physician (Cardiology)  Cisco Husain MD as Consulting Physician (Otolaryngology)  EZIO Garibay MD as Consulting Physician (Ophthalmology)  ____________________________________________________________________    REVIEW OF SYSTEMS    Review of Systems  As noted per HPI  Constitutional neg  Resp neg  CV neg  GI neg for active bleeding      PHYSICAL EXAMINATION    Physical Exam  Constitutional  No distress  Cardiovascular Rate  normal . Rhythm: regular . Heart sounds:  Normal. No abdominal bruit. No LE edema.   Pulmonary/Chest  Effort normal. Breath sounds:  normal  Psychiatric  Alert. Judgment and thought content normal. Mood normal    REVIEWED DATA:    Labs:   Lab Results   Component Value Date     11/01/2018    K 4.0 11/01/2018    AST 10 10/31/2018    ALT 7 10/31/2018    BUN 11 11/01/2018     CREATININE 0.76 11/01/2018    CREATININE 0.87 10/31/2018    CREATININE 0.80 02/19/2018    EGFRIFNONA 74 11/01/2018    EGFRIFAFRI 85 02/19/2018       Lab Results   Component Value Date    HGBA1C 6.30 (H) 07/30/2018    HGBA1C 6.02 (H) 02/19/2018    HGBA1C 6.70 (H) 08/16/2017    GLUCOSE 149 (H) 11/01/2018    GLUCOSE 128 (H) 10/31/2018    GLUCOSE 147 (H) 06/17/2017    MICROALBUR 27.3 02/19/2018       Lab Results   Component Value Date    LDL 61 02/19/2018    LDL 69 08/12/2016    LDL 69 08/20/2015    HDL 43 02/19/2018    HDL 48 08/12/2016    TRIG 104 02/19/2018    CHOLHDLRATIO 2.91 02/19/2018       No results found for: TSH, FREET4       Lab Results   Component Value Date    WBC 6.58 11/03/2018    HGB 10.1 (L) 11/03/2018    HGB 10.4 (L) 11/02/2018    HGB 6.7 (C) 11/01/2018     11/03/2018        Lab Results   Component Value Date    PROTEIN Negative 08/20/2015    GLUCOSEU Negative 08/20/2015    BLOODU Negative 05/05/2014    NITRITEU Negative 05/05/2014    LEUKOCYTESUR Negative 08/20/2015       Imaging:        Medical Tests:        Summary of old records / correspondence / consultant report:        Request outside records:        ALLERGIES  Allergies   Allergen Reactions   • Tetanus Toxoids Swelling     Hand and arm        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)   • Osteopenia   • Hypertension   • Hyperlipidemia   • Colon polyp   • Gastroesophageal reflux disease   • History of non-ST elevation myocardial infarction (NSTEMI)   • Glaucoma   • Hypothyroidism   • Coronary artery disease involving native coronary artery of native heart without angina pectoris   • Takotsubo cardiomyopathy   • Coronary artery disease involving native  coronary artery of native heart without angina pectoris   • Ulcerative colitis with rectal bleeding (CMS/HCC)   • Acute post-hemorrhagic anemia       PAST MEDICAL HISTORY  Past Medical History:   Diagnosis Date   • Allergic rhinitis    • Colitis    • Colon polyps    • Diabetes mellitus (CMS/[facility])     type 2   • Dizziness    • Encounter for breast cancer screening other than mammogram    • GERD (gastroesophageal reflux disease)    • Glaucoma    • Hyperlipidemia    • Hypertension    • Hypothyroidism    • Knee fracture, left    • Non-STEMI (non-ST elevated myocardial infarction) (CMS/[facility]) 6/16/2017   • Osteopenia        SURGICAL HISTORY  Past Surgical History:   Procedure Laterality Date   • CATARACT EXTRACTION     • COLONOSCOPY  08/14/2018   • EYE SURGERY      cataract surgery   • PAP SMEAR         SOCIAL HISTORY  Social History     Socioeconomic History   • Marital status:      Spouse name: Not on file   • Number of children: 1   • Years of education: Not on file   • Highest education level: Not on file   Occupational History   • Occupation: retail     Comment: retired   Tobacco Use   • Smoking status: Never Smoker   • Smokeless tobacco: Never Used   Substance and Sexual Activity   • Alcohol use: No   • Drug use: No   • Sexual activity: Defer       FAMILY HISTORY  Family History   Problem Relation Age of Onset   • Heart disease Mother    • Hypertension Mother    • Diabetes Mother    • Heart disease Father    • Hypertension Father    • Heart disease Sister    • Heart disease Brother    • Hypertension Other    • Diabetes Other         type 2   • No Known Problems Maternal Grandmother    • No Known Problems Maternal Grandfather    • No Known Problems Paternal Grandmother    • No Known Problems Paternal Grandfather          **Cheryl Disclaimer:   Much of this encounter note is an electronic transcription/translation of spoken language to printed text. The electronic translation of spoken language may permit  erroneous, or at times, nonsensical words or phrases to be inadvertently transcribed. Although I have reviewed the note for such errors, some may still exist.

## 2018-11-29 NOTE — ASSESSMENT & PLAN NOTE
Accelerated hypertension: Start amlodipine 5 mg qd. Send blood pressure readings tomorrow and in 1 week. Continue lisinopril 20 mg and metoprolol tart 50 mg BID.

## 2018-11-30 ENCOUNTER — TELEPHONE (OUTPATIENT)
Dept: INTERNAL MEDICINE | Age: 73
End: 2018-11-30

## 2018-11-30 NOTE — TELEPHONE ENCOUNTER
I talked to her. Blood pressure is improving without significant side effects.   She was asked to notify me of blood pressure readings again in 1 week.

## 2018-11-30 NOTE — TELEPHONE ENCOUNTER
B/P Readings:    Thursday, 11/29/18:   1p: 143/80  4:30p:137/81  9p: 131/73    Friday, 11/30/18:  8a: 161/83  12p: 143/70    Pls advise.     Pt can be reached #[phone].

## 2018-12-07 ENCOUNTER — TELEPHONE (OUTPATIENT)
Dept: INTERNAL MEDICINE | Age: 73
End: 2018-12-07

## 2018-12-07 DIAGNOSIS — I10 ESSENTIAL HYPERTENSION: Primary | Chronic | ICD-10-CM

## 2018-12-07 NOTE — TELEPHONE ENCOUNTER
Call patient. I will send a BP [employer] link for her to send the blood pressure readings in to us.     Blood pressure is improving but at times high. Continue same medications. Let us know how blood pressure is doing in 2 weeks.

## 2018-12-07 NOTE — TELEPHONE ENCOUNTER
BP     12/1         137/78     7 am                   112/67    1 pm                   116/69    7 pm    12/2          150/75     8 am                   126/68     12 pm                   115/66      9 pm    12/3           149/87     8 am                    121/68     1 pm                    107/63    10 pm    12/4            136/69     8 am                      126/64    2 pm                      116/67    8 pm    12/5              146/79   8 am                       117/65   12 pm                        126/68   10 pm    12/6                140/72    9 pm                          146/83  9:30pm    12/7                *168/88     8 am                         *169/87     1 pm    *Pt has been bleeding today.

## 2018-12-15 ENCOUNTER — HOSPITAL ENCOUNTER (OUTPATIENT)
Facility: HOSPITAL | Age: 73
Setting detail: OBSERVATION
Discharge: HOME | End: 2018-12-16
Attending: EMERGENCY MEDICINE | Admitting: EMERGENCY MEDICINE

## 2018-12-15 DIAGNOSIS — K92.2 LOWER GI BLEED: Primary | ICD-10-CM

## 2018-12-15 DIAGNOSIS — K51.919 ULCERATIVE COLITIS WITH COMPLICATION, UNSPECIFIED LOCATION (HCC): ICD-10-CM

## 2018-12-15 PROBLEM — E87.20 LACTIC ACIDOSIS: Status: ACTIVE | Noted: 2018-12-15

## 2018-12-15 LAB
ABO GROUP BLD: NORMAL
ALBUMIN SERPL-MCNC: 4.1 G/DL (ref 3.5–5.2)
ALBUMIN/GLOB SERPL: 1.2 G/DL
ALP SERPL-CCNC: 50 U/L (ref 39–117)
ALT SERPL W P-5'-P-CCNC: 10 U/L (ref 1–33)
ANION GAP SERPL CALCULATED.3IONS-SCNC: 10.5 MMOL/L
AST SERPL-CCNC: 12 U/L (ref 1–32)
BASOPHILS # BLD AUTO: 0.01 10*3/MM3 (ref 0–0.2)
BASOPHILS NFR BLD AUTO: 0.1 % (ref 0–1.5)
BILIRUB SERPL-MCNC: 0.3 MG/DL (ref 0.1–1.2)
BLD GP AB SCN SERPL QL: NEGATIVE
BUN BLD-MCNC: 14 MG/DL (ref 8–23)
BUN/CREAT SERPL: 17.7 (ref 7–25)
CALCIUM SPEC-SCNC: 9.7 MG/DL (ref 8.6–10.5)
CHLORIDE SERPL-SCNC: 100 MMOL/L (ref 98–107)
CO2 SERPL-SCNC: 25.5 MMOL/L (ref 22–29)
CREAT BLD-MCNC: 0.79 MG/DL (ref 0.57–1)
D-LACTATE SERPL-SCNC: 3 MMOL/L (ref 0.5–2)
D-LACTATE SERPL-SCNC: 3.7 MMOL/L (ref 0.5–2)
DEPRECATED RDW RBC AUTO: 61.2 FL (ref 37–54)
EOSINOPHIL # BLD AUTO: 0 10*3/MM3 (ref 0–0.7)
EOSINOPHIL NFR BLD AUTO: 0 % (ref 0.3–6.2)
ERYTHROCYTE [DISTWIDTH] IN BLOOD BY AUTOMATED COUNT: 19.3 % (ref 11.7–13)
GFR SERPL CREATININE-BSD FRML MDRD: 71 ML/MIN/1.73
GLOBULIN UR ELPH-MCNC: 3.4 GM/DL
GLUCOSE BLD-MCNC: 186 MG/DL (ref 65–99)
GLUCOSE BLDC GLUCOMTR-MCNC: 127 MG/DL (ref 70–130)
HCT VFR BLD AUTO: 31.4 % (ref 35.6–45.5)
HGB BLD-MCNC: 9.9 G/DL (ref 11.9–15.5)
HOLD SPECIMEN: NORMAL
IMM GRANULOCYTES # BLD: 0.03 10*3/MM3 (ref 0–0.03)
IMM GRANULOCYTES NFR BLD: 0.4 % (ref 0–0.5)
INR PPP: 1.02 (ref 0.9–1.1)
LYMPHOCYTES # BLD AUTO: 2.11 10*3/MM3 (ref 0.9–4.8)
LYMPHOCYTES NFR BLD AUTO: 29.4 % (ref 19.6–45.3)
MCH RBC QN AUTO: 27 PG (ref 26.9–32)
MCHC RBC AUTO-ENTMCNC: 31.5 G/DL (ref 32.4–36.3)
MCV RBC AUTO: 85.8 FL (ref 80.5–98.2)
MONOCYTES # BLD AUTO: 0.56 10*3/MM3 (ref 0.2–1.2)
MONOCYTES NFR BLD AUTO: 7.8 % (ref 5–12)
NEUTROPHILS # BLD AUTO: 4.5 10*3/MM3 (ref 1.9–8.1)
NEUTROPHILS NFR BLD AUTO: 62.7 % (ref 42.7–76)
NRBC BLD MANUAL-RTO: 0 /100 WBC (ref 0–0)
PLATELET # BLD AUTO: 259 10*3/MM3 (ref 140–500)
PMV BLD AUTO: 9.6 FL (ref 6–12)
POTASSIUM BLD-SCNC: 3.9 MMOL/L (ref 3.5–5.2)
PROT SERPL-MCNC: 7.5 G/DL (ref 6–8.5)
PROTHROMBIN TIME: 13.2 SECONDS (ref 11.7–14.2)
RBC # BLD AUTO: 3.66 10*6/MM3 (ref 3.9–5.2)
RH BLD: POSITIVE
SODIUM BLD-SCNC: 136 MMOL/L (ref 136–145)
T&S EXPIRATION DATE: NORMAL
WBC NRBC COR # BLD: 7.18 10*3/MM3 (ref 4.5–10.7)

## 2018-12-15 PROCEDURE — 99285 EMERGENCY DEPT VISIT HI MDM: CPT

## 2018-12-15 PROCEDURE — 80053 COMPREHEN METABOLIC PANEL: CPT | Performed by: EMERGENCY MEDICINE

## 2018-12-15 PROCEDURE — G0378 HOSPITAL OBSERVATION PER HR: HCPCS

## 2018-12-15 PROCEDURE — 85025 COMPLETE CBC W/AUTO DIFF WBC: CPT | Performed by: EMERGENCY MEDICINE

## 2018-12-15 PROCEDURE — 87999 UNLISTED MICROBIOLOGY PX: CPT | Performed by: INTERNAL MEDICINE

## 2018-12-15 PROCEDURE — 86850 RBC ANTIBODY SCREEN: CPT | Performed by: EMERGENCY MEDICINE

## 2018-12-15 PROCEDURE — 99214 OFFICE O/P EST MOD 30 MIN: CPT | Performed by: INTERNAL MEDICINE

## 2018-12-15 PROCEDURE — 83605 ASSAY OF LACTIC ACID: CPT | Performed by: EMERGENCY MEDICINE

## 2018-12-15 PROCEDURE — 85610 PROTHROMBIN TIME: CPT | Performed by: EMERGENCY MEDICINE

## 2018-12-15 PROCEDURE — 86901 BLOOD TYPING SEROLOGIC RH(D): CPT | Performed by: EMERGENCY MEDICINE

## 2018-12-15 PROCEDURE — 87493 C DIFF AMPLIFIED PROBE: CPT | Performed by: INTERNAL MEDICINE

## 2018-12-15 PROCEDURE — 86900 BLOOD TYPING SEROLOGIC ABO: CPT | Performed by: EMERGENCY MEDICINE

## 2018-12-15 PROCEDURE — 82962 GLUCOSE BLOOD TEST: CPT

## 2018-12-15 RX ORDER — FERROUS SULFATE 325(65) MG
325 TABLET ORAL
Status: DISCONTINUED | OUTPATIENT
Start: 2018-12-16 | End: 2018-12-16 | Stop reason: HOSPADM

## 2018-12-15 RX ORDER — ROSUVASTATIN CALCIUM 10 MG/1
10 TABLET, COATED ORAL DAILY
Status: DISCONTINUED | OUTPATIENT
Start: 2018-12-15 | End: 2018-12-16 | Stop reason: HOSPADM

## 2018-12-15 RX ORDER — ONDANSETRON 4 MG/1
4 TABLET, FILM COATED ORAL EVERY 6 HOURS PRN
Status: DISCONTINUED | OUTPATIENT
Start: 2018-12-15 | End: 2018-12-16 | Stop reason: HOSPADM

## 2018-12-15 RX ORDER — ONDANSETRON 4 MG/1
4 TABLET, ORALLY DISINTEGRATING ORAL EVERY 6 HOURS PRN
Status: DISCONTINUED | OUTPATIENT
Start: 2018-12-15 | End: 2018-12-16 | Stop reason: HOSPADM

## 2018-12-15 RX ORDER — AMLODIPINE BESYLATE 5 MG/1
5 TABLET ORAL DAILY
Status: DISCONTINUED | OUTPATIENT
Start: 2018-12-16 | End: 2018-12-16 | Stop reason: HOSPADM

## 2018-12-15 RX ORDER — SODIUM CHLORIDE 0.9 % (FLUSH) 0.9 %
3-10 SYRINGE (ML) INJECTION AS NEEDED
Status: DISCONTINUED | OUTPATIENT
Start: 2018-12-15 | End: 2018-12-16 | Stop reason: HOSPADM

## 2018-12-15 RX ORDER — PANTOPRAZOLE SODIUM 40 MG/1
40 TABLET, DELAYED RELEASE ORAL EVERY MORNING
Status: DISCONTINUED | OUTPATIENT
Start: 2018-12-16 | End: 2018-12-16 | Stop reason: HOSPADM

## 2018-12-15 RX ORDER — METFORMIN HYDROCHLORIDE 500 MG/1
1000 TABLET, EXTENDED RELEASE ORAL 2 TIMES DAILY
Status: DISCONTINUED | OUTPATIENT
Start: 2018-12-15 | End: 2018-12-16 | Stop reason: HOSPADM

## 2018-12-15 RX ORDER — LISINOPRIL 20 MG/1
20 TABLET ORAL DAILY
Status: DISCONTINUED | OUTPATIENT
Start: 2018-12-15 | End: 2018-12-16 | Stop reason: HOSPADM

## 2018-12-15 RX ORDER — SODIUM CHLORIDE 0.9 % (FLUSH) 0.9 %
10 SYRINGE (ML) INJECTION AS NEEDED
Status: DISCONTINUED | OUTPATIENT
Start: 2018-12-15 | End: 2018-12-16 | Stop reason: HOSPADM

## 2018-12-15 RX ORDER — ONDANSETRON 2 MG/ML
4 INJECTION INTRAMUSCULAR; INTRAVENOUS EVERY 6 HOURS PRN
Status: DISCONTINUED | OUTPATIENT
Start: 2018-12-15 | End: 2018-12-16 | Stop reason: HOSPADM

## 2018-12-15 RX ORDER — MESALAMINE 4 G/60ML
4 ENEMA RECTAL
Status: DISCONTINUED | OUTPATIENT
Start: 2018-12-15 | End: 2018-12-15

## 2018-12-15 RX ORDER — METOPROLOL TARTRATE 50 MG/1
50 TABLET, FILM COATED ORAL EVERY 12 HOURS
Status: DISCONTINUED | OUTPATIENT
Start: 2018-12-15 | End: 2018-12-16 | Stop reason: HOSPADM

## 2018-12-15 RX ORDER — ACETAMINOPHEN 325 MG/1
650 TABLET ORAL EVERY 4 HOURS PRN
Status: DISCONTINUED | OUTPATIENT
Start: 2018-12-15 | End: 2018-12-16 | Stop reason: HOSPADM

## 2018-12-15 RX ORDER — MESALAMINE 4 G/60ML
4 ENEMA RECTAL 2 TIMES DAILY
Status: DISCONTINUED | OUTPATIENT
Start: 2018-12-15 | End: 2018-12-15 | Stop reason: CLARIF

## 2018-12-15 RX ORDER — MESALAMINE 1.2 G/1
4.8 TABLET, DELAYED RELEASE ORAL
Status: DISCONTINUED | OUTPATIENT
Start: 2018-12-16 | End: 2018-12-16 | Stop reason: HOSPADM

## 2018-12-15 RX ORDER — MESALAMINE 4 G/60ML
4 ENEMA RECTAL 2 TIMES DAILY
Status: DISCONTINUED | OUTPATIENT
Start: 2018-12-15 | End: 2018-12-16 | Stop reason: HOSPADM

## 2018-12-15 RX ORDER — DORZOLAMIDE HYDROCHLORIDE AND TIMOLOL MALEATE 20; 5 MG/ML; MG/ML
1 SOLUTION/ DROPS OPHTHALMIC 2 TIMES DAILY
Status: DISCONTINUED | OUTPATIENT
Start: 2018-12-15 | End: 2018-12-15

## 2018-12-15 RX ORDER — DORZOLAMIDE HCL 20 MG/ML
1 SOLUTION/ DROPS OPHTHALMIC 2 TIMES DAILY
Status: DISCONTINUED | OUTPATIENT
Start: 2018-12-15 | End: 2018-12-16 | Stop reason: HOSPADM

## 2018-12-15 RX ORDER — TIMOLOL MALEATE 5 MG/ML
1 SOLUTION/ DROPS OPHTHALMIC 2 TIMES DAILY
Status: DISCONTINUED | OUTPATIENT
Start: 2018-12-15 | End: 2018-12-16 | Stop reason: HOSPADM

## 2018-12-15 RX ORDER — SODIUM CHLORIDE 0.9 % (FLUSH) 0.9 %
3 SYRINGE (ML) INJECTION EVERY 12 HOURS SCHEDULED
Status: DISCONTINUED | OUTPATIENT
Start: 2018-12-15 | End: 2018-12-16 | Stop reason: HOSPADM

## 2018-12-15 RX ADMIN — SODIUM CHLORIDE, PRESERVATIVE FREE 3 ML: 5 INJECTION INTRAVENOUS at 20:13

## 2018-12-15 RX ADMIN — TIMOLOL MALEATE 1 DROP: 5 SOLUTION/ DROPS OPHTHALMIC at 21:43

## 2018-12-15 RX ADMIN — MESALAMINE 4 G: 4 ENEMA RECTAL at 21:46

## 2018-12-15 RX ADMIN — DORZOLAMIDE HYDROCHLORIDE 1 DROP: 20 SOLUTION/ DROPS OPHTHALMIC at 21:43

## 2018-12-15 RX ADMIN — METFORMIN HYDROCHLORIDE 1000 MG: 500 TABLET, EXTENDED RELEASE ORAL at 20:10

## 2018-12-15 RX ADMIN — METOPROLOL TARTRATE 50 MG: 50 TABLET, FILM COATED ORAL at 20:10

## 2018-12-15 RX ADMIN — ROSUVASTATIN CALCIUM 10 MG: 10 TABLET, FILM COATED ORAL at 20:10

## 2018-12-15 NOTE — PLAN OF CARE
Problem: Patient Care Overview  Goal: Plan of Care Review  Outcome: Ongoing (interventions implemented as appropriate)   12/15/18 4881   Coping/Psychosocial   Plan of Care Reviewed With patient   Plan of Care Review   Progress no change   OTHER   Outcome Summary arrived from ER, vss, no rectal bleeding at present, denies pain, admitting MD aware of pt's arrival - waiting on orders     Goal: Individualization and Mutuality  Outcome: Ongoing (interventions implemented as appropriate)    Goal: Discharge Needs Assessment  Outcome: Ongoing (interventions implemented as appropriate)    Goal: Interprofessional Rounds/Family Conf  Outcome: Ongoing (interventions implemented as appropriate)      Problem: Gastrointestinal Bleeding (Adult)  Goal: Signs and Symptoms of Listed Potential Problems Will be Absent, Minimized or Managed (Gastrointestinal Bleeding)  Outcome: Ongoing (interventions implemented as appropriate)      Problem: Bowel Disease, Inflammatory (Adult)  Goal: Signs and Symptoms of Listed Potential Problems Will be Absent, Minimized or Managed (Bowel Disease, Inflammatory)  Outcome: Ongoing (interventions implemented as appropriate)

## 2018-12-15 NOTE — H&P
"      Patient Care Team:  Esequiel Pacheco MD as PCP - General  Esequiel Pacheco MD as PCP - Claims Attributed  Andre Adams MD as Consulting Physician (Gastroenterology)  Ministerio Wells MD as Consulting Physician (Orthopedic Surgery)  Abhay Wooten MD as Consulting Physician (Cardiology)  Cisco Husain MD as Consulting Physician (Otolaryngology)  EZIO Garibay MD as Consulting Physician (Ophthalmology)    Chief complaint rectal bleeding    Subjective     Very pleasant 72yo woman just admitted to our service in November for UC flare with rectal bleeding. She came to ER this AM with report of 3 days of intermittent diarrhea. She reports 10 episodes of bright red blood mixed with stool. Denies N/V or abd pain. Says she really feels pretty good--\"not like last time\". No SOA or dizziness.        Review of Systems   Constitutional: Negative for appetite change, chills, diaphoresis, fatigue and fever.   HENT: Negative for ear pain, facial swelling, hearing loss, mouth sores, nosebleeds, rhinorrhea, sinus pressure, sore throat, tinnitus, trouble swallowing and voice change.    Eyes: Negative for pain and visual disturbance.   Respiratory: Negative for cough, choking, chest tightness, shortness of breath and stridor.    Cardiovascular: Negative for chest pain, palpitations and leg swelling.   Gastrointestinal: Positive for blood in stool and diarrhea. Negative for abdominal pain, constipation, nausea and vomiting.   Endocrine: Negative for cold intolerance and heat intolerance.   Genitourinary: Negative for decreased urine volume, difficulty urinating, dysuria and hematuria.   Musculoskeletal: Negative for back pain and neck pain.   Skin: Negative for color change, pallor and rash.   Allergic/Immunologic: Negative for environmental allergies, food allergies and immunocompromised state.   Neurological: Negative for tremors, seizures, syncope, facial asymmetry, speech difficulty, light-headedness, numbness and " headaches.   Hematological: Negative for adenopathy. Does not bruise/bleed easily.   Psychiatric/Behavioral: Negative for behavioral problems, confusion and hallucinations.        Past Medical History:   Diagnosis Date   • Allergic rhinitis    • Colitis    • Colon polyps    • Diabetes mellitus (CMS/[facility])     type 2   • Dizziness    • Encounter for breast cancer screening other than mammogram    • GERD (gastroesophageal reflux disease)    • Glaucoma    • Hyperlipidemia    • Hypertension    • Knee fracture, left    • Non-STEMI (non-ST elevated myocardial infarction) (CMS/[facility]) 6/16/2017   • Osteopenia    • Ulcerative colitis (CMS/[facility])      Past Surgical History:   Procedure Laterality Date   • CATARACT EXTRACTION     • COLONOSCOPY  08/14/2018   • EYE SURGERY      cataract surgery   • PAP SMEAR       Family History   Problem Relation Age of Onset   • Heart disease Mother    • Hypertension Mother    • Diabetes Mother    • Heart disease Father    • Hypertension Father    • Heart disease Sister    • Heart disease Brother    • Hypertension Other    • Diabetes Other         type 2   • No Known Problems Maternal Grandmother    • No Known Problems Maternal Grandfather    • No Known Problems Paternal Grandmother    • No Known Problems Paternal Grandfather      Social History     Tobacco Use   • Smoking status: Never Smoker   • Smokeless tobacco: Never Used   Substance Use Topics   • Alcohol use: No   • Drug use: No     Medications Prior to Admission   Medication Sig Dispense Refill Last Dose   • Acetaminophen (TYLENOL PO) Take 2 tablets by mouth As Needed.   Taking   • amLODIPine (NORVASC) 5 MG tablet Take 1 tablet by mouth Daily. 30 tablet 3    • MAIA CONTOUR NEXT TEST test strip CHECK BLOOD SUGAR ONCE DAILY 100 each 12 Taking   • Cholecalciferol (VITAMIN D) 2000 UNITS capsule Take 1 capsule by mouth daily.   Taking   • dorzolamide-timolol (COSOPT) 22.3-6.8 MG/ML ophthalmic solution Apply 1 drop to eye 2 (two) times a day.    Taking   • ferrous sulfate 325 (65 FE) MG tablet Take 1 tablet by mouth Daily With Breakfast. 30 tablet 0 Taking   • hydrocortisone (ANUSOL-HC) 2.5 % rectal cream Insert  into the rectum 2 (Two) Times a Day. 28 g 0 Taking   • lisinopril (PRINIVIL,ZESTRIL) 20 MG tablet TAKE 1 TABLET BY MOUTH EVERY DAY 90 tablet 1 Taking   • mesalamine (LIALDA) 1.2 g EC tablet Take 1 tablet by mouth Daily With Breakfast.   Taking   • metFORMIN ER (GLUCOPHAGE-XR) 500 MG 24 hr tablet TAKE 2 TABLETS BY MOUTH TWICE DAILY 360 tablet 1 Taking   • metoprolol tartrate (LOPRESSOR) 50 MG tablet TAKE 1 TABLET BY MOUTH EVERY 12 HOURS 180 tablet 0 Taking   • Multiple Vitamin (MULTI-VITAMIN PO) Take 1 tablet by mouth Daily.   Taking   • omeprazole (PriLOSEC) 40 MG capsule Take 1 capsule by mouth daily.   Taking   • rosuvastatin (CRESTOR) 10 MG tablet Take 1 tablet by mouth Daily. 30 tablet 3      Allergies:  Tetanus toxoids    Objective      Vital Signs  Temp:  [97.8 °F (36.6 °C)-98.5 °F (36.9 °C)] 97.8 °F (36.6 °C)  Heart Rate:  [57-69] 68  Resp:  [13-16] 16  BP: (125-165)/(65-78) 125/71    Physical Exam   Constitutional: She is oriented to person, place, and time. She appears well-developed and well-nourished. No distress.   HENT:   Head: Normocephalic and atraumatic.   Mouth/Throat: Oropharynx is clear and moist. No oropharyngeal exudate.   Eyes: Conjunctivae and EOM are normal. Pupils are equal, round, and reactive to light. Right eye exhibits no discharge. Left eye exhibits no discharge. No scleral icterus.   Neck: Normal range of motion. Neck supple. No thyromegaly present.   Cardiovascular: Normal rate, regular rhythm, normal heart sounds and intact distal pulses.   No murmur heard.  Pulmonary/Chest: Effort normal and breath sounds normal. No respiratory distress.   Abdominal: Soft. Bowel sounds are normal. She exhibits no distension. There is no tenderness.   Musculoskeletal: She exhibits no edema or deformity.   Lymphadenopathy:     She  has no cervical adenopathy.   Neurological: She is alert and oriented to person, place, and time. No cranial nerve deficit or sensory deficit.   Skin: Skin is warm and dry. Capillary refill takes less than 2 seconds. No rash noted. She is not diaphoretic. No erythema.   Psychiatric: She has a normal mood and affect. Her behavior is normal. Thought content normal.   Nursing note and vitals reviewed.      Results Review:   I reviewed the patient's new clinical results.  I reviewed the patient's other test results and agree with the interpretation  Discussed with patient, RN, and Dr. Lyons      Assessment/Plan       Lower GI bleed    Type 2 diabetes mellitus with circulatory disorder (CMS/[facility])    Hypertension    Gastroesophageal reflux disease    Coronary artery disease involving native coronary artery of native heart without angina pectoris    Ulcerative colitis with rectal bleeding (CMS/[facility])    Acute post-hemorrhagic anemia    Lactic acidosis          Plan:   (Appreciate GI attention to pt  BID Mesalamine enema and once daily oral Mesalamine  GI PCR and CDiff ordered  Monitor Hgb--stable since last admit  Clear liquid diet for now  SCDs for DVT ppx  Confirmed full code  Further orders to follow as suggested by evolving hospital course).       I discussed the patients findings and my recommendations with patient, nursing staff and consulting provider    Jose L Garvin MD  12/15/18  6:12 PM    Time: 45min

## 2018-12-15 NOTE — PROGRESS NOTES
Discharge Planning Assessment  [facility]     Patient Name: Renee PARIKH From  MRN: [id number]  Today's Date: 12/15/2018    Admit Date: 12/15/2018    Discharge Needs Assessment     Row Name 12/15/18 1458       Living Environment    Lives With  child(marni), adult;spouse    Name(s) of Who Lives With Patient  -- , Arpan and sonTerry    Current Living Arrangements  home/apartment/condo    Duration at Residence  -- 44 years    Primary Care Provided by  self    Provides Primary Care For  no one    Family Caregiver if Needed  child(marni), adult;spouse    Family Caregiver Names  -- , Arpan and sonTerry    Quality of Family Relationships  supportive    Able to Return to Prior Arrangements  yes    Living Arrangement Comments  -- One step to get into house, 13 steps inside home       Resource/Environmental Concerns    Resource/Environmental Concerns  none    Transportation Concerns  car, none       Transition Planning    Patient/Family Anticipates Transition to  home with family    Patient/Family Anticipated Services at Transition  none    Transportation Anticipated  car, drives self       Discharge Needs Assessment    Readmission Within the Last 30 Days  no previous admission in last 30 days    Concerns to be Addressed  no discharge needs identified    Equipment Currently Used at Home  none    Anticipated Changes Related to Illness  none    Equipment Needed After Discharge  none    Offered/Gave Vendor List  no        Discharge Plan     Row Name 12/15/18 7260       Plan    Plan  Anticipates return home w/ family at d/c; no anticipated d/c needs at present time    Patient/Family in Agreement with Plan  yes        Destination      No service coordination in this encounter.      Durable Medical Equipment      No service coordination in this encounter.      Dialysis/Infusion      No service coordination in this encounter.      Home Medical Care      No service coordination in this encounter.      Community  Resources      No service coordination in this encounter.          Demographic Summary    No documentation.       Functional Status    No documentation.       Psychosocial    No documentation.       Abuse/Neglect    No documentation.       Legal    No documentation.       Substance Abuse    No documentation.       Patient Forms    No documentation.           Marivel Murdock RN

## 2018-12-15 NOTE — CONSULTS
[facility]  Initial Inpatient Consult Note    Referring Provider: Dr. Garvin    Reason for Consultation: Ulcerative colitis    Subjective     History of present illness:    73 y.o. female history of ulcerative colitis followed by Dr. Adams admitted today with rectal bleeding.  This comes off the heels of admission 1 month ago for the same.  At home on 4.8 g of oral mesalamine and rowasa enemas twice a day at last admission.  She ran out of her  enemas, called Dr. Adams but could not get a hold of him nor get a refill and began with rectal bleeding over the last few days.  She is having watery diarrhea.  Last colonoscopy August of this year showing left-sided ulcerative colitis.  Biopsies consistent with ulcerative colitis.  She was anemic upon last admission and required blood transfusion, hemoglobin of 7 on last admission.  She denies extraintestinal manifestations of inflammatory bowel disease such as rashes, skin lesions, oral ulcerations, ocular complaints.  There are no sick contacts and no recent trauma      Past Medical History:  Past Medical History:   Diagnosis Date   • Allergic rhinitis    • Colitis    • Colon polyps    • Diabetes mellitus (CMS/[facility])     type 2   • Dizziness    • Encounter for breast cancer screening other than mammogram    • GERD (gastroesophageal reflux disease)    • Glaucoma    • Hyperlipidemia    • Hypertension    • Hypothyroidism    • Knee fracture, left    • Non-STEMI (non-ST elevated myocardial infarction) (CMS/HCC) 6/16/2017   • Osteopenia      Past Surgical History:  Past Surgical History:   Procedure Laterality Date   • CATARACT EXTRACTION     • COLONOSCOPY  08/14/2018   • EYE SURGERY      cataract surgery   • PAP SMEAR        Social History:   Social History     Tobacco Use   • Smoking status: Never Smoker   • Smokeless tobacco: Never Used   Substance Use Topics   • Alcohol use: No      Family History:  Family History   Problem Relation Age of Onset   • Heart  disease Mother    • Hypertension Mother    • Diabetes Mother    • Heart disease Father    • Hypertension Father    • Heart disease Sister    • Heart disease Brother    • Hypertension Other    • Diabetes Other         type 2   • No Known Problems Maternal Grandmother    • No Known Problems Maternal Grandfather    • No Known Problems Paternal Grandmother    • No Known Problems Paternal Grandfather        Home Meds:    (Not in a hospital admission)  Current Meds:     [START ON 12/16/2018] mesalamine 4.8 g Oral Daily With Breakfast   mesalamine 4 g Rectal BID - RT     Allergies:  Allergies   Allergen Reactions   • Tetanus Toxoids Swelling     Hand and arm     Review of Systems  She has weakness fatigue all other systems reviewed and negative     Objective     Vital Signs  Temp:  [98.5 °F (36.9 °C)] 98.5 °F (36.9 °C)  Heart Rate:  [57-69] 59  Resp:  [13-16] 15  BP: (131-159)/(69-75) 145/74  Physical Exam:  General Appearance:    Alert, cooperative, in no acute distress   Head:    Normocephalic, without obvious abnormality, atraumatic   Eyes:            Lids and lashes normal, conjunctivae and sclerae normal, no   icterus   Throat:   No oral lesions, no thrush, oral mucosa moist   Neck:   No adenopathy, supple, trachea midline, no thyromegaly, no   carotid bruit, no JVD   Lungs:     Clear to auscultation,respirations regular, even and                   unlabored    Heart:    Regular rhythm and normal rate, normal S1 and S2, no            murmur, no gallop, no rub, no click   Chest Wall:    No abnormalities observed   Abdomen:     Normal bowel sounds, no masses, no organomegaly, soft        non-tender, non-distended, no guarding, no rebound                 tenderness   Rectal:     Deferred   Extremities:   no edema, no cyanosis, no redness   Skin:   No bleeding, bruising or rash   Lymph nodes:   No palpable adenopathy   Psychiatric:  Judgement and insight: normal   Orientation to person place and time: normal   Mood and  affect: normal   Results Review:   I reviewed the patient's new clinical results.    Results from last 7 days   Lab Units  12/15/18   1120   WBC 10*3/mm3  7.18   HEMOGLOBIN g/dL  9.9*   HEMATOCRIT %  31.4*   PLATELETS 10*3/mm3  259     Results from last 7 days   Lab Units  12/15/18   1120   SODIUM mmol/L  136   POTASSIUM mmol/L  3.9   CHLORIDE mmol/L  100   CO2 mmol/L  25.5   BUN mg/dL  14   CREATININE mg/dL  0.79   CALCIUM mg/dL  9.7   BILIRUBIN mg/dL  0.3   ALK PHOS U/L  50   ALT (SGPT) U/L  10   AST (SGOT) U/L  12   GLUCOSE mg/dL  186*     Results from last 7 days   Lab Units  12/15/18   1120   INR   1.02     No results found for: LIPASE    Radiology:  No orders to display       Assessment/Plan   Patient Active Problem List   Diagnosis   • 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)   • Osteopenia   • Hypertension   • Hyperlipidemia   • Colon polyp   • Gastroesophageal reflux disease   • History of non-ST elevation myocardial infarction (NSTEMI)   • Glaucoma   • Hypothyroidism   • Coronary artery disease involving native coronary artery of native heart without angina pectoris   • Takotsubo cardiomyopathy   • Coronary artery disease involving native coronary artery of native heart without angina pectoris   • Ulcerative colitis with rectal bleeding (CMS/HCC)   • Acute post-hemorrhagic anemia   • Lower GI bleed     Problem list    Left-sided ulcerative colitis  Return of rectal bleeding this week after stopping related to enemas  Mild abdominal cramping      Assessment/Plan    23 hour observation admit  Follow hemoglobin tomorrow  Rowasa enema twice a day  Oral mesalamine 4.8 g per day  No indication for lower endoscopy  No indication for steroids at this time  C. difficile toxin to be checked  GI PCR to be checked also as she's having worsening rectal bleeding, need to rule out enteroinvasive bacterium    I discussed the patients findings and my recommendations with patient and family.    Juanito SOTELO  MD [place]

## 2018-12-15 NOTE — ED NOTES
Patient presents from home with bloody stool for past 3 days.  Patient notes bright red stool.  No blood thinners.  Notes history of GI bleed, and was admitted in October for the same.  No shortness of breath, no cp, no weakness noted.  No nausea or vomiting.  Breathing is even and unlabored.      Thaddeus Mandel, RN  12/15/18 3853

## 2018-12-15 NOTE — ED PROVIDER NOTES
EMERGENCY DEPARTMENT ENCOUNTER    CHIEF COMPLAINT  Chief Complaint: blood in stool  History given by: Patient   History limited by: Patient   Room Number: P676/1  PMD: Esequiel Pacheco MD Dr. Cox (GI Doctor)    HPI:  Pt is a 73 y.o. female who presents to the ED complaining of blood in stool which presented Thursday night and got better, but returned this morning with episodes of diarrhea. She says that the blood only occurs with bowel movements. Pt states that she has hx of ulcerative colitis and external hemorrhoids. She says that she was here at [facility] last month for the exact same thing and she had a CT scan. Pt denies abd pain, CP, and difficulty breathing.    Duration:  thursday night - this morning   Onset: gradual   Timing: waxing and waning   Location: rectum  Radiation: n/a  Quality: none stated   Intensity/Severity: moderate   Progression: unchanged   Associated Symptoms: diarrhea  Aggravating Factors: n/a  Alleviating Factors: n/a  Previous Episodes: last month   Treatment before arrival: none     PAST MEDICAL HISTORY  Active Ambulatory Problems     Diagnosis Date Noted   • 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) 01/19/2016   • Osteopenia 01/19/2016   • Hypertension 01/19/2016   • Hyperlipidemia 01/19/2016   • Colon polyp 01/19/2016   • Gastroesophageal reflux disease 01/19/2016   • History of non-ST elevation myocardial infarction (NSTEMI) 06/16/2017   • Glaucoma 06/22/2017   • Hypothyroidism 06/22/2017   • Coronary artery disease involving native coronary artery of native heart without angina pectoris 06/22/2017   • Takotsubo cardiomyopathy 06/22/2017   • Coronary artery disease involving native coronary artery of native heart without angina pectoris 06/23/2017   • Ulcerative colitis with rectal bleeding (CMS/HCC) 10/31/2018   • Acute post-hemorrhagic anemia 11/01/2018     Resolved Ambulatory Problems     Diagnosis Date Noted   • Non-specific colitis 01/19/2016   • Atopic rhinitis  01/19/2016     Past Medical History:   Diagnosis Date   • Allergic rhinitis    • Colitis    • Colon polyps    • Diabetes mellitus (CMS/[facility])    • Dizziness    • Encounter for breast cancer screening other than mammogram    • GERD (gastroesophageal reflux disease)    • Glaucoma    • Hyperlipidemia    • Hypertension    • Knee fracture, left    • Non-STEMI (non-ST elevated myocardial infarction) (CMS/[facility]) 6/16/2017   • Osteopenia    • Ulcerative colitis (CMS/[facility])        PAST SURGICAL HISTORY  Past Surgical History:   Procedure Laterality Date   • CATARACT EXTRACTION     • COLONOSCOPY  08/14/2018   • EYE SURGERY      cataract surgery   • PAP SMEAR         FAMILY HISTORY  Family History   Problem Relation Age of Onset   • Heart disease Mother    • Hypertension Mother    • Diabetes Mother    • Heart disease Father    • Hypertension Father    • Heart disease Sister    • Heart disease Brother    • Hypertension Other    • Diabetes Other         type 2   • No Known Problems Maternal Grandmother    • No Known Problems Maternal Grandfather    • No Known Problems Paternal Grandmother    • No Known Problems Paternal Grandfather        SOCIAL HISTORY  Social History     Socioeconomic History   • Marital status:      Spouse name: Not on file   • Number of children: 1   • Years of education: Not on file   • Highest education level: Not on file   Social Needs   • Financial resource strain: Not on file   • Food insecurity - worry: Not on file   • Food insecurity - inability: Not on file   • Transportation needs - medical: Not on file   • Transportation needs - non-medical: Not on file   Occupational History   • Occupation: retail     Comment: retired   Tobacco Use   • Smoking status: Never Smoker   • Smokeless tobacco: Never Used   Substance and Sexual Activity   • Alcohol use: No   • Drug use: No   • Sexual activity: Defer   Other Topics Concern   • Not on file   Social History Narrative   • Not on file       ALLERGIES  Tetanus  toxoids    REVIEW OF SYSTEMS  Review of Systems   Constitutional: Negative for fever.   HENT: Negative for sore throat.    Eyes: Negative.    Respiratory: Negative for cough and shortness of breath.    Cardiovascular: Negative for chest pain.   Gastrointestinal: Negative for abdominal pain, diarrhea and vomiting.   Genitourinary: Negative for dysuria.   Musculoskeletal: Negative for neck pain.   Skin: Negative for rash.   Allergic/Immunologic: Negative.    Neurological: Negative for weakness, numbness and headaches.   Hematological: Negative.    Psychiatric/Behavioral: Negative.    All other systems reviewed and are negative.      PHYSICAL EXAM  ED Triage Vitals [12/15/18 1105]   Temp Heart Rate Resp BP SpO2   98.5 °F (36.9 °C) 69 16 159/75 100 %      Temp src Heart Rate Source Patient Position BP Location FiO2 (%)   -- -- -- -- --       Physical Exam   Constitutional: She is oriented to person, place, and time. No distress.   HENT:   Head: Normocephalic and atraumatic.   Eyes: EOM are normal. Pupils are equal, round, and reactive to light.   Neck: Normal range of motion. Neck supple.   Cardiovascular: Normal rate, regular rhythm and normal heart sounds.   Pulmonary/Chest: Effort normal and breath sounds normal. No respiratory distress.   Abdominal: Soft. There is no tenderness. There is no rebound and no guarding.   Genitourinary:   Genitourinary Comments: Minimal mucus from rectal exam    Musculoskeletal: Normal range of motion. She exhibits no edema.   Neurological: She is alert and oriented to person, place, and time. She has normal sensation and normal strength.   Skin: Skin is warm and dry. No rash noted.   Psychiatric: Mood and affect normal.   Nursing note and vitals reviewed.      LAB RESULTS  Lab Results (last 24 hours)     Procedure Component Value Units Date/Time    Lactic Acid, Reflex [[id number]]  (Abnormal) Collected:  12/15/18 1531    Specimen:  Blood Updated:  12/15/18 1555     Lactate 3.7 mmol/L      POC Glucose Once [[id number]]  (Normal) Collected:  12/15/18 1733    Specimen:  Blood Updated:  12/15/18 1735     Glucose 127 mg/dL     Clostridium Difficile Toxin - Stool, Per Rectum [[id number]] Collected:  12/15/18 2206    Specimen:  Stool from Per Rectum Updated:  12/16/18 0007    Narrative:       The following orders were created for panel order Clostridium Difficile Toxin - Stool, Per Rectum.  Procedure                               Abnormality         Status                     ---------                               -----------         ------                     Clostridium Difficile To...[[id number]]  Normal              Final result                 Please view results for these tests on the individual orders.    Gastrointestinal Panel, PCR - Stool, Per Rectum [[id number]]  (Normal) Collected:  12/15/18 2206    Specimen:  Stool from Per Rectum Updated:  12/16/18 0007     Campylobacter Not Detected     Plesiomonas shigelloides Not Detected     Salmonella Not Detected     Vibrio Not Detected     Vibrio cholerae Not Detected     Yersinia enterocolitica Not Detected     Enteroaggregative E. coli (EAEC) Not Detected     Enteropathogenic E. coli (EPEC) Not Detected     Enterotoxigenic E. coli (ETEC) lt/st Not Detected     Shiga-like toxin-producing E. coli (STEC) stx1/stx2 Not Detected     E. coli O157 Not Detected     Shigella/Enteroinvasive E. coli (EIEC) Not Detected     Cryptosporidium Not Detected     Cyclospora cayetanensis Not Detected     Entamoeba histolytica Not Detected     Giardia lamblia Not Detected     Adenovirus F40/41 Not Detected     Astrovirus Not Detected     Norovirus GI/GII Not Detected     Rotavirus A Not Detected     Sapovirus (I, II, IV or V) Not Detected    Narrative:       If Aeromonas, Staphylococcus aureus or Bacillus cereus are suspected, please order [id number]: Stool Culture, Aeromonas, S aureus, B Cereus.    Clostridium Difficile Toxin, PCR - Stool, Per Rectum [[id number]]  (Normal)  Collected:  12/15/18 2206    Specimen:  Stool from Per Rectum Updated:  12/16/18 0007     C. Difficile Toxins by PCR Negative    CBC & Differential [[id number]] Collected:  12/16/18 0617    Specimen:  Blood Updated:  12/16/18 0646    Narrative:       The following orders were created for panel order CBC & Differential.  Procedure                               Abnormality         Status                     ---------                               -----------         ------                     CBC Auto Differential[[id number]]        Abnormal            Final result                 Please view results for these tests on the individual orders.    Comprehensive Metabolic Panel [[id number]]  (Abnormal) Collected:  12/16/18 0617    Specimen:  Blood Updated:  12/16/18 0702     Glucose 168 mg/dL      BUN 11 mg/dL      Creatinine 0.67 mg/dL      Sodium 140 mmol/L      Potassium 4.4 mmol/L      Chloride 102 mmol/L      CO2 24.9 mmol/L      Calcium 9.6 mg/dL      Total Protein 7.2 g/dL      Albumin 4.00 g/dL      ALT (SGPT) 7 U/L      AST (SGOT) 12 U/L      Alkaline Phosphatase 47 U/L      Total Bilirubin 0.4 mg/dL      eGFR Non African Amer 86 mL/min/1.73      Globulin 3.2 gm/dL      A/G Ratio 1.3 g/dL      BUN/Creatinine Ratio 16.4     Anion Gap 13.1 mmol/L     Narrative:       The MDRD GFR formula is only valid for adults with stable renal function between ages 18 and 70.    CBC Auto Differential [[id number]]  (Abnormal) Collected:  12/16/18 0617    Specimen:  Blood Updated:  12/16/18 0646     WBC 6.05 10*3/mm3      RBC 3.60 10*6/mm3      Hemoglobin 9.4 g/dL      Hematocrit 30.5 %      MCV 84.7 fL      MCH 26.1 pg      MCHC 30.8 g/dL      RDW 19.1 %      RDW-SD 59.5 fl      MPV 9.1 fL      Platelets 235 10*3/mm3      Neutrophil % 62.1 %      Lymphocyte % 30.6 %      Monocyte % 7.1 %      Eosinophil % 0.0 %      Basophil % 0.2 %      Immature Grans % 0.2 %      Neutrophils, Absolute 3.76 10*3/mm3      Lymphocytes, Absolute 1.85  10*3/mm3      Monocytes, Absolute 0.43 10*3/mm3      Eosinophils, Absolute 0.00 10*3/mm3      Basophils, Absolute 0.01 10*3/mm3      Immature Grans, Absolute 0.01 10*3/mm3      nRBC 0.0 /100 WBC     Lactic Acid, Plasma [[id number]]  (Normal) Collected:  12/16/18 0619    Specimen:  Blood Updated:  12/16/18 0652     Lactate 1.3 mmol/L           I ordered the above labs and reviewed the results      PROCEDURES  Procedures      PROGRESS AND CONSULTS  ED Course as of Dec 16 1211   Sat Dec 15, 2018   1515 I think this is unlikely significant.  We will repeat it.  It does not sound like her GI bleed was upper in origin.  She has had no infectious process as well. Lactate: (!!) 3.0 [MM]      ED Course User Index  [MM] Jaspal Lyons MD      1312  BP- 136/69 HR- 67 Temp- 98.5 °F (36.9 °C) O2 sat- 99%  Rechecked the patient who is in NAD and is resting comfortably Questioned pt about any recent episodes of bloody stool since being in the ED. She denies. Questioned about frequency. Pt reports she's had 10 episodes of bloody stool in the last 24 hours. Discussed w/ pt plan for admission. Pt understands and agrees with plan. All questions answered.    1329  Discussed pt w/ Dr. Garvin about [facility]. He agreed to admit.    MEDICAL DECISION MAKING  Results were reviewed/discussed with the patient and they were also made aware of online access. Pt also made aware that some labs, such as cultures, will not be resulted during ER visit and follow up with PMD is necessary.     MDM  Number of Diagnoses or Management Options  Lower GI bleed:   Ulcerative colitis with complication, unspecified location (CMS/[facility]):      Amount and/or Complexity of Data Reviewed  Clinical lab tests: reviewed (Lactic acid 3.0)  Decide to obtain previous medical records or to obtain history from someone other than the patient: yes  Discuss the patient with other providers: yes (Dr. Garvin, [facility])  Independent visualization of images, tracings, or specimens:  yes    Patient Progress  Patient progress: stable         DIAGNOSIS  Final diagnoses:   Lower GI bleed   Ulcerative colitis with complication, unspecified location (CMS/HCC)       DISPOSITION  ADMISSION    Discussed treatment plan and reason for admission with pt/family and admitting physician.  Pt/family voiced understanding of the plan for admission for further testing/treatment as needed.       Latest Documented Vital Signs:  As of 12:11 PM  BP- 107/69 HR- 55 Temp- 97.4 °F (36.3 °C) (Oral) O2 sat- 99%    --  Documentation assistance provided by jagruti Alfonso and Yolanda Shetty for Dr. Lyons. Information recorded by the scribe was done at my direction and has been verified and validated by me.       Yolanda Shetty  12/15/18 8141       Jaspal Lyons MD  12/16/18 1211

## 2018-12-16 VITALS
DIASTOLIC BLOOD PRESSURE: 69 MMHG | TEMPERATURE: 97.4 F | RESPIRATION RATE: 16 BRPM | OXYGEN SATURATION: 99 % | HEIGHT: 62 IN | WEIGHT: 110.7 LBS | HEART RATE: 55 BPM | SYSTOLIC BLOOD PRESSURE: 107 MMHG | BODY MASS INDEX: 20.37 KG/M2

## 2018-12-16 LAB
ADV 40+41 DNA STL QL NAA+NON-PROBE: NOT DETECTED
ALBUMIN SERPL-MCNC: 4 G/DL (ref 3.5–5.2)
ALBUMIN/GLOB SERPL: 1.3 G/DL
ALP SERPL-CCNC: 47 U/L (ref 39–117)
ALT SERPL W P-5'-P-CCNC: 7 U/L (ref 1–33)
ANION GAP SERPL CALCULATED.3IONS-SCNC: 13.1 MMOL/L
AST SERPL-CCNC: 12 U/L (ref 1–32)
ASTRO TYP 1-8 RNA STL QL NAA+NON-PROBE: NOT DETECTED
BASOPHILS # BLD AUTO: 0.01 10*3/MM3 (ref 0–0.2)
BASOPHILS NFR BLD AUTO: 0.2 % (ref 0–1.5)
BILIRUB SERPL-MCNC: 0.4 MG/DL (ref 0.1–1.2)
BUN BLD-MCNC: 11 MG/DL (ref 8–23)
BUN/CREAT SERPL: 16.4 (ref 7–25)
C CAYETANENSIS DNA STL QL NAA+NON-PROBE: NOT DETECTED
C DIFF TOX GENS STL QL NAA+PROBE: NEGATIVE
CALCIUM SPEC-SCNC: 9.6 MG/DL (ref 8.6–10.5)
CAMPY SP DNA.DIARRHEA STL QL NAA+PROBE: NOT DETECTED
CHLORIDE SERPL-SCNC: 102 MMOL/L (ref 98–107)
CO2 SERPL-SCNC: 24.9 MMOL/L (ref 22–29)
CREAT BLD-MCNC: 0.67 MG/DL (ref 0.57–1)
CRYPTOSP STL CULT: NOT DETECTED
D-LACTATE SERPL-SCNC: 1.3 MMOL/L (ref 0.5–2)
DEPRECATED RDW RBC AUTO: 59.5 FL (ref 37–54)
E COLI DNA SPEC QL NAA+PROBE: NOT DETECTED
E HISTOLYT AG STL-ACNC: NOT DETECTED
EAEC PAA PLAS AGGR+AATA ST NAA+NON-PRB: NOT DETECTED
EC STX1 + STX2 GENES STL NAA+PROBE: NOT DETECTED
EOSINOPHIL # BLD AUTO: 0 10*3/MM3 (ref 0–0.7)
EOSINOPHIL NFR BLD AUTO: 0 % (ref 0.3–6.2)
EPEC EAE GENE STL QL NAA+NON-PROBE: NOT DETECTED
ERYTHROCYTE [DISTWIDTH] IN BLOOD BY AUTOMATED COUNT: 19.1 % (ref 11.7–13)
ETEC LTA+ST1A+ST1B TOX ST NAA+NON-PROBE: NOT DETECTED
G LAMBLIA DNA SPEC QL NAA+PROBE: NOT DETECTED
GFR SERPL CREATININE-BSD FRML MDRD: 86 ML/MIN/1.73
GLOBULIN UR ELPH-MCNC: 3.2 GM/DL
GLUCOSE BLD-MCNC: 168 MG/DL (ref 65–99)
HCT VFR BLD AUTO: 30.5 % (ref 35.6–45.5)
HGB BLD-MCNC: 9.4 G/DL (ref 11.9–15.5)
IMM GRANULOCYTES # BLD: 0.01 10*3/MM3 (ref 0–0.03)
IMM GRANULOCYTES NFR BLD: 0.2 % (ref 0–0.5)
LYMPHOCYTES # BLD AUTO: 1.85 10*3/MM3 (ref 0.9–4.8)
LYMPHOCYTES NFR BLD AUTO: 30.6 % (ref 19.6–45.3)
MCH RBC QN AUTO: 26.1 PG (ref 26.9–32)
MCHC RBC AUTO-ENTMCNC: 30.8 G/DL (ref 32.4–36.3)
MCV RBC AUTO: 84.7 FL (ref 80.5–98.2)
MONOCYTES # BLD AUTO: 0.43 10*3/MM3 (ref 0.2–1.2)
MONOCYTES NFR BLD AUTO: 7.1 % (ref 5–12)
NEUTROPHILS # BLD AUTO: 3.76 10*3/MM3 (ref 1.9–8.1)
NEUTROPHILS NFR BLD AUTO: 62.1 % (ref 42.7–76)
NOROVIRUS GI+II RNA STL QL NAA+NON-PROBE: NOT DETECTED
NRBC BLD MANUAL-RTO: 0 /100 WBC (ref 0–0)
P SHIGELLOIDES DNA STL QL NAA+NON-PROBE: NOT DETECTED
PLATELET # BLD AUTO: 235 10*3/MM3 (ref 140–500)
PMV BLD AUTO: 9.1 FL (ref 6–12)
POTASSIUM BLD-SCNC: 4.4 MMOL/L (ref 3.5–5.2)
PROT SERPL-MCNC: 7.2 G/DL (ref 6–8.5)
RBC # BLD AUTO: 3.6 10*6/MM3 (ref 3.9–5.2)
RV RNA STL NAA+PROBE: NOT DETECTED
SALMONELLA DNA SPEC QL NAA+PROBE: NOT DETECTED
SAPO I+II+IV+V RNA STL QL NAA+NON-PROBE: NOT DETECTED
SHIGELLA SP+EIEC IPAH STL QL NAA+PROBE: NOT DETECTED
SODIUM BLD-SCNC: 140 MMOL/L (ref 136–145)
V CHOLERAE DNA SPEC QL NAA+PROBE: NOT DETECTED
VIBRIO DNA SPEC NAA+PROBE: NOT DETECTED
WBC NRBC COR # BLD: 6.05 10*3/MM3 (ref 4.5–10.7)
YERSINIA STL CULT: NOT DETECTED

## 2018-12-16 PROCEDURE — 80053 COMPREHEN METABOLIC PANEL: CPT | Performed by: INTERNAL MEDICINE

## 2018-12-16 PROCEDURE — 83605 ASSAY OF LACTIC ACID: CPT | Performed by: HOSPITALIST

## 2018-12-16 PROCEDURE — 85025 COMPLETE CBC W/AUTO DIFF WBC: CPT | Performed by: INTERNAL MEDICINE

## 2018-12-16 PROCEDURE — G0378 HOSPITAL OBSERVATION PER HR: HCPCS

## 2018-12-16 PROCEDURE — 36415 COLL VENOUS BLD VENIPUNCTURE: CPT | Performed by: INTERNAL MEDICINE

## 2018-12-16 RX ORDER — MESALAMINE 4 G/60ML
4 ENEMA RECTAL 2 TIMES DAILY
Qty: 60 ENEMA | Refills: 0 | Status: SHIPPED | OUTPATIENT
Start: 2018-12-16 | End: 2019-01-03 | Stop reason: SDUPTHER

## 2018-12-16 RX ORDER — MESALAMINE 1.2 G/1
4.8 TABLET, DELAYED RELEASE ORAL
Qty: 120 TABLET | Refills: 0 | Status: SHIPPED | OUTPATIENT
Start: 2018-12-17 | End: 2019-03-06 | Stop reason: SDUPTHER

## 2018-12-16 RX ADMIN — MESALAMINE 4.8 G: 1.2 TABLET, DELAYED RELEASE ORAL at 08:09

## 2018-12-16 RX ADMIN — DORZOLAMIDE HYDROCHLORIDE 1 DROP: 20 SOLUTION/ DROPS OPHTHALMIC at 08:13

## 2018-12-16 RX ADMIN — METFORMIN HYDROCHLORIDE 1000 MG: 500 TABLET, EXTENDED RELEASE ORAL at 08:10

## 2018-12-16 RX ADMIN — LISINOPRIL 20 MG: 20 TABLET ORAL at 08:10

## 2018-12-16 RX ADMIN — AMLODIPINE BESYLATE 5 MG: 5 TABLET ORAL at 08:11

## 2018-12-16 RX ADMIN — PANTOPRAZOLE SODIUM 40 MG: 40 TABLET, DELAYED RELEASE ORAL at 06:22

## 2018-12-16 RX ADMIN — FERROUS SULFATE TAB 325 MG (65 MG ELEMENTAL FE) 325 MG: 325 (65 FE) TAB at 08:11

## 2018-12-16 RX ADMIN — MESALAMINE 4 G: 4 ENEMA RECTAL at 10:45

## 2018-12-16 RX ADMIN — TIMOLOL MALEATE 1 DROP: 5 SOLUTION/ DROPS OPHTHALMIC at 08:13

## 2018-12-16 RX ADMIN — SODIUM CHLORIDE, PRESERVATIVE FREE 3 ML: 5 INJECTION INTRAVENOUS at 08:13

## 2018-12-16 RX ADMIN — METOPROLOL TARTRATE 50 MG: 50 TABLET, FILM COATED ORAL at 06:22

## 2018-12-16 NOTE — PLAN OF CARE
Problem: Patient Care Overview  Goal: Plan of Care Review  Outcome: Ongoing (interventions implemented as appropriate)   12/16/18 0433   Coping/Psychosocial   Plan of Care Reviewed With patient   Plan of Care Review   Progress no change   OTHER   Outcome Summary vss. had bloody stools last night. c diff and gi panel negative. mesalamine enema given last night. labs ordered this am     Goal: Individualization and Mutuality  Outcome: Ongoing (interventions implemented as appropriate)    Goal: Discharge Needs Assessment  Outcome: Ongoing (interventions implemented as appropriate)    Goal: Interprofessional Rounds/Family Conf  Outcome: Ongoing (interventions implemented as appropriate)      Problem: Gastrointestinal Bleeding (Adult)  Goal: Signs and Symptoms of Listed Potential Problems Will be Absent, Minimized or Managed (Gastrointestinal Bleeding)  Outcome: Ongoing (interventions implemented as appropriate)      Problem: Bowel Disease, Inflammatory (Adult)  Goal: Signs and Symptoms of Listed Potential Problems Will be Absent, Minimized or Managed (Bowel Disease, Inflammatory)  Outcome: Ongoing (interventions implemented as appropriate)

## 2018-12-16 NOTE — DISCHARGE SUMMARY
Name: Renee PARIKH From  Age: 73 y.o.  Sex: female  :  1945  MRN: [id number]         Primary Care Physician: Esequiel Pacheco MD      Date of Admission:  12/15/2018  Date of Discharge:  2018      Chief Complaint:  Black or Bloody Stool      Presenting Problem/History of Present Illness:  Lower GI bleed [K92.2]  Ulcerative colitis with complication, unspecified location (CMS/[facility]) [K51.919]     Discharge Diagnosis:  Active Hospital Problems    Diagnosis Date Noted   • **Lower GI bleed [K92.2] 12/15/2018   • Lactic acidosis [E87.2] 12/15/2018   • Acute post-hemorrhagic anemia [D62] 2018   • Ulcerative colitis with rectal bleeding (CMS/[facility]) [K51.911] 10/31/2018   • Coronary artery disease involving native coronary artery of native heart without angina pectoris [I25.10] 2017   • Gastroesophageal reflux disease [K21.9] 2016   • Hypertension [I10] 2016   • Type 2 diabetes mellitus with circulatory disorder (CMS/[facility]) [E11.59] 2016      Resolved Hospital Problems   No resolved problems to display.       Secondary Diagnoses:  Past Medical History:   Diagnosis Date   • Allergic rhinitis    • Colitis    • Colon polyps    • Diabetes mellitus (CMS/[facility])     type 2   • Dizziness    • Encounter for breast cancer screening other than mammogram    • GERD (gastroesophageal reflux disease)    • Glaucoma    • Hyperlipidemia    • Hypertension    • Knee fracture, left    • Non-STEMI (non-ST elevated myocardial infarction) (CMS/[facility]) 2017   • Osteopenia    • Ulcerative colitis (CMS/[facility])          Consults:  Consult Orders (all) (From admission, onward)    Start     Ordered    12/15/18 1709  Inpatient Consult to Advance Care Planning  Once     Provider:  (Not yet assigned)    12/15/18 1708    12/15/18 1333  Inpatient Gastroenterology Consult  Once     Specialty:  Gastroenterology  Provider:  (Not yet assigned)    12/15/18 1332    12/15/18 1305  [facility] (on-call MD unless specified)  Once     Specialty:   "Internal Medicine  Provider:  (Not yet assigned)    12/15/18 1304        Procedures Performed:  None        Hospital Course:  Very pleasant 74yo woman just admitted to our service in November for UC flare with rectal bleeding. She came to ER again with report of 3 days of intermittent diarrhea. She reported 10 episodes of bright red blood mixed with stool. Denied N/V or abd pain. Said she really feels pretty good--\"not like last time\". Hgb was 9.9. It had been 10.1 on discharge last admission. She was started on BID Mesalamine enemas and higher dose of oral Mesalamine by GI (Dr. Browne). She continues to have intermittent small amounts of rectal bleeding. Hgb is stable this AM. GI service feels she is stable for discharge. They are going to arrange CBC in a few days and office follow up with Dr. Burleson in 2 weeks. Continue Mesalamine therapy for now. Pt is amenable to this plan. She consistently denies N/V/abd pain. She is tolerating diet. Voiding well.             Physical Exam:  Temp:  [97 °F (36.1 °C)-97.8 °F (36.6 °C)] 97.4 °F (36.3 °C)  Heart Rate:  [55-69] 55  Resp:  [13-18] 16  BP: (107-165)/(61-78) 107/69  Body mass index is 20.25 kg/m².  Physical Exam   Constitutional: She is oriented to person, place, and time. She appears well-developed and well-nourished. No distress.   HENT:   Head: Normocephalic and atraumatic.   Mouth/Throat: Oropharynx is clear and moist. No oropharyngeal exudate.   Eyes: Conjunctivae and EOM are normal. Pupils are equal, round, and reactive to light. Right eye exhibits no discharge. Left eye exhibits no discharge. No scleral icterus.   Neck: Normal range of motion. Neck supple. No thyromegaly present.   Cardiovascular: Normal rate, regular rhythm, normal heart sounds and intact distal pulses.   Pulmonary/Chest: Effort normal and breath sounds normal. No respiratory distress.   Abdominal: Soft. Bowel sounds are normal. She exhibits no distension. There is no tenderness. "   Musculoskeletal: She exhibits no edema.   Lymphadenopathy:     She has no cervical adenopathy.   Neurological: She is alert and oriented to person, place, and time. No cranial nerve deficit or sensory deficit.   Skin: Skin is warm and dry. Capillary refill takes less than 2 seconds. No rash noted. She is not diaphoretic. No erythema.   Psychiatric: She has a normal mood and affect. Her behavior is normal.   Nursing note and vitals reviewed.      Condition on Discharge:  Stable.    Discharge Disposition:   Home with family    Allergies:   Allergies   Allergen Reactions   • Tetanus Toxoids Swelling     Hand and arm       Discharge Medications:      Discharge Medications      Changes to Medications      Instructions Start Date   mesalamine 4 g enema  Commonly known as:  ROWASA  What changed:  You were already taking a medication with the same name, and this prescription was added. Make sure you understand how and when to take each.   4 g, Rectal, 2 Times Daily      mesalamine 1.2 g EC tablet  Commonly known as:  LIALDA  What changed:  how much to take   4.8 g, Oral, Daily With Breakfast         Continue These Medications      Instructions Start Date   amLODIPine 5 MG tablet  Commonly known as:  NORVASC   5 mg, Oral, Daily      MAIA CONTOUR NEXT TEST test strip  Generic drug:  glucose blood   CHECK BLOOD SUGAR ONCE DAILY      dorzolamide-timolol 22.3-6.8 MG/ML ophthalmic solution  Commonly known as:  COSOPT   1 drop, Ophthalmic, 2 Times Daily      ferrous sulfate 325 (65 FE) MG tablet   325 mg, Oral, Daily With Breakfast      hydrocortisone 2.5 % rectal cream  Commonly known as:  ANUSOL-HC   Rectal, 2 Times Daily      lisinopril 20 MG tablet  Commonly known as:  PRINIVIL,ZESTRIL   TAKE 1 TABLET BY MOUTH EVERY DAY      metFORMIN  MG 24 hr tablet  Commonly known as:  GLUCOPHAGE-XR   TAKE 2 TABLETS BY MOUTH TWICE DAILY      metoprolol tartrate 50 MG tablet  Commonly known as:  LOPRESSOR   TAKE 1 TABLET BY MOUTH  EVERY 12 HOURS      MULTI-VITAMIN PO   1 tablet, Oral, Daily      omeprazole 40 MG capsule  Commonly known as:  priLOSEC   1 capsule, Oral, Daily      rosuvastatin 10 MG tablet  Commonly known as:  CRESTOR   10 mg, Oral, Daily      TYLENOL PO   2 tablets, Oral, As Needed      Vitamin D 2000 units capsule   1 capsule, Oral, Daily             Discharge Diet:     Activity at Discharge:     Follow-up Appointments:  Future Appointments   Date Time Provider Department Center   1/4/2019 11:30 AM Esequiel Pacheco MD [facility] None   1/31/2019  1:00 PM Brandie Herring PA [facility] None     Additional Instructions for the Follow-ups that You Need to Schedule     Discharge Follow-up with PCP   As directed       Currently Documented PCP:    Esequiel Pacheco MD    PCP Phone Number:    [phone]     Follow Up Details:  Dr. Pacheco (PCP) in 1 week         Discharge Follow-up with Specified Provider: Dr. Burleson (GI); 2 Weeks   As directed      To:  Dr. Burleson (GI)    Follow Up:  2 Weeks           Follow-up Information     Esequiel Pacheco MD .    Specialty:  Internal Medicine  Why:  Dr. Pacheco (PCP) in 1 week  Contact information:  [address] [address]  [phone]                 Additional Instructions for the Follow-ups that You Need to Schedule     Discharge Follow-up with PCP   As directed       Currently Documented PCP:    Esequiel Pacheco MD    PCP Phone Number:    [phone]     Follow Up Details:  Dr. Pacheco (PCP) in 1 week         Discharge Follow-up with Specified Provider: Dr. Burleson (GI); 2 Weeks   As directed      To:  Dr. Burleson (GI)    Follow Up:  2 Weeks               Test Results Pending at Discharge:  None     Code status:   Code Status and Medical Interventions:   Ordered at: 12/15/18 1812     Level Of Support Discussed With:    Patient     Code Status:    CPR     Medical Interventions (Level of Support Prior to Arrest):    Full         Jose L Garvin MD  [facility]  12/16/18  1:27 PM      Time: greater than 30 minutes.

## 2018-12-18 ENCOUNTER — TELEPHONE (OUTPATIENT)
Dept: GASTROENTEROLOGY | Facility: CLINIC | Age: 73
End: 2018-12-18

## 2018-12-18 DIAGNOSIS — K92.2 LOWER GI BLEED: Primary | ICD-10-CM

## 2018-12-18 NOTE — TELEPHONE ENCOUNTER
----- Message from Lyndsay Hill RN sent at 12/17/2018  8:39 AM EST -----  Regarding: FW: ov  Will await OK from Dr Burleson   ----- Message -----  From: Juanito Browne MD  Sent: 12/16/2018   2:22 PM  To: Melissa Burleson MD, #  Subject: ov                                               Patient would like to switch to [facility], I do believe Dr. Burleson saw her first, office visit per her discretion    Admitted again over the weekend for a mild flare of ulcerative colitis, I did not use steroids but sent her home on topical mesalamine in addition to oral

## 2018-12-18 NOTE — TELEPHONE ENCOUNTER
Per Daryl AMAYA, patient called back.   Pt called today regarding an appt and labs that need to be done. Please call her regarding this. Thx

## 2018-12-18 NOTE — TELEPHONE ENCOUNTER
Call to pt.  States calling to make lab appt and f/u appt with DR Burleson as per hosp d/c instructions (see AVS discharge under Media Tab.      Lab appt scheduled for 12/19 @ 2pm - order placed for CBC.  Message to Dr Burleson.      Message to Manager for 2 week appt.

## 2018-12-19 LAB
BASOPHILS # BLD AUTO: 0.01 10*3/MM3 (ref 0–0.2)
BASOPHILS NFR BLD AUTO: 0.1 % (ref 0–1.5)
EOSINOPHIL # BLD AUTO: 0 10*3/MM3 (ref 0–0.7)
EOSINOPHIL NFR BLD AUTO: 0 % (ref 0.3–6.2)
ERYTHROCYTE [DISTWIDTH] IN BLOOD BY AUTOMATED COUNT: 19.7 % (ref 11.7–13)
HCT VFR BLD AUTO: 29.3 % (ref 35.6–45.5)
HGB BLD-MCNC: 8.7 G/DL (ref 11.9–15.5)
IMM GRANULOCYTES # BLD: 0.01 10*3/MM3 (ref 0–0.03)
IMM GRANULOCYTES NFR BLD: 0.1 % (ref 0–0.5)
LYMPHOCYTES # BLD AUTO: 2.19 10*3/MM3 (ref 0.9–4.8)
LYMPHOCYTES NFR BLD AUTO: 28.4 % (ref 19.6–45.3)
MCH RBC QN AUTO: 25.8 PG (ref 26.9–32)
MCHC RBC AUTO-ENTMCNC: 29.7 G/DL (ref 32.4–36.3)
MCV RBC AUTO: 86.9 FL (ref 80.5–98.2)
MONOCYTES # BLD AUTO: 0.65 10*3/MM3 (ref 0.2–1.2)
MONOCYTES NFR BLD AUTO: 8.4 % (ref 5–12)
NEUTROPHILS # BLD AUTO: 4.85 10*3/MM3 (ref 1.9–8.1)
NEUTROPHILS NFR BLD AUTO: 63.1 % (ref 42.7–76)
PLATELET # BLD AUTO: 274 10*3/MM3 (ref 140–500)
RBC # BLD AUTO: 3.37 10*6/MM3 (ref 3.9–5.2)
WBC # BLD AUTO: 7.7 10*3/MM3 (ref 4.5–10.7)

## 2018-12-20 ENCOUNTER — TELEPHONE (OUTPATIENT)
Dept: GASTROENTEROLOGY | Facility: CLINIC | Age: 73
End: 2018-12-20

## 2018-12-20 NOTE — TELEPHONE ENCOUNTER
Her blood count is only slightly lower than when she was in the hospital.    Will repeat labs at her follow up visit 1/2/19

## 2018-12-20 NOTE — TELEPHONE ENCOUNTER
----- Message from Melissa Burleson MD sent at 12/20/2018  1:14 PM EST -----  Her blood count is only slightly lower than when she was in the hospital.    Will repeat labs at her follow up visit 1/2/19

## 2018-12-20 NOTE — TELEPHONE ENCOUNTER
Call to pt.  Advise per Dr Burleson that blood count is only slightly lower than when was in the hosp.     Will repeat labs at f/u visit 1/2/19. Pt verb understanding.

## 2018-12-26 RX ORDER — METOPROLOL TARTRATE 50 MG/1
TABLET, FILM COATED ORAL
Qty: 180 TABLET | Refills: 0 | Status: SHIPPED | OUTPATIENT
Start: 2018-12-26 | End: 2019-03-26 | Stop reason: SDUPTHER

## 2019-01-02 ENCOUNTER — OFFICE VISIT (OUTPATIENT)
Dept: GASTROENTEROLOGY | Facility: CLINIC | Age: 74
End: 2019-01-02

## 2019-01-02 VITALS
DIASTOLIC BLOOD PRESSURE: 82 MMHG | TEMPERATURE: 97.7 F | WEIGHT: 111 LBS | BODY MASS INDEX: 20.43 KG/M2 | HEIGHT: 62 IN | SYSTOLIC BLOOD PRESSURE: 124 MMHG

## 2019-01-02 DIAGNOSIS — D62 ACUTE POST-HEMORRHAGIC ANEMIA: ICD-10-CM

## 2019-01-02 DIAGNOSIS — R63.4 WEIGHT LOSS, ABNORMAL: ICD-10-CM

## 2019-01-02 DIAGNOSIS — K51.311 ULCERATIVE RECTOSIGMOIDITIS WITH RECTAL BLEEDING (HCC): Primary | Chronic | ICD-10-CM

## 2019-01-02 LAB
HCT VFR BLD AUTO: 28.3 % (ref 35.6–45.5)
HGB BLD-MCNC: 8.5 G/DL (ref 11.9–15.5)

## 2019-01-02 PROCEDURE — 99214 OFFICE O/P EST MOD 30 MIN: CPT | Performed by: INTERNAL MEDICINE

## 2019-01-02 NOTE — PROGRESS NOTES
Chief Complaint   Patient presents with   • Rectal Bleeding     Subjective     HPI  Renee Stevenson is a 73 y.o. female who presents for follow-up.  She was previously followed by Dr. Adams at [facility].  She has a history of left-sided ulcerative colitis.  Her last colonoscopy was with Dr. Adams in August 14, 2018.  Records reviewed and it was notable for colitis from the rectum into the sigmoid colon.  Additionally she had 2 tubular adenomas removed.  She had been maintained on mesalamine po.  She was hospitalized October 31 through November 4 for rectal bleeding with associated anemia.  She was started on mesalamine enemas.  She also had a brief stay December 14 for similar symptoms, though not as severe.    She is here for follow-up.    She reports 3 diarrhea stools today, 1 with red blood (spoonfuls) in it, the other 2 were not bloody.  Yesterday no blood in her stool.  The day before she did see blood.  Diarrhea occurring every few weeks.  Reports the blood in her stool is significantly improved from what is was previously.    Does not endorse SOA, chest pain, significant fatigue or other symptoms of anemia.      She is using the mesalamine enema twice daily and taking 4.8g daily lialda.    Weight is down.  Feels poor appetite.  Drinking ensure, trying to eat higher calorie foods.      Past Medical History:   Diagnosis Date   • Allergic rhinitis    • Colitis    • Colon polyps    • Diabetes mellitus (CMS/HCC)     type 2   • Dizziness    • Encounter for breast cancer screening other than mammogram    • GERD (gastroesophageal reflux disease)    • Glaucoma    • Hyperlipidemia    • Hypertension    • Knee fracture, left    • Non-STEMI (non-ST elevated myocardial infarction) (CMS/HCC) 6/16/2017   • Osteopenia    • Ulcerative colitis (CMS/HCC)        Social History     Socioeconomic History   • Marital status:      Spouse name: Not on file   • Number of children: 1   • Years of education: Not on file   • Highest education  level: Not on file   Occupational History   • Occupation: retail     Comment: retired   Tobacco Use   • Smoking status: Never Smoker   • Smokeless tobacco: Never Used   Substance and Sexual Activity   • Alcohol use: No   • Drug use: No   • Sexual activity: Defer         Current Outpatient Medications:   •  Acetaminophen (TYLENOL PO), Take 2 tablets by mouth As Needed., Disp: , Rfl:   •  amLODIPine (NORVASC) 5 MG tablet, Take 1 tablet by mouth Daily., Disp: 30 tablet, Rfl: 3  •  MAIA CONTOUR NEXT TEST test strip, CHECK BLOOD SUGAR ONCE DAILY, Disp: 100 each, Rfl: 12  •  Cholecalciferol (VITAMIN D) 2000 UNITS capsule, Take 1 capsule by mouth daily., Disp: , Rfl:   •  dorzolamide-timolol (COSOPT) 22.3-6.8 MG/ML ophthalmic solution, Apply 1 drop to eye 2 (two) times a day., Disp: , Rfl:   •  ferrous sulfate 325 (65 FE) MG tablet, Take 1 tablet by mouth Daily With Breakfast., Disp: 30 tablet, Rfl: 0  •  hydrocortisone (ANUSOL-HC) 2.5 % rectal cream, Insert  into the rectum 2 (Two) Times a Day., Disp: 28 g, Rfl: 0  •  lisinopril (PRINIVIL,ZESTRIL) 20 MG tablet, TAKE 1 TABLET BY MOUTH EVERY DAY, Disp: 90 tablet, Rfl: 1  •  mesalamine (LIALDA) 1.2 g EC tablet, Take 4 tablets by mouth Daily With Breakfast., Disp: 120 tablet, Rfl: 0  •  mesalamine (ROWASA) 4 g enema, Insert 1 enema into the rectum 2 (Two) Times a Day., Disp: 60 enema, Rfl: 0  •  metFORMIN ER (GLUCOPHAGE-XR) 500 MG 24 hr tablet, TAKE 2 TABLETS BY MOUTH TWICE DAILY, Disp: 360 tablet, Rfl: 1  •  metoprolol tartrate (LOPRESSOR) 50 MG tablet, TAKE 1 TABLET BY MOUTH EVERY 12 HOURS, Disp: 180 tablet, Rfl: 0  •  Multiple Vitamin (MULTI-VITAMIN PO), Take 1 tablet by mouth Daily., Disp: , Rfl:   •  omeprazole (PriLOSEC) 40 MG capsule, Take 1 capsule by mouth daily., Disp: , Rfl:   •  rosuvastatin (CRESTOR) 10 MG tablet, Take 1 tablet by mouth Daily., Disp: 30 tablet, Rfl: 3    Review of Systems   Constitutional: Negative for activity change, appetite change, chills  and fever.   HENT: Negative for trouble swallowing.    Respiratory: Negative.    Cardiovascular: Negative.  Negative for chest pain.   Gastrointestinal: Positive for anal bleeding and diarrhea. Negative for abdominal distention, abdominal pain, constipation, nausea and vomiting.   Genitourinary: Negative for dysuria, frequency and hematuria.       Objective   Vitals:    01/02/19 1204   BP: 124/82   Temp: 97.7 °F (36.5 °C)         01/02/19  1204   Weight: 50.3 kg (111 lb)     Body mass index is 20.3 kg/m².      Physical Exam   Constitutional: She is oriented to person, place, and time. She appears well-developed and well-nourished. No distress.   HENT:   Head: Normocephalic and atraumatic.   Right Ear: External ear normal.   Left Ear: External ear normal.   Nose: Nose normal.   Mouth/Throat: Oropharynx is clear and moist.   Eyes: Conjunctivae and EOM are normal. Right eye exhibits no discharge. Left eye exhibits no discharge. No scleral icterus.   Neck: Normal range of motion. Neck supple. No thyromegaly present.   No supraclavicular adenopathy   Cardiovascular: Normal rate, regular rhythm, normal heart sounds and intact distal pulses. Exam reveals no gallop.   No murmur heard.  No lower extremity edema   Pulmonary/Chest: Effort normal and breath sounds normal. No respiratory distress. She has no wheezes.   Abdominal: Soft. Normal appearance and bowel sounds are normal. She exhibits no distension and no mass. There is no hepatosplenomegaly. There is no tenderness. There is no rigidity, no rebound and no guarding. No hernia.   Genitourinary:   Genitourinary Comments: Rectal exam deferred   Musculoskeletal: Normal range of motion. She exhibits no edema or tenderness.   No atrophy of upper or lower extremities.  Normal digits and nails of both hands.   Lymphadenopathy:     She has no cervical adenopathy.   Neurological: She is alert and oriented to person, place, and time. She displays no atrophy. Coordination normal.    Skin: Skin is warm and dry. No rash noted. She is not diaphoretic. No erythema.   Psychiatric: She has a normal mood and affect. Her behavior is normal. Judgment and thought content normal.   Vitals reviewed.      WBC   Date Value Ref Range Status   12/19/2018 7.70 4.50 - 10.70 10*3/mm3 Final     RBC   Date Value Ref Range Status   12/19/2018 3.37 (L) 3.90 - 5.20 10*6/mm3 Final     Hemoglobin   Date Value Ref Range Status   12/19/2018 8.7 (L) 11.9 - 15.5 g/dL Final   12/16/2018 9.4 (L) 11.9 - 15.5 g/dL Final     Hematocrit   Date Value Ref Range Status   12/19/2018 29.3 (L) 35.6 - 45.5 % Final   12/16/2018 30.5 (L) 35.6 - 45.5 % Final     MCV   Date Value Ref Range Status   12/19/2018 86.9 80.5 - 98.2 fL Final   12/16/2018 84.7 80.5 - 98.2 fL Final     MCH   Date Value Ref Range Status   12/19/2018 25.8 (L) 26.9 - 32.0 pg Final   12/16/2018 26.1 (L) 26.9 - 32.0 pg Final     MCHC   Date Value Ref Range Status   12/19/2018 29.7 (L) 32.4 - 36.3 g/dL Final   12/16/2018 30.8 (L) 32.4 - 36.3 g/dL Final     RDW   Date Value Ref Range Status   12/19/2018 19.7 (H) 11.7 - 13.0 % Final   12/16/2018 19.1 (H) 11.7 - 13.0 % Final     RDW-SD   Date Value Ref Range Status   12/16/2018 59.5 (H) 37.0 - 54.0 fl Final     MPV   Date Value Ref Range Status   12/16/2018 9.1 6.0 - 12.0 fL Final     Platelets   Date Value Ref Range Status   12/19/2018 274 140 - 500 10*3/mm3 Final   12/16/2018 235 140 - 500 10*3/mm3 Final     Neutrophil %   Date Value Ref Range Status   12/16/2018 62.1 42.7 - 76.0 % Final     Neutrophil Rel %   Date Value Ref Range Status   12/19/2018 63.1 42.7 - 76.0 % Final     Lymphocyte %   Date Value Ref Range Status   12/16/2018 30.6 19.6 - 45.3 % Final     Lymphocyte Rel %   Date Value Ref Range Status   12/19/2018 28.4 19.6 - 45.3 % Final     Monocyte %   Date Value Ref Range Status   12/16/2018 7.1 5.0 - 12.0 % Final     Monocyte Rel %   Date Value Ref Range Status   12/19/2018 8.4 5.0 - 12.0 % Final      Eosinophil %   Date Value Ref Range Status   12/16/2018 0.0 (L) 0.3 - 6.2 % Final     Eosinophil Rel %   Date Value Ref Range Status   12/19/2018 0.0 (L) 0.3 - 6.2 % Final     Basophil %   Date Value Ref Range Status   12/16/2018 0.2 0.0 - 1.5 % Final     Basophil Rel %   Date Value Ref Range Status   12/19/2018 0.1 0.0 - 1.5 % Final     Immature Grans %   Date Value Ref Range Status   12/16/2018 0.2 0.0 - 0.5 % Final     Neutrophils, Absolute   Date Value Ref Range Status   12/16/2018 3.76 1.90 - 8.10 10*3/mm3 Final     Neutrophils Absolute   Date Value Ref Range Status   12/19/2018 4.85 1.90 - 8.10 10*3/mm3 Final     Lymphocytes, Absolute   Date Value Ref Range Status   12/16/2018 1.85 0.90 - 4.80 10*3/mm3 Final     Lymphocytes Absolute   Date Value Ref Range Status   12/19/2018 2.19 0.90 - 4.80 10*3/mm3 Final     Monocytes, Absolute   Date Value Ref Range Status   12/16/2018 0.43 0.20 - 1.20 10*3/mm3 Final     Monocytes Absolute   Date Value Ref Range Status   12/19/2018 0.65 0.20 - 1.20 10*3/mm3 Final     Eosinophils, Absolute   Date Value Ref Range Status   12/16/2018 0.00 0.00 - 0.70 10*3/mm3 Final     Eosinophils Absolute   Date Value Ref Range Status   12/19/2018 0.00 0.00 - 0.70 10*3/mm3 Final     Basophils, Absolute   Date Value Ref Range Status   12/16/2018 0.01 0.00 - 0.20 10*3/mm3 Final     Basophils Absolute   Date Value Ref Range Status   12/19/2018 0.01 0.00 - 0.20 10*3/mm3 Final     Immature Grans, Absolute   Date Value Ref Range Status   12/16/2018 0.01 0.00 - 0.03 10*3/mm3 Final     nRBC   Date Value Ref Range Status   12/16/2018 0.0 0.0 - 0.0 /100 WBC Final       Lab Results   Component Value Date    GLUCOSE 168 (H) 12/16/2018    BUN 11 12/16/2018    CREATININE 0.67 12/16/2018    EGFRIFNONA 86 12/16/2018    EGFRIFAFRI 85 02/19/2018    BCR 16.4 12/16/2018    CO2 24.9 12/16/2018    CALCIUM 9.6 12/16/2018    PROTENTOTREF 7.3 02/19/2018    ALBUMIN 4.00 12/16/2018    LABIL2 1.4 02/19/2018    AST  12 12/16/2018    ALT 7 12/16/2018         Imaging Results (last 7 days)     ** No results found for the last 168 hours. **            Assessment/Plan    Ulcerative rectosigmoiditis with rectal bleeding: improving on mesalamine po and pr    Acute post-hemorrhagic anemia: slight decline on 12/19 labs but on oral iron, not describing symptoms of worsened anemia    Weight loss, abnormal: reports a weight in the summer of 125, working on eating more    Plan  Hemoglobin & Hematocrit today  If Hb is stable, will have her decrease enemas to qhs and resume a daily baby asa  Continue mesalimine 4.8g daily and enemas for now  Continue oral iron  Back in 2 months to follow    Renee was seen today for rectal bleeding.    Diagnoses and all orders for this visit:    Ulcerative rectosigmoiditis with rectal bleeding (CMS/HCC)  -     Hemoglobin & Hematocrit, Blood    Acute post-hemorrhagic anemia  -     Hemoglobin & Hematocrit, Blood    Weight loss, abnormal        Dictated utilizing Dragon dictation

## 2019-01-02 NOTE — PATIENT INSTRUCTIONS
Labs today    We can adjust medications based on those results    For any additional questions, concerns or changes to your condition after today's office visit please contact the office at [phone].

## 2019-01-03 ENCOUNTER — TELEPHONE (OUTPATIENT)
Dept: GASTROENTEROLOGY | Facility: CLINIC | Age: 74
End: 2019-01-03

## 2019-01-03 DIAGNOSIS — D50.8 IRON DEFICIENCY ANEMIA SECONDARY TO INADEQUATE DIETARY IRON INTAKE: Primary | ICD-10-CM

## 2019-01-03 RX ORDER — MESALAMINE 4 G/60ML
4 ENEMA RECTAL NIGHTLY
Qty: 60 ENEMA | Refills: 1 | Status: SHIPPED | OUTPATIENT
Start: 2019-01-03 | End: 2019-07-16 | Stop reason: SDUPTHER

## 2019-01-03 NOTE — TELEPHONE ENCOUNTER
----- Message from Melissa Burleson MD sent at 1/3/2019  8:13 AM EST -----  Her hemoglobin is 8.5, this is a 2/10 lower than it was 2 weeks ago, so she is staying mostly stable but we have room to improve.    She needs to continue the oral iron pills.    Let's get her set up for 3 venofer infusions, one every week for the next 3 weeks.

## 2019-01-03 NOTE — TELEPHONE ENCOUNTER
I have renewed her enemas--please liver notes okay to decrease to once per night.    The venofer dosage is 200 mg

## 2019-01-03 NOTE — PROGRESS NOTES
Her hemoglobin is 8.5, this is a 2/10 lower than it was 2 weeks ago, so she is staying mostly stable but we have room to improve.    She needs to continue the oral iron pills.    Let's get her set up for 3 venofer infusions, one every week for the next 3 weeks.

## 2019-01-03 NOTE — TELEPHONE ENCOUNTER
Case request and tx plan placed for Venofer - message to DR Burleson.      Call to pt.  Advise that [facility] will be contacting to arrange infusions.  Advise per Dr Burleson that enemas have been renewed.  Ok to decrease to once per night.  Pt verb understanding.

## 2019-01-03 NOTE — TELEPHONE ENCOUNTER
Repeated attempts to contact pt - repeatedly drops call.    Call to sonTerry - see [employer] auth.  Request for pt to contact office - low Hgb.  Arranging for venofer infusions.  Terry verb understanding.    Call from pt.  Advise per Dr Burleson that Hgb if 8l5, this is 2/10 lower than it was 2 weeks ago, so staying mostly stable, but have room to improve.    Needs to continue the oral iron pills.      Will set up for 3 venofer infusions, one every week for the next 3 weeks.    Pt verb understanding.  States will need refill on enemas.    Call to [facility] and spoke with Josselyn.  Need to know venofer dosage in order to block out appropriate time.  Message to Dr Burleson.

## 2019-01-04 ENCOUNTER — OFFICE VISIT (OUTPATIENT)
Dept: INTERNAL MEDICINE | Age: 74
End: 2019-01-04

## 2019-01-04 VITALS
HEIGHT: 62 IN | OXYGEN SATURATION: 98 % | DIASTOLIC BLOOD PRESSURE: 64 MMHG | BODY MASS INDEX: 20.61 KG/M2 | TEMPERATURE: 97.8 F | WEIGHT: 112 LBS | SYSTOLIC BLOOD PRESSURE: 132 MMHG | HEART RATE: 64 BPM

## 2019-01-04 DIAGNOSIS — D62 ACUTE POST-HEMORRHAGIC ANEMIA: ICD-10-CM

## 2019-01-04 DIAGNOSIS — I10 ESSENTIAL HYPERTENSION: Primary | Chronic | ICD-10-CM

## 2019-01-04 DIAGNOSIS — E11.59 TYPE 2 DIABETES MELLITUS WITH OTHER CIRCULATORY COMPLICATION, WITHOUT LONG-TERM CURRENT USE OF INSULIN (HCC): Chronic | ICD-10-CM

## 2019-01-04 DIAGNOSIS — K51.311 ULCERATIVE RECTOSIGMOIDITIS WITH RECTAL BLEEDING (HCC): Chronic | ICD-10-CM

## 2019-01-04 PROCEDURE — 99214 OFFICE O/P EST MOD 30 MIN: CPT | Performed by: INTERNAL MEDICINE

## 2019-01-04 RX ORDER — SODIUM CHLORIDE 9 MG/ML
250 INJECTION, SOLUTION INTRAVENOUS ONCE
Status: CANCELLED | OUTPATIENT
Start: 2019-01-07

## 2019-01-04 NOTE — PROGRESS NOTES
[facility] [place] INTERNAL MEDICINE  ILA PATIÑO M.D.      Renee PARIKH From / 73 y.o. / female  01/04/2019      ASSESSMENT & PLAN:    Problem List Items Addressed This Visit        High    Hypertension - Primary (Chronic)    Relevant Medications    lisinopril (PRINIVIL,ZESTRIL) 20 MG tablet    amLODIPine (NORVASC) 5 MG tablet    metoprolol tartrate (LOPRESSOR) 50 MG tablet    Ulcerative colitis with rectal bleeding (CMS/HCC) (Chronic)    Overview     *Adams         Relevant Medications    omeprazole (PriLOSEC) 40 MG capsule       Medium    Acute post-hemorrhagic anemia    Relevant Medications    ferrous sulfate 325 (65 FE) MG tablet        No orders of the defined types were placed in this encounter.    No orders of the defined types were placed in this encounter.      Summary/Discussion:  ·     No Follow-up on file.    ____________________________________________________________________    MEDICATIONS  Current Outpatient Medications on File Prior to Visit   Medication Sig   • Acetaminophen (TYLENOL PO) Take 2 tablets by mouth As Needed.   • amLODIPine (NORVASC) 5 MG tablet Take 1 tablet by mouth Daily.   • MAIA CONTOUR NEXT TEST test strip CHECK BLOOD SUGAR ONCE DAILY   • Cholecalciferol (VITAMIN D) 2000 UNITS capsule Take 1 capsule by mouth daily.   • dorzolamide-timolol (COSOPT) 22.3-6.8 MG/ML ophthalmic solution Apply 1 drop to eye 2 (two) times a day.   • ferrous sulfate 325 (65 FE) MG tablet Take 1 tablet by mouth Daily With Breakfast.   • hydrocortisone (ANUSOL-HC) 2.5 % rectal cream Insert  into the rectum 2 (Two) Times a Day.   • lisinopril (PRINIVIL,ZESTRIL) 20 MG tablet TAKE 1 TABLET BY MOUTH EVERY DAY   • mesalamine (LIALDA) 1.2 g EC tablet Take 4 tablets by mouth Daily With Breakfast.   • mesalamine (ROWASA) 4 g enema Insert 1 enema into the rectum Every Night.   • metFORMIN ER (GLUCOPHAGE-XR) 500 MG 24 hr tablet TAKE 2 TABLETS BY MOUTH TWICE DAILY   • metoprolol tartrate (LOPRESSOR) 50 MG tablet TAKE 1 TABLET BY MOUTH  "EVERY 12 HOURS   • Multiple Vitamin (MULTI-VITAMIN PO) Take 1 tablet by mouth Daily.   • omeprazole (PriLOSEC) 40 MG capsule Take 1 capsule by mouth daily.   • rosuvastatin (CRESTOR) 10 MG tablet Take 1 tablet by mouth Daily.     No current facility-administered medications on file prior to visit.           VITALS:    Visit Vitals  /64   Pulse 64   Temp 97.8 °F (36.6 °C)   Ht 157.5 cm (62.01\")   Wt 50.8 kg (112 lb)   SpO2 98%   BMI 20.48 kg/m²       BP Readings from Last 3 Encounters:   01/04/19 132/64   01/02/19 124/82   12/16/18 107/69     Wt Readings from Last 3 Encounters:   01/04/19 50.8 kg (112 lb)   01/02/19 50.3 kg (111 lb)   12/15/18 50.2 kg (110 lb 11.2 oz)      Body mass index is 20.48 kg/m².    CC:  Main reason(s) for today's visit: Hospital F/U GI bleed (12/15/2018 to 12/16/2018) and Diabetes      HPI:     ***    Patient Care Team:  Esequiel Pacheco MD as PCP - General  Ministerio Wells MD as Consulting Physician (Orthopedic Surgery)  Abhay Wooten MD as Consulting Physician (Cardiology)  Cisco Husain MD as Consulting Physician (Otolaryngology)  EZIO Garibay MD as Consulting Physician (Ophthalmology)  Melissa Burleson MD as Consulting Physician (Gastroenterology)    ____________________________________________________________________    REVIEW OF SYSTEMS    Review of Systems  Constitutional neg  Resp neg  CV neg      PHYSICAL EXAMINATION    Physical Exam  Constitutional  No distress  Cardiovascular Rate  normal . Rhythm: regular . Heart sounds:  Normal  Pulmonary/Chest  Effort normal. Breath sounds:  Normal  Psychiatric  Alert. Judgment and thought content normal. Mood normal    REVIEWED DATA:    Labs:     Lab Results   Component Value Date     12/16/2018    K 4.4 12/16/2018    AST 12 12/16/2018    ALT 7 12/16/2018    BUN 11 12/16/2018    CREATININE 0.67 12/16/2018    CREATININE 0.79 12/15/2018    CREATININE 0.76 11/01/2018    EGFRIFNONA 86 12/16/2018    EGFRIFAFRI 85 02/19/2018 "       Lab Results   Component Value Date    HGBA1C 6.30 (H) 07/30/2018    HGBA1C 6.02 (H) 02/19/2018    HGBA1C 6.70 (H) 08/16/2017    GLUCOSE 168 (H) 12/16/2018    GLUCOSE 186 (H) 12/15/2018    GLUCOSE 149 (H) 11/01/2018    MICROALBUR 27.3 02/19/2018       Lab Results   Component Value Date    LDL 61 02/19/2018    LDL 69 08/12/2016    LDL 69 08/20/2015    HDL 43 02/19/2018    HDL 48 08/12/2016    HDL 55 08/20/2015    TRIG 104 02/19/2018    TRIG 96 08/12/2016    TRIG 92 08/20/2015    CHOLHDLRATIO 2.91 02/19/2018    CHOLHDLRATIO 2.8 08/12/2016    CHOLHDLRATIO 2.6 08/20/2015       No results found for: TSH, FREET4    Lab Results   Component Value Date    WBC 7.70 12/19/2018    HGB 8.5 (L) 01/02/2019    HGB 8.7 (L) 12/19/2018    HGB 9.4 (L) 12/16/2018     12/19/2018       Lab Results   Component Value Date    PROTEIN Negative 08/20/2015    GLUCOSEU Negative 08/20/2015    BLOODU Negative 05/05/2014    NITRITEU Negative 05/05/2014    LEUKOCYTESUR Negative 08/20/2015       Imaging:         Medical Tests:         Summary of old records / correspondence / consultant report:         Request outside records:         ALLERGIES  Allergies   Allergen Reactions   • Tetanus Toxoids Swelling     Hand and arm        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)   • Osteopenia   • Hypertension   • Hyperlipidemia   • Colon polyp   • Gastroesophageal reflux disease   • History of non-ST elevation myocardial infarction (NSTEMI)   • Glaucoma   • Coronary artery disease involving native coronary artery of native heart without angina pectoris   • Takotsubo cardiomyopathy   • Coronary artery disease involving native coronary artery of native heart without  angina pectoris   • Ulcerative colitis with rectal bleeding (CMS/[facility])   • Acute post-hemorrhagic anemia   • Iron deficiency anemia secondary to inadequate dietary iron intake       PAST MEDICAL HISTORY  Past Medical History:   Diagnosis Date   • Allergic rhinitis    • Colitis    • Colon polyps    • Diabetes mellitus (CMS/[facility])     type 2   • Dizziness    • Encounter for breast cancer screening other than mammogram    • GERD (gastroesophageal reflux disease)    • Glaucoma    • Hyperlipidemia    • Hypertension    • Knee fracture, left    • Non-STEMI (non-ST elevated myocardial infarction) (CMS/[facility]) 6/16/2017   • Osteopenia    • Ulcerative colitis (CMS/[facility])        SURGICAL HISTORY  Past Surgical History:   Procedure Laterality Date   • CARDIAC CATHETERIZATION N/A 6/16/2017    Procedure: Left Heart Cath;  Surgeon: Abhay Wooten MD;  Location: [facility] CATH INVASIVE LOCATION;  Service:    • CARDIAC CATHETERIZATION N/A 6/16/2017    Procedure: Left ventriculography;  Surgeon: Abhay Wooten MD;  Location:  [facility] CATH INVASIVE LOCATION;  Service:    • CARDIAC CATHETERIZATION N/A 6/16/2017    Procedure: Coronary angiography;  Surgeon: Abhay Wooten MD;  Location: [facility] CATH INVASIVE LOCATION;  Service:    • CATARACT EXTRACTION     • COLONOSCOPY  08/14/2018   • EYE SURGERY      cataract surgery   • PAP SMEAR         SOCIAL HISTORY  Social History     Socioeconomic History   • Marital status:      Spouse name: Not on file   • Number of children: 1   • Years of education: Not on file   • Highest education level: Not on file   Occupational History   • Occupation: retail     Comment: retired   Tobacco Use   • Smoking status: Never Smoker   • Smokeless tobacco: Never Used   Substance and Sexual Activity   • Alcohol use: No   • Drug use: No   • Sexual activity: Defer       FAMILY HISTORY  Family History   Problem Relation Age of Onset   • Heart disease Mother    • Hypertension Mother    • Diabetes Mother    • Heart disease Father    •  Hypertension Father    • Heart disease Sister    • Heart disease Brother    • Hypertension Other    • Diabetes Other         type 2   • No Known Problems Maternal Grandmother    • No Known Problems Maternal Grandfather    • No Known Problems Paternal Grandmother    • No Known Problems Paternal Grandfather    • Crohn's disease Niece          **Cheryl Disclaimer:   Much of this encounter note is an electronic transcription/translation of spoken language to printed text. The electronic translation of spoken language may permit erroneous, or at times, nonsensical words or phrases to be inadvertently transcribed. Although I have reviewed the note for such errors, some may still exist.

## 2019-01-07 ENCOUNTER — HOSPITAL ENCOUNTER (OUTPATIENT)
Dept: INFUSION THERAPY | Facility: HOSPITAL | Age: 74
Discharge: HOME | End: 2019-01-07
Attending: INTERNAL MEDICINE | Admitting: INTERNAL MEDICINE

## 2019-01-07 VITALS
TEMPERATURE: 97.1 F | DIASTOLIC BLOOD PRESSURE: 63 MMHG | OXYGEN SATURATION: 100 % | RESPIRATION RATE: 16 BRPM | HEART RATE: 60 BPM | SYSTOLIC BLOOD PRESSURE: 144 MMHG

## 2019-01-07 DIAGNOSIS — D50.8 IRON DEFICIENCY ANEMIA SECONDARY TO INADEQUATE DIETARY IRON INTAKE: Primary | ICD-10-CM

## 2019-01-07 PROCEDURE — 25010000002 IRON SUCROSE PER 1 MG: Performed by: INTERNAL MEDICINE

## 2019-01-07 PROCEDURE — 96365 THER/PROPH/DIAG IV INF INIT: CPT

## 2019-01-07 RX ORDER — SODIUM CHLORIDE 9 MG/ML
250 INJECTION, SOLUTION INTRAVENOUS ONCE
Status: CANCELLED | OUTPATIENT
Start: 2019-01-07

## 2019-01-07 RX ORDER — SODIUM CHLORIDE 9 MG/ML
250 INJECTION, SOLUTION INTRAVENOUS ONCE
Status: DISCONTINUED | OUTPATIENT
Start: 2019-01-07 | End: 2019-01-09 | Stop reason: HOSPADM

## 2019-01-07 RX ADMIN — IRON SUCROSE 200 MG: 20 INJECTION, SOLUTION INTRAVENOUS at 14:44

## 2019-01-07 NOTE — PATIENT INSTRUCTIONS
Iron Sucrose injection  What is this medicine?  IRON SUCROSE (GOLD virk) is an iron complex. Iron is used to make healthy red blood cells, which carry oxygen and nutrients throughout the body. This medicine is used to treat iron deficiency anemia in people with chronic kidney disease.  This medicine may be used for other purposes; ask your health care provider or pharmacist if you have questions.  COMMON BRAND NAME(S): Venofer  What should I tell my health care provider before I take this medicine?  They need to know if you have any of these conditions:  -anemia not caused by low iron levels  -heart disease  -high levels of iron in the blood  -kidney disease  -liver disease  -an unusual or allergic reaction to iron, other medicines, foods, dyes, or preservatives  -pregnant or trying to get pregnant  -breast-feeding  How should I use this medicine?  This medicine is for infusion into a vein. It is given by a health care professional in a hospital or clinic setting.  Talk to your pediatrician regarding the use of this medicine in children. While this drug may be prescribed for children as young as 2 years for selected conditions, precautions do apply.  Overdosage: If you think you have taken too much of this medicine contact a poison control center or emergency room at once.  NOTE: This medicine is only for you. Do not share this medicine with others.  What if I miss a dose?  It is important not to miss your dose. Call your doctor or health care professional if you are unable to keep an appointment.  What may interact with this medicine?  Do not take this medicine with any of the following medications:  -deferoxamine  -dimercaprol  -other iron products  This medicine may also interact with the following medications:  -chloramphenicol  -deferasirox  This list may not describe all possible interactions. Give your health care provider a list of all the medicines, herbs, non-prescription drugs, or dietary  supplements you use. Also tell them if you smoke, drink alcohol, or use illegal drugs. Some items may interact with your medicine.  What should I watch for while using this medicine?  Visit your doctor or healthcare professional regularly. Tell your doctor or healthcare professional if your symptoms do not start to get better or if they get worse. You may need blood work done while you are taking this medicine.  You may need to follow a special diet. Talk to your doctor. Foods that contain iron include: whole grains/cereals, dried fruits, beans, or peas, leafy green vegetables, and organ meats (liver, kidney).  What side effects may I notice from receiving this medicine?  Side effects that you should report to your doctor or health care professional as soon as possible:  -allergic reactions like skin rash, itching or hives, swelling of the face, lips, or tongue  -breathing problems  -changes in blood pressure  -cough  -fast, irregular heartbeat  -feeling faint or lightheaded, falls  -fever or chills  -flushing, sweating, or hot feelings  -joint or muscle aches/pains  -seizures  -swelling of the ankles or feet  -unusually weak or tired  Side effects that usually do not require medical attention (report to your doctor or health care professional if they continue or are bothersome):  -diarrhea  -feeling achy  -headache  -irritation at site where injected  -nausea, vomiting  -stomach upset  -tiredness  This list may not describe all possible side effects. Call your doctor for medical advice about side effects. You may report side effects to FDA at 8-408-FDA-1267.  Where should I keep my medicine?  This drug is given in a hospital or clinic and will not be stored at home.  NOTE: This sheet is a summary. It may not cover all possible information. If you have questions about this medicine, talk to your doctor, pharmacist, or health care provider.  © 2018 Elsevier/Gold Standard (2012-09-27 17:14:35)

## 2019-01-07 NOTE — PROGRESS NOTES
Patient tolerated infusion without complaints.  Patient ambulatory, D/C'd from [facility] at 1601 with family.

## 2019-01-14 ENCOUNTER — HOSPITAL ENCOUNTER (OUTPATIENT)
Dept: INFUSION THERAPY | Facility: HOSPITAL | Age: 74
Discharge: HOME | End: 2019-01-14
Admitting: INTERNAL MEDICINE

## 2019-01-14 VITALS
TEMPERATURE: 97.8 F | RESPIRATION RATE: 16 BRPM | SYSTOLIC BLOOD PRESSURE: 110 MMHG | OXYGEN SATURATION: 99 % | DIASTOLIC BLOOD PRESSURE: 54 MMHG | HEART RATE: 53 BPM

## 2019-01-14 DIAGNOSIS — D50.8 IRON DEFICIENCY ANEMIA SECONDARY TO INADEQUATE DIETARY IRON INTAKE: Primary | ICD-10-CM

## 2019-01-14 PROCEDURE — 25010000002 IRON SUCROSE PER 1 MG: Performed by: INTERNAL MEDICINE

## 2019-01-14 PROCEDURE — 96365 THER/PROPH/DIAG IV INF INIT: CPT

## 2019-01-14 RX ORDER — SODIUM CHLORIDE 9 MG/ML
250 INJECTION, SOLUTION INTRAVENOUS ONCE
Status: CANCELLED | OUTPATIENT
Start: 2019-01-14

## 2019-01-14 RX ORDER — SODIUM CHLORIDE 9 MG/ML
250 INJECTION, SOLUTION INTRAVENOUS ONCE
Status: DISCONTINUED | OUTPATIENT
Start: 2019-01-14 | End: 2019-01-17 | Stop reason: HOSPADM

## 2019-01-14 RX ADMIN — IRON SUCROSE 200 MG: 20 INJECTION, SOLUTION INTRAVENOUS at 11:23

## 2019-01-21 ENCOUNTER — HOSPITAL ENCOUNTER (OUTPATIENT)
Dept: INFUSION THERAPY | Facility: HOSPITAL | Age: 74
Discharge: HOME | End: 2019-01-21
Admitting: INTERNAL MEDICINE

## 2019-01-21 VITALS
HEART RATE: 55 BPM | RESPIRATION RATE: 20 BRPM | OXYGEN SATURATION: 100 % | SYSTOLIC BLOOD PRESSURE: 122 MMHG | TEMPERATURE: 95.2 F | DIASTOLIC BLOOD PRESSURE: 65 MMHG

## 2019-01-21 DIAGNOSIS — D50.8 IRON DEFICIENCY ANEMIA SECONDARY TO INADEQUATE DIETARY IRON INTAKE: Primary | ICD-10-CM

## 2019-01-21 PROCEDURE — 25010000002 IRON SUCROSE PER 1 MG: Performed by: INTERNAL MEDICINE

## 2019-01-21 PROCEDURE — 96365 THER/PROPH/DIAG IV INF INIT: CPT

## 2019-01-21 RX ORDER — SODIUM CHLORIDE 9 MG/ML
250 INJECTION, SOLUTION INTRAVENOUS ONCE
Status: DISCONTINUED | OUTPATIENT
Start: 2019-01-21 | End: 2019-01-23 | Stop reason: HOSPADM

## 2019-01-21 RX ORDER — SODIUM CHLORIDE 9 MG/ML
250 INJECTION, SOLUTION INTRAVENOUS ONCE
Status: CANCELLED | OUTPATIENT
Start: 2019-01-21

## 2019-01-21 RX ADMIN — IRON SUCROSE 200 MG: 20 INJECTION, SOLUTION INTRAVENOUS at 13:08

## 2019-01-21 NOTE — PROGRESS NOTES
Tolerated infusion without difficulty.  Vital signs stable.  Held for 30 minute s/p observation.  Discharged home amb with  after appointment completed.

## 2019-01-22 DIAGNOSIS — E11.9 TYPE 2 DIABETES MELLITUS WITHOUT COMPLICATION, WITHOUT LONG-TERM CURRENT USE OF INSULIN (HCC): Chronic | ICD-10-CM

## 2019-01-22 RX ORDER — PERPHENAZINE 16 MG/1
TABLET, FILM COATED ORAL
Qty: 100 EACH | Refills: 5 | Status: SHIPPED | OUTPATIENT
Start: 2019-01-22 | End: 2020-04-15

## 2019-01-31 ENCOUNTER — OFFICE VISIT (OUTPATIENT)
Dept: CARDIOLOGY | Facility: CLINIC | Age: 74
End: 2019-01-31

## 2019-01-31 VITALS
DIASTOLIC BLOOD PRESSURE: 62 MMHG | BODY MASS INDEX: 21.35 KG/M2 | WEIGHT: 116 LBS | OXYGEN SATURATION: 99 % | HEART RATE: 58 BPM | SYSTOLIC BLOOD PRESSURE: 124 MMHG | HEIGHT: 62 IN

## 2019-01-31 DIAGNOSIS — I25.10 CORONARY ARTERY DISEASE INVOLVING NATIVE CORONARY ARTERY OF NATIVE HEART WITHOUT ANGINA PECTORIS: Primary | Chronic | ICD-10-CM

## 2019-01-31 DIAGNOSIS — I51.81 TAKOTSUBO CARDIOMYOPATHY: Chronic | ICD-10-CM

## 2019-01-31 PROCEDURE — 99213 OFFICE O/P EST LOW 20 MIN: CPT | Performed by: PHYSICIAN ASSISTANT

## 2019-01-31 PROCEDURE — 93000 ELECTROCARDIOGRAM COMPLETE: CPT | Performed by: PHYSICIAN ASSISTANT

## 2019-01-31 NOTE — PROGRESS NOTES
Date of Office Visit: 2019  Encounter Provider: PEE Holder  Place of Service: [facility] CARDIOLOGY  Patient Name: Renee Stevenson  :1945    Chief Complaint   Patient presents with   • Cardiomyopathy     1 year follow up   • Hypertension   • Coronary Artery Disease   :     HPI: Renee Stevenson is a 73 y.o. female who presents today for follow-up.  Old records have been obtained and reviewed by me.  She is a patient of Dr. Wooten's with a past cardiac history significant for coronary artery disease and Takotsubo cardiomyopathy.  This was discovered in 2017.  At that time, cardiac catheterization showed mild to moderate coronary artery disease, nothing greater than 50% stenosis.  Her EF was around 40% and was felt to be consistent with Takotsubo.  She then had a follow-up echocardiogram 3 months later that showed resolution of her Takotsubo cardiomyopathy, normal LV function with an EF of 65%, and no significant valvular abnormalities.  She was last in our office to see Dr. Wooten on 2018.  At that visit she was doing well without complaints of angina or heart failure.  No changes were made to her medical regimen, and she's here today for yearly visit.   Over the past year she's been doing fairly well.  She had a flareup of her ulcerative colitis in December and was in the hospital with a GI bleed.  She had to be transfused and was given iron infusions as well.  Because of this, her aspirin was discontinued.  When the weather is nice, she usually walks 3 times a week.  She is pretty active in her home and can climb flights of stairs all day long without any difficulty.  She denies any chest pain, shortness of breath, palpitations, edema, dizziness, or syncope.    Past Medical History:   Diagnosis Date   • Allergic rhinitis    • Colitis    • Colon polyps    • Diabetes mellitus (CMS/HCC)     type 2   • Dizziness    • Encounter for breast cancer screening other than  mammogram    • GERD (gastroesophageal reflux disease)    • Glaucoma    • Hyperlipidemia    • Hypertension    • Knee fracture, left    • Non-STEMI (non-ST elevated myocardial infarction) (CMS/[facility]) 6/16/2017   • Osteopenia    • Ulcerative colitis (CMS/[facility])        Past Surgical History:   Procedure Laterality Date   • CARDIAC CATHETERIZATION N/A 6/16/2017    Procedure: Left Heart Cath;  Surgeon: Abhay Wooten MD;  Location: [facility] CATH INVASIVE LOCATION;  Service:    • CARDIAC CATHETERIZATION N/A 6/16/2017    Procedure: Left ventriculography;  Surgeon: Abhay Wooten MD;  Location: [facility] CATH INVASIVE LOC[facility];  Service:    • CARDIAC CATHETERIZATION N/A 6/16/2017    Procedure: Coronary angiography;  Surgeon: Abhay Wooten MD;  Location: [facility] CATH INVASIVE LOC[facility];  Service:    • CATARACT EXTRACTION     • COLONOSCOPY  08/14/2018   • EYE SURGERY      cataract surgery   • PAP SMEAR         Social History     Socioeconomic History   • Marital status:      Spouse name: Not on file   • Number of children: 1   • Years of education: Not on file   • Highest education level: Not on file   Social Needs   • Financial resource strain: Not on file   • Food insecurity - worry: Not on file   • Food insecurity - inability: Not on file   • Transportation needs - medical: Not on file   • Transportation needs - non-medical: Not on file   Occupational History   • Occupation: retail     Comment: retired   Tobacco Use   • Smoking status: Never Smoker   • Smokeless tobacco: Never Used   Substance and Sexual Activity   • Alcohol use: No   • Drug use: No   • Sexual activity: Defer   Other Topics Concern   • Not on file   Social History Narrative   • Not on file       Family History   Problem Relation Age of Onset   • Heart disease Mother    • Hypertension Mother    • Diabetes Mother    • Heart disease Father    • Hypertension Father    • Heart disease Sister    • Heart disease Brother    • Hypertension Other    • Diabetes Other         type 2    • No Known Problems Maternal Grandmother    • No Known Problems Maternal Grandfather    • No Known Problems Paternal Grandmother    • No Known Problems Paternal Grandfather    • Crohn's disease Niece        Review of Systems   Constitution: Negative for chills, fever and malaise/fatigue.   Cardiovascular: Negative for chest pain, dyspnea on exertion, leg swelling, near-syncope, orthopnea, palpitations, paroxysmal nocturnal dyspnea and syncope.   Respiratory: Negative for cough and shortness of breath.    Musculoskeletal: Negative for joint pain, joint swelling and myalgias.   Gastrointestinal: Negative for abdominal pain, diarrhea, melena, nausea and vomiting.   Genitourinary: Negative for frequency and hematuria.   Neurological: Negative for light-headedness, numbness, paresthesias and seizures.   Allergic/Immunologic: Negative.    All other systems reviewed and are negative.      Allergies   Allergen Reactions   • Tetanus Toxoids Swelling     Hand and arm         Current Outpatient Medications:   •  Acetaminophen (TYLENOL PO), Take 2 tablets by mouth As Needed., Disp: , Rfl:   •  amLODIPine (NORVASC) 5 MG tablet, Take 1 tablet by mouth Daily., Disp: 30 tablet, Rfl: 3  •  Cholecalciferol (VITAMIN D) 2000 UNITS capsule, Take 1 capsule by mouth daily., Disp: , Rfl:   •  CONTOUR NEXT TEST test strip, CHECK BLOOD SUGAR EVERY DAY, Disp: 100 each, Rfl: 5  •  dorzolamide-timolol (COSOPT) 22.3-6.8 MG/ML ophthalmic solution, Apply 1 drop to eye 2 (two) times a day., Disp: , Rfl:   •  ferrous sulfate 325 (65 FE) MG tablet, Take 1 tablet by mouth Daily With Breakfast., Disp: 30 tablet, Rfl: 0  •  hydrocortisone (ANUSOL-HC) 2.5 % rectal cream, Insert  into the rectum 2 (Two) Times a Day., Disp: 28 g, Rfl: 0  •  lisinopril (PRINIVIL,ZESTRIL) 20 MG tablet, TAKE 1 TABLET BY MOUTH EVERY DAY, Disp: 90 tablet, Rfl: 1  •  mesalamine (LIALDA) 1.2 g EC tablet, Take 4 tablets by mouth Daily With Breakfast., Disp: 120 tablet, Rfl: 0  •  " mesalamine (ROWASA) 4 g enema, Insert 1 enema into the rectum Every Night., Disp: 60 enema, Rfl: 1  •  metFORMIN ER (GLUCOPHAGE-XR) 500 MG 24 hr tablet, TAKE 2 TABLETS BY MOUTH TWICE DAILY, Disp: 360 tablet, Rfl: 1  •  metoprolol tartrate (LOPRESSOR) 50 MG tablet, TAKE 1 TABLET BY MOUTH EVERY 12 HOURS, Disp: 180 tablet, Rfl: 0  •  Multiple Vitamin (MULTI-VITAMIN PO), Take 1 tablet by mouth Daily., Disp: , Rfl:   •  omeprazole (PriLOSEC) 40 MG capsule, Take 1 capsule by mouth daily., Disp: , Rfl:   •  rosuvastatin (CRESTOR) 10 MG tablet, Take 1 tablet by mouth Daily., Disp: 30 tablet, Rfl: 3      Objective:     Vitals:    01/31/19 1311 01/31/19 1315   BP: 120/60 124/62   BP Location: Right arm Left arm   Pulse: 58    SpO2: 99%    Weight: 52.6 kg (116 lb)    Height: 157.5 cm (62\")      Body mass index is 21.22 kg/m².    PHYSICAL EXAM:    Physical Exam   Constitutional: She is oriented to person, place, and time. She appears well-developed and well-nourished. No distress.   HENT:   Head: Normocephalic and atraumatic.   Eyes: Pupils are equal, round, and reactive to light.   Neck: No JVD present. No thyromegaly present.   Cardiovascular: Normal rate, regular rhythm, normal heart sounds and intact distal pulses.   No murmur heard.  Pulmonary/Chest: Effort normal and breath sounds normal. No respiratory distress.   Abdominal: Soft. Bowel sounds are normal. She exhibits no distension. There is no splenomegaly or hepatomegaly. There is no tenderness.   Musculoskeletal: Normal range of motion. She exhibits no edema.   Neurological: She is alert and oriented to person, place, and time.   Skin: Skin is warm and dry. She is not diaphoretic. No erythema.   Psychiatric: She has a normal mood and affect. Her behavior is normal. Judgment normal.         ECG 12 Lead  Date/Time: 1/31/2019 1:25 PM  Performed by: Brandie Herring PA  Authorized by: Brandie Herring PA   Comparison: compared with previous ECG from 1/31/2018  Similar " to previous ECG  Rhythm: sinus rhythm  BPM: 57  Clinical impression: normal ECG  Comments: Indication: Coronary disease.    Diffuse T-wave flattening unchanged              Assessment:       Diagnosis Plan   1. Coronary artery disease involving native coronary artery of native heart without angina pectoris  ECG 12 Lead   2. Takotsubo cardiomyopathy  ECG 12 Lead     Orders Placed This Encounter   Procedures   • ECG 12 Lead     This order was created via procedure documentation          Plan:       1. Coronary Artery Disease  Assessment  • The patient has no angina    Plan  • Lifestyle modifications discussed include adhering to a heart healthy diet and regular exercise    Subjective - Objective  • There is a history of past MI  • Overall she is doing well.  She has no complaints of angina or heart failure.  She has mild to moderate coronary disease which we are managing him medically at this point.  She has fairly good risk factor modification.  Continue current plan.    2.  Takotsubo cardiomyopathy.  This has since resolved and she is on good medical therapy.  Continue current plan.    I'm not going to make any changes to her medical regimen, and she will follow-up with Dr. Wooten and 1 year or sooner if needed.    As always, it has been a pleasure to participate in your patient's care.      Sincerely,         Brandie Herring PA-C

## 2019-03-06 ENCOUNTER — OFFICE VISIT (OUTPATIENT)
Dept: GASTROENTEROLOGY | Facility: CLINIC | Age: 74
End: 2019-03-06

## 2019-03-06 VITALS
BODY MASS INDEX: 20.65 KG/M2 | WEIGHT: 112.2 LBS | SYSTOLIC BLOOD PRESSURE: 118 MMHG | TEMPERATURE: 97.6 F | DIASTOLIC BLOOD PRESSURE: 70 MMHG | HEIGHT: 62 IN

## 2019-03-06 DIAGNOSIS — R63.4 WEIGHT LOSS: ICD-10-CM

## 2019-03-06 DIAGNOSIS — D50.0 IRON DEFICIENCY ANEMIA DUE TO CHRONIC BLOOD LOSS: ICD-10-CM

## 2019-03-06 DIAGNOSIS — K51.311 ULCERATIVE RECTOSIGMOIDITIS WITH RECTAL BLEEDING (HCC): Primary | Chronic | ICD-10-CM

## 2019-03-06 PROCEDURE — 99214 OFFICE O/P EST MOD 30 MIN: CPT | Performed by: INTERNAL MEDICINE

## 2019-03-06 RX ORDER — MESALAMINE 1.2 G/1
4.8 TABLET, DELAYED RELEASE ORAL
Qty: 120 TABLET | Refills: 3 | Status: SHIPPED | OUTPATIENT
Start: 2019-03-06 | End: 2019-07-09 | Stop reason: SDUPTHER

## 2019-03-06 NOTE — PATIENT INSTRUCTIONS
Labs today    OK to stop enemas but restart if you bleed again    Continue the Lialda    Start daily fiber supplement-- metamucil, citrucel or fiber con    For any additional questions, concerns or changes to your condition after today's office visit please contact the office at [phone].

## 2019-03-07 ENCOUNTER — TELEPHONE (OUTPATIENT)
Dept: GASTROENTEROLOGY | Facility: CLINIC | Age: 74
End: 2019-03-07

## 2019-03-07 LAB
FERRITIN SERPL-MCNC: 35.5 NG/ML (ref 13–150)
HCT VFR BLD AUTO: 33 % (ref 34–46.6)
HGB BLD-MCNC: 10.1 G/DL (ref 12–15.9)

## 2019-03-07 NOTE — TELEPHONE ENCOUNTER
Call to pt.  Advise per DR Burleson that blood count has improved to 10.1.  Iron stores have improved as well.    Continue oral iron pills.  Continue to monitor stools for bleeding.  Pt verb understanding.

## 2019-03-07 NOTE — PROGRESS NOTES
Her blood count has improved to 10.1.  Her iron stores have improved as well.    Continue oral iron pills.  Continue to monitor stools for bleeding

## 2019-03-07 NOTE — TELEPHONE ENCOUNTER
----- Message from Melissa Burleson MD sent at 3/7/2019 12:43 PM EST -----  Her blood count has improved to 10.1.  Her iron stores have improved as well.    Continue oral iron pills.  Continue to monitor stools for bleeding

## 2019-03-23 DIAGNOSIS — I10 ESSENTIAL HYPERTENSION: Chronic | ICD-10-CM

## 2019-03-25 RX ORDER — ROSUVASTATIN CALCIUM 10 MG/1
10 TABLET, COATED ORAL DAILY
Qty: 30 TABLET | Refills: 0 | Status: SHIPPED | OUTPATIENT
Start: 2019-03-25 | End: 2019-04-28 | Stop reason: SDUPTHER

## 2019-03-25 RX ORDER — AMLODIPINE BESYLATE 5 MG/1
5 TABLET ORAL DAILY
Qty: 30 TABLET | Refills: 0 | Status: SHIPPED | OUTPATIENT
Start: 2019-03-25 | End: 2019-04-26 | Stop reason: SDUPTHER

## 2019-03-27 RX ORDER — METOPROLOL TARTRATE 50 MG/1
TABLET, FILM COATED ORAL
Qty: 180 TABLET | Refills: 3 | Status: SHIPPED | OUTPATIENT
Start: 2019-03-27 | End: 2020-03-16 | Stop reason: SDUPTHER

## 2019-04-26 DIAGNOSIS — I10 ESSENTIAL HYPERTENSION: Chronic | ICD-10-CM

## 2019-04-26 RX ORDER — AMLODIPINE BESYLATE 5 MG/1
5 TABLET ORAL DAILY
Qty: 30 TABLET | Refills: 2 | Status: SHIPPED | OUTPATIENT
Start: 2019-04-26 | End: 2019-07-18 | Stop reason: SDUPTHER

## 2019-04-29 RX ORDER — ROSUVASTATIN CALCIUM 10 MG/1
10 TABLET, COATED ORAL DAILY
Qty: 30 TABLET | Refills: 5 | Status: SHIPPED | OUTPATIENT
Start: 2019-04-29 | End: 2020-05-14 | Stop reason: SDUPTHER

## 2019-05-16 ENCOUNTER — OFFICE VISIT (OUTPATIENT)
Dept: INTERNAL MEDICINE | Age: 74
End: 2019-05-16

## 2019-05-16 VITALS
OXYGEN SATURATION: 98 % | TEMPERATURE: 98.2 F | WEIGHT: 113 LBS | DIASTOLIC BLOOD PRESSURE: 64 MMHG | BODY MASS INDEX: 20.8 KG/M2 | HEART RATE: 50 BPM | HEIGHT: 62 IN | SYSTOLIC BLOOD PRESSURE: 126 MMHG

## 2019-05-16 DIAGNOSIS — E78.2 MIXED HYPERLIPIDEMIA: Chronic | ICD-10-CM

## 2019-05-16 DIAGNOSIS — I10 ESSENTIAL HYPERTENSION: Primary | Chronic | ICD-10-CM

## 2019-05-16 DIAGNOSIS — E11.59 TYPE 2 DIABETES MELLITUS WITH OTHER CIRCULATORY COMPLICATION, WITHOUT LONG-TERM CURRENT USE OF INSULIN (HCC): Chronic | ICD-10-CM

## 2019-05-16 PROBLEM — I25.2 HISTORY OF NON-ST ELEVATION MYOCARDIAL INFARCTION (NSTEMI): Chronic | Status: RESOLVED | Noted: 2017-06-16 | Resolved: 2019-05-16

## 2019-05-16 PROCEDURE — 99214 OFFICE O/P EST MOD 30 MIN: CPT | Performed by: INTERNAL MEDICINE

## 2019-05-16 RX ORDER — ROSUVASTATIN CALCIUM 10 MG/1
10 TABLET, COATED ORAL DAILY
Qty: 30 TABLET | Refills: 5 | COMMUNITY
Start: 2019-05-16 | End: 2019-10-24 | Stop reason: ALTCHOICE

## 2019-05-16 NOTE — ASSESSMENT & PLAN NOTE
Previously switched to rosuvastatin from simvastatin due to increased dose of amlodipine. check labs today.

## 2019-05-16 NOTE — PROGRESS NOTES
[facility] [place] INTERNAL MEDICINE  ILA PATIÑO M.D.      Renee PARIKH From / 74 y.o. 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 Continue metformin  mg 2 tabs BID.          Current Assessment & Plan     Lab Results   Component Value Date    HGBA1C 6.30 (H) 07/30/2018    HGBA1C 6.02 (H) 02/19/2018    HGBA1C 6.70 (H) 08/16/2017    CREATININE 0.67 12/16/2018    LDL 61 02/19/2018    MICROALBUR 27.3 02/19/2018             Relevant Medications    metFORMIN ER (GLUCOPHAGE-XR) 500 MG 24 hr tablet    CONTOUR NEXT TEST test strip    Other Relevant Orders    Hemoglobin A1c    Comprehensive Metabolic Panel    Microalbumin / Creatinine Urine Ratio - Urine, Clean Catch       Medium    Hypertension - Primary (Chronic)    Overview     Stable. Continue amlodipine 5 mg and lisinopril 20 mg qd.          Relevant Medications    lisinopril (PRINIVIL,ZESTRIL) 20 MG tablet    metoprolol tartrate (LOPRESSOR) 50 MG tablet    amLODIPine (NORVASC) 5 MG tablet    Hyperlipidemia (Chronic)    Current Assessment & Plan     Previously switched to rosuvastatin from simvastatin due to increased dose of amlodipine. check labs today.          Relevant Medications    rosuvastatin (CRESTOR) 10 MG tablet    Other Relevant Orders    Lipid Panel With / Chol / HDL Ratio        Orders Placed This Encounter   Procedures   • Hemoglobin A1c   • Lipid Panel With / Chol / HDL Ratio   • Comprehensive Metabolic Panel   • Microalbumin / Creatinine Urine Ratio - Urine, Clean Catch     No orders of the defined types were placed in this encounter.      Summary/Discussion:      Next Appointment with me: Visit date not found    Return in about 5 months (around 10/16/2019) for COMBINED SUBSEQUENT AWV and medical f/u (30 min).  ____________________________________________________________________    MEDICATIONS  Current Outpatient  "Medications   Medication Sig Dispense Refill   • Acetaminophen (TYLENOL PO) Take 2 tablets by mouth As Needed.     • amLODIPine (NORVASC) 5 MG tablet TAKE 1 TABLET BY MOUTH DAILY 30 tablet 2   • CONTOUR NEXT TEST test strip CHECK BLOOD SUGAR EVERY  each 5   • dorzolamide-timolol (COSOPT) 22.3-6.8 MG/ML ophthalmic solution Apply 1 drop to eye 2 (two) times a day.     • ferrous sulfate 325 (65 FE) MG tablet Take 1 tablet by mouth Daily With Breakfast. 30 tablet 0   • hydrocortisone (ANUSOL-HC) 2.5 % rectal cream Insert  into the rectum 2 (Two) Times a Day. 28 g 0   • lisinopril (PRINIVIL,ZESTRIL) 20 MG tablet TAKE 1 TABLET BY MOUTH EVERY DAY 90 tablet 1   • mesalamine (LIALDA) 1.2 g EC tablet Take 4 tablets by mouth Daily With Breakfast. 120 tablet 3   • mesalamine (ROWASA) 4 g enema Insert 1 enema into the rectum Every Night. 60 enema 1   • metFORMIN ER (GLUCOPHAGE-XR) 500 MG 24 hr tablet TAKE 2 TABLETS BY MOUTH TWICE DAILY 360 tablet 1   • metoprolol tartrate (LOPRESSOR) 50 MG tablet TAKE 1 TABLET BY MOUTH EVERY 12 HOURS 180 tablet 3   • Multiple Vitamin (MULTI-VITAMIN PO) Take 1 tablet by mouth Daily.     • omeprazole (PriLOSEC) 40 MG capsule Take 1 capsule by mouth daily.     • rosuvastatin (CRESTOR) 10 MG tablet Take 1 tablet by mouth Daily. 30 tablet 5   • Cholecalciferol (VITAMIN D) 2000 UNITS capsule Take 1 capsule by mouth daily.       No current facility-administered medications for this visit.        VITALS    Visit Vitals  /64   Pulse 50   Temp 98.2 °F (36.8 °C)   Ht 157.5 cm (62.01\")   Wt 51.3 kg (113 lb)   SpO2 98%   BMI 20.66 kg/m²       BP Readings from Last 3 Encounters:   05/16/19 126/64   03/06/19 118/70   01/31/19 124/62     Wt Readings from Last 3 Encounters:   05/16/19 51.3 kg (113 lb)   03/06/19 50.9 kg (112 lb 3.2 oz)   01/31/19 52.6 kg (116 lb)      Body mass index is 20.66 kg/m².    CC:  Main reason(s) for today's visit: Follow-up for diabetes and related medical problems    HPI: "     Chronic type 2 diabetes:  Diabetic complications: none identified. Current therapy: metformin/metformin ER.  Most recent change to treatment plan: no recent change.  Home blood sugar levels: about the same as before, consistently in acceptable range.   Most recent relevant labs:   Lab Results   Component Value Date    HGBA1C 6.30 (H) 07/30/2018    HGBA1C 6.02 (H) 02/19/2018    HGBA1C 6.70 (H) 08/16/2017    CREATININE 0.67 12/16/2018    LDL 61 02/19/2018    MICROALBUR 27.3 02/19/2018       Chronic essential hypertension:  Since prior visit: compliant with medication(s) and denies significant problems with medication(s).  Most recent in-office blood pressure readings:   BP Readings from Last 3 Encounters:   05/16/19 126/64   03/06/19 118/70   01/31/19 124/62       Chronic hyperlipidemia:  Current therapy include rosuvastatin, previously switched from simvastatin.    Most recent labs:   Lab Results   Component Value Date    LDL 61 02/19/2018    LDL 69 08/12/2016    LDL 69 08/20/2015    HDL 43 02/19/2018    HDL 48 08/12/2016    HDL 55 08/20/2015    TRIG 104 02/19/2018    CHOLHDLRATIO 2.91 02/19/2018    CHOLHDLRATIO 2.8 08/12/2016    CHOLHDLRATIO 2.6 08/20/2015        Ulcerative colitis is stable. No bleeding problems.       Patient Care Team:  Esequiel Pacheco MD as PCP - General  Esequiel Pacheco MD as PCP - Claims Attributed  Ministerio Wells MD as Consulting Physician (Orthopedic Surgery)  Abhay Wooten MD as Consulting Physician (Cardiology)  Cisco Husain MD as Consulting Physician (Otolaryngology)  EZIO Garibay MD as Consulting Physician (Ophthalmology)  Melissa Burleson MD as Consulting Physician (Gastroenterology)  ____________________________________________________________________    REVIEW OF SYSTEMS    Review of Systems  As noted per HPI  Constitutional neg  Resp neg  CV neg chest pain   GI neg for bleeding       PHYSICAL EXAMINATION    Physical Exam  Constitutional  No distress  Cardiovascular Rate   normal . Rhythm: regular . Heart sounds:  Normal  Pulmonary/Chest: Effort normal and breath sounds normal.    Psychiatric  Alert. Judgment and thought content normal. Mood normal    REVIEWED DATA:    Labs:   Lab Results   Component Value Date     12/16/2018    K 4.4 12/16/2018    CALCIUM 9.6 12/16/2018    AST 12 12/16/2018    ALT 7 12/16/2018    BUN 11 12/16/2018    CREATININE 0.67 12/16/2018    CREATININE 0.79 12/15/2018    CREATININE 0.76 11/01/2018    EGFRIFNONA 86 12/16/2018    EGFRIFAFRI 85 02/19/2018       Lab Results   Component Value Date    HGBA1C 6.30 (H) 07/30/2018    HGBA1C 6.02 (H) 02/19/2018    HGBA1C 6.70 (H) 08/16/2017    GLUCOSE 168 (H) 12/16/2018    GLUCOSE 186 (H) 12/15/2018    GLUCOSE 149 (H) 11/01/2018    MICROALBUR 27.3 02/19/2018       Lab Results   Component Value Date    LDL 61 02/19/2018    LDL 69 08/12/2016    LDL 69 08/20/2015    HDL 43 02/19/2018    HDL 48 08/12/2016    TRIG 104 02/19/2018    CHOLHDLRATIO 2.91 02/19/2018       No results found for: TSH, FREET4       Lab Results   Component Value Date    WBC 7.70 12/19/2018    HGB 10.1 (L) 03/06/2019    HGB 8.5 (L) 01/02/2019    HGB 8.7 (L) 12/19/2018     12/19/2018        Lab Results   Component Value Date    PROTEIN Negative 08/20/2015    GLUCOSEU Negative 08/20/2015    BLOODU Negative 05/05/2014    NITRITEU Negative 05/05/2014    LEUKOCYTESUR Negative 08/20/2015       Imaging:          Medical Tests:          Summary of old records / correspondence / consultant report:          Request outside records:          ALLERGIES  Allergies   Allergen Reactions   • Tetanus Toxoids Swelling     Hand and arm        PFSH:     The following portions of the patient's history were reviewed and updated as appropriate: Allergies / Current Medications / Past Medical History / Surgical History / Social History / Family History    PROBLEM LIST   Patient Active Problem List   Diagnosis   • Type 2 diabetes mellitus with circulatory disorder  (CMS/[facility])   • Osteopenia   • Hypertension   • Hyperlipidemia   • Colon polyp   • Gastroesophageal reflux disease   • Glaucoma   • Takotsubo cardiomyopathy   • Coronary artery disease involving native coronary artery of native heart without angina pectoris   • Ulcerative colitis with rectal bleeding (CMS/[facility])   • Acute post-hemorrhagic anemia   • Iron deficiency anemia secondary to inadequate dietary iron intake   • Iron deficiency anemia due to chronic blood loss       PAST MEDICAL HISTORY  Past Medical History:   Diagnosis Date   • Allergic rhinitis    • Colitis    • Colon polyps    • Diabetes mellitus (CMS/[facility])     type 2   • Dizziness    • Encounter for breast cancer screening other than mammogram    • GERD (gastroesophageal reflux disease)    • Glaucoma    • Hyperlipidemia    • Hypertension    • Knee fracture, left    • Non-STEMI (non-ST elevated myocardial infarction) (CMS/[facility]) 6/16/2017   • Osteopenia    • Ulcerative colitis (CMS/[facility])        SURGICAL HISTORY  Past Surgical History:   Procedure Laterality Date   • CARDIAC CATHETERIZATION N/A 6/16/2017    Procedure: Left Heart Cath;  Surgeon: Abhay Wooten MD;  Location: [facility] CATH INVASIVE LOCATION;  Service:    • CARDIAC CATHETERIZATION N/A 6/16/2017    Procedure: Left ventriculography;  Surgeon: Abhay Wooten MD;  [facility]AS[facility] LOC[facility];  Service:    • CARDIAC CATHETERIZATION N/A 6/16/2017    Procedure: Coronary angiography;  Surgeon: Abhay Wooten MD;  [facility];  Service:    • CATARACT EXTRACTION     • COLONOSCOPY  08/14/2018   • EYE SURGERY      cataract surgery   • PAP SMEAR         SOCIAL HISTORY  Social History     Socioeconomic History   • Marital status:      Spouse name: Not on file   • Number of children: 1   • Years of education: Not on file   • Highest education level: Not on file   Occupational History   • Occupation: retail     Comment: retired   Tobacco Use   • Smoking status: Never Smoker   •  Smokeless tobacco: Never Used   Substance and Sexual Activity   • Alcohol use: No   • Drug use: No   • Sexual activity: Defer       FAMILY HISTORY  Family History   Problem Relation Age of Onset   • Heart disease Mother    • Hypertension Mother    • Diabetes Mother    • Heart disease Father    • Hypertension Father    • Heart disease Sister    • Heart disease Brother    • Hypertension Other    • Diabetes Other         type 2   • No Known Problems Maternal Grandmother    • No Known Problems Maternal Grandfather    • No Known Problems Paternal Grandmother    • No Known Problems Paternal Grandfather    • Crohn's disease Niece          **Dragon Disclaimer:   Much of this encounter note is an electronic transcription/translation of spoken language to printed text. The electronic translation of spoken language may permit erroneous, or at times, nonsensical words or phrases to be inadvertently transcribed. Although I have reviewed the note for such errors, some may still exist.       Template created by Zachery Pacheco MD

## 2019-05-16 NOTE — ASSESSMENT & PLAN NOTE
Lab Results   Component Value Date    HGBA1C 6.30 (H) 07/30/2018    HGBA1C 6.02 (H) 02/19/2018    HGBA1C 6.70 (H) 08/16/2017    CREATININE 0.67 12/16/2018    LDL 61 02/19/2018    MICROALBUR 27.3 02/19/2018

## 2019-05-17 LAB
ALBUMIN SERPL-MCNC: 4.5 G/DL (ref 3.5–5.2)
ALBUMIN/CREAT UR: 15.3 MG/G CREAT (ref 0–30)
ALBUMIN/GLOB SERPL: 1.7 G/DL
ALP SERPL-CCNC: 49 U/L (ref 39–117)
ALT SERPL-CCNC: 10 U/L (ref 1–33)
AST SERPL-CCNC: 13 U/L (ref 1–32)
BILIRUB SERPL-MCNC: 0.3 MG/DL (ref 0.2–1.2)
BUN SERPL-MCNC: 11 MG/DL (ref 8–23)
BUN/CREAT SERPL: 16.4 (ref 7–25)
CALCIUM SERPL-MCNC: 9.5 MG/DL (ref 8.6–10.5)
CHLORIDE SERPL-SCNC: 105 MMOL/L (ref 98–107)
CHOLEST SERPL-MCNC: 117 MG/DL (ref 0–200)
CHOLEST/HDLC SERPL: 2.79 {RATIO}
CO2 SERPL-SCNC: 23 MMOL/L (ref 22–29)
CREAT SERPL-MCNC: 0.67 MG/DL (ref 0.57–1)
CREAT UR-MCNC: 137 MG/DL
GLOBULIN SER CALC-MCNC: 2.7 GM/DL
GLUCOSE SERPL-MCNC: 122 MG/DL (ref 65–99)
HBA1C MFR BLD: 5.5 % (ref 4.8–5.6)
HDLC SERPL-MCNC: 42 MG/DL (ref 40–60)
LDLC SERPL CALC-MCNC: 52 MG/DL (ref 0–100)
MICROALBUMIN UR-MCNC: 20.9 UG/ML
POTASSIUM SERPL-SCNC: 4.5 MMOL/L (ref 3.5–5.2)
PROT SERPL-MCNC: 7.2 G/DL (ref 6–8.5)
SODIUM SERPL-SCNC: 141 MMOL/L (ref 136–145)
TRIGL SERPL-MCNC: 113 MG/DL (ref 0–150)
VLDLC SERPL CALC-MCNC: 22.6 MG/DL

## 2019-05-23 DIAGNOSIS — I10 ESSENTIAL HYPERTENSION: Chronic | ICD-10-CM

## 2019-05-23 DIAGNOSIS — E11.9 TYPE 2 DIABETES MELLITUS WITHOUT COMPLICATION (HCC): Chronic | ICD-10-CM

## 2019-05-23 RX ORDER — LISINOPRIL 20 MG/1
TABLET ORAL
Qty: 90 TABLET | Refills: 3 | Status: SHIPPED | OUTPATIENT
Start: 2019-05-23 | End: 2020-05-12 | Stop reason: SDUPTHER

## 2019-05-23 RX ORDER — METFORMIN HYDROCHLORIDE 500 MG/1
TABLET, EXTENDED RELEASE ORAL
Qty: 360 TABLET | Refills: 3 | Status: SHIPPED | OUTPATIENT
Start: 2019-05-23 | End: 2019-05-24 | Stop reason: SDUPTHER

## 2019-05-24 ENCOUNTER — OFFICE VISIT (OUTPATIENT)
Dept: GASTROENTEROLOGY | Facility: CLINIC | Age: 74
End: 2019-05-24

## 2019-05-24 VITALS
SYSTOLIC BLOOD PRESSURE: 136 MMHG | WEIGHT: 121.8 LBS | DIASTOLIC BLOOD PRESSURE: 58 MMHG | TEMPERATURE: 98 F | HEIGHT: 62 IN | BODY MASS INDEX: 22.41 KG/M2

## 2019-05-24 DIAGNOSIS — K51.311 ULCERATIVE RECTOSIGMOIDITIS WITH RECTAL BLEEDING (HCC): Primary | Chronic | ICD-10-CM

## 2019-05-24 DIAGNOSIS — D50.0 IRON DEFICIENCY ANEMIA DUE TO CHRONIC BLOOD LOSS: ICD-10-CM

## 2019-05-24 DIAGNOSIS — R63.4 WEIGHT LOSS: ICD-10-CM

## 2019-05-24 PROCEDURE — 99214 OFFICE O/P EST MOD 30 MIN: CPT | Performed by: INTERNAL MEDICINE

## 2019-05-24 NOTE — PROGRESS NOTES
Chief Complaint   Patient presents with   • Follow-up   • Ulcerative Colitis     Subjective     HPI  Renee Stevenson is a 74 y.o. female who presents for follow up of ulcerative colitis complicated by rectal bleeding and iron deficiency anemia.    She was able to stop regular use of the mesalamine enemas.  Has used some here and there for intermittent rectal bleeding, not severe.  Remains on mesalamine po.      No abdominal pain, nausea, vomiting.  Her appetite has improved, her weight is up since her last visit.      Still taking oral iron.      Denies excess fatigue, SOA, chest pain.        Past Medical History:   Diagnosis Date   • Allergic rhinitis    • Colitis    • Colon polyps    • Diabetes mellitus (CMS/[facility])     type 2   • Dizziness    • Encounter for breast cancer screening other than mammogram    • GERD (gastroesophageal reflux disease)    • GI (gastrointestinal bleed)    • Glaucoma    • Hyperlipidemia    • Hypertension    • Knee fracture, left    • Non-STEMI (non-ST elevated myocardial infarction) (CMS/[facility]) 6/16/2017   • Osteopenia    • Ulcerative colitis (CMS/[facility])        Social History     Socioeconomic History   • Marital status:      Spouse name: Not on file   • Number of children: 1   • Years of education: Not on file   • Highest education level: Not on file   Occupational History   • Occupation: retail     Comment: retired   Tobacco Use   • Smoking status: Never Smoker   • Smokeless tobacco: Never Used   Substance and Sexual Activity   • Alcohol use: No   • Drug use: No   • Sexual activity: Not Currently     Partners: Male         Current Outpatient Medications:   •  Acetaminophen (TYLENOL PO), Take 2 tablets by mouth As Needed., Disp: , Rfl:   •  amLODIPine (NORVASC) 5 MG tablet, TAKE 1 TABLET BY MOUTH DAILY, Disp: 30 tablet, Rfl: 2  •  Cholecalciferol (VITAMIN D) 2000 UNITS capsule, Take 1 capsule by mouth daily., Disp: , Rfl:   •  CONTOUR NEXT TEST test strip, CHECK BLOOD SUGAR EVERY DAY, Disp:  100 each, Rfl: 5  •  dorzolamide-timolol (COSOPT) 22.3-6.8 MG/ML ophthalmic solution, Apply 1 drop to eye 2 (two) times a day., Disp: , Rfl:   •  ferrous sulfate 325 (65 FE) MG tablet, Take 1 tablet by mouth Daily With Breakfast., Disp: 30 tablet, Rfl: 0  •  hydrocortisone (ANUSOL-HC) 2.5 % rectal cream, Insert  into the rectum 2 (Two) Times a Day., Disp: 28 g, Rfl: 0  •  lisinopril (PRINIVIL,ZESTRIL) 20 MG tablet, TAKE 1 TABLET BY MOUTH EVERY DAY, Disp: 90 tablet, Rfl: 3  •  mesalamine (LIALDA) 1.2 g EC tablet, Take 4 tablets by mouth Daily With Breakfast., Disp: 120 tablet, Rfl: 3  •  mesalamine (ROWASA) 4 g enema, Insert 1 enema into the rectum Every Night., Disp: 60 enema, Rfl: 1  •  metFORMIN ER (GLUCOPHAGE-XR) 500 MG 24 hr tablet, TAKE 2 TABLETS BY MOUTH TWICE DAILY, Disp: 360 tablet, Rfl: 1  •  metoprolol tartrate (LOPRESSOR) 50 MG tablet, TAKE 1 TABLET BY MOUTH EVERY 12 HOURS, Disp: 180 tablet, Rfl: 3  •  Multiple Vitamin (MULTI-VITAMIN PO), Take 1 tablet by mouth Daily., Disp: , Rfl:   •  omeprazole (PriLOSEC) 40 MG capsule, Take 1 capsule by mouth daily., Disp: , Rfl:   •  rosuvastatin (CRESTOR) 10 MG tablet, Take 1 tablet by mouth Daily., Disp: 30 tablet, Rfl: 5    Review of Systems   Constitutional: Negative for activity change, appetite change, chills, fatigue and fever.   HENT: Negative for trouble swallowing.    Respiratory: Negative.    Cardiovascular: Negative.  Negative for chest pain.   Gastrointestinal: Positive for anal bleeding. Negative for abdominal distention, abdominal pain, constipation, diarrhea, nausea and vomiting.        Intermittent rectal bleeding   Genitourinary: Negative for dysuria, frequency and hematuria.       Objective   Vitals:    05/24/19 1415   BP: 136/58   Temp: 98 °F (36.7 °C)         05/24/19  1415   Weight: 55.2 kg (121 lb 12.8 oz)     Body mass index is 22.28 kg/m².      Physical Exam   Constitutional: She is oriented to person, place, and time. She appears  well-developed and well-nourished. No distress.   HENT:   Head: Normocephalic and atraumatic.   Right Ear: External ear normal.   Left Ear: External ear normal.   Nose: Nose normal.   Mouth/Throat: Oropharynx is clear and moist.   Eyes: Conjunctivae and EOM are normal. Right eye exhibits no discharge. Left eye exhibits no discharge. No scleral icterus.   Neck: Normal range of motion. Neck supple. No thyromegaly present.   No supraclavicular adenopathy   Cardiovascular: Normal rate, regular rhythm, normal heart sounds and intact distal pulses. Exam reveals no gallop.   No murmur heard.  No lower extremity edema   Pulmonary/Chest: Effort normal and breath sounds normal. No respiratory distress. She has no wheezes.   Abdominal: Soft. Normal appearance and bowel sounds are normal. She exhibits no distension and no mass. There is no hepatosplenomegaly. There is no tenderness. There is no rigidity, no rebound and no guarding. No hernia.   Genitourinary:   Genitourinary Comments: Rectal exam deferred   Musculoskeletal: Normal range of motion. She exhibits no edema or tenderness.   No atrophy of upper or lower extremities.  Normal digits and nails of both hands.   Lymphadenopathy:     She has no cervical adenopathy.   Neurological: She is alert and oriented to person, place, and time. She displays no atrophy. Coordination normal.   Skin: Skin is warm and dry. No rash noted. She is not diaphoretic. No erythema.   Psychiatric: She has a normal mood and affect. Her behavior is normal. Judgment and thought content normal.   Vitals reviewed.      WBC   Date Value Ref Range Status   12/19/2018 7.70 4.50 - 10.70 10*3/mm3 Final     RBC   Date Value Ref Range Status   12/19/2018 3.37 (L) 3.90 - 5.20 10*6/mm3 Final     Hemoglobin   Date Value Ref Range Status   03/06/2019 10.1 (L) 12.0 - 15.9 g/dL Final   12/16/2018 9.4 (L) 11.9 - 15.5 g/dL Final     Hematocrit   Date Value Ref Range Status   03/06/2019 33.0 (L) 34.0 - 46.6 % Final    12/16/2018 30.5 (L) 35.6 - 45.5 % Final     MCV   Date Value Ref Range Status   12/19/2018 86.9 80.5 - 98.2 fL Final   12/16/2018 84.7 80.5 - 98.2 fL Final     MCH   Date Value Ref Range Status   12/19/2018 25.8 (L) 26.9 - 32.0 pg Final   12/16/2018 26.1 (L) 26.9 - 32.0 pg Final     MCHC   Date Value Ref Range Status   12/19/2018 29.7 (L) 32.4 - 36.3 g/dL Final   12/16/2018 30.8 (L) 32.4 - 36.3 g/dL Final     RDW   Date Value Ref Range Status   12/19/2018 19.7 (H) 11.7 - 13.0 % Final   12/16/2018 19.1 (H) 11.7 - 13.0 % Final     RDW-SD   Date Value Ref Range Status   12/16/2018 59.5 (H) 37.0 - 54.0 fl Final     MPV   Date Value Ref Range Status   12/16/2018 9.1 6.0 - 12.0 fL Final     Platelets   Date Value Ref Range Status   12/19/2018 274 140 - 500 10*3/mm3 Final   12/16/2018 235 140 - 500 10*3/mm3 Final     Neutrophil Rel %   Date Value Ref Range Status   12/19/2018 63.1 42.7 - 76.0 % Final     Neutrophil %   Date Value Ref Range Status   12/16/2018 62.1 42.7 - 76.0 % Final     Lymphocyte Rel %   Date Value Ref Range Status   12/19/2018 28.4 19.6 - 45.3 % Final     Lymphocyte %   Date Value Ref Range Status   12/16/2018 30.6 19.6 - 45.3 % Final     Monocyte Rel %   Date Value Ref Range Status   12/19/2018 8.4 5.0 - 12.0 % Final     Monocyte %   Date Value Ref Range Status   12/16/2018 7.1 5.0 - 12.0 % Final     Eosinophil Rel %   Date Value Ref Range Status   12/19/2018 0.0 (L) 0.3 - 6.2 % Final     Eosinophil %   Date Value Ref Range Status   12/16/2018 0.0 (L) 0.3 - 6.2 % Final     Basophil Rel %   Date Value Ref Range Status   12/19/2018 0.1 0.0 - 1.5 % Final     Basophil %   Date Value Ref Range Status   12/16/2018 0.2 0.0 - 1.5 % Final     Immature Grans %   Date Value Ref Range Status   12/16/2018 0.2 0.0 - 0.5 % Final     Neutrophils Absolute   Date Value Ref Range Status   12/19/2018 4.85 1.90 - 8.10 10*3/mm3 Final     Neutrophils, Absolute   Date Value Ref Range Status   12/16/2018 3.76 1.90 - 8.10  10*3/mm3 Final     Lymphocytes Absolute   Date Value Ref Range Status   12/19/2018 2.19 0.90 - 4.80 10*3/mm3 Final     Lymphocytes, Absolute   Date Value Ref Range Status   12/16/2018 1.85 0.90 - 4.80 10*3/mm3 Final     Monocytes Absolute   Date Value Ref Range Status   12/19/2018 0.65 0.20 - 1.20 10*3/mm3 Final     Monocytes, Absolute   Date Value Ref Range Status   12/16/2018 0.43 0.20 - 1.20 10*3/mm3 Final     Eosinophils Absolute   Date Value Ref Range Status   12/19/2018 0.00 0.00 - 0.70 10*3/mm3 Final     Eosinophils, Absolute   Date Value Ref Range Status   12/16/2018 0.00 0.00 - 0.70 10*3/mm3 Final     Basophils Absolute   Date Value Ref Range Status   12/19/2018 0.01 0.00 - 0.20 10*3/mm3 Final     Basophils, Absolute   Date Value Ref Range Status   12/16/2018 0.01 0.00 - 0.20 10*3/mm3 Final     Immature Grans, Absolute   Date Value Ref Range Status   12/16/2018 0.01 0.00 - 0.03 10*3/mm3 Final     nRBC   Date Value Ref Range Status   12/16/2018 0.0 0.0 - 0.0 /100 WBC Final       Lab Results   Component Value Date    GLUCOSE 168 (H) 12/16/2018    BUN 11 05/16/2019    CREATININE 0.67 05/16/2019    EGFRIFNONA 86 05/16/2019    EGFRIFAFRI 104 05/16/2019    BCR 16.4 05/16/2019    CO2 23.0 05/16/2019    CALCIUM 9.5 05/16/2019    PROTENTOTREF 7.2 05/16/2019    ALBUMIN 4.50 05/16/2019    LABIL2 1.7 05/16/2019    AST 13 05/16/2019    ALT 10 05/16/2019         Imaging Results (last 7 days)     ** No results found for the last 168 hours. 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  Remains on oral iron    Weight loss: Improved, stable    Rectal bleeding: with #1    Plan  Continue oral mesalamine  Continue enemas as needed for the bleeding, use for a week at a time  Continue oral iron  CBC, ferritin today  Continue frequent small meals      Renee  was seen today for follow-up and ulcerative colitis.    Diagnoses and all orders for this visit:    Ulcerative rectosigmoiditis with rectal bleeding (CMS/HCC)    Iron deficiency anemia due to chronic blood loss  -     CBC & Differential  -     Ferritin    Weight loss        Dictated utilizing Dragon dictation

## 2019-05-24 NOTE — PATIENT INSTRUCTIONS
Labs today    Continue the mesalamine pills, use the suppositories for bleeding    Continue iron, healthy eating    For any additional questions, concerns or changes to your condition after today's office visit please contact the office at [phone].

## 2019-05-25 LAB
BASOPHILS # BLD AUTO: 0.01 10*3/MM3 (ref 0–0.2)
BASOPHILS NFR BLD AUTO: 0.2 % (ref 0–1.5)
EOSINOPHIL # BLD AUTO: 0 10*3/MM3 (ref 0–0.4)
EOSINOPHIL NFR BLD AUTO: 0 % (ref 0.3–6.2)
ERYTHROCYTE [DISTWIDTH] IN BLOOD BY AUTOMATED COUNT: 15 % (ref 12.3–15.4)
FERRITIN SERPL-MCNC: 14.7 NG/ML (ref 13–150)
HCT VFR BLD AUTO: 29.9 % (ref 34–46.6)
HGB BLD-MCNC: 8.6 G/DL (ref 12–15.9)
IMM GRANULOCYTES # BLD AUTO: 0.02 10*3/MM3 (ref 0–0.05)
IMM GRANULOCYTES NFR BLD AUTO: 0.3 % (ref 0–0.5)
LYMPHOCYTES # BLD AUTO: 1.52 10*3/MM3 (ref 0.7–3.1)
LYMPHOCYTES NFR BLD AUTO: 24.9 % (ref 19.6–45.3)
MCH RBC QN AUTO: 28.2 PG (ref 26.6–33)
MCHC RBC AUTO-ENTMCNC: 28.8 G/DL (ref 31.5–35.7)
MCV RBC AUTO: 98 FL (ref 79–97)
MONOCYTES # BLD AUTO: 0.51 10*3/MM3 (ref 0.1–0.9)
MONOCYTES NFR BLD AUTO: 8.3 % (ref 5–12)
NEUTROPHILS # BLD AUTO: 4.05 10*3/MM3 (ref 1.7–7)
NEUTROPHILS NFR BLD AUTO: 66.3 % (ref 42.7–76)
NRBC BLD AUTO-RTO: 0.2 /100 WBC (ref 0–0.2)
PLATELET # BLD AUTO: 219 10*3/MM3 (ref 140–450)
RBC # BLD AUTO: 3.05 10*6/MM3 (ref 3.77–5.28)
WBC # BLD AUTO: 6.11 10*3/MM3 (ref 3.4–10.8)

## 2019-05-28 ENCOUNTER — TELEPHONE (OUTPATIENT)
Dept: GASTROENTEROLOGY | Facility: CLINIC | Age: 74
End: 2019-05-28

## 2019-05-28 DIAGNOSIS — D50.0 IRON DEFICIENCY ANEMIA DUE TO CHRONIC BLOOD LOSS: Primary | ICD-10-CM

## 2019-05-28 NOTE — TELEPHONE ENCOUNTER
----- Message from Melissa Burleson MD sent at 5/28/2019 12:46 PM EDT -----  Her hemoglobin is down to 8.6.  Her iron stores are also low    Can we set her up for IV Venofer infusions––1 200mg infusion/week for 3 weeks.    If she is not improving, we are going to need to consider repeating her colonoscopy as well as an EGD.

## 2019-05-28 NOTE — PROGRESS NOTES
Her hemoglobin is down to 8.6.  Her iron stores are also low    Can we set her up for IV Venofer infusions––1 200mg infusion/week for 3 weeks.    If she is not improving, we are going to need to consider repeating her colonoscopy as well as an EGD.

## 2019-05-28 NOTE — TELEPHONE ENCOUNTER
Call to pt.  Advise per DR Burleson that hgb is down to 8.6.  Iron stores are also low.     Will set up IV infusion - once a week for 3 wks.      If not improving, going to need to consider repeating c/s as well as egd.      Advise [facility] will contact to arrange - may also call them at [phone].  Verb understanding.    Referral and tx plan place for Iv Venofer - 200 mg/wk for 3 wks - message to Dr Burleson.

## 2019-06-04 ENCOUNTER — HOSPITAL ENCOUNTER (OUTPATIENT)
Dept: INFUSION THERAPY | Facility: HOSPITAL | Age: 74
Setting detail: INFUSION SERIES
Discharge: HOME | End: 2019-06-04

## 2019-06-04 VITALS
DIASTOLIC BLOOD PRESSURE: 61 MMHG | RESPIRATION RATE: 20 BRPM | TEMPERATURE: 98.1 F | HEART RATE: 56 BPM | OXYGEN SATURATION: 95 % | SYSTOLIC BLOOD PRESSURE: 115 MMHG

## 2019-06-04 DIAGNOSIS — D50.8 IRON DEFICIENCY ANEMIA SECONDARY TO INADEQUATE DIETARY IRON INTAKE: Primary | ICD-10-CM

## 2019-06-04 PROCEDURE — 96365 THER/PROPH/DIAG IV INF INIT: CPT

## 2019-06-04 PROCEDURE — 25010000002 IRON SUCROSE PER 1 MG: Performed by: INTERNAL MEDICINE

## 2019-06-04 RX ADMIN — IRON SUCROSE 200 MG: 20 INJECTION, SOLUTION INTRAVENOUS at 14:39

## 2019-06-04 NOTE — PATIENT INSTRUCTIONS
Iron Sucrose injection  What is this medicine?  IRON SUCROSE (GOLD virk) is an iron complex. Iron is used to make healthy red blood cells, which carry oxygen and nutrients throughout the body. This medicine is used to treat iron deficiency anemia in people with chronic kidney disease.  This medicine may be used for other purposes; ask your health care provider or pharmacist if you have questions.  COMMON BRAND NAME(S): Venofer  What should I tell my health care provider before I take this medicine?  They need to know if you have any of these conditions:  -anemia not caused by low iron levels  -heart disease  -high levels of iron in the blood  -kidney disease  -liver disease  -an unusual or allergic reaction to iron, other medicines, foods, dyes, or preservatives  -pregnant or trying to get pregnant  -breast-feeding  How should I use this medicine?  This medicine is for infusion into a vein. It is given by a health care professional in a hospital or clinic setting.  Talk to your pediatrician regarding the use of this medicine in children. While this drug may be prescribed for children as young as 2 years for selected conditions, precautions do apply.  Overdosage: If you think you have taken too much of this medicine contact a poison control center or emergency room at once.  NOTE: This medicine is only for you. Do not share this medicine with others.  What if I miss a dose?  It is important not to miss your dose. Call your doctor or health care professional if you are unable to keep an appointment.  What may interact with this medicine?  Do not take this medicine with any of the following medications:  -deferoxamine  -dimercaprol  -other iron products  This medicine may also interact with the following medications:  -chloramphenicol  -deferasirox  This list may not describe all possible interactions. Give your health care provider a list of all the medicines, herbs, non-prescription drugs, or dietary  supplements you use. Also tell them if you smoke, drink alcohol, or use illegal drugs. Some items may interact with your medicine.  What should I watch for while using this medicine?  Visit your doctor or healthcare professional regularly. Tell your doctor or healthcare professional if your symptoms do not start to get better or if they get worse. You may need blood work done while you are taking this medicine.  You may need to follow a special diet. Talk to your doctor. Foods that contain iron include: whole grains/cereals, dried fruits, beans, or peas, leafy green vegetables, and organ meats (liver, kidney).  What side effects may I notice from receiving this medicine?  Side effects that you should report to your doctor or health care professional as soon as possible:  -allergic reactions like skin rash, itching or hives, swelling of the face, lips, or tongue  -breathing problems  -changes in blood pressure  -cough  -fast, irregular heartbeat  -feeling faint or lightheaded, falls  -fever or chills  -flushing, sweating, or hot feelings  -joint or muscle aches/pains  -seizures  -swelling of the ankles or feet  -unusually weak or tired  Side effects that usually do not require medical attention (report to your doctor or health care professional if they continue or are bothersome):  -diarrhea  -feeling achy  -headache  -irritation at site where injected  -nausea, vomiting  -stomach upset  -tiredness  This list may not describe all possible side effects. Call your doctor for medical advice about side effects. You may report side effects to FDA at 5-077-FDA-6521.  Where should I keep my medicine?  This drug is given in a hospital or clinic and will not be stored at home.  NOTE: This sheet is a summary. It may not cover all possible information. If you have questions about this medicine, talk to your doctor, pharmacist, or health care provider.  © 2019 [employer]/Gold Standard (2012-09-27 17:14:35)

## 2019-06-11 ENCOUNTER — TELEPHONE (OUTPATIENT)
Dept: GASTROENTEROLOGY | Facility: CLINIC | Age: 74
End: 2019-06-11

## 2019-06-11 ENCOUNTER — HOSPITAL ENCOUNTER (OUTPATIENT)
Dept: INFUSION THERAPY | Facility: HOSPITAL | Age: 74
Setting detail: INFUSION SERIES
Discharge: HOME | End: 2019-06-11

## 2019-06-11 VITALS
OXYGEN SATURATION: 97 % | RESPIRATION RATE: 16 BRPM | TEMPERATURE: 98 F | HEART RATE: 61 BPM | SYSTOLIC BLOOD PRESSURE: 109 MMHG | DIASTOLIC BLOOD PRESSURE: 52 MMHG

## 2019-06-11 DIAGNOSIS — D50.0 IRON DEFICIENCY ANEMIA DUE TO CHRONIC BLOOD LOSS: Primary | ICD-10-CM

## 2019-06-11 DIAGNOSIS — D50.8 IRON DEFICIENCY ANEMIA SECONDARY TO INADEQUATE DIETARY IRON INTAKE: Primary | ICD-10-CM

## 2019-06-11 PROCEDURE — 25010000002 IRON SUCROSE PER 1 MG: Performed by: INTERNAL MEDICINE

## 2019-06-11 PROCEDURE — 96365 THER/PROPH/DIAG IV INF INIT: CPT

## 2019-06-11 RX ADMIN — IRON SUCROSE 200 MG: 20 INJECTION, SOLUTION INTRAVENOUS at 13:31

## 2019-06-11 NOTE — TELEPHONE ENCOUNTER
Call to pt.  Advise per Dr Burleson that agree with enemas.  Will need repeat labs following iron infusions.     Verb understanding.  Lab appt scheduled for 7/3 @ 1pm.  Order placed for cbc, ferritin - message to Dr Burleson.

## 2019-06-11 NOTE — TELEPHONE ENCOUNTER
----- Message from Julio Levy sent at 6/11/2019 11:22 AM EDT -----  Regarding: UC   Contact: [phone]  COMPLAIN OF BLOOD IN STOOL

## 2019-06-11 NOTE — TELEPHONE ENCOUNTER
Agree with enemas, she will need repeat cbc and ferritin following iron infusions (about 2 weeks later)

## 2019-06-11 NOTE — TELEPHONE ENCOUNTER
Call to pt.  Reports has noted daily, occasional episodes of bright red bleeding in toilet water and on tissue.  States not a lot and not every time - but does occur daily.  Has started Rowasa enemas am and pm as of 2 days ago.     States feels well, having BM's without problem, good appetite.      Having 2nd iron infusion today.    Advise pt will update DR Burleson, and in the meantime - if has bleeding that won't stop - go to ER.  Verb understanding.

## 2019-06-18 ENCOUNTER — HOSPITAL ENCOUNTER (OUTPATIENT)
Dept: INFUSION THERAPY | Facility: HOSPITAL | Age: 74
Setting detail: INFUSION SERIES
Discharge: HOME | End: 2019-06-18

## 2019-06-18 VITALS
TEMPERATURE: 97.9 F | DIASTOLIC BLOOD PRESSURE: 53 MMHG | RESPIRATION RATE: 16 BRPM | OXYGEN SATURATION: 100 % | HEART RATE: 58 BPM | SYSTOLIC BLOOD PRESSURE: 105 MMHG

## 2019-06-18 DIAGNOSIS — D50.8 IRON DEFICIENCY ANEMIA SECONDARY TO INADEQUATE DIETARY IRON INTAKE: Primary | ICD-10-CM

## 2019-06-18 PROCEDURE — 96365 THER/PROPH/DIAG IV INF INIT: CPT

## 2019-06-18 PROCEDURE — 25010000002 IRON SUCROSE PER 1 MG: Performed by: INTERNAL MEDICINE

## 2019-06-18 PROCEDURE — 96366 THER/PROPH/DIAG IV INF ADDON: CPT

## 2019-06-18 RX ADMIN — IRON SUCROSE 200 MG: 20 INJECTION, SOLUTION INTRAVENOUS at 12:01

## 2019-06-18 NOTE — PROGRESS NOTES
Patient tolerated infusion without complaints. Observed x 30 minutes post infusion. Patient ambulatory, D/C'd from [facility] with  at 1345.

## 2019-06-18 NOTE — PATIENT INSTRUCTIONS
Iron Sucrose injection  What is this medicine?  IRON SUCROSE (GOLD virk) is an iron complex. Iron is used to make healthy red blood cells, which carry oxygen and nutrients throughout the body. This medicine is used to treat iron deficiency anemia in people with chronic kidney disease.  This medicine may be used for other purposes; ask your health care provider or pharmacist if you have questions.  COMMON BRAND NAME(S): Venofer  What should I tell my health care provider before I take this medicine?  They need to know if you have any of these conditions:  -anemia not caused by low iron levels  -heart disease  -high levels of iron in the blood  -kidney disease  -liver disease  -an unusual or allergic reaction to iron, other medicines, foods, dyes, or preservatives  -pregnant or trying to get pregnant  -breast-feeding  How should I use this medicine?  This medicine is for infusion into a vein. It is given by a health care professional in a hospital or clinic setting.  Talk to your pediatrician regarding the use of this medicine in children. While this drug may be prescribed for children as young as 2 years for selected conditions, precautions do apply.  Overdosage: If you think you have taken too much of this medicine contact a poison control center or emergency room at once.  NOTE: This medicine is only for you. Do not share this medicine with others.  What if I miss a dose?  It is important not to miss your dose. Call your doctor or health care professional if you are unable to keep an appointment.  What may interact with this medicine?  Do not take this medicine with any of the following medications:  -deferoxamine  -dimercaprol  -other iron products  This medicine may also interact with the following medications:  -chloramphenicol  -deferasirox  This list may not describe all possible interactions. Give your health care provider a list of all the medicines, herbs, non-prescription drugs, or dietary  supplements you use. Also tell them if you smoke, drink alcohol, or use illegal drugs. Some items may interact with your medicine.  What should I watch for while using this medicine?  Visit your doctor or healthcare professional regularly. Tell your doctor or healthcare professional if your symptoms do not start to get better or if they get worse. You may need blood work done while you are taking this medicine.  You may need to follow a special diet. Talk to your doctor. Foods that contain iron include: whole grains/cereals, dried fruits, beans, or peas, leafy green vegetables, and organ meats (liver, kidney).  What side effects may I notice from receiving this medicine?  Side effects that you should report to your doctor or health care professional as soon as possible:  -allergic reactions like skin rash, itching or hives, swelling of the face, lips, or tongue  -breathing problems  -changes in blood pressure  -cough  -fast, irregular heartbeat  -feeling faint or lightheaded, falls  -fever or chills  -flushing, sweating, or hot feelings  -joint or muscle aches/pains  -seizures  -swelling of the ankles or feet  -unusually weak or tired  Side effects that usually do not require medical attention (report to your doctor or health care professional if they continue or are bothersome):  -diarrhea  -feeling achy  -headache  -irritation at site where injected  -nausea, vomiting  -stomach upset  -tiredness  This list may not describe all possible side effects. Call your doctor for medical advice about side effects. You may report side effects to FDA at 4-163-FDA-0843.  Where should I keep my medicine?  This drug is given in a hospital or clinic and will not be stored at home.  NOTE: This sheet is a summary. It may not cover all possible information. If you have questions about this medicine, talk to your doctor, pharmacist, or health care provider.  © 2019 [employer]/Gold Standard (2012-09-27 17:14:35)

## 2019-06-24 ENCOUNTER — APPOINTMENT (OUTPATIENT)
Dept: WOMENS IMAGING | Facility: HOSPITAL | Age: 74
End: 2019-06-24

## 2019-06-24 PROCEDURE — 77063 BREAST TOMOSYNTHESIS BI: CPT | Performed by: RADIOLOGY

## 2019-06-24 PROCEDURE — 77067 SCR MAMMO BI INCL CAD: CPT | Performed by: RADIOLOGY

## 2019-06-24 PROCEDURE — MDREVIEWSP: Performed by: RADIOLOGY

## 2019-06-26 ENCOUNTER — RESULTS ENCOUNTER (OUTPATIENT)
Dept: GASTROENTEROLOGY | Facility: CLINIC | Age: 74
End: 2019-06-26

## 2019-06-26 DIAGNOSIS — D50.0 IRON DEFICIENCY ANEMIA DUE TO CHRONIC BLOOD LOSS: ICD-10-CM

## 2019-07-04 LAB
BASOPHILS # BLD AUTO: 0.01 10*3/MM3 (ref 0–0.2)
BASOPHILS NFR BLD AUTO: 0.2 % (ref 0–1.5)
EOSINOPHIL # BLD AUTO: 0 10*3/MM3 (ref 0–0.4)
EOSINOPHIL NFR BLD AUTO: 0 % (ref 0.3–6.2)
ERYTHROCYTE [DISTWIDTH] IN BLOOD BY AUTOMATED COUNT: 16.5 % (ref 12.3–15.4)
FERRITIN SERPL-MCNC: 99.2 NG/ML (ref 13–150)
HCT VFR BLD AUTO: 31.9 % (ref 34–46.6)
HGB BLD-MCNC: 9.2 G/DL (ref 12–15.9)
IMM GRANULOCYTES # BLD AUTO: 0.03 10*3/MM3 (ref 0–0.05)
IMM GRANULOCYTES NFR BLD AUTO: 0.6 % (ref 0–0.5)
LYMPHOCYTES # BLD AUTO: 1.54 10*3/MM3 (ref 0.7–3.1)
LYMPHOCYTES NFR BLD AUTO: 31.4 % (ref 19.6–45.3)
MCH RBC QN AUTO: 29.6 PG (ref 26.6–33)
MCHC RBC AUTO-ENTMCNC: 28.8 G/DL (ref 31.5–35.7)
MCV RBC AUTO: 102.6 FL (ref 79–97)
MONOCYTES # BLD AUTO: 0.36 10*3/MM3 (ref 0.1–0.9)
MONOCYTES NFR BLD AUTO: 7.3 % (ref 5–12)
NEUTROPHILS # BLD AUTO: 2.97 10*3/MM3 (ref 1.7–7)
NEUTROPHILS NFR BLD AUTO: 60.5 % (ref 42.7–76)
NRBC BLD AUTO-RTO: 0 /100 WBC (ref 0–0.2)
PLATELET # BLD AUTO: 201 10*3/MM3 (ref 140–450)
RBC # BLD AUTO: 3.11 10*6/MM3 (ref 3.77–5.28)
WBC # BLD AUTO: 4.91 10*3/MM3 (ref 3.4–10.8)

## 2019-07-09 DIAGNOSIS — K51.311 ULCERATIVE RECTOSIGMOIDITIS WITH RECTAL BLEEDING (HCC): Chronic | ICD-10-CM

## 2019-07-11 RX ORDER — MESALAMINE 1.2 G/1
4.8 TABLET, DELAYED RELEASE ORAL
Qty: 120 TABLET | Refills: 0 | Status: SHIPPED | OUTPATIENT
Start: 2019-07-11 | End: 2019-08-09 | Stop reason: SDUPTHER

## 2019-07-16 RX ORDER — MESALAMINE 4 G/60ML
ENEMA RECTAL
Qty: 3360 ML | Refills: 0 | Status: SHIPPED | OUTPATIENT
Start: 2019-07-16 | End: 2019-09-08 | Stop reason: SDUPTHER

## 2019-07-16 NOTE — TELEPHONE ENCOUNTER
Tere request received for rowasa 4 gm enema - #2350 ml, R0.  See note of 6/11.  Pt has f/u appt with Dr Burleson on 8/20.    Message to Dr Burleson.

## 2019-07-17 ENCOUNTER — HOSPITAL ENCOUNTER (OUTPATIENT)
Dept: GENERAL RADIOLOGY | Facility: HOSPITAL | Age: 74
Discharge: HOME | End: 2019-07-17
Admitting: NURSE PRACTITIONER

## 2019-07-17 ENCOUNTER — OFFICE VISIT (OUTPATIENT)
Dept: INTERNAL MEDICINE | Age: 74
End: 2019-07-17

## 2019-07-17 VITALS
SYSTOLIC BLOOD PRESSURE: 122 MMHG | HEART RATE: 71 BPM | DIASTOLIC BLOOD PRESSURE: 60 MMHG | TEMPERATURE: 98.1 F | OXYGEN SATURATION: 99 % | HEIGHT: 62 IN | BODY MASS INDEX: 20.61 KG/M2 | WEIGHT: 112 LBS

## 2019-07-17 DIAGNOSIS — R50.9 FEVER AND CHILLS: ICD-10-CM

## 2019-07-17 DIAGNOSIS — J06.9 URI, ACUTE: ICD-10-CM

## 2019-07-17 DIAGNOSIS — R05.9 COUGH: Primary | ICD-10-CM

## 2019-07-17 PROCEDURE — 71046 X-RAY EXAM CHEST 2 VIEWS: CPT

## 2019-07-17 PROCEDURE — 99213 OFFICE O/P EST LOW 20 MIN: CPT | Performed by: NURSE PRACTITIONER

## 2019-07-17 RX ORDER — DEXTROMETHORPHAN HYDROBROMIDE AND PROMETHAZINE HYDROCHLORIDE 15; 6.25 MG/5ML; MG/5ML
5 SYRUP ORAL 4 TIMES DAILY PRN
Qty: 180 ML | Refills: 0 | Status: SHIPPED | OUTPATIENT
Start: 2019-07-17 | End: 2019-10-16

## 2019-07-17 NOTE — PATIENT INSTRUCTIONS
"Instructions & Medications over-the-counter: acetaminophen (Tylenol), Mucinex, Delsym. Avoid medications with \"decongestant\" because this will elevate BP. Consider starting daily allergy medication OTC  fexofenadine (Allegra), loratidine (Claritin), and nasal spray like fluticasone (Flonase) nasal spray to avoid allergy symptoms, seasonally.  Ok to use saline irrigation for symptom relief. Make sure to get plenty of rest, maintain good hydration, wash hands frequently, avoid known sick contacts, and call if symptoms become notably worse.    "

## 2019-07-18 DIAGNOSIS — I10 ESSENTIAL HYPERTENSION: Chronic | ICD-10-CM

## 2019-07-18 DIAGNOSIS — J06.9 URI, ACUTE: Primary | ICD-10-CM

## 2019-07-18 RX ORDER — DOXYCYCLINE HYCLATE 100 MG/1
100 CAPSULE ORAL 2 TIMES DAILY
Qty: 14 CAPSULE | Refills: 0 | Status: SHIPPED | OUTPATIENT
Start: 2019-07-18 | End: 2019-07-25

## 2019-07-18 RX ORDER — AMLODIPINE BESYLATE 5 MG/1
5 TABLET ORAL DAILY
Qty: 30 TABLET | Refills: 5 | Status: SHIPPED | OUTPATIENT
Start: 2019-07-18 | End: 2020-01-16

## 2019-07-24 ENCOUNTER — HOSPITAL ENCOUNTER (EMERGENCY)
Facility: HOSPITAL | Age: 74
Discharge: HOME | End: 2019-07-24
Attending: EMERGENCY MEDICINE | Admitting: EMERGENCY MEDICINE

## 2019-07-24 ENCOUNTER — APPOINTMENT (OUTPATIENT)
Dept: CT IMAGING | Facility: HOSPITAL | Age: 74
End: 2019-07-24

## 2019-07-24 VITALS
DIASTOLIC BLOOD PRESSURE: 64 MMHG | SYSTOLIC BLOOD PRESSURE: 125 MMHG | HEART RATE: 59 BPM | OXYGEN SATURATION: 100 % | HEIGHT: 62 IN | TEMPERATURE: 97.8 F | BODY MASS INDEX: 20.54 KG/M2 | WEIGHT: 111.6 LBS | RESPIRATION RATE: 16 BRPM

## 2019-07-24 DIAGNOSIS — K51.911 ULCERATIVE COLITIS WITH RECTAL BLEEDING, UNSPECIFIED LOCATION (HCC): Primary | ICD-10-CM

## 2019-07-24 LAB
ABO GROUP BLD: NORMAL
ALBUMIN SERPL-MCNC: 4.4 G/DL (ref 3.5–5.2)
ALBUMIN/GLOB SERPL: 1.3 G/DL
ALP SERPL-CCNC: 58 U/L (ref 39–117)
ALT SERPL W P-5'-P-CCNC: 11 U/L (ref 1–33)
ANION GAP SERPL CALCULATED.3IONS-SCNC: 16.9 MMOL/L (ref 5–15)
APTT PPP: 30.4 SECONDS (ref 22.7–35.4)
AST SERPL-CCNC: 15 U/L (ref 1–32)
BASOPHILS # BLD AUTO: 0.03 10*3/MM3 (ref 0–0.2)
BASOPHILS NFR BLD AUTO: 0.5 % (ref 0–1.5)
BILIRUB SERPL-MCNC: 0.2 MG/DL (ref 0.2–1.2)
BLD GP AB SCN SERPL QL: NEGATIVE
BUN BLD-MCNC: 20 MG/DL (ref 8–23)
BUN/CREAT SERPL: 28.6 (ref 7–25)
CALCIUM SPEC-SCNC: 9.2 MG/DL (ref 8.6–10.5)
CHLORIDE SERPL-SCNC: 109 MMOL/L (ref 98–107)
CO2 SERPL-SCNC: 17.1 MMOL/L (ref 22–29)
CREAT BLD-MCNC: 0.7 MG/DL (ref 0.57–1)
DEPRECATED RDW RBC AUTO: 51.3 FL (ref 37–54)
EOSINOPHIL # BLD AUTO: 0 10*3/MM3 (ref 0–0.4)
EOSINOPHIL NFR BLD AUTO: 0 % (ref 0.3–6.2)
ERYTHROCYTE [DISTWIDTH] IN BLOOD BY AUTOMATED COUNT: 14.5 % (ref 12.3–15.4)
EXPIRATION DATE: ABNORMAL
FECAL OCCULT BLOOD SCREEN, POC: POSITIVE
GFR SERPL CREATININE-BSD FRML MDRD: 82 ML/MIN/1.73
GLOBULIN UR ELPH-MCNC: 3.3 GM/DL
GLUCOSE BLD-MCNC: 148 MG/DL (ref 65–99)
HCT VFR BLD AUTO: 34.3 % (ref 34–46.6)
HGB BLD-MCNC: 10.3 G/DL (ref 12–15.9)
IMM GRANULOCYTES # BLD AUTO: 0.07 10*3/MM3 (ref 0–0.05)
IMM GRANULOCYTES NFR BLD AUTO: 1.1 % (ref 0–0.5)
INR PPP: 1.01 (ref 0.9–1.1)
LIPASE SERPL-CCNC: 77 U/L (ref 13–60)
LYMPHOCYTES # BLD AUTO: 2.22 10*3/MM3 (ref 0.7–3.1)
LYMPHOCYTES NFR BLD AUTO: 35.8 % (ref 19.6–45.3)
Lab: 124
MCH RBC QN AUTO: 29.2 PG (ref 26.6–33)
MCHC RBC AUTO-ENTMCNC: 30 G/DL (ref 31.5–35.7)
MCV RBC AUTO: 97.2 FL (ref 79–97)
MONOCYTES # BLD AUTO: 0.56 10*3/MM3 (ref 0.1–0.9)
MONOCYTES NFR BLD AUTO: 9 % (ref 5–12)
NEGATIVE CONTROL: NEGATIVE
NEUTROPHILS # BLD AUTO: 3.32 10*3/MM3 (ref 1.7–7)
NEUTROPHILS NFR BLD AUTO: 53.6 % (ref 42.7–76)
NRBC BLD AUTO-RTO: 0 /100 WBC (ref 0–0.2)
PLATELET # BLD AUTO: 266 10*3/MM3 (ref 140–450)
PMV BLD AUTO: 9.7 FL (ref 6–12)
POSITIVE CONTROL: POSITIVE
POTASSIUM BLD-SCNC: 4.1 MMOL/L (ref 3.5–5.2)
PROT SERPL-MCNC: 7.7 G/DL (ref 6–8.5)
PROTHROMBIN TIME: 13 SECONDS (ref 11.7–14.2)
RBC # BLD AUTO: 3.53 10*6/MM3 (ref 3.77–5.28)
RH BLD: POSITIVE
SODIUM BLD-SCNC: 143 MMOL/L (ref 136–145)
T&S EXPIRATION DATE: NORMAL
WBC NRBC COR # BLD: 6.2 10*3/MM3 (ref 3.4–10.8)

## 2019-07-24 PROCEDURE — 86850 RBC ANTIBODY SCREEN: CPT | Performed by: EMERGENCY MEDICINE

## 2019-07-24 PROCEDURE — 85025 COMPLETE CBC W/AUTO DIFF WBC: CPT | Performed by: EMERGENCY MEDICINE

## 2019-07-24 PROCEDURE — 86900 BLOOD TYPING SEROLOGIC ABO: CPT | Performed by: EMERGENCY MEDICINE

## 2019-07-24 PROCEDURE — 74177 CT ABD & PELVIS W/CONTRAST: CPT

## 2019-07-24 PROCEDURE — 80053 COMPREHEN METABOLIC PANEL: CPT | Performed by: EMERGENCY MEDICINE

## 2019-07-24 PROCEDURE — 82270 OCCULT BLOOD FECES: CPT | Performed by: EMERGENCY MEDICINE

## 2019-07-24 PROCEDURE — 25010000002 IOPAMIDOL 61 % SOLUTION: Performed by: EMERGENCY MEDICINE

## 2019-07-24 PROCEDURE — 99284 EMERGENCY DEPT VISIT MOD MDM: CPT

## 2019-07-24 PROCEDURE — 85730 THROMBOPLASTIN TIME PARTIAL: CPT | Performed by: EMERGENCY MEDICINE

## 2019-07-24 PROCEDURE — 86901 BLOOD TYPING SEROLOGIC RH(D): CPT | Performed by: EMERGENCY MEDICINE

## 2019-07-24 PROCEDURE — 85610 PROTHROMBIN TIME: CPT | Performed by: EMERGENCY MEDICINE

## 2019-07-24 PROCEDURE — 83690 ASSAY OF LIPASE: CPT | Performed by: EMERGENCY MEDICINE

## 2019-07-24 RX ORDER — HYDROCORTISONE 100 MG/60ML
100 SUSPENSION RECTAL NIGHTLY
Qty: 30 ENEMA | Refills: 0 | Status: SHIPPED | OUTPATIENT
Start: 2019-07-24 | End: 2020-02-14

## 2019-07-24 RX ADMIN — IOPAMIDOL 85 ML: 612 INJECTION, SOLUTION INTRAVENOUS at 21:50

## 2019-07-24 RX ADMIN — SODIUM CHLORIDE 1000 ML: 9 INJECTION, SOLUTION INTRAVENOUS at 20:58

## 2019-07-25 ENCOUNTER — TELEPHONE (OUTPATIENT)
Dept: GASTROENTEROLOGY | Facility: CLINIC | Age: 74
End: 2019-07-25

## 2019-07-25 DIAGNOSIS — D50.0 IRON DEFICIENCY ANEMIA DUE TO CHRONIC BLOOD LOSS: Primary | ICD-10-CM

## 2019-07-25 NOTE — TELEPHONE ENCOUNTER
Call to pt.  Advise per Dr Burleson that case discussed with ER MD.  Understand that pt supposed to be out on hydrocortisone enemas.  Should use this along with rowasa, but not at the same time - maybe 12 hours apart.    Verb understanding.  States has been using rowasa BID - asking if should decrease to once/day so that hydrocortisone enema can be 12 hours apart - question to Dr Burleson.    Lab appt scheduled for 8/1 @ 1pm.  Appt with Dr Burleson rescheduled for 8/8 @ 10:15 am.

## 2019-07-26 ENCOUNTER — PATIENT OUTREACH (OUTPATIENT)
Dept: CASE MANAGEMENT | Facility: OTHER | Age: 74
End: 2019-07-26

## 2019-07-26 NOTE — TELEPHONE ENCOUNTER
pls have her use one cortisone enema and one rowasa enema daily, ie, ok to decrease rowasa to once daily

## 2019-07-26 NOTE — OUTREACH NOTE
Patient Outreach Note    Patient see in ED 7/25 for diverticulitis flair up. Patient states she is feeling better. Role of care advisor explained and contact information provided. She cannot fill her steroid until Monday due to no local pharmacist carrying the medication at this time. Patient has a follow up appointment with her PCP on Thursday. Patient instructed to call PCP if she has worsening symptoms again (fatigue, dark-tarry stools, SOA). Patient instructed to drink plenty of water, rest, and ensure she eats foods fortified with iron (dark leafy greens). Per patient she has regularly taken iron supplements x 1 year. No care gaps at this time. No advanced directives on file. Patient states she received AD information on Wednesday. MyChart Active. No other needs identified at this time. Patient does not feel return call necessary.      Kaye Cassidy RN    7/26/2019, 5:23 PM

## 2019-07-30 ENCOUNTER — RESULTS ENCOUNTER (OUTPATIENT)
Dept: GASTROENTEROLOGY | Facility: CLINIC | Age: 74
End: 2019-07-30

## 2019-07-30 DIAGNOSIS — D50.0 IRON DEFICIENCY ANEMIA DUE TO CHRONIC BLOOD LOSS: ICD-10-CM

## 2019-08-02 LAB
HCT VFR BLD AUTO: 29 % (ref 34–46.6)
HGB BLD-MCNC: 8.5 G/DL (ref 12–15.9)

## 2019-08-05 ENCOUNTER — TELEPHONE (OUTPATIENT)
Dept: GASTROENTEROLOGY | Facility: CLINIC | Age: 74
End: 2019-08-05

## 2019-08-05 NOTE — TELEPHONE ENCOUNTER
----- Message from Melissa Burleson MD sent at 8/5/2019 11:29 AM EDT -----  Hb on recheck is 8.5, low for her.  Continue iron.  She sees me this week, will discuss next steps

## 2019-08-06 NOTE — TELEPHONE ENCOUNTER
Called pt and advised per Dr Burleson that her hgb on recheck is 8.5 , low for her.  Continue iron.  When she sees her this week , she will discuss next steps. Pt verb understanding.

## 2019-08-08 ENCOUNTER — OFFICE VISIT (OUTPATIENT)
Dept: GASTROENTEROLOGY | Facility: CLINIC | Age: 74
End: 2019-08-08

## 2019-08-08 VITALS
DIASTOLIC BLOOD PRESSURE: 62 MMHG | TEMPERATURE: 98 F | SYSTOLIC BLOOD PRESSURE: 100 MMHG | BODY MASS INDEX: 21.12 KG/M2 | HEIGHT: 62 IN | WEIGHT: 114.8 LBS

## 2019-08-08 DIAGNOSIS — K51.311 ULCERATIVE RECTOSIGMOIDITIS WITH RECTAL BLEEDING (HCC): Chronic | ICD-10-CM

## 2019-08-08 DIAGNOSIS — D50.0 IRON DEFICIENCY ANEMIA DUE TO CHRONIC BLOOD LOSS: Primary | ICD-10-CM

## 2019-08-08 PROCEDURE — 99214 OFFICE O/P EST MOD 30 MIN: CPT | Performed by: INTERNAL MEDICINE

## 2019-08-08 NOTE — PROGRESS NOTES
Chief Complaint   Patient presents with   • Follow-up   • UC flare     Subjective     HPI  Renee Stevenson is a 74 y.o. female who presents for follow up of ulcerative colitis complicated by rectal bleeding and iron deficiency anemia.    She had IV iron in May and responded well.  Then a few weeks ago, she had a week flare of bleeding-- bright red blood per rectum with all BMs and sometimes just passing blood.  .  She went to the ER. Now on hydrocortisone enemas along with her Rowasa enemas.  She is still taking the oral Lialda.  Oral iron once per day.    Weight is stable, improved.    No excess diarrhea, no excess pain.    Last hemoglobin check showed that her hemoglobin is back down to the 8 range.    Last colonoscopy was a year ago showing sigmoid and rectal colitis.        Past Medical History:   Diagnosis Date   • Allergic rhinitis    • Colitis    • Colon polyps    • Diabetes mellitus (CMS/HCC)     type 2   • Dizziness    • Encounter for breast cancer screening other than mammogram    • GERD (gastroesophageal reflux disease)    • GI (gastrointestinal bleed)    • Glaucoma    • Hyperlipidemia    • Hypertension    • Knee fracture, left    • Non-STEMI (non-ST elevated myocardial infarction) (CMS/HCC) 6/16/2017   • Osteopenia    • Ulcerative colitis (CMS/HCC)        Social History     Socioeconomic History   • Marital status:      Spouse name: Not on file   • Number of children: 1   • Years of education: Not on file   • Highest education level: Not on file   Occupational History   • Occupation: retail     Comment: retired   Tobacco Use   • Smoking status: Never Smoker   • Smokeless tobacco: Never Used   Substance and Sexual Activity   • Alcohol use: No   • Drug use: No   • Sexual activity: Not Currently     Partners: Male         Current Outpatient Medications:   •  Acetaminophen (TYLENOL PO), Take 2 tablets by mouth As Needed., Disp: , Rfl:   •  amLODIPine (NORVASC) 5 MG tablet, TAKE 1 TABLET BY MOUTH DAILY,  Disp: 30 tablet, Rfl: 5  •  Cholecalciferol (VITAMIN D) 2000 UNITS capsule, Take 1 capsule by mouth daily., Disp: , Rfl:   •  CONTOUR NEXT TEST test strip, CHECK BLOOD SUGAR EVERY DAY, Disp: 100 each, Rfl: 5  •  dorzolamide-timolol (COSOPT) 22.3-6.8 MG/ML ophthalmic solution, Apply 1 drop to eye 2 (two) times a day., Disp: , Rfl:   •  ferrous sulfate 325 (65 FE) MG tablet, Take 1 tablet by mouth Daily With Breakfast., Disp: 30 tablet, Rfl: 0  •  hydrocortisone (ANUSOL-HC) 2.5 % rectal cream, Insert  into the rectum 2 (Two) Times a Day., Disp: 28 g, Rfl: 0  •  hydrocortisone (CORTENEMA) 100 MG/60ML enema, Insert 1 enema into the rectum Every Night., Disp: 30 enema, Rfl: 0  •  lisinopril (PRINIVIL,ZESTRIL) 20 MG tablet, TAKE 1 TABLET BY MOUTH EVERY DAY, Disp: 90 tablet, Rfl: 3  •  mesalamine (LIALDA) 1.2 g EC tablet, TAKE 4 TABLETS BY MOUTH DAILY WITH BREAKFAST, Disp: 120 tablet, Rfl: 0  •  mesalamine (ROWASA) 4 g enema, INSERT 1 ENEMA INTO THE RECTUM EVERY EVENING, Disp: 3360 mL, Rfl: 0  •  metFORMIN ER (GLUCOPHAGE-XR) 500 MG 24 hr tablet, TAKE 2 TABLETS BY MOUTH TWICE DAILY, Disp: 360 tablet, Rfl: 1  •  metoprolol tartrate (LOPRESSOR) 50 MG tablet, TAKE 1 TABLET BY MOUTH EVERY 12 HOURS, Disp: 180 tablet, Rfl: 3  •  Multiple Vitamin (MULTI-VITAMIN PO), Take 1 tablet by mouth Daily., Disp: , Rfl:   •  omeprazole (PriLOSEC) 40 MG capsule, Take 1 capsule by mouth daily., Disp: , Rfl:   •  promethazine-dextromethorphan (PROMETHAZINE-DM) 6.25-15 MG/5ML syrup, Take 5 mL by mouth 4 (Four) Times a Day As Needed for Cough., Disp: 180 mL, Rfl: 0  •  rosuvastatin (CRESTOR) 10 MG tablet, Take 1 tablet by mouth Daily., Disp: 30 tablet, Rfl: 5    Review of Systems   Constitutional: Negative for activity change, appetite change, chills, fatigue and fever.   HENT: Negative for trouble swallowing.    Respiratory: Negative.    Cardiovascular: Negative.  Negative for chest pain.   Gastrointestinal: Positive for anal bleeding and blood  in stool. Negative for abdominal distention, abdominal pain, constipation, diarrhea, nausea and vomiting.        Intermittent rectal bleeding   Genitourinary: Negative for dysuria, frequency and hematuria.       Objective   Vitals:    08/08/19 1016   BP: 100/62   Temp: 98 °F (36.7 °C)         08/08/19  1016   Weight: 52.1 kg (114 lb 12.8 oz)     Body mass index is 21 kg/m².      Physical Exam   Constitutional: She is oriented to person, place, and time. She appears well-developed and well-nourished. No distress.   HENT:   Head: Normocephalic and atraumatic.   Right Ear: External ear normal.   Left Ear: External ear normal.   Nose: Nose normal.   Mouth/Throat: Oropharynx is clear and moist.   Eyes: Conjunctivae and EOM are normal. Right eye exhibits no discharge. Left eye exhibits no discharge. No scleral icterus.   Neck: Normal range of motion. Neck supple. No thyromegaly present.   No supraclavicular adenopathy   Cardiovascular: Normal rate, regular rhythm, normal heart sounds and intact distal pulses. Exam reveals no gallop.   No murmur heard.  No lower extremity edema   Pulmonary/Chest: Effort normal and breath sounds normal. No respiratory distress. She has no wheezes.   Abdominal: Soft. Normal appearance and bowel sounds are normal. She exhibits no distension and no mass. There is no hepatosplenomegaly. There is no tenderness. There is no rigidity, no rebound and no guarding. No hernia.   Genitourinary:   Genitourinary Comments: Rectal exam deferred   Musculoskeletal: Normal range of motion. She exhibits no edema or tenderness.   No atrophy of upper or lower extremities.  Normal digits and nails of both hands.   Lymphadenopathy:     She has no cervical adenopathy.   Neurological: She is alert and oriented to person, place, and time. She displays no atrophy. Coordination normal.   Skin: Skin is warm and dry. No rash noted. She is not diaphoretic. No erythema. There is pallor.   Psychiatric: She has a normal mood  and affect. Her behavior is normal. Judgment and thought content normal.   Vitals reviewed.      WBC   Date Value Ref Range Status   07/24/2019 6.20 3.40 - 10.80 10*3/mm3 Final   07/03/2019 4.91 3.40 - 10.80 10*3/mm3 Final     RBC   Date Value Ref Range Status   07/24/2019 3.53 (L) 3.77 - 5.28 10*6/mm3 Final   07/03/2019 3.11 (L) 3.77 - 5.28 10*6/mm3 Final     Hemoglobin   Date Value Ref Range Status   08/01/2019 8.5 (L) 12.0 - 15.9 g/dL Final   07/24/2019 10.3 (L) 12.0 - 15.9 g/dL Final     Hematocrit   Date Value Ref Range Status   08/01/2019 29.0 (L) 34.0 - 46.6 % Final   07/24/2019 34.3 34.0 - 46.6 % Final     MCV   Date Value Ref Range Status   07/24/2019 97.2 (H) 79.0 - 97.0 fL Final     MCH   Date Value Ref Range Status   07/24/2019 29.2 26.6 - 33.0 pg Final     MCHC   Date Value Ref Range Status   07/24/2019 30.0 (L) 31.5 - 35.7 g/dL Final     RDW   Date Value Ref Range Status   07/24/2019 14.5 12.3 - 15.4 % Final     RDW-SD   Date Value Ref Range Status   07/24/2019 51.3 37.0 - 54.0 fl Final     MPV   Date Value Ref Range Status   07/24/2019 9.7 6.0 - 12.0 fL Final     Platelets   Date Value Ref Range Status   07/24/2019 266 140 - 450 10*3/mm3 Final     Neutrophil %   Date Value Ref Range Status   07/24/2019 53.6 42.7 - 76.0 % Final     Lymphocyte %   Date Value Ref Range Status   07/24/2019 35.8 19.6 - 45.3 % Final     Monocyte %   Date Value Ref Range Status   07/24/2019 9.0 5.0 - 12.0 % Final     Eosinophil %   Date Value Ref Range Status   07/24/2019 0.0 (L) 0.3 - 6.2 % Final     Basophil %   Date Value Ref Range Status   07/24/2019 0.5 0.0 - 1.5 % Final     Immature Grans %   Date Value Ref Range Status   07/24/2019 1.1 (H) 0.0 - 0.5 % Final     Neutrophils, Absolute   Date Value Ref Range Status   07/24/2019 3.32 1.70 - 7.00 10*3/mm3 Final     Lymphocytes, Absolute   Date Value Ref Range Status   07/24/2019 2.22 0.70 - 3.10 10*3/mm3 Final     Monocytes, Absolute   Date Value Ref Range Status    07/24/2019 0.56 0.10 - 0.90 10*3/mm3 Final     Eosinophils, Absolute   Date Value Ref Range Status   07/24/2019 0.00 0.00 - 0.40 10*3/mm3 Final     Basophils, Absolute   Date Value Ref Range Status   07/24/2019 0.03 0.00 - 0.20 10*3/mm3 Final     Immature Grans, Absolute   Date Value Ref Range Status   07/24/2019 0.07 (H) 0.00 - 0.05 10*3/mm3 Final     nRBC   Date Value Ref Range Status   07/24/2019 0.0 0.0 - 0.2 /100 WBC Final       Lab Results   Component Value Date    GLUCOSE 148 (H) 07/24/2019    BUN 20 07/24/2019    CREATININE 0.70 07/24/2019    EGFRIFNONA 82 07/24/2019    EGFRIFAFRI 104 05/16/2019    BCR 28.6 (H) 07/24/2019    CO2 17.1 (L) 07/24/2019    CALCIUM 9.2 07/24/2019    PROTENTOTREF 7.2 05/16/2019    ALBUMIN 4.40 07/24/2019    LABIL2 1.7 05/16/2019    AST 15 07/24/2019    ALT 11 07/24/2019         Imaging Results (last 7 days)     ** No results found for the last 168 hours. **            Assessment/Plan    Left-sided ulcerative colitis: She has been on oral mesalamine as well as Rowasa enemas.  Still persists with episodes of rectal bleeding, severe at times    Iron deficiency anemia: Secondary to bleeding with above.  She did respond well to IV iron but now again is anemic    Rectal bleeding: I am suspicious that this is related to poorly controlled colitis versus significant hemorrhoidal bleeding    Weight loss: Stable, improved    Plan  Continue 1 month of the hydrocortisone enemas along with the mesalamine and mesalamine enemas  We will plan for a flexible sigmoidoscopy following a tap water enema to further assess disease activity.  She may need a stronger medication such as Entyvio or a short course of steroids.  Will see what her scope shows to decide  Continue oral iron  Will continue to follow her blood count       Renee was seen today for follow-up and uc flare.    Diagnoses and all orders for this visit:    Iron deficiency anemia due to chronic blood loss  -     Case Request; Standing  -      Case Request    Ulcerative rectosigmoiditis with rectal bleeding (CMS/HCC)  -     Case Request; Standing  -     Case Request    Other orders  -     Follow Anesthesia Guidelines / Standing Orders; Future  -     Implement Anesthesia Orders Day of Procedure; Standing  -     Obtain Informed Consent; Standing  -     Obtain Informed Consent; Future        Dictated utilizing Dragon dictation

## 2019-08-08 NOTE — PATIENT INSTRUCTIONS
Finish the cortisone enemas    Flex sig next month    Continue iron    For any additional questions, concerns or changes to your condition after today's office visit please contact the office at [phone].

## 2019-08-09 DIAGNOSIS — K51.311 ULCERATIVE RECTOSIGMOIDITIS WITH RECTAL BLEEDING (HCC): Chronic | ICD-10-CM

## 2019-08-09 RX ORDER — MESALAMINE 1.2 G/1
4.8 TABLET, DELAYED RELEASE ORAL
Qty: 120 TABLET | Refills: 2 | Status: SHIPPED | OUTPATIENT
Start: 2019-08-09 | End: 2020-03-06

## 2019-09-10 NOTE — PROGRESS NOTES
"[facility] INTERNAL MEDICINE  ILA PACHECO M.D.      Renee J From / 73 y.o. / female  01/04/2019    VITALS    Visit Vitals  /64   Pulse 64   Temp 97.8 °F (36.6 °C)   Ht 157.5 cm (62.01\")   Wt 50.8 kg (112 lb)   SpO2 98%   BMI 20.48 kg/m²       BP Readings from Last 3 Encounters:   01/04/19 132/64   01/02/19 124/82   12/16/18 107/69     Wt Readings from Last 3 Encounters:   01/04/19 50.8 kg (112 lb)   01/02/19 50.3 kg (111 lb)   12/15/18 50.2 kg (110 lb 11.2 oz)      Body mass index is 20.48 kg/m².    CC:  Main reason(s) for today's visit: Hospital F/U GI bleed (12/15/2018 to 12/16/2018) and Diabetes      HPI:     Date of admission/discharge: 12/15/18 - 12/16/18  Hospital: [facility]  Principle Dx: LGIB from Ulcerative Colitis   Secondary Dx: Anemia due to GI bleed, iron deficiency  History prior to hospitalization: Flare-up of UC since November with frequent bloody bowel movement.   Evaluation/Treatment: noted to have anemia with hemoglobin of 8. On mesalamine.   Course: sees Dr. Burleson now. To start iron infusion Monday.     Patient Care Team:  Esequiel Pacheco MD as PCP - General  Ministerio Wells MD as Consulting Physician (Orthopedic Surgery)  Abhay Wooten MD as Consulting Physician (Cardiology)  Cisco Husain MD as Consulting Physician (Otolaryngology)  EZIO Garibay MD as Consulting Physician (Ophthalmology)  Melissa Burleson MD as Consulting Physician (Gastroenterology)  ____________________________________________________________________    ASSESSMENT & PLAN:    1. Essential hypertension    2. Acute post-hemorrhagic anemia    3. Ulcerative rectosigmoiditis with rectal bleeding (CMS/HCC)    4. Type 2 diabetes mellitus with other circulatory complication, without long-term current use of insulin (CMS/HCC)      No orders of the defined types were placed in this encounter.      Summary/Discussion:  Follow-up with GI   To start iron infusion Monday.   Continue mesalamine.   Continue same " for hypertension and DM 2      Return in about 4 months (around 5/4/2019) for Diabetes and co-morbid conditions.    ____________________________________________________________________    REVIEW OF SYSTEMS    Review of Systems   Constitutional: Positive for fatigue. Negative for fever.   Respiratory: Negative for shortness of breath.    Cardiovascular: Negative for chest pain.   Gastrointestinal: Negative for abdominal pain, blood in stool (not actively) and diarrhea (2 formed bm / daily).         PHYSICAL EXAMINATION    Physical Exam   Constitutional: No distress.   Cardiovascular: Normal rate and regular rhythm.   Pulmonary/Chest: Effort normal and breath sounds normal.   Abdominal: Soft. There is no tenderness.   Skin: There is pallor.         REVIEWED DATA:    Labs:   Office Visit on 01/02/2019   Component Date Value Ref Range Status   • Hemoglobin 01/02/2019 8.5* 11.9 - 15.5 g/dL Final   • Hematocrit 01/02/2019 28.3* 35.6 - 45.5 % Final       Imaging:      CT OF THE ABDOMEN AND PELVIS WITH CONTRAST     HISTORY: Abdominal pain     COMPARISON: None available.     TECHNIQUE: Axial CT imaging was obtained from the dome of the diaphragm  through symphysis pubis following the administration of IV contrast.     FINDINGS:  Images through the lung bases demonstrate mild bibasilar atelectasis.  The stomach and proximal small bowel appear unremarkable, as are the  adrenal glands. Spleen is within normal limits, as is the pancreas. The  gallbladder appears unremarkable. No focal hepatic lesions are seen.  Cysts are identified on both kidneys. There is atherosclerotic  involvement of the abdominal aorta. There is a small fat-containing  umbilical hernia. The appendix is visualized, and is within normal  limits. Urinary bladder is normal. Uterus appears unremarkable. This  patient's colon appears thick walled, extending from the sigmoid colon  through the rectum. There is some mild pericolonic soft tissue  stranding.  Findings are felt to be characteristic of colitis. There is  some focal fatty infiltration seen within the bowel wall. This is  particularly pronounced within the rectosigmoid, and at the terminal  ileum and cecum. This finding can be seen in the setting of chronic  inflammatory bowel disease. Correlation with history is suggested. No  pneumatosis or free air is seen. There is no evidence of obstruction.  There is some mild presacral soft tissue stranding /fluid. This is  likely reactive. Review of bony windows does not demonstrate any  aggressive osseous abnormalities.     IMPRESSION:   IMPRESSION:  Diffusely thick-walled appearance to the bowel seen extending from the  sigmoid colon to the rectum, with some mild associated pericolonic soft  tissue stranding. Findings are felt to be characteristic of colitis.  There is some focal fatty infiltration identified within the wall of the  bowel. This finding can be seen in the setting of chronic inflammatory  bowel disease. Correlation with history is suggested. No pneumatosis,  free air, or evidence of obstruction is seen.     Radiation dose reduction techniques were utilized, including automated  exposure control and exposure modulation based on body size.     This report was finalized on 10/31/2018   Medical Tests:        Summary of old records / correspondence / consultant report:   DC summary re: issues addressed on HPI    Request outside records:       ALLERGIES  Allergies   Allergen Reactions   • Tetanus Toxoids Swelling     Hand and arm        MEDICATIONS   Current Outpatient Medications on File Prior to Visit   Medication Sig   • Acetaminophen (TYLENOL PO) Take 2 tablets by mouth As Needed.   • amLODIPine (NORVASC) 5 MG tablet Take 1 tablet by mouth Daily.   • MAIA CONTOUR NEXT TEST test strip CHECK BLOOD SUGAR ONCE DAILY   • Cholecalciferol (VITAMIN D) 2000 UNITS capsule Take 1 capsule by mouth daily.   • dorzolamide-timolol (COSOPT) 22.3-6.8 MG/ML ophthalmic  solution Apply 1 drop to eye 2 (two) times a day.   • ferrous sulfate 325 (65 FE) MG tablet Take 1 tablet by mouth Daily With Breakfast.   • hydrocortisone (ANUSOL-HC) 2.5 % rectal cream Insert  into the rectum 2 (Two) Times a Day.   • lisinopril (PRINIVIL,ZESTRIL) 20 MG tablet TAKE 1 TABLET BY MOUTH EVERY DAY   • mesalamine (LIALDA) 1.2 g EC tablet Take 4 tablets by mouth Daily With Breakfast.   • mesalamine (ROWASA) 4 g enema Insert 1 enema into the rectum Every Night.   • metFORMIN ER (GLUCOPHAGE-XR) 500 MG 24 hr tablet TAKE 2 TABLETS BY MOUTH TWICE DAILY   • metoprolol tartrate (LOPRESSOR) 50 MG tablet TAKE 1 TABLET BY MOUTH EVERY 12 HOURS   • Multiple Vitamin (MULTI-VITAMIN PO) Take 1 tablet by mouth Daily.   • omeprazole (PriLOSEC) 40 MG capsule Take 1 capsule by mouth daily.   • rosuvastatin (CRESTOR) 10 MG tablet Take 1 tablet by mouth Daily.     No current facility-administered medications on file prior to visit.        Current outpatient and discharge medications have been reconciled for the patient.  Reviewed by: Esequiel Pacheco MD       [facility]:     The following portions of the patient's history were reviewed and updated as appropriate: Allergies / Current Medications / Past Medical History / Surgical History / Social History / Family History    PROBLEM LIST   Patient Active Problem List   Diagnosis   • 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)   • Osteopenia   • Hypertension   • Hyperlipidemia   • Colon polyp   • Gastroesophageal reflux disease   • History of non-ST elevation myocardial infarction (NSTEMI)   • Glaucoma   • Coronary artery disease involving native coronary artery of native heart without angina pectoris   • Takotsubo cardiomyopathy   • Coronary artery disease involving native coronary artery of native heart without angina pectoris   • Ulcerative colitis with rectal bleeding (CMS/HCC)   • Acute post-hemorrhagic anemia   • Iron deficiency anemia secondary to inadequate dietary  iron intake       PAST MEDICAL HISTORY  Past Medical History:   Diagnosis Date   • Allergic rhinitis    • Colitis    • Colon polyps    • Diabetes mellitus (CMS/[facility])     type 2   • Dizziness    • Encounter for breast cancer screening other than mammogram    • GERD (gastroesophageal reflux disease)    • Glaucoma    • Hyperlipidemia    • Hypertension    • Knee fracture, left    • Non-STEMI (non-ST elevated myocardial infarction) (CMS/[facility]) 6/16/2017   • Osteopenia    • Ulcerative colitis (CMS/[facility])        SURGICAL HISTORY  Past Surgical History:   Procedure Laterality Date   • CARDIAC CATHETERIZATION N/A 6/16/2017    Procedure: Left Heart Cath;  Surgeon: Abhay Wooten MD;  Location:  [facility];  Service:    • CARDIAC CATHETERIZATION N/A 6/16/2017    Procedure: Left ventriculography;  Surgeon: Abhay Wooten MD;  Location:  [facility];  Service:    • CARDIAC CATHETERIZATION N/A 6/16/2017    Procedure: Coronary angiography;  Surgeon: Abhay Wooten MD;  Location:  [facility];  Service:    • CATARACT EXTRACTION     • COLONOSCOPY  08/14/2018   • EYE SURGERY      cataract surgery   • PAP SMEAR         SOCIAL HISTORY  Social History     Socioeconomic History   • Marital status:      Spouse name: Not on file   • Number of children: 1   • Years of education: Not on file   • Highest education level: Not on file   Occupational History   • Occupation: retail     Comment: retired   Tobacco Use   • Smoking status: Never Smoker   • Smokeless tobacco: Never Used   Substance and Sexual Activity   • Alcohol use: No   • Drug use: No   • Sexual activity: Defer       FAMILY HISTORY  Family History   Problem Relation Age of Onset   • Heart disease Mother    • Hypertension Mother    • Diabetes Mother    • Heart disease Father    • Hypertension Father    • Heart disease Sister    • Heart disease Brother    • Hypertension Other    • Diabetes Other         type 2   • No Known Problems Maternal  Grandmother    • No Known Problems Maternal Grandfather    • No Known Problems Paternal Grandmother    • No Known Problems Paternal Grandfather    • Crohn's disease Niece          **Cheryl Disclaimer:   Much of this encounter note is an electronic transcription/translation of spoken language to printed text. The electronic translation of spoken language may permit erroneous, or at times, nonsensical words or phrases to be inadvertently transcribed. Although I have reviewed the note for such errors, some may still exist.    [FreeTextEntry1] : Followup for patient with remote MI and stent

## 2019-09-12 RX ORDER — MESALAMINE 4 G/60ML
ENEMA RECTAL
Qty: 3360 ML | Refills: 0 | Status: SHIPPED | OUTPATIENT
Start: 2019-09-12 | End: 2019-11-05 | Stop reason: SDUPTHER

## 2019-10-16 ENCOUNTER — OFFICE VISIT (OUTPATIENT)
Dept: INTERNAL MEDICINE | Age: 74
End: 2019-10-16

## 2019-10-16 VITALS
DIASTOLIC BLOOD PRESSURE: 66 MMHG | WEIGHT: 112 LBS | OXYGEN SATURATION: 99 % | SYSTOLIC BLOOD PRESSURE: 126 MMHG | TEMPERATURE: 96.8 F | HEIGHT: 62 IN | HEART RATE: 58 BPM | BODY MASS INDEX: 20.61 KG/M2

## 2019-10-16 DIAGNOSIS — Z23 NEED FOR INFLUENZA VACCINATION: ICD-10-CM

## 2019-10-16 DIAGNOSIS — Z00.00 MEDICARE ANNUAL WELLNESS VISIT, SUBSEQUENT: Primary | ICD-10-CM

## 2019-10-16 DIAGNOSIS — E78.2 MIXED HYPERLIPIDEMIA: Chronic | ICD-10-CM

## 2019-10-16 DIAGNOSIS — I10 ESSENTIAL HYPERTENSION: Chronic | ICD-10-CM

## 2019-10-16 DIAGNOSIS — K51.311 ULCERATIVE RECTOSIGMOIDITIS WITH RECTAL BLEEDING (HCC): Chronic | ICD-10-CM

## 2019-10-16 DIAGNOSIS — I25.10 CORONARY ARTERY DISEASE INVOLVING NATIVE CORONARY ARTERY OF NATIVE HEART WITHOUT ANGINA PECTORIS: Chronic | ICD-10-CM

## 2019-10-16 DIAGNOSIS — E11.59 TYPE 2 DIABETES MELLITUS WITH OTHER CIRCULATORY COMPLICATION, WITHOUT LONG-TERM CURRENT USE OF INSULIN (HCC): Chronic | ICD-10-CM

## 2019-10-16 PROBLEM — I51.81 TAKOTSUBO CARDIOMYOPATHY: Chronic | Status: RESOLVED | Noted: 2017-06-22 | Resolved: 2019-10-16

## 2019-10-16 PROBLEM — D62 ACUTE POST-HEMORRHAGIC ANEMIA: Status: RESOLVED | Noted: 2018-11-01 | Resolved: 2019-10-16

## 2019-10-16 PROCEDURE — G0439 PPPS, SUBSEQ VISIT: HCPCS | Performed by: INTERNAL MEDICINE

## 2019-10-16 PROCEDURE — 99214 OFFICE O/P EST MOD 30 MIN: CPT | Performed by: INTERNAL MEDICINE

## 2019-10-16 PROCEDURE — 90653 IIV ADJUVANT VACCINE IM: CPT | Performed by: INTERNAL MEDICINE

## 2019-10-16 PROCEDURE — G0008 ADMIN INFLUENZA VIRUS VAC: HCPCS | Performed by: INTERNAL MEDICINE

## 2019-10-16 RX ORDER — SIMVASTATIN 40 MG
40 TABLET ORAL NIGHTLY
COMMUNITY
End: 2019-12-04 | Stop reason: ALTCHOICE

## 2019-10-16 NOTE — ASSESSMENT & PLAN NOTE
Lab Results   Component Value Date    LDL 52 05/16/2019    LDL 61 02/19/2018    LDL 69 08/12/2016    HDL 42 05/16/2019    HDL 43 02/19/2018    HDL 48 08/12/2016    TRIG 113 05/16/2019    CHOLHDLRATIO 2.79 05/16/2019    CHOLHDLRATIO 2.91 02/19/2018    CHOLHDLRATIO 2.8 08/12/2016      She will verify whether taking simvastatin or rosuvastatin

## 2019-10-16 NOTE — ASSESSMENT & PLAN NOTE
BP Readings from Last 3 Encounters:   10/16/19 126/66   08/08/19 100/62   07/24/19 125/64      She will verify current blood pressure medications.

## 2019-10-16 NOTE — ASSESSMENT & PLAN NOTE
Lab Results   Component Value Date    HGBA1C 5.50 05/16/2019    HGBA1C 6.30 (H) 07/30/2018    HGBA1C 6.02 (H) 02/19/2018    CREATININE 0.70 07/24/2019    LDL 52 05/16/2019    MICROALBUR 20.9 05/16/2019

## 2019-10-16 NOTE — ASSESSMENT & PLAN NOTE
To undergo flex sigmoidoscopy shortly.   Lab Results   Component Value Date    HGB 8.5 (L) 08/01/2019

## 2019-10-16 NOTE — PATIENT INSTRUCTIONS
** IMPORTANT MESSAGE FROM DR. PATIÑO **    In our office, your satisfaction is VERY important to us.     You may receive a survey from [facility] by mail or E-mail for you to provide feedback about your visit. This information is invaluable for me to know what we can do to improve our services.     I ask that you please take a few minutes to complete the survey and let us know how we are doing in serving your needs. (You may receive the survey more than once for multiple visits)    Thank You !    Dr. Patiño & Staff    __________________________________________________________      ADDITIONAL INSTRUCTION / REMINDERS FROM DR. PATIÑO    Recommended Shingrix (new shingles vaccine).

## 2019-10-16 NOTE — PROGRESS NOTES
10/16/2019      MEDICARE ANNUAL WELLNESS VISIT     Renee Stevenson is a 74 y.o. female who presents for Medicare Wellness-subsequent; Diabetes; and Hypertension      Patient's general assessment of her health since a year ago:     - Compared to one year ago, she feels her physical health is slightly worse.    - Compared to one year ago, she feels her mental health is about the same without significant change.      HPI for other active medical problems:     Recent flare of ulcerative colitis with rectal bleeding.   Lab Results   Component Value Date    HGB 8.5 (L) 08/01/2019   Planned sigmoidoscopy shortly with GI.     Chronic type 2 diabetes:  Diabetic complications: coronary artery disease. Current therapy: metformin/metformin ER.  Most recent change to treatment plan: no recent change.  Home blood sugar levels: has no significant low sugar symptoms/problems.   Most recent relevant labs:   Lab Results   Component Value Date    HGBA1C 5.50 05/16/2019    HGBA1C 6.30 (H) 07/30/2018    HGBA1C 6.02 (H) 02/19/2018    CREATININE 0.70 07/24/2019    LDL 52 05/16/2019    MICROALBUR 20.9 05/16/2019      Chronic essential hypertension:  Since prior visit: compliant with medication(s) and denies significant problems with medication(s).  Most recent in-office blood pressure readings:   BP Readings from Last 3 Encounters:   10/16/19 126/66   08/08/19 100/62   07/24/19 125/64     Chronic hyperlipidemia:  Current therapy include statin medication (? simvastatin or rosuvastatin).    Most recent labs:   Lab Results   Component Value Date    LDL 52 05/16/2019    LDL 61 02/19/2018    LDL 69 08/12/2016    HDL 42 05/16/2019    HDL 43 02/19/2018    HDL 48 08/12/2016    TRIG 113 05/16/2019    CHOLHDLRATIO 2.79 05/16/2019    CHOLHDLRATIO 2.91 02/19/2018    CHOLHDLRATIO 2.8 08/12/2016     Coronary artery disease: She complains of no change in symptoms.   Denies exertional chest pain and MUNGUIA. Current medications include ASA, statin, and  "betablocker.         * The required components of Health Risk Assessment (HRA) that were completed by the patient and/or my staff are contained within this note and in the scanned documents titled \"Health Risk Assessment\" within the media section of the patient's chart in [facility].       HISTORY    Recent Hospitalizations:    Recent hospitalization?:     If YES, location, date, and diagnoses:     · Location:   · Date:   · Principle Discharge Dx:   · Secondary Dx:       Patient Care Team:    Patient Care Team:  Esequiel Pachceo MD as PCP - General  Esequiel Pacheco MD as PCP - Claims Attributed  Ministerio Wells MD as Consulting Physician (Orthopedic Surgery)  Abhay Wooten MD as Consulting Physician (Cardiology)  Cisco Husain MD as Consulting Physician (Otolaryngology)  EZIO Garibay MD as Consulting Physician (Ophthalmology)  Melissa Burleson MD as Consulting Physician (Gastroenterology)      Allergies:  Tetanus toxoids    Medications:  Current Outpatient Medications   Medication Sig Dispense Refill   • Acetaminophen (TYLENOL PO) Take 2 tablets by mouth As Needed.     • aspirin 81 MG tablet Take 81 mg by mouth Daily.     • CONTOUR NEXT TEST test strip CHECK BLOOD SUGAR EVERY  each 5   • dorzolamide-timolol (COSOPT) 22.3-6.8 MG/ML ophthalmic solution Apply 1 drop to eye 2 (two) times a day.     • ferrous sulfate 325 (65 FE) MG tablet Take 1 tablet by mouth Daily With Breakfast. 30 tablet 0   • hydrocortisone (ANUSOL-HC) 2.5 % rectal cream Insert  into the rectum 2 (Two) Times a Day. 28 g 0   • lisinopril (PRINIVIL,ZESTRIL) 20 MG tablet TAKE 1 TABLET BY MOUTH EVERY DAY 90 tablet 3   • mesalamine (LIALDA) 1.2 g EC tablet TAKE 4 TABLETS BY MOUTH DAILY WITH BREAKFAST 120 tablet 2   • mesalamine (ROWASA) 4 g enema INSERT 1 ENEMA INTO THE RECTUM EVERY EVENING 3360 mL 0   • metFORMIN ER (GLUCOPHAGE-XR) 500 MG 24 hr tablet TAKE 2 TABLETS BY MOUTH TWICE DAILY 360 tablet 1   • metoprolol tartrate (LOPRESSOR) 50 MG " tablet TAKE 1 TABLET BY MOUTH EVERY 12 HOURS 180 tablet 3   • Multiple Vitamin (MULTI-VITAMIN PO) Take 1 tablet by mouth Daily.     • omeprazole (PriLOSEC) 40 MG capsule Take 1 capsule by mouth daily.     • simvastatin (ZOCOR) 40 MG tablet Take 40 mg by mouth Every Night.     • amLODIPine (NORVASC) 5 MG tablet TAKE 1 TABLET BY MOUTH DAILY 30 tablet 5   • hydrocortisone (CORTENEMA) 100 MG/60ML enema Insert 1 enema into the rectum Every Night. 30 enema 0   • rosuvastatin (CRESTOR) 10 MG tablet Take 1 tablet by mouth Daily. 30 tablet 5     No current facility-administered medications for this visit.         PFSH:     The following portions of the patient's history were reviewed and updated as appropriate: Allergies / Current Medications / Past Medical History / Surgical History / Social History / Family History    Problem List:  Patient Active Problem List   Diagnosis   • 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)   • Osteopenia   • Hypertension   • Hyperlipidemia   • Colon polyp   • Gastroesophageal reflux disease   • Glaucoma   • Coronary artery disease involving native coronary artery of native heart without angina pectoris   • Ulcerative colitis with rectal bleeding (CMS/HCC)   • Iron deficiency anemia secondary to inadequate dietary iron intake   • Iron deficiency anemia due to chronic blood loss   • Ulcerative rectosigmoiditis with rectal bleeding (CMS/HCC)       Past Medical History:  Past Medical History:   Diagnosis Date   • Allergic rhinitis    • Colitis    • Colon polyps    • Diabetes mellitus (CMS/HCC)     type 2   • Dizziness    • Encounter for breast cancer screening other than mammogram    • GERD (gastroesophageal reflux disease)    • GI (gastrointestinal bleed)    • Glaucoma    • Hyperlipidemia    • Hypertension    • Knee fracture, left    • Non-STEMI (non-ST elevated myocardial infarction) (CMS/HCC) 6/16/2017   • Osteopenia    • Ulcerative colitis (CMS/HCC)        Past Surgical  "History:  Past Surgical History:   Procedure Laterality Date   • CARDIAC CATHETERIZATION N/A 6/16/2017    Procedure: Left Heart Cath;  Surgeon: Abhay Wooten MD;  Location:  [facility];  Service:    • CARDIAC CATHETERIZATION N/A 6/16/2017    Procedure: Left ventriculography;  Surgeon: Abhay Wooten MD;  Location:  [facility];  Service:    • CARDIAC CATHETERIZATION N/A 6/16/2017    Procedure: Coronary angiography;  Surgeon: Abhay Wooten MD;  Location:  [facility];  Service:    • CATARACT EXTRACTION     • COLONOSCOPY  08/14/2018   • EYE SURGERY      cataract surgery   • PAP SMEAR         Social History:  Social History     Socioeconomic History   • Marital status:      Spouse name: Eliazar*   • Number of children: 1   • Years of education: Not on file   • Highest education level: Not on file   Occupational History   • Occupation: Retired (retail)   Tobacco Use   • Smoking status: Never Smoker   • Smokeless tobacco: Never Used   Substance and Sexual Activity   • Alcohol use: No   • Drug use: No   • Sexual activity: Not Currently     Partners: Male   Lifestyle   • Physical activity:     Days per week: Not on file     Minutes per session: Not on file   • Stress: Only a little       Family History:  Family History   Problem Relation Age of Onset   • Heart disease Mother    • Hypertension Mother    • Diabetes Mother    • Heart disease Father    • Hypertension Father    • Heart disease Sister    • Heart disease Brother    • No Known Problems Maternal Grandmother    • No Known Problems Maternal Grandfather    • No Known Problems Paternal Grandmother    • No Known Problems Paternal Grandfather    • Crohn's disease Niece    • Colon cancer Neg Hx    • Breast cancer Neg Hx    • Dementia Neg Hx          PATIENT ASSESSMENT    Vitals:  /66   Pulse 58   Temp 96.8 °F (36 °C)   Ht 157.5 cm (62\")   Wt 50.8 kg (112 lb)   SpO2 99%   BMI 20.49 kg/m²   BP Readings from Last 3 " Encounters:   10/16/19 126/66   08/08/19 100/62   07/24/19 125/64     Wt Readings from Last 3 Encounters:   10/16/19 50.8 kg (112 lb)   08/08/19 52.1 kg (114 lb 12.8 oz)   07/24/19 50.6 kg (111 lb 9.6 oz)      Body mass index is 20.49 kg/m².    There were no vitals filed for this visit.      Review of Systems:    Review of Systems  Constitutional neg  Resp neg  CV neg   GI ulcerative colitis, no current rectal bleeding  Neuro neg  Psych neg     Physical Exam:    Physical Exam  Constitutional  No distress  Cardiovascular Rate  normal . Rhythm: regular . Heart sounds:  normal  Psychiatric  Alert. Judgment and thought content normal. Mood normal     Reviewed Data:    Labs:   Lab Results   Component Value Date     07/24/2019    K 4.1 07/24/2019    CALCIUM 9.2 07/24/2019    AST 15 07/24/2019    ALT 11 07/24/2019    BUN 20 07/24/2019    CREATININE 0.70 07/24/2019    CREATININE 0.67 05/16/2019    CREATININE 0.67 12/16/2018    EGFRIFNONA 82 07/24/2019    EGFRIFAFRI 104 05/16/2019       Lab Results   Component Value Date     (H) 05/16/2019     (H) 02/19/2018     (H) 08/12/2016    HGBA1C 5.50 05/16/2019    HGBA1C 6.30 (H) 07/30/2018    HGBA1C 6.02 (H) 02/19/2018    MICROALBUR 20.9 05/16/2019       Lab Results   Component Value Date    LDL 52 05/16/2019    LDL 61 02/19/2018    LDL 69 08/12/2016    HDL 42 05/16/2019    TRIG 113 05/16/2019    CHOLHDLRATIO 2.79 05/16/2019       No results found for: TSH, FREET4       Lab Results   Component Value Date    WBC 6.20 07/24/2019    HGB 8.5 (L) 08/01/2019    HGB 10.3 (L) 07/24/2019    HGB 9.2 (L) 07/03/2019     07/24/2019                 No results found for: PSA    Imaging:          Medical Tests:          Screening for Glaucoma:  Previous screening for glaucoma?: Has glaucoma      Hearing Loss Screen:  Finger Rub Hearing Test (right ear): passed  Finger Rub Hearing Test (left ear): passed      Urinary Incontinence Screen:  Episodes of urinary  incontinence? : No      Depression Screen:  PHQ-2/PHQ-9 Depression Screening 10/16/2019   Little interest or pleasure in doing things 0   Feeling down, depressed, or hopeless 0   Total Score 0        PHQ-2: 0 (Not depressed)    PHQ-9:        FUNCTIONAL, FALL RISK, & COGNITIVE SCREENING (Components below):    DATA:    Functional & Cognitive Status 10/16/2019   Do you have difficulty preparing food and eating? No   Do you have difficulty bathing yourself, getting dressed or grooming yourself? No   Do you have difficulty using the toilet? No   Do you have difficulty moving around from place to place? No   Do you have trouble with steps or getting out of a bed or a chair? No   Current Diet Well Balanced Diet   Dental Exam Up to date   Eye Exam Up to date   Exercise (times per week) 1 times per week   Current Exercise Activities Include Walking   Do you need help using the phone?  No   Are you deaf or do you have serious difficulty hearing?  No   Do you need help with transportation? No   Do you need help shopping? No   Do you need help preparing meals?  No   Do you need help with housework?  No   Do you need help with laundry? No   Do you need help taking your medications? No   Do you need help managing money? No   Do you ever drive or ride in a car without wearing a seat belt? No   Do you have difficulty concentrating, remembering or making decisions? No         A) Assessment of Functional Ability:  (Assessment of ability to perform ADL's (showering/bathing, using toilet, dressing, feeding self, moving self around) and IADL's (use telephone, shop, prepare food, housekeep, do laundry, transport independently, take medications independently, and handle finances)    Degree of functional impairment: MILD (based on assessment noted above)      B) Assessment of Fall Risk:  Fall Risk Assessment was completed, and patient is at low risk for falls.       Need for further evaluation of gait, strength, and balance? : No    Timed  Up and Go (TUG):   (>= 12 seconds indicates high risk for falling)    Observable abnormalities included: Normal gait pattern       C. Assessment of Cognitive Function:    Mini-Cog Test:     1) Registration (3 objects): Yes   2) Number of objects recalled: 3   3) Clock Draw: Passed? : N/A       Further evaluation required? : No      COUNSELING    A. Identification of Health Risk Factors:    Risk factors include: cardiovascular risk factors and caretaker stress      B. Age-Appropriate Screening Schedule:  (Refer to the list below for future screening recommendations based on patient's age, sex and/or medical conditions. Orders for these recommended tests are listed in the plan section. The patient has been provided with a written plan)    Health Maintenance Topics  Health Maintenance   Topic Date Due   • ZOSTER VACCINE (3 of 3) 04/12/2017   • INFLUENZA VACCINE  08/01/2019   • DIABETIC EYE EXAM  09/14/2019   • HEMOGLOBIN A1C  11/16/2019   • DXA SCAN  03/02/2020   • DIABETIC FOOT EXAM  05/16/2020   • LIPID PANEL  05/16/2020   • URINE MICROALBUMIN  05/16/2020   • MAMMOGRAM  06/24/2021   • COLONOSCOPY  08/14/2021   • PNEUMOCOCCAL VACCINES (65+ LOW/MEDIUM RISK)  Completed   • TDAP/TD VACCINES  Discontinued       Health Maintenance Topics Due or Over-Due  Health Maintenance Due   Topic Date Due   • ZOSTER VACCINE (3 of 3) 04/12/2017   • INFLUENZA VACCINE  08/01/2019   • DIABETIC EYE EXAM  09/14/2019         C. Advanced Care Planning:    does not have an advanced directive but has a medical power of  (brochure on advanced directive provided to patient)      D. Patient Self-Management and Personalized Health Advice:    She has been provided with personalized counseling/information (including brochures/handouts) about:     -- reducing risk for cardiac/vascular events with pre-existing disease, designing advance directives and designating POA      She has been recommended for the following preventative services which  has been performed today, will be ordered today or ordered/performed on upcoming follow-up visit:     -- nutrition counseling provided, exercise counseling provided, counseling for cardiovascular disease risk reduction, fall risk assessment / plan of care completed, urinary incontinence assessment done, vaccination for influenza administered/recommended, vaccination for Shingrix administered  (or recommended at pharmacy)      E. Miscellaneous Items:    -Aspirin use counseling: Taking ASA appropriately as indicated    -Discussed BMI with her. The BMI is in the acceptable range    -Reviewed use of high risk medication in the elderly: YES    -Reviewed for potential of harmful drug interactions in the elderly: YES      IV. WRAP-UP    Assessment & Plan:    1) MEDICARE ANNUAL WELLNESS VISIT    2) OTHER MEDICAL CONDITIONS ADDRESSED TODAY:              Problem List Items Addressed This Visit        High    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) (Chronic)    Overview     Stable. Continue metformin  mg 2 tabs BID.          Current Assessment & Plan     Lab Results   Component Value Date    HGBA1C 5.50 05/16/2019    HGBA1C 6.30 (H) 07/30/2018    HGBA1C 6.02 (H) 02/19/2018    CREATININE 0.70 07/24/2019    LDL 52 05/16/2019    MICROALBUR 20.9 05/16/2019             Relevant Medications    metFORMIN ER (GLUCOPHAGE-XR) 500 MG 24 hr tablet    CONTOUR NEXT TEST test strip       Medium    Hypertension (Chronic)    Current Assessment & Plan     BP Readings from Last 3 Encounters:   10/16/19 126/66   08/08/19 100/62   07/24/19 125/64      She will verify current blood pressure medications.          Relevant Medications    metoprolol tartrate (LOPRESSOR) 50 MG tablet    lisinopril (PRINIVIL,ZESTRIL) 20 MG tablet    amLODIPine (NORVASC) 5 MG tablet    Hyperlipidemia (Chronic)    Current Assessment & Plan     Lab Results   Component Value Date    LDL 52 05/16/2019    LDL 61 02/19/2018    LDL 69 08/12/2016     HDL 42 05/16/2019    HDL 43 02/19/2018    HDL 48 08/12/2016    TRIG 113 05/16/2019    CHOLHDLRATIO 2.79 05/16/2019    CHOLHDLRATIO 2.91 02/19/2018    CHOLHDLRATIO 2.8 08/12/2016      She will verify whether taking simvastatin or rosuvastatin          Relevant Medications    rosuvastatin (CRESTOR) 10 MG tablet    simvastatin (ZOCOR) 40 MG tablet       Low    Coronary artery disease involving native coronary artery of native heart without angina pectoris (Chronic)    Overview     *[facility]    Stable. Continue statin, bblocker. Not on ASA due to recent LGIB         Relevant Medications    metoprolol tartrate (LOPRESSOR) 50 MG tablet    amLODIPine (NORVASC) 5 MG tablet    Ulcerative colitis with rectal bleeding (CMS/HCC) (Chronic)    Overview     *[facility]         Current Assessment & Plan     To undergo flex sigmoidoscopy shortly.   Lab Results   Component Value Date    HGB 8.5 (L) 08/01/2019             Relevant Medications    omeprazole (PriLOSEC) 40 MG capsule    mesalamine (LIALDA) 1.2 g EC tablet      Other Visit Diagnoses     Medicare annual wellness visit, subsequent    -  Primary    Need for influenza vaccination        Relevant Orders    Fluzone High Dose =>65Years                    Orders Placed This Encounter   Procedures   • Fluzone High Dose =>65Years       Discussion / Summary:        Medications as of TODAY:              Current Outpatient Medications   Medication Sig Dispense Refill   • Acetaminophen (TYLENOL PO) Take 2 tablets by mouth As Needed.     • aspirin 81 MG tablet Take 81 mg by mouth Daily.     • CONTOUR NEXT TEST test strip CHECK BLOOD SUGAR EVERY  each 5   • dorzolamide-timolol (COSOPT) 22.3-6.8 MG/ML ophthalmic solution Apply 1 drop to eye 2 (two) times a day.     • ferrous sulfate 325 (65 FE) MG tablet Take 1 tablet by mouth Daily With Breakfast. 30 tablet 0   • hydrocortisone (ANUSOL-HC) 2.5 % rectal cream Insert  into the rectum 2 (Two) Times a Day. 28 g 0   • lisinopril  (PRINIVIL,ZESTRIL) 20 MG tablet TAKE 1 TABLET BY MOUTH EVERY DAY 90 tablet 3   • mesalamine (LIALDA) 1.2 g EC tablet TAKE 4 TABLETS BY MOUTH DAILY WITH BREAKFAST 120 tablet 2   • mesalamine (ROWASA) 4 g enema INSERT 1 ENEMA INTO THE RECTUM EVERY EVENING 3360 mL 0   • metFORMIN ER (GLUCOPHAGE-XR) 500 MG 24 hr tablet TAKE 2 TABLETS BY MOUTH TWICE DAILY 360 tablet 1   • metoprolol tartrate (LOPRESSOR) 50 MG tablet TAKE 1 TABLET BY MOUTH EVERY 12 HOURS 180 tablet 3   • Multiple Vitamin (MULTI-VITAMIN PO) Take 1 tablet by mouth Daily.     • omeprazole (PriLOSEC) 40 MG capsule Take 1 capsule by mouth daily.     • simvastatin (ZOCOR) 40 MG tablet Take 40 mg by mouth Every Night.     • amLODIPine (NORVASC) 5 MG tablet TAKE 1 TABLET BY MOUTH DAILY 30 tablet 5   • hydrocortisone (CORTENEMA) 100 MG/60ML enema Insert 1 enema into the rectum Every Night. 30 enema 0   • rosuvastatin (CRESTOR) 10 MG tablet Take 1 tablet by mouth Daily. 30 tablet 5     No current facility-administered medications for this visit.          FOLLOW-UP:            Return in about 5 months (around 3/16/2020) for Diabetes and co-morbid conditions.              Future Appointments   Date Time Provider Department Center   2/3/2020  1:30 PM Darling Espinosa MD [facility] None   3/16/2020  9:00 AM Esequiel Pacheco MD [facility] None         ______________________________________________________________________      A printed After Visit Summary (AVS) including the Personalized Prevention  Plan Services (PPPS) was given to the patient at check-out today.     Educational Handouts    Strong & Stable / Balance / Physical Activity / Pain / Depression /  Forgetfulness / Healthy Eating / Alcohol / Smoking /  Medicine / Sexuality / Scams     **Dragon Disclaimer:   Much of this encounter note is an electronic transcription/translation of spoken language to printed text. The electronic translation of spoken language may permit erroneous, or at times, nonsensical words or  phrases to be inadvertently transcribed. Although I have reviewed the note for such errors, some may still exist.     This template was created by Zachery Pacheco MD.

## 2019-10-21 ENCOUNTER — HOSPITAL ENCOUNTER (OUTPATIENT)
Facility: HOSPITAL | Age: 74
Setting detail: HOSPITAL OUTPATIENT SURGERY
Discharge: HOME | End: 2019-10-21
Attending: INTERNAL MEDICINE | Admitting: INTERNAL MEDICINE

## 2019-10-21 ENCOUNTER — ANESTHESIA (OUTPATIENT)
Dept: GASTROENTEROLOGY | Facility: HOSPITAL | Age: 74
End: 2019-10-21

## 2019-10-21 ENCOUNTER — ANESTHESIA EVENT (OUTPATIENT)
Dept: GASTROENTEROLOGY | Facility: HOSPITAL | Age: 74
End: 2019-10-21

## 2019-10-21 VITALS
DIASTOLIC BLOOD PRESSURE: 92 MMHG | BODY MASS INDEX: 20.8 KG/M2 | HEIGHT: 62 IN | WEIGHT: 113 LBS | SYSTOLIC BLOOD PRESSURE: 140 MMHG | OXYGEN SATURATION: 100 % | RESPIRATION RATE: 16 BRPM | TEMPERATURE: 97.4 F | HEART RATE: 55 BPM

## 2019-10-21 DIAGNOSIS — E78.2 MIXED HYPERLIPIDEMIA: Chronic | ICD-10-CM

## 2019-10-21 DIAGNOSIS — K51.311 ULCERATIVE RECTOSIGMOIDITIS WITH RECTAL BLEEDING (HCC): ICD-10-CM

## 2019-10-21 DIAGNOSIS — D50.0 IRON DEFICIENCY ANEMIA DUE TO CHRONIC BLOOD LOSS: ICD-10-CM

## 2019-10-21 DIAGNOSIS — Z11.59 ENCOUNTER FOR SCREENING FOR OTHER VIRAL DISEASES: ICD-10-CM

## 2019-10-21 LAB
GLUCOSE BLDC GLUCOMTR-MCNC: 134 MG/DL (ref 70–130)
HBV SURFACE AB SER RIA-ACNC: NORMAL
HBV SURFACE AG SERPL QL IA: NORMAL
HCT VFR BLD AUTO: 32.3 % (ref 34–46.6)
HGB BLD-MCNC: 10.5 G/DL (ref 12–15.9)

## 2019-10-21 PROCEDURE — 45331 SIGMOIDOSCOPY AND BIOPSY: CPT | Performed by: INTERNAL MEDICINE

## 2019-10-21 PROCEDURE — 86706 HEP B SURFACE ANTIBODY: CPT | Performed by: INTERNAL MEDICINE

## 2019-10-21 PROCEDURE — 86481 TB AG RESPONSE T-CELL SUSP: CPT | Performed by: INTERNAL MEDICINE

## 2019-10-21 PROCEDURE — 85014 HEMATOCRIT: CPT | Performed by: INTERNAL MEDICINE

## 2019-10-21 PROCEDURE — 85018 HEMOGLOBIN: CPT | Performed by: INTERNAL MEDICINE

## 2019-10-21 PROCEDURE — 88305 TISSUE EXAM BY PATHOLOGIST: CPT | Performed by: INTERNAL MEDICINE

## 2019-10-21 PROCEDURE — S0260 H&P FOR SURGERY: HCPCS | Performed by: INTERNAL MEDICINE

## 2019-10-21 PROCEDURE — 86704 HEP B CORE ANTIBODY TOTAL: CPT | Performed by: INTERNAL MEDICINE

## 2019-10-21 PROCEDURE — 82962 GLUCOSE BLOOD TEST: CPT

## 2019-10-21 PROCEDURE — 25010000002 PROPOFOL 10 MG/ML EMULSION: Performed by: ANESTHESIOLOGY

## 2019-10-21 PROCEDURE — 36415 COLL VENOUS BLD VENIPUNCTURE: CPT | Performed by: INTERNAL MEDICINE

## 2019-10-21 PROCEDURE — 87340 HEPATITIS B SURFACE AG IA: CPT | Performed by: INTERNAL MEDICINE

## 2019-10-21 RX ORDER — LIDOCAINE HYDROCHLORIDE 20 MG/ML
INJECTION, SOLUTION INFILTRATION; PERINEURAL AS NEEDED
Status: DISCONTINUED | OUTPATIENT
Start: 2019-10-21 | End: 2019-10-21 | Stop reason: SURG

## 2019-10-21 RX ORDER — SODIUM CHLORIDE 0.9 % (FLUSH) 0.9 %
10 SYRINGE (ML) INJECTION AS NEEDED
Status: DISCONTINUED | OUTPATIENT
Start: 2019-10-21 | End: 2019-10-21 | Stop reason: HOSPADM

## 2019-10-21 RX ORDER — SODIUM CHLORIDE, SODIUM LACTATE, POTASSIUM CHLORIDE, CALCIUM CHLORIDE 600; 310; 30; 20 MG/100ML; MG/100ML; MG/100ML; MG/100ML
1000 INJECTION, SOLUTION INTRAVENOUS CONTINUOUS
Status: DISCONTINUED | OUTPATIENT
Start: 2019-10-21 | End: 2019-10-21 | Stop reason: HOSPADM

## 2019-10-21 RX ORDER — ROSUVASTATIN CALCIUM 10 MG/1
10 TABLET, COATED ORAL DAILY
Qty: 30 TABLET | Refills: 0 | OUTPATIENT
Start: 2019-10-21

## 2019-10-21 RX ORDER — PROPOFOL 10 MG/ML
VIAL (ML) INTRAVENOUS AS NEEDED
Status: DISCONTINUED | OUTPATIENT
Start: 2019-10-21 | End: 2019-10-21 | Stop reason: SURG

## 2019-10-21 RX ORDER — LIDOCAINE HYDROCHLORIDE 10 MG/ML
0.5 INJECTION, SOLUTION INFILTRATION; PERINEURAL ONCE AS NEEDED
Status: DISCONTINUED | OUTPATIENT
Start: 2019-10-21 | End: 2019-10-21 | Stop reason: HOSPADM

## 2019-10-21 RX ADMIN — PROPOFOL 50 MG: 10 INJECTION, EMULSION INTRAVENOUS at 13:49

## 2019-10-21 RX ADMIN — SODIUM CHLORIDE, POTASSIUM CHLORIDE, SODIUM LACTATE AND CALCIUM CHLORIDE 1000 ML: 600; 310; 30; 20 INJECTION, SOLUTION INTRAVENOUS at 13:13

## 2019-10-21 RX ADMIN — PROPOFOL 150 MG: 10 INJECTION, EMULSION INTRAVENOUS at 13:32

## 2019-10-21 RX ADMIN — LIDOCAINE HYDROCHLORIDE 50 MG: 20 INJECTION, SOLUTION INFILTRATION; PERINEURAL at 13:32

## 2019-10-21 NOTE — ANESTHESIA PREPROCEDURE EVALUATION
Anesthesia Evaluation     Patient summary reviewed and Nursing notes reviewed   NPO Solid Status: > 8 hours  NPO Liquid Status: > 2 hours           Airway   Mallampati: I  TM distance: >3 FB  Neck ROM: full  No difficulty expected  Dental - normal exam     Pulmonary - normal exam   Cardiovascular - normal exam    Patient on routine beta blocker    (+) hypertension 2 medications or greater, past MI  >12 months,       Neuro/Psych  GI/Hepatic/Renal/Endo    (+)  GI bleeding, diabetes mellitus type 2,     ROS Comment: ulcerative colitis    Musculoskeletal     Abdominal  - normal exam    Bowel sounds: normal.   Substance History      OB/GYN          Other                        Anesthesia Plan    ASA 3     MAC     Anesthetic plan, all risks, benefits, and alternatives have been provided, discussed and informed consent has been obtained with: patient.

## 2019-10-21 NOTE — ANESTHESIA POSTPROCEDURE EVALUATION
"Patient: Renee PARIKH From    Procedure Summary     Date:  10/21/19 Room / Location:  [facility] ENDOSCOPY 7 /  [facility]    Anesthesia Start:  1332 Anesthesia Stop:  1400    Procedure:  SIGMOIDOSCOPY FLEXIBLE TO TRANSVERSE COLON WITH BIOPSIES (N/A ) Diagnosis:       Iron deficiency anemia due to chronic blood loss      Ulcerative rectosigmoiditis with rectal bleeding (CMS/HCC)      (Iron deficiency anemia due to chronic blood loss [D50.0])      (Ulcerative rectosigmoiditis with rectal bleeding (CMS/HCC) [K51.311])    Surgeon:  Melissa Burleson MD Provider:  Bree Gan MD    Anesthesia Type:  MAC ASA Status:  3          Anesthesia Type: MAC  Last vitals  BP   163/70 (10/21/19 1246)   Temp   36.3 °C (97.4 °F) (10/21/19 1246)   Pulse   58 (10/21/19 1246)   Resp   14 (10/21/19 1246)     SpO2   98 % (10/21/19 1246)     Post Anesthesia Care and Evaluation    Patient location during evaluation: PACU  Patient participation: complete - patient participated  Level of consciousness: awake  Pain score: 0  Pain management: adequate  Airway patency: patent  Anesthetic complications: No anesthetic complications    Cardiovascular status: acceptable  Respiratory status: acceptable  Hydration status: acceptable    Comments: Blood pressure 163/70, pulse 58, temperature 36.3 °C (97.4 °F), temperature source Oral, resp. rate 14, height 157.5 cm (62\"), weight 51.3 kg (113 lb), SpO2 98 %, not currently breastfeeding.    No anesthesia care post op    "

## 2019-10-21 NOTE — H&P
[facility]  Pre Procedure History & Physical    Chief Complaint: Left sided colitis, rectal bleeding, anemia      HPI:  74 y.o. female who presents for follow up of ulcerative colitis complicated by rectal bleeding and iron deficiency anemia.     She had IV iron in May and responded well.  Then a few weeks ago, she had a week flare of bleeding-- bright red blood per rectum with all BMs and sometimes just passing blood.  .  She went to the ER. Now on hydrocortisone enemas along with her Rowasa enemas.  She is still taking the oral Lialda.  Oral iron once per day.     Weight is stable, improved.     No excess diarrhea, no excess pain.     Last hemoglobin check showed that her hemoglobin is back down to the 8 range.     Last colonoscopy was a year ago showing sigmoid and rectal colitis.    Past Medical History:   Past Medical History:   Diagnosis Date   • Allergic rhinitis    • Colitis    • Colon polyps    • Diabetes mellitus (CMS/HCC)     type 2   • Dizziness    • Encounter for breast cancer screening other than mammogram    • GERD (gastroesophageal reflux disease)    • GI (gastrointestinal bleed)    • Glaucoma    • Hyperlipidemia    • Hypertension    • Knee fracture, left    • Non-STEMI (non-ST elevated myocardial infarction) (CMS/HCC) 6/16/2017   • Osteopenia    • Ulcerative colitis (CMS/HCC)        Family History:  Family History   Problem Relation Age of Onset   • Heart disease Mother    • Hypertension Mother    • Diabetes Mother    • Heart disease Father    • Hypertension Father    • Heart disease Sister    • Heart disease Brother    • No Known Problems Maternal Grandmother    • No Known Problems Maternal Grandfather    • No Known Problems Paternal Grandmother    • No Known Problems Paternal Grandfather    • Crohn's disease Niece    • Colon cancer Neg Hx    • Breast cancer Neg Hx    • Dementia Neg Hx        Social History:   reports that she has never smoked. She has never used smokeless  tobacco. She reports that she does not drink alcohol or use drugs.    Medications:   No medications prior to admission.       Allergies:  Tetanus toxoids    ROS:    Pertinent items are noted in HPI     Objective     not currently breastfeeding.    Physical Exam   Constitutional: Pt is oriented to person, place, and time and well-developed, well-nourished, and in no distress.   HENT:   Mouth/Throat: Oropharynx is clear and moist.   Neck: Normal range of motion. Neck supple.   Cardiovascular: Normal rate, regular rhythm and normal heart sounds.    Pulmonary/Chest: Effort normal and breath sounds normal. No respiratory distress. No  wheezes.   Abdominal: Soft. Bowel sounds are normal.   Skin: Skin is warm and dry.   Psychiatric: Mood, memory, affect and judgment normal.     Assessment/Plan     Diagnosis: Left sided colitis, rectal bleeding, anemia      Anticipated Surgical Procedure:  Flexible sigmoidoscopy    The risks, benefits, and alternatives of this procedure have been discussed with the patient or the responsible party- the patient understands and agrees to proceed.

## 2019-10-22 LAB
CYTO UR: NORMAL
HBV CORE AB SER DONR QL IA: NEGATIVE
LAB AP CASE REPORT: NORMAL
LAB AP DIAGNOSIS COMMENT: NORMAL
PATH REPORT.FINAL DX SPEC: NORMAL
PATH REPORT.GROSS SPEC: NORMAL

## 2019-10-23 ENCOUNTER — TELEPHONE (OUTPATIENT)
Dept: GASTROENTEROLOGY | Facility: CLINIC | Age: 74
End: 2019-10-23

## 2019-10-23 LAB
TSPOT INTERPRETATION: NEGATIVE
TSPOT NIL CONTROL INTERPRETATION: NORMAL
TSPOT PANEL A: 0
TSPOT PANEL B: 0
TSPOT POS CONTROL INTERPRETATION: NORMAL

## 2019-10-23 RX ORDER — BUDESONIDE 3 MG/1
9 CAPSULE, COATED PELLETS ORAL EVERY MORNING
Qty: 90 CAPSULE | Refills: 1 | Status: SHIPPED | OUTPATIENT
Start: 2019-10-23 | End: 2020-03-16

## 2019-10-23 NOTE — TELEPHONE ENCOUNTER
----- Message from Melissa Burleson MD sent at 10/23/2019  1:03 PM EDT -----  Hb is good at 10.5    The biopsies of her colon showed active colitis, worse in the rectum.    I have ordered budesonide for her to take in addition to her current medications    She needs office follow up with me only in ~6 weeks, thx

## 2019-10-23 NOTE — TELEPHONE ENCOUNTER
Both medications (rosuvastatin and simvastatin) are listed on her medication list.   As instructed: Verify directly with the patient as to which statin she is taking.

## 2019-10-23 NOTE — PROGRESS NOTES
Hb is good at 10.5    The biopsies of her colon showed active colitis, worse in the rectum.    I have ordered budesonide for her to take in addition to her current medications    She needs office follow up with me only in ~6 weeks, thx

## 2019-10-23 NOTE — TELEPHONE ENCOUNTER
Per pt chart on OV 11.29.18 Note reads:     Hyperlipidemia (Chronic)      Overview       Starting amlodipine 5 mg. DC simvastatin and start rosuvastatin 10 mg daily.

## 2019-10-24 RX ORDER — ROSUVASTATIN CALCIUM 10 MG/1
10 TABLET, COATED ORAL DAILY
Qty: 30 TABLET | Refills: 11 | OUTPATIENT
Start: 2019-10-24

## 2019-10-24 NOTE — TELEPHONE ENCOUNTER
S/W pt and she you took her off the Rosuvastatin in Nov. 2018 and she has not been taking it she states the pharmacy must have it automatic refill and she will call and have them stop it.

## 2019-10-26 LAB
TPMT BACKGROUND: NORMAL
TPMT COMMENT: NORMAL
TPMT GENOTYPE: NORMAL
TPMT INTERPRETATION: NORMAL

## 2019-11-05 RX ORDER — MESALAMINE 4 G/60ML
ENEMA RECTAL
Qty: 3360 ML | Refills: 0 | Status: SHIPPED | OUTPATIENT
Start: 2019-11-05 | End: 2020-01-08

## 2019-12-02 NOTE — PROGRESS NOTES
Chief Complaint   Patient presents with   • Anemia   • Ulcerative Colitis     Subjective     HPI  Renee Stevenson is a 74 y.o. female who presents for follow up of ulcerative colitis complicated by rectal bleeding and iron deficiency anemia.    She had a flex sig in October, showing active rectosigmoiditis.  There was also a tubular adenoma in the transverse colon.  She has been on budesonide and is now tapering.  She is still taking the lialda and mesalamine enemas.  She reports less bleeding, only occasional BRB with wiping.  She says that she has felt better and has gained weight.  She is having about 1-2 bowel movements per day.  No tenesmus, cramping, or excessive stools.    She is having some ankle swelling with the medication    She is still taking iron supplementation.        Past Medical History:   Diagnosis Date   • Allergic rhinitis    • Colitis    • Colon polyps    • Diabetes mellitus (CMS/[facility])     type 2   • Dizziness    • Encounter for breast cancer screening other than mammogram    • GERD (gastroesophageal reflux disease)    • GI (gastrointestinal bleed)    • Glaucoma    • Hyperlipidemia    • Hypertension    • Knee fracture, left    • Non-STEMI (non-ST elevated myocardial infarction) (CMS/[facility]) 6/16/2017   • Osteopenia    • Ulcerative colitis (CMS/[facility])        Social History     Socioeconomic History   • Marital status:      Spouse name: Eliazar*   • Number of children: 1   • Years of education: Not on file   • Highest education level: Not on file   Occupational History   • Occupation: Retired (retail)   Tobacco Use   • Smoking status: Never Smoker   • Smokeless tobacco: Never Used   Substance and Sexual Activity   • Alcohol use: No   • Drug use: No   • Sexual activity: Not Currently     Partners: Male   Lifestyle   • Physical activity:     Days per week: Not on file     Minutes per session: Not on file   • Stress: Only a little         Current Outpatient Medications:   •  Acetaminophen (TYLENOL PO),  Take 2 tablets by mouth As Needed., Disp: , Rfl:   •  amLODIPine (NORVASC) 5 MG tablet, TAKE 1 TABLET BY MOUTH DAILY, Disp: 30 tablet, Rfl: 5  •  aspirin 81 MG tablet, Take 81 mg by mouth Daily., Disp: , Rfl:   •  Budesonide (ENTOCORT EC) 3 MG 24 hr capsule, Take 3 capsules by mouth Every Morning. For 4 weeks, then 2 caps daily for 4 weeks and then 1 cap daily for 4 weeks, Disp: 90 capsule, Rfl: 1  •  CONTOUR NEXT TEST test strip, CHECK BLOOD SUGAR EVERY DAY, Disp: 100 each, Rfl: 5  •  dorzolamide-timolol (COSOPT) 22.3-6.8 MG/ML ophthalmic solution, Apply 1 drop to eye 2 (two) times a day., Disp: , Rfl:   •  ferrous sulfate 325 (65 FE) MG tablet, Take 1 tablet by mouth Daily With Breakfast., Disp: 30 tablet, Rfl: 0  •  lisinopril (PRINIVIL,ZESTRIL) 20 MG tablet, TAKE 1 TABLET BY MOUTH EVERY DAY, Disp: 90 tablet, Rfl: 3  •  mesalamine (LIALDA) 1.2 g EC tablet, TAKE 4 TABLETS BY MOUTH DAILY WITH BREAKFAST, Disp: 120 tablet, Rfl: 2  •  mesalamine (ROWASA) 4 g enema, INSERT 1 ENEMA INTO THE RECTUM EVERY EVENING, Disp: 3360 mL, Rfl: 0  •  metFORMIN ER (GLUCOPHAGE-XR) 500 MG 24 hr tablet, TAKE 2 TABLETS BY MOUTH TWICE DAILY, Disp: 360 tablet, Rfl: 1  •  metoprolol tartrate (LOPRESSOR) 50 MG tablet, TAKE 1 TABLET BY MOUTH EVERY 12 HOURS, Disp: 180 tablet, Rfl: 3  •  Multiple Vitamin (MULTI-VITAMIN PO), Take 1 tablet by mouth Daily., Disp: , Rfl:   •  omeprazole (PriLOSEC) 40 MG capsule, Take 1 capsule by mouth daily., Disp: , Rfl:   •  ROSUVASTATIN CALCIUM PO, , Disp: , Rfl:   •  hydrocortisone (ANUSOL-HC) 2.5 % rectal cream, Insert  into the rectum 2 (Two) Times a Day., Disp: 28 g, Rfl: 0  •  hydrocortisone (CORTENEMA) 100 MG/60ML enema, Insert 1 enema into the rectum Every Night., Disp: 30 enema, Rfl: 0    Review of Systems   Constitutional: Negative for activity change, appetite change, chills, fatigue and fever.   HENT: Negative for trouble swallowing.    Respiratory: Negative.    Cardiovascular: Negative.  Negative  for chest pain.   Gastrointestinal: Negative for abdominal distention, abdominal pain, anal bleeding, blood in stool, constipation, diarrhea, nausea and vomiting.        Occasional bright red blood with wiping   Genitourinary: Negative for dysuria, frequency and hematuria.       Objective   Vitals:    12/04/19 1313   BP: 114/68   Temp: 97.8 °F (36.6 °C)         12/04/19  1313   Weight: 55.2 kg (121 lb 12.8 oz)     Body mass index is 22.28 kg/m².      Physical Exam   Constitutional: She is oriented to person, place, and time. She appears well-developed and well-nourished. No distress.   HENT:   Head: Normocephalic and atraumatic.   Right Ear: External ear normal.   Left Ear: External ear normal.   Nose: Nose normal.   Mouth/Throat: Oropharynx is clear and moist.   Eyes: Conjunctivae and EOM are normal. Right eye exhibits no discharge. Left eye exhibits no discharge. No scleral icterus.   Neck: Normal range of motion. Neck supple. No thyromegaly present.   No supraclavicular adenopathy   Cardiovascular: Normal rate, regular rhythm, normal heart sounds and intact distal pulses. Exam reveals no gallop.   No murmur heard.  No lower extremity edema   Pulmonary/Chest: Effort normal and breath sounds normal. No respiratory distress. She has no wheezes.   Abdominal: Soft. Normal appearance and bowel sounds are normal. She exhibits no distension and no mass. There is no hepatosplenomegaly. There is no tenderness. There is no rigidity, no rebound and no guarding. No hernia.   Genitourinary:   Genitourinary Comments: Rectal exam deferred   Musculoskeletal: Normal range of motion. She exhibits no edema or tenderness.   No atrophy of upper or lower extremities.  Normal digits and nails of both hands.   Lymphadenopathy:     She has no cervical adenopathy.   Neurological: She is alert and oriented to person, place, and time. She displays no atrophy. Coordination normal.   Skin: Skin is warm and dry. No rash noted. She is not  diaphoretic. No erythema. There is pallor.   Psychiatric: She has a normal mood and affect. Her behavior is normal. Judgment and thought content normal.   Vitals reviewed.      WBC   Date Value Ref Range Status   07/24/2019 6.20 3.40 - 10.80 10*3/mm3 Final   07/03/2019 4.91 3.40 - 10.80 10*3/mm3 Final     RBC   Date Value Ref Range Status   07/24/2019 3.53 (L) 3.77 - 5.28 10*6/mm3 Final   07/03/2019 3.11 (L) 3.77 - 5.28 10*6/mm3 Final     Hemoglobin   Date Value Ref Range Status   10/21/2019 10.5 (L) 12.0 - 15.9 g/dL Final     Hematocrit   Date Value Ref Range Status   10/21/2019 32.3 (L) 34.0 - 46.6 % Final     MCV   Date Value Ref Range Status   07/24/2019 97.2 (H) 79.0 - 97.0 fL Final     MCH   Date Value Ref Range Status   07/24/2019 29.2 26.6 - 33.0 pg Final     MCHC   Date Value Ref Range Status   07/24/2019 30.0 (L) 31.5 - 35.7 g/dL Final     RDW   Date Value Ref Range Status   07/24/2019 14.5 12.3 - 15.4 % Final     RDW-SD   Date Value Ref Range Status   07/24/2019 51.3 37.0 - 54.0 fl Final     MPV   Date Value Ref Range Status   07/24/2019 9.7 6.0 - 12.0 fL Final     Platelets   Date Value Ref Range Status   07/24/2019 266 140 - 450 10*3/mm3 Final     Neutrophil %   Date Value Ref Range Status   07/24/2019 53.6 42.7 - 76.0 % Final     Lymphocyte %   Date Value Ref Range Status   07/24/2019 35.8 19.6 - 45.3 % Final     Monocyte %   Date Value Ref Range Status   07/24/2019 9.0 5.0 - 12.0 % Final     Eosinophil %   Date Value Ref Range Status   07/24/2019 0.0 (L) 0.3 - 6.2 % Final     Basophil %   Date Value Ref Range Status   07/24/2019 0.5 0.0 - 1.5 % Final     Immature Grans %   Date Value Ref Range Status   07/24/2019 1.1 (H) 0.0 - 0.5 % Final     Neutrophils, Absolute   Date Value Ref Range Status   07/24/2019 3.32 1.70 - 7.00 10*3/mm3 Final     Lymphocytes, Absolute   Date Value Ref Range Status   07/24/2019 2.22 0.70 - 3.10 10*3/mm3 Final     Monocytes, Absolute   Date Value Ref Range Status    07/24/2019 0.56 0.10 - 0.90 10*3/mm3 Final     Eosinophils, Absolute   Date Value Ref Range Status   07/24/2019 0.00 0.00 - 0.40 10*3/mm3 Final     Basophils, Absolute   Date Value Ref Range Status   07/24/2019 0.03 0.00 - 0.20 10*3/mm3 Final     Immature Grans, Absolute   Date Value Ref Range Status   07/24/2019 0.07 (H) 0.00 - 0.05 10*3/mm3 Final     nRBC   Date Value Ref Range Status   07/24/2019 0.0 0.0 - 0.2 /100 WBC Final       Lab Results   Component Value Date    GLUCOSE 148 (H) 07/24/2019    BUN 20 07/24/2019    CREATININE 0.70 07/24/2019    EGFRIFNONA 82 07/24/2019    EGFRIFAFRI 104 05/16/2019    BCR 28.6 (H) 07/24/2019    CO2 17.1 (L) 07/24/2019    CALCIUM 9.2 07/24/2019    PROTENTOTREF 7.2 05/16/2019    ALBUMIN 4.40 07/24/2019    LABIL2 1.7 05/16/2019    AST 15 07/24/2019    ALT 11 07/24/2019         Imaging Results (Last 7 Days)     ** No results found for the last 168 hours. **            Assessment/Plan    Ulcerative rectosigmoiditis: From the anal verge to about 35 cm on a recent flexible sigmoidoscopy.  She seems to be responding to budesonide.  She did not seem to do too well with mesalamine enemas and oral mesalamine    Iron deficiency anemia: She was having lots of rectal bleeding and I suspect this was related to ongoing GI blood losses    Rectal bleeding: Now with only occasional episodes of bright red blood with wiping, suspected hemorrhoidal    Weight loss: She is now starting to gain weight.?  Steroid effect versus better control of her disease    Colon polyps: Tubular adenoma in the transverse colon.  Her last full colonoscopy was in 2018    Plan  We will check labs today with CBC, CMP, ferritin, inflammatory markers  Continue budesonide  Continue mesalamine oral and enema  We will touch base in a few months.  I am hopeful that the budesonide settles her down enough to allow the mesalamine products to work.  If not, then she is going to have to consider a biologic agent.  I think at her  age and with her anemia status Entyvio would be the best option.      Renee was seen today for anemia and ulcerative colitis.    Diagnoses and all orders for this visit:    Iron deficiency anemia due to chronic blood loss  -     Ferritin  -     Sedimentation Rate  -     C-reactive Protein  -     CBC & Differential  -     Comprehensive Metabolic Panel    Ulcerative rectosigmoiditis with rectal bleeding (CMS/HCC)  -     Ferritin  -     Sedimentation Rate  -     C-reactive Protein  -     CBC & Differential  -     Comprehensive Metabolic Panel    Weight loss        Dictated utilizing Dragon dictation

## 2019-12-04 ENCOUNTER — OFFICE VISIT (OUTPATIENT)
Dept: GASTROENTEROLOGY | Facility: CLINIC | Age: 74
End: 2019-12-04

## 2019-12-04 VITALS
TEMPERATURE: 97.8 F | HEIGHT: 62 IN | SYSTOLIC BLOOD PRESSURE: 114 MMHG | WEIGHT: 121.8 LBS | BODY MASS INDEX: 22.41 KG/M2 | DIASTOLIC BLOOD PRESSURE: 68 MMHG

## 2019-12-04 DIAGNOSIS — D50.0 IRON DEFICIENCY ANEMIA DUE TO CHRONIC BLOOD LOSS: Primary | ICD-10-CM

## 2019-12-04 DIAGNOSIS — K51.311 ULCERATIVE RECTOSIGMOIDITIS WITH RECTAL BLEEDING (HCC): Chronic | ICD-10-CM

## 2019-12-04 DIAGNOSIS — R63.4 WEIGHT LOSS: ICD-10-CM

## 2019-12-04 PROCEDURE — 99214 OFFICE O/P EST MOD 30 MIN: CPT | Performed by: INTERNAL MEDICINE

## 2019-12-05 ENCOUNTER — TELEPHONE (OUTPATIENT)
Dept: GASTROENTEROLOGY | Facility: CLINIC | Age: 74
End: 2019-12-05

## 2019-12-05 DIAGNOSIS — E87.6 HYPOKALEMIA: Primary | ICD-10-CM

## 2019-12-05 LAB
ALBUMIN SERPL-MCNC: 4.4 G/DL (ref 3.5–5.2)
ALBUMIN/GLOB SERPL: 1.7 G/DL
ALP SERPL-CCNC: 48 U/L (ref 39–117)
ALT SERPL-CCNC: 13 U/L (ref 1–33)
AST SERPL-CCNC: 10 U/L (ref 1–32)
BASOPHILS # BLD AUTO: 0.03 10*3/MM3 (ref 0–0.2)
BASOPHILS NFR BLD AUTO: 0.4 % (ref 0–1.5)
BILIRUB SERPL-MCNC: 0.3 MG/DL (ref 0.2–1.2)
BUN SERPL-MCNC: 15 MG/DL (ref 8–23)
BUN/CREAT SERPL: 20.5 (ref 7–25)
CALCIUM SERPL-MCNC: 9.3 MG/DL (ref 8.6–10.5)
CHLORIDE SERPL-SCNC: 98 MMOL/L (ref 98–107)
CO2 SERPL-SCNC: 30.3 MMOL/L (ref 22–29)
CREAT SERPL-MCNC: 0.73 MG/DL (ref 0.57–1)
CRP SERPL-MCNC: 0.11 MG/DL (ref 0–0.5)
EOSINOPHIL # BLD AUTO: 0 10*3/MM3 (ref 0–0.4)
EOSINOPHIL NFR BLD AUTO: 0 % (ref 0.3–6.2)
ERYTHROCYTE [DISTWIDTH] IN BLOOD BY AUTOMATED COUNT: 13.2 % (ref 12.3–15.4)
ERYTHROCYTE [SEDIMENTATION RATE] IN BLOOD BY WESTERGREN METHOD: 12 MM/HR (ref 0–30)
GLOBULIN SER CALC-MCNC: 2.6 GM/DL
GLUCOSE SERPL-MCNC: 241 MG/DL (ref 65–99)
HCT VFR BLD AUTO: 31.2 % (ref 34–46.6)
HGB BLD-MCNC: 9.4 G/DL (ref 12–15.9)
IMM GRANULOCYTES # BLD AUTO: 0.02 10*3/MM3 (ref 0–0.05)
IMM GRANULOCYTES NFR BLD AUTO: 0.2 % (ref 0–0.5)
LYMPHOCYTES # BLD AUTO: 1.41 10*3/MM3 (ref 0.7–3.1)
LYMPHOCYTES NFR BLD AUTO: 17 % (ref 19.6–45.3)
MCH RBC QN AUTO: 26.6 PG (ref 26.6–33)
MCHC RBC AUTO-ENTMCNC: 30.1 G/DL (ref 31.5–35.7)
MCV RBC AUTO: 88.1 FL (ref 79–97)
MONOCYTES # BLD AUTO: 0.5 10*3/MM3 (ref 0.1–0.9)
MONOCYTES NFR BLD AUTO: 6 % (ref 5–12)
NEUTROPHILS # BLD AUTO: 6.32 10*3/MM3 (ref 1.7–7)
NEUTROPHILS NFR BLD AUTO: 76.4 % (ref 42.7–76)
NRBC BLD AUTO-RTO: 0 /100 WBC (ref 0–0.2)
PLATELET # BLD AUTO: 278 10*3/MM3 (ref 140–450)
POTASSIUM SERPL-SCNC: 2.9 MMOL/L (ref 3.5–5.2)
PROT SERPL-MCNC: 7 G/DL (ref 6–8.5)
RBC # BLD AUTO: 3.54 10*6/MM3 (ref 3.77–5.28)
SODIUM SERPL-SCNC: 143 MMOL/L (ref 136–145)
WBC # BLD AUTO: 8.28 10*3/MM3 (ref 3.4–10.8)

## 2019-12-05 RX ORDER — POTASSIUM CHLORIDE 1500 MG/1
40 TABLET, FILM COATED, EXTENDED RELEASE ORAL 2 TIMES DAILY
Qty: 30 TABLET | Refills: 0 | Status: SHIPPED | OUTPATIENT
Start: 2019-12-05 | End: 2019-12-08

## 2019-12-05 NOTE — TELEPHONE ENCOUNTER
Pt called and advised per Dr Burleson that her potassium is low at 2.9 it should be around 4.  She has ordered a supplement for her to start taking.  She should pick this up today and start taking as directed.  Advised she would like to recheck her level tomorrow at the hospital.      Pt verb understanding.

## 2019-12-05 NOTE — PROGRESS NOTES
Please let her know that her potassium is low at 2.9, should be ~4.  I have ordered a supplement for her to start taking--she should pick this up today and start taking this as directed.  We will recheck her level tomorrow.  Can we see if we can get her to go to the hospital lab tomorrow to get a sooner result in case she needs IV potassium?    Her blood count is low but I will await the results of her ferritin.    Her blood glucose was elevated at 241 yesterday

## 2019-12-05 NOTE — TELEPHONE ENCOUNTER
----- Message from Melissa Burleson MD sent at 12/5/2019  8:08 AM EST -----  Please let her know that her potassium is low at 2.9, should be ~4.  I have ordered a supplement for her to start taking--she should pick this up today and start taking this as directed.  We will recheck her level tomorrow.  Can we see if we can get her to go to the hospital lab tomorrow to get a sooner result in case she needs IV potassium?    Her blood count is low but I will await the results of her ferritin.    Her blood glucose was elevated at 241 yesterday

## 2019-12-06 ENCOUNTER — RESULTS ENCOUNTER (OUTPATIENT)
Dept: GASTROENTEROLOGY | Facility: CLINIC | Age: 74
End: 2019-12-06

## 2019-12-06 ENCOUNTER — APPOINTMENT (OUTPATIENT)
Dept: LAB | Facility: HOSPITAL | Age: 74
End: 2019-12-06

## 2019-12-06 DIAGNOSIS — E87.6 HYPOKALEMIA: ICD-10-CM

## 2019-12-06 LAB
ANION GAP SERPL CALCULATED.3IONS-SCNC: 14 MMOL/L (ref 5–15)
BUN BLD-MCNC: 14 MG/DL (ref 8–23)
BUN/CREAT SERPL: 19.7 (ref 7–25)
CALCIUM SPEC-SCNC: 9.3 MG/DL (ref 8.6–10.5)
CHLORIDE SERPL-SCNC: 103 MMOL/L (ref 98–107)
CO2 SERPL-SCNC: 28 MMOL/L (ref 22–29)
CREAT BLD-MCNC: 0.71 MG/DL (ref 0.57–1)
CRP SERPL-MCNC: 0.17 MG/DL (ref 0–0.5)
ERYTHROCYTE [SEDIMENTATION RATE] IN BLOOD: 16 MM/HR (ref 0–30)
FERRITIN SERPL-MCNC: 7.56 NG/ML (ref 13–150)
GFR SERPL CREATININE-BSD FRML MDRD: 80 ML/MIN/1.73
GLUCOSE BLD-MCNC: 161 MG/DL (ref 65–99)
POTASSIUM BLD-SCNC: 3.6 MMOL/L (ref 3.5–5.2)
SODIUM BLD-SCNC: 145 MMOL/L (ref 136–145)

## 2019-12-06 PROCEDURE — 82542 COL CHROMOTOGRAPHY QUAL/QUAN: CPT

## 2019-12-06 PROCEDURE — 82657 ENZYME CELL ACTIVITY: CPT

## 2019-12-06 PROCEDURE — 80048 BASIC METABOLIC PNL TOTAL CA: CPT | Performed by: INTERNAL MEDICINE

## 2019-12-06 PROCEDURE — 85652 RBC SED RATE AUTOMATED: CPT | Performed by: INTERNAL MEDICINE

## 2019-12-06 PROCEDURE — 82542 COL CHROMOTOGRAPHY QUAL/QUAN: CPT | Performed by: INTERNAL MEDICINE

## 2019-12-06 PROCEDURE — 82728 ASSAY OF FERRITIN: CPT | Performed by: INTERNAL MEDICINE

## 2019-12-06 PROCEDURE — 82657 ENZYME CELL ACTIVITY: CPT | Performed by: INTERNAL MEDICINE

## 2019-12-06 PROCEDURE — 36415 COLL VENOUS BLD VENIPUNCTURE: CPT | Performed by: INTERNAL MEDICINE

## 2019-12-06 PROCEDURE — 86140 C-REACTIVE PROTEIN: CPT | Performed by: INTERNAL MEDICINE

## 2019-12-09 ENCOUNTER — TELEPHONE (OUTPATIENT)
Dept: GASTROENTEROLOGY | Facility: CLINIC | Age: 74
End: 2019-12-09

## 2019-12-09 DIAGNOSIS — D50.0 IRON DEFICIENCY ANEMIA DUE TO CHRONIC BLOOD LOSS: Primary | ICD-10-CM

## 2019-12-09 RX ORDER — SODIUM CHLORIDE, SODIUM LACTATE, POTASSIUM CHLORIDE, CALCIUM CHLORIDE 600; 310; 30; 20 MG/100ML; MG/100ML; MG/100ML; MG/100ML
30 INJECTION, SOLUTION INTRAVENOUS CONTINUOUS
Status: CANCELLED | OUTPATIENT
Start: 2020-02-10

## 2019-12-09 NOTE — TELEPHONE ENCOUNTER
----- Message from Melissa Burleson MD sent at 12/9/2019 12:30 PM EST -----  Her iron stores are quite low again.    Can we please get her set up for 3 doses of the IV Venofer-- 200mg per week for 3 weeks    Her potassium improved    She needs an EGD given persistence of her anemia given that her proctitis has improved-- pls have her schedule the procedure, I have ordered, thx

## 2019-12-09 NOTE — PROGRESS NOTES
Her iron stores are quite low again.    Can we please get her set up for 3 doses of the IV Venofer-- 200mg per week for 3 weeks    Her potassium improved    She needs an EGD given persistence of her anemia given that her proctitis has improved-- pls have her schedule the procedure, I have ordered, thx

## 2019-12-09 NOTE — TELEPHONE ENCOUNTER
Called pt and advised per Dr Burleson that her iron stores are quite low again.  Advised pt that she has ordered for her to have IV venofer 200mg once a weeks for the next 3 wks.  Advised scheduling from [facility] will call her to arrange.     Advised her potassium has improve.  Also advised she needs an egd given persistence of her anemia given that her proctitis has improved .  Advised scheduling will call her to arrange.  Pt verb understanding.

## 2019-12-12 ENCOUNTER — TELEPHONE (OUTPATIENT)
Dept: GASTROENTEROLOGY | Facility: CLINIC | Age: 74
End: 2019-12-12

## 2019-12-12 LAB — REF LAB TEST METHOD: NORMAL

## 2019-12-13 ENCOUNTER — HOSPITAL ENCOUNTER (OUTPATIENT)
Dept: INFUSION THERAPY | Facility: HOSPITAL | Age: 74
Discharge: HOME | End: 2019-12-13
Admitting: INTERNAL MEDICINE

## 2019-12-13 VITALS
DIASTOLIC BLOOD PRESSURE: 68 MMHG | SYSTOLIC BLOOD PRESSURE: 136 MMHG | RESPIRATION RATE: 16 BRPM | TEMPERATURE: 97.4 F | HEART RATE: 63 BPM | OXYGEN SATURATION: 96 %

## 2019-12-13 DIAGNOSIS — D50.8 IRON DEFICIENCY ANEMIA SECONDARY TO INADEQUATE DIETARY IRON INTAKE: Primary | ICD-10-CM

## 2019-12-13 PROCEDURE — 96365 THER/PROPH/DIAG IV INF INIT: CPT

## 2019-12-13 PROCEDURE — 25010000002 IRON SUCROSE PER 1 MG: Performed by: INTERNAL MEDICINE

## 2019-12-13 RX ADMIN — IRON SUCROSE 200 MG: 20 INJECTION, SOLUTION INTRAVENOUS at 10:03

## 2019-12-13 NOTE — PATIENT INSTRUCTIONS
Iron Sucrose injection  What is this medicine?  IRON SUCROSE (GOLD virk) is an iron complex. Iron is used to make healthy red blood cells, which carry oxygen and nutrients throughout the body. This medicine is used to treat iron deficiency anemia in people with chronic kidney disease.  This medicine may be used for other purposes; ask your health care provider or pharmacist if you have questions.  COMMON BRAND NAME(S): Venofer  What should I tell my health care provider before I take this medicine?  They need to know if you have any of these conditions:  -anemia not caused by low iron levels  -heart disease  -high levels of iron in the blood  -kidney disease  -liver disease  -an unusual or allergic reaction to iron, other medicines, foods, dyes, or preservatives  -pregnant or trying to get pregnant  -breast-feeding  How should I use this medicine?  This medicine is for infusion into a vein. It is given by a health care professional in a hospital or clinic setting.  Talk to your pediatrician regarding the use of this medicine in children. While this drug may be prescribed for children as young as 2 years for selected conditions, precautions do apply.  Overdosage: If you think you have taken too much of this medicine contact a poison control center or emergency room at once.  NOTE: This medicine is only for you. Do not share this medicine with others.  What if I miss a dose?  It is important not to miss your dose. Call your doctor or health care professional if you are unable to keep an appointment.  What may interact with this medicine?  Do not take this medicine with any of the following medications:  -deferoxamine  -dimercaprol  -other iron products  This medicine may also interact with the following medications:  -chloramphenicol  -deferasirox  This list may not describe all possible interactions. Give your health care provider a list of all the medicines, herbs, non-prescription drugs, or dietary  supplements you use. Also tell them if you smoke, drink alcohol, or use illegal drugs. Some items may interact with your medicine.  What should I watch for while using this medicine?  Visit your doctor or healthcare professional regularly. Tell your doctor or healthcare professional if your symptoms do not start to get better or if they get worse. You may need blood work done while you are taking this medicine.  You may need to follow a special diet. Talk to your doctor. Foods that contain iron include: whole grains/cereals, dried fruits, beans, or peas, leafy green vegetables, and organ meats (liver, kidney).  What side effects may I notice from receiving this medicine?  Side effects that you should report to your doctor or health care professional as soon as possible:  -allergic reactions like skin rash, itching or hives, swelling of the face, lips, or tongue  -breathing problems  -changes in blood pressure  -cough  -fast, irregular heartbeat  -feeling faint or lightheaded, falls  -fever or chills  -flushing, sweating, or hot feelings  -joint or muscle aches/pains  -seizures  -swelling of the ankles or feet  -unusually weak or tired  Side effects that usually do not require medical attention (report to your doctor or health care professional if they continue or are bothersome):  -diarrhea  -feeling achy  -headache  -irritation at site where injected  -nausea, vomiting  -stomach upset  -tiredness  This list may not describe all possible side effects. Call your doctor for medical advice about side effects. You may report side effects to FDA at 4-089-FDA-8056.  Where should I keep my medicine?  This drug is given in a hospital or clinic and will not be stored at home.  NOTE: This sheet is a summary. It may not cover all possible information. If you have questions about this medicine, talk to your doctor, pharmacist, or health care provider.  © 2019 [employer] (2012-09-27 17:14:35)

## 2019-12-20 ENCOUNTER — HOSPITAL ENCOUNTER (OUTPATIENT)
Dept: INFUSION THERAPY | Facility: HOSPITAL | Age: 74
Discharge: HOME | End: 2019-12-20
Admitting: INTERNAL MEDICINE

## 2019-12-20 VITALS
OXYGEN SATURATION: 97 % | HEART RATE: 58 BPM | DIASTOLIC BLOOD PRESSURE: 62 MMHG | SYSTOLIC BLOOD PRESSURE: 139 MMHG | RESPIRATION RATE: 20 BRPM | TEMPERATURE: 97.9 F

## 2019-12-20 DIAGNOSIS — D50.8 IRON DEFICIENCY ANEMIA SECONDARY TO INADEQUATE DIETARY IRON INTAKE: Primary | ICD-10-CM

## 2019-12-20 PROCEDURE — 25010000002 IRON SUCROSE PER 1 MG: Performed by: INTERNAL MEDICINE

## 2019-12-20 PROCEDURE — 96365 THER/PROPH/DIAG IV INF INIT: CPT

## 2019-12-20 RX ADMIN — IRON SUCROSE 200 MG: 20 INJECTION, SOLUTION INTRAVENOUS at 12:45

## 2019-12-20 NOTE — PATIENT INSTRUCTIONS
Iron Sucrose injection  What is this medicine?  IRON SUCROSE (GOLD virk) is an iron complex. Iron is used to make healthy red blood cells, which carry oxygen and nutrients throughout the body. This medicine is used to treat iron deficiency anemia in people with chronic kidney disease.  This medicine may be used for other purposes; ask your health care provider or pharmacist if you have questions.  COMMON BRAND NAME(S): Venofer  What should I tell my health care provider before I take this medicine?  They need to know if you have any of these conditions:  -anemia not caused by low iron levels  -heart disease  -high levels of iron in the blood  -kidney disease  -liver disease  -an unusual or allergic reaction to iron, other medicines, foods, dyes, or preservatives  -pregnant or trying to get pregnant  -breast-feeding  How should I use this medicine?  This medicine is for infusion into a vein. It is given by a health care professional in a hospital or clinic setting.  Talk to your pediatrician regarding the use of this medicine in children. While this drug may be prescribed for children as young as 2 years for selected conditions, precautions do apply.  Overdosage: If you think you have taken too much of this medicine contact a poison control center or emergency room at once.  NOTE: This medicine is only for you. Do not share this medicine with others.  What if I miss a dose?  It is important not to miss your dose. Call your doctor or health care professional if you are unable to keep an appointment.  What may interact with this medicine?  Do not take this medicine with any of the following medications:  -deferoxamine  -dimercaprol  -other iron products  This medicine may also interact with the following medications:  -chloramphenicol  -deferasirox  This list may not describe all possible interactions. Give your health care provider a list of all the medicines, herbs, non-prescription drugs, or dietary  supplements you use. Also tell them if you smoke, drink alcohol, or use illegal drugs. Some items may interact with your medicine.  What should I watch for while using this medicine?  Visit your doctor or healthcare professional regularly. Tell your doctor or healthcare professional if your symptoms do not start to get better or if they get worse. You may need blood work done while you are taking this medicine.  You may need to follow a special diet. Talk to your doctor. Foods that contain iron include: whole grains/cereals, dried fruits, beans, or peas, leafy green vegetables, and organ meats (liver, kidney).  What side effects may I notice from receiving this medicine?  Side effects that you should report to your doctor or health care professional as soon as possible:  -allergic reactions like skin rash, itching or hives, swelling of the face, lips, or tongue  -breathing problems  -changes in blood pressure  -cough  -fast, irregular heartbeat  -feeling faint or lightheaded, falls  -fever or chills  -flushing, sweating, or hot feelings  -joint or muscle aches/pains  -seizures  -swelling of the ankles or feet  -unusually weak or tired  Side effects that usually do not require medical attention (report to your doctor or health care professional if they continue or are bothersome):  -diarrhea  -feeling achy  -headache  -irritation at site where injected  -nausea, vomiting  -stomach upset  -tiredness  This list may not describe all possible side effects. Call your doctor for medical advice about side effects. You may report side effects to FDA at 5-873-FDA-9661.  Where should I keep my medicine?  This drug is given in a hospital or clinic and will not be stored at home.  NOTE: This sheet is a summary. It may not cover all possible information. If you have questions about this medicine, talk to your doctor, pharmacist, or health care provider.  © 2019 [employer]/Gold Standard (2012-09-27 17:14:35)

## 2019-12-27 ENCOUNTER — HOSPITAL ENCOUNTER (OUTPATIENT)
Dept: INFUSION THERAPY | Facility: HOSPITAL | Age: 74
Discharge: HOME | End: 2019-12-27
Admitting: INTERNAL MEDICINE

## 2019-12-27 VITALS
TEMPERATURE: 96.7 F | OXYGEN SATURATION: 100 % | SYSTOLIC BLOOD PRESSURE: 134 MMHG | RESPIRATION RATE: 18 BRPM | DIASTOLIC BLOOD PRESSURE: 59 MMHG | HEART RATE: 60 BPM

## 2019-12-27 DIAGNOSIS — D50.8 IRON DEFICIENCY ANEMIA SECONDARY TO INADEQUATE DIETARY IRON INTAKE: Primary | ICD-10-CM

## 2019-12-27 PROCEDURE — 25010000002 IRON SUCROSE PER 1 MG: Performed by: INTERNAL MEDICINE

## 2019-12-27 PROCEDURE — 96365 THER/PROPH/DIAG IV INF INIT: CPT

## 2019-12-27 RX ADMIN — IRON SUCROSE 200 MG: 20 INJECTION, SOLUTION INTRAVENOUS at 13:30

## 2019-12-27 NOTE — PROGRESS NOTES
Pt tolerated infusion without complaints.  Observed x 30 minutes post infusion.  Pt DC per ambulation with spouse @ 1440.

## 2020-01-08 RX ORDER — MESALAMINE 4 G/60ML
4 ENEMA RECTAL NIGHTLY
Qty: 90 ENEMA | Refills: 1 | Status: SHIPPED | OUTPATIENT
Start: 2020-01-08 | End: 2020-02-14

## 2020-01-16 DIAGNOSIS — I10 ESSENTIAL HYPERTENSION: Chronic | ICD-10-CM

## 2020-01-16 RX ORDER — AMLODIPINE BESYLATE 5 MG/1
5 TABLET ORAL DAILY
Qty: 30 TABLET | Refills: 11 | Status: SHIPPED | OUTPATIENT
Start: 2020-01-16 | End: 2020-12-14

## 2020-01-24 ENCOUNTER — TELEPHONE (OUTPATIENT)
Dept: GASTROENTEROLOGY | Facility: CLINIC | Age: 75
End: 2020-01-24

## 2020-01-24 NOTE — TELEPHONE ENCOUNTER
Faxed request received from [facility] for potassium chloride 20 meq - take 2 tabs by mouth twice daily for 3 days.      Message to Dr Burleson.

## 2020-02-10 ENCOUNTER — ANESTHESIA EVENT (OUTPATIENT)
Dept: GASTROENTEROLOGY | Facility: HOSPITAL | Age: 75
End: 2020-02-10

## 2020-02-10 ENCOUNTER — ANESTHESIA (OUTPATIENT)
Dept: GASTROENTEROLOGY | Facility: HOSPITAL | Age: 75
End: 2020-02-10

## 2020-02-10 ENCOUNTER — HOSPITAL ENCOUNTER (OUTPATIENT)
Facility: HOSPITAL | Age: 75
Setting detail: HOSPITAL OUTPATIENT SURGERY
Discharge: HOME | End: 2020-02-10
Attending: INTERNAL MEDICINE | Admitting: INTERNAL MEDICINE

## 2020-02-10 VITALS
RESPIRATION RATE: 11 BRPM | HEIGHT: 62 IN | OXYGEN SATURATION: 98 % | DIASTOLIC BLOOD PRESSURE: 73 MMHG | BODY MASS INDEX: 21.9 KG/M2 | HEART RATE: 63 BPM | SYSTOLIC BLOOD PRESSURE: 142 MMHG | WEIGHT: 119 LBS

## 2020-02-10 DIAGNOSIS — D50.0 IRON DEFICIENCY ANEMIA DUE TO CHRONIC BLOOD LOSS: ICD-10-CM

## 2020-02-10 LAB
FERRITIN SERPL-MCNC: 194 NG/ML (ref 13–150)
GLUCOSE BLDC GLUCOMTR-MCNC: 120 MG/DL (ref 70–130)
HCT VFR BLD AUTO: 34.4 % (ref 34–46.6)
HGB BLD-MCNC: 11.5 G/DL (ref 12–15.9)

## 2020-02-10 PROCEDURE — 25010000002 PROPOFOL 10 MG/ML EMULSION: Performed by: NURSE ANESTHETIST, CERTIFIED REGISTERED

## 2020-02-10 PROCEDURE — 85014 HEMATOCRIT: CPT | Performed by: INTERNAL MEDICINE

## 2020-02-10 PROCEDURE — S0260 H&P FOR SURGERY: HCPCS | Performed by: INTERNAL MEDICINE

## 2020-02-10 PROCEDURE — 36415 COLL VENOUS BLD VENIPUNCTURE: CPT | Performed by: INTERNAL MEDICINE

## 2020-02-10 PROCEDURE — 43239 EGD BIOPSY SINGLE/MULTIPLE: CPT | Performed by: INTERNAL MEDICINE

## 2020-02-10 PROCEDURE — 82728 ASSAY OF FERRITIN: CPT | Performed by: INTERNAL MEDICINE

## 2020-02-10 PROCEDURE — 85018 HEMOGLOBIN: CPT | Performed by: INTERNAL MEDICINE

## 2020-02-10 PROCEDURE — 82962 GLUCOSE BLOOD TEST: CPT

## 2020-02-10 PROCEDURE — 88305 TISSUE EXAM BY PATHOLOGIST: CPT | Performed by: INTERNAL MEDICINE

## 2020-02-10 RX ORDER — SODIUM CHLORIDE, SODIUM LACTATE, POTASSIUM CHLORIDE, CALCIUM CHLORIDE 600; 310; 30; 20 MG/100ML; MG/100ML; MG/100ML; MG/100ML
30 INJECTION, SOLUTION INTRAVENOUS CONTINUOUS
Status: DISCONTINUED | OUTPATIENT
Start: 2020-02-10 | End: 2020-02-10 | Stop reason: HOSPADM

## 2020-02-10 RX ORDER — PROPOFOL 10 MG/ML
VIAL (ML) INTRAVENOUS CONTINUOUS PRN
Status: DISCONTINUED | OUTPATIENT
Start: 2020-02-10 | End: 2020-02-10 | Stop reason: SURG

## 2020-02-10 RX ORDER — PROPOFOL 10 MG/ML
VIAL (ML) INTRAVENOUS AS NEEDED
Status: DISCONTINUED | OUTPATIENT
Start: 2020-02-10 | End: 2020-02-10 | Stop reason: SURG

## 2020-02-10 RX ORDER — LIDOCAINE HYDROCHLORIDE 20 MG/ML
INJECTION, SOLUTION INFILTRATION; PERINEURAL AS NEEDED
Status: DISCONTINUED | OUTPATIENT
Start: 2020-02-10 | End: 2020-02-10 | Stop reason: SURG

## 2020-02-10 RX ADMIN — PROPOFOL 300 MCG/KG/MIN: 10 INJECTION, EMULSION INTRAVENOUS at 13:10

## 2020-02-10 RX ADMIN — SODIUM CHLORIDE, POTASSIUM CHLORIDE, SODIUM LACTATE AND CALCIUM CHLORIDE 30 ML/HR: 600; 310; 30; 20 INJECTION, SOLUTION INTRAVENOUS at 13:00

## 2020-02-10 RX ADMIN — LIDOCAINE HYDROCHLORIDE 40 MG: 20 INJECTION, SOLUTION INFILTRATION; PERINEURAL at 13:10

## 2020-02-10 RX ADMIN — PROPOFOL 50 MG: 10 INJECTION, EMULSION INTRAVENOUS at 13:10

## 2020-02-10 NOTE — ANESTHESIA PREPROCEDURE EVALUATION
Anesthesia Evaluation     Patient summary reviewed and Nursing notes reviewed                Airway   Mallampati: I  TM distance: >3 FB  Neck ROM: full  No difficulty expected  Dental - normal exam     Pulmonary - negative pulmonary ROS and normal exam   Cardiovascular - normal exam    (+) hypertension, past MI , CAD, hyperlipidemia,       Neuro/Psych  (+) dizziness/light headedness,     GI/Hepatic/Renal/Endo    (+)  GERD, GI bleeding , diabetes mellitus,     Musculoskeletal (-) negative ROS    Abdominal  - normal exam    Bowel sounds: normal.   Substance History - negative use     OB/GYN negative ob/gyn ROS         Other                      Anesthesia Plan    ASA 3     MAC       Anesthetic plan, all risks, benefits, and alternatives have been provided, discussed and informed consent has been obtained with: patient.

## 2020-02-10 NOTE — H&P
[facility], [place], operated by [facility]  Pre Procedure History & Physical    Chief Complaint: Persistent iron deficiency anemia      HPI:   74 y.o. female who presents for follow up of ulcerative colitis complicated by rectal bleeding and iron deficiency anemia.     She had a flex sig in October, showing active rectosigmoiditis.  There was also a tubular adenoma in the transverse colon.  She has been on budesonide and is now tapering.  She is still taking the lialda and mesalamine enemas.  She reports less bleeding, only occasional BRB with wiping.  She says that she has felt better and has gained weight.  She is having about 1-2 bowel movements per day.  No tenesmus, cramping, or excessive stools.     She is having some ankle swelling with the medication     She is still taking iron supplementation.  Past Medical History:   Past Medical History:   Diagnosis Date   • Allergic rhinitis    • CAD (coronary artery disease)    • Colitis    • Colon polyps    • Diabetes mellitus (CMS/HCC)     type 2   • Dizziness    • Encounter for breast cancer screening other than mammogram    • GERD (gastroesophageal reflux disease)    • GI (gastrointestinal bleed)    • Glaucoma    • Hyperlipidemia    • Hypertension    • Iron deficiency anemia due to chronic blood loss    • Knee fracture, left    • Non-STEMI (non-ST elevated myocardial infarction) (CMS/HCC) 6/16/2017   • Osteopenia    • Ulcerative colitis (CMS/HCC)        Family History:  Family History   Problem Relation Age of Onset   • Heart disease Mother    • Hypertension Mother    • Diabetes Mother    • Heart disease Father    • Hypertension Father    • Heart disease Sister    • Heart disease Brother    • No Known Problems Maternal Grandmother    • No Known Problems Maternal Grandfather    • No Known Problems Paternal Grandmother    • No Known Problems Paternal Grandfather    • Crohn's disease Niece    • Colon cancer Neg Hx    • Breast cancer Neg Hx    • Dementia Neg Hx        Social  History:   reports that she has never smoked. She has never used smokeless tobacco. She reports that she does not drink alcohol or use drugs.    Medications:   No medications prior to admission.       Allergies:  Tetanus toxoids    ROS:    Pertinent items are noted in HPI     Objective     not currently breastfeeding.    Physical Exam   Constitutional: Pt is oriented to person, place, and time and well-developed, well-nourished, and in no distress.   HENT:   Mouth/Throat: Oropharynx is clear and moist.   Neck: Normal range of motion. Neck supple.   Cardiovascular: Normal rate, regular rhythm and normal heart sounds.    Pulmonary/Chest: Effort normal and breath sounds normal. No respiratory distress. No  wheezes.   Abdominal: Soft. Bowel sounds are normal.   Skin: Skin is warm and dry.   Psychiatric: Mood, memory, affect and judgment normal.     Assessment/Plan     Diagnosis: Persistent iron deficiency anemia      Anticipated Surgical Procedure:  EGD      The risks, benefits, and alternatives of this procedure have been discussed with the patient or the responsible party- the patient understands and agrees to proceed.

## 2020-02-10 NOTE — ANESTHESIA POSTPROCEDURE EVALUATION
"Patient: Renee PARIKH From    Procedure Summary     Date:  02/10/20 Room / Location:   [facility]    Anesthesia Start:  1305 Anesthesia Stop:  1326    Procedure:  ESOPHAGOGASTRODUODENOSCOPY (N/A Esophagus) Diagnosis:       Iron deficiency anemia due to chronic blood loss      (Iron deficiency anemia due to chronic blood loss [D50.0])    Surgeon:  Melissa Burleson MD Provider:  Trell Wetzel MD    Anesthesia Type:  MAC ASA Status:  3          Anesthesia Type: MAC    Vitals  Vitals Value Taken Time   /73 2/10/2020  1:52 PM   Temp     Pulse 63 2/10/2020  1:52 PM   Resp     SpO2 98 % 2/10/2020  1:52 PM           Post Anesthesia Care and Evaluation    Patient location during evaluation: PACU  Patient participation: complete - patient participated  Level of consciousness: awake  Pain score: 0  Pain management: adequate  Airway patency: patent  Anesthetic complications: No anesthetic complications  PONV Status: none  Cardiovascular status: acceptable  Respiratory status: acceptable  Hydration status: acceptable    Comments: /73 (BP Location: Left arm, Patient Position: Lying)   Pulse 63   Resp 11   Ht 157.5 cm (62\")   Wt 54 kg (119 lb)   SpO2 98%   BMI 21.77 kg/m²       "

## 2020-02-11 ENCOUNTER — TELEPHONE (OUTPATIENT)
Dept: GASTROENTEROLOGY | Facility: CLINIC | Age: 75
End: 2020-02-11

## 2020-02-11 LAB
CYTO UR: NORMAL
LAB AP CASE REPORT: NORMAL
PATH REPORT.FINAL DX SPEC: NORMAL
PATH REPORT.GROSS SPEC: NORMAL

## 2020-02-11 RX ORDER — PANTOPRAZOLE SODIUM 40 MG/1
40 TABLET, DELAYED RELEASE ORAL
Qty: 60 TABLET | Refills: 1 | Status: SHIPPED | OUTPATIENT
Start: 2020-02-11 | End: 2020-06-03 | Stop reason: SDUPTHER

## 2020-02-11 NOTE — TELEPHONE ENCOUNTER
----- Message from Melissa Burleson MD sent at 2/11/2020 12:40 PM EST -----  Her blood count is the highest it is been in about a year at 11.5.  Her iron stores are elevated at 194 which is very good for her.    Biopsies from her EGD show mild gastritis and esophagitis.  No other worrisome changes    Would like for her to start the daily omeprazole and change this to pantoprazole.  I am sending this to her pharmacy.    Please have her schedule up follow-up with me only in about May, thank you      --Called pt and advised of the above. Pt verb understanding and made lab appt for 05/12 at 230p with Dr Burleson.   Scarlett Foster RN

## 2020-02-11 NOTE — PROGRESS NOTES
Her blood count is the highest it is been in about a year at 11.5.  Her iron stores are elevated at 194 which is very good for her.    Biopsies from her EGD show mild gastritis and esophagitis.  No other worrisome changes    Would like for her to start the daily omeprazole and change this to pantoprazole.  I am sending this to her pharmacy.    Please have her schedule up follow-up with me only in about May, thank you

## 2020-02-14 ENCOUNTER — OFFICE VISIT (OUTPATIENT)
Dept: CARDIOLOGY | Facility: CLINIC | Age: 75
End: 2020-02-14

## 2020-02-14 VITALS
HEIGHT: 62 IN | HEART RATE: 61 BPM | DIASTOLIC BLOOD PRESSURE: 72 MMHG | BODY MASS INDEX: 22.08 KG/M2 | WEIGHT: 120 LBS | SYSTOLIC BLOOD PRESSURE: 136 MMHG

## 2020-02-14 DIAGNOSIS — I10 ESSENTIAL HYPERTENSION: ICD-10-CM

## 2020-02-14 DIAGNOSIS — E78.5 HYPERLIPIDEMIA, UNSPECIFIED HYPERLIPIDEMIA TYPE: ICD-10-CM

## 2020-02-14 DIAGNOSIS — I51.81 STRESS-INDUCED CARDIOMYOPATHY: Primary | ICD-10-CM

## 2020-02-14 DIAGNOSIS — I25.10 CORONARY ARTERY DISEASE INVOLVING NATIVE CORONARY ARTERY OF NATIVE HEART WITHOUT ANGINA PECTORIS: ICD-10-CM

## 2020-02-14 DIAGNOSIS — E11.9 TYPE 2 DIABETES MELLITUS WITHOUT COMPLICATION, WITHOUT LONG-TERM CURRENT USE OF INSULIN (HCC): ICD-10-CM

## 2020-02-14 PROCEDURE — 93000 ELECTROCARDIOGRAM COMPLETE: CPT | Performed by: INTERNAL MEDICINE

## 2020-02-14 PROCEDURE — 99214 OFFICE O/P EST MOD 30 MIN: CPT | Performed by: INTERNAL MEDICINE

## 2020-02-14 NOTE — PROGRESS NOTES
Subjective:     Encounter Date:02/14/2020      Patient ID: Renee Stevenson is a 74 y.o. female.    Chief Complaint:  HPI:   This is a 74-year-old woman who was previously followed by Dr. Gerardo Wooten.  In 2017 she presented to [facility] with chest pain and dynamic EKG changes.  She had a cardiac catheterization which showed mild to moderate disease of the proximal, mid and distal LAD as well as 2 small diagonal arteries.  She also had mild disease in the circumflex.  Her LV gram showed inferior apical hypokinesis consistent with Takosubo hardy myopathy with an EF mildly reduced at 45%.  She recently suffered the loss of 2 of her brothers, which was considered to be the culprit.  After few months of goal directed medical therapy her EF improved to 65%.  Her other medical problems include diabetes, hypertension and ulcerative colitis.  She has not had an UC flare in a year or so.  No recent blood transfusions.  She is maintained on Lialda and does not require any biologic therapy.  Her hypertension is well controlled.  Her diabetes is also well controlled.    She has never smoked, she does not drink.  She is retired.  There is no family history of coronary disease.  The following portions of the patient's history were reviewed and updated as appropriate: allergies, current medications, past family history, past medical history, past social history, past surgical history and problem list.     REVIEW OF SYSTEMS:   All systems reviewed.  Pertinent positives identified in HPI.  All other systems are negative.    Past Medical History:   Diagnosis Date   • Allergic rhinitis    • Broken heart syndrome    • Colitis    • Colon polyps    • Diabetes mellitus (CMS/HCC)     type 2   • Dizziness    • Encounter for breast cancer screening other than mammogram    • GERD (gastroesophageal reflux disease)    • GI (gastrointestinal bleed)    • Glaucoma    • History of transfusion    • Hyperlipidemia    • Hypertension    • Iron  deficiency anemia due to chronic blood loss    • Knee fracture, left    • Non-STEMI (non-ST elevated myocardial infarction) (CMS/[facility]) 6/16/2017   • Osteopenia    • Ulcerative colitis (CMS/[facility])        Family History   Problem Relation Age of Onset   • Heart disease Mother    • Hypertension Mother    • Diabetes Mother    • Heart disease Father    • Hypertension Father    • Heart disease Sister    • Heart disease Brother    • No Known Problems Maternal Grandmother    • No Known Problems Maternal Grandfather    • No Known Problems Paternal Grandmother    • No Known Problems Paternal Grandfather    • Crohn's disease Niece    • Colon cancer Neg Hx    • Breast cancer Neg Hx    • Dementia Neg Hx        Social History     Socioeconomic History   • Marital status:      Spouse name: Eliazar*   • Number of children: 1   • Years of education: Not on file   • Highest education level: Not on file   Occupational History   • Occupation: Retired (retail)   Tobacco Use   • Smoking status: Never Smoker   • Smokeless tobacco: Never Used   Substance and Sexual Activity   • Alcohol use: No   • Drug use: No   • Sexual activity: Not Currently     Partners: Male   Lifestyle   • Physical activity:     Days per week: Not on file     Minutes per session: Not on file   • Stress: Only a little       Allergies   Allergen Reactions   • Tetanus Toxoids Swelling     Hand and arm       Past Surgical History:   Procedure Laterality Date   • CARDIAC CATHETERIZATION N/A 6/16/2017    Procedure: Left Heart Cath;  Surgeon: Abhay Wooten MD;  Location: [facility] INVASIVE LOCATION;  Service:    • CARDIAC CATHETERIZATION N/A 6/16/2017    Procedure: Left ventriculography;  Surgeon: Abhay Wooten MD;  Location: [facility] INVASIVE LOCATION;  Service:    • CARDIAC CATHETERIZATION N/A 6/16/2017    Procedure: Coronary angiography;  Surgeon: Abhay Wooten MD;  Location: [facility] INVASIVE LOCATION;  Service:    • CATARACT EXTRACTION     • COLONOSCOPY  08/14/2018   •  ENDOSCOPY N/A 2/10/2020    Procedure: ESOPHAGOGASTRODUODENOSCOPY;  Surgeon: Melissa Burleson MD;  Location: [facility] ENDOSCOPY;  Service: Gastroenterology;  Laterality: N/A;  PRE- IRON DEFICIENCY ANEMIA  POST--SCHATZKY'S RING, GASTRITIS,DUODENITIS   • EYE SURGERY      cataract surgery   • PAP SMEAR     • SIGMOIDOSCOPY N/A 10/21/2019    EH, active ulcerative colitis, severe inflammation in rectum, TA         ECG 12 Lead  Date/Time: 2/14/2020 4:07 PM  Performed by: Darling Espinosa MD  Authorized by: Darling Espinosa MD   Comparison: compared with previous ECG from 1/31/2018  Similar to previous ECG  Rhythm: sinus rhythm  Rate: normal  Conduction: conduction normal  ST Segments: ST segments normal  T Waves: T waves normal  QRS axis: normal  Other findings: non-specific ST-T wave changes    Clinical impression: non-specific ECG               Objective:         PHYSICAL EXAM:  GEN: VSS, no distress,   Eyes: normal sclera, normal lids and lashes  HENT: moist mucus membranes,   Respiratory: CTAB, no rales or wheezes  CV: RRR, no murmurs, , +2 DP and 2+ carotid pulses b/l  GI: NABS, soft,  Nontender, nondistended  MSK: no edema, no scoliosis or kyphosis  Skin: no rash, warm, dry  Heme/Lymph: no bruising or bleeding  Psych: organized thought, normal behavior and affect  Neuro: Cranial nerves grossly intact, Alert and Oriented x 3.   Left great toe shows some induration, erythema mild swelling and tenderness to palpation.  No obvious purulence.      Assessment:          Diagnosis Plan   1. Stress-induced cardiomyopathy     2. Essential hypertension     3. Hyperlipidemia, unspecified hyperlipidemia type     4. Type 2 diabetes mellitus without complication, without long-term current use of insulin (CMS/[facility])            Plan:         1.  Stress-induced cardiomyopathy: Noted on cardiac catheterization in 2017.  Her ejection fraction has normalized.  Continue Toprol 50 and lisinopril 20.  2.  Coronary artery disease: Nonobstructive  coronary disease in the LAD and circumflex system noted on cardiac catheterization.  Continue Crestor.  Not on aspirin, most likely related to prior UC flares requiring PRBC transfusion.  3.  Hypertension: Well-controlled on current therapy continue.  4.  Hyperlipidemia: Moderate intensity statin for diabetes, continue  5.  Ulcerative colitis: Under control  6.  Foot pain: I examined her great toe today.  It is erythematous and somewhat tender and swollen, however it is not appear grossly infected.  Given her diabetes, I have asked her to follow-up with Dr. Pacheco or her podiatrist next week.    Dr. Pacheco, thank you very much for referring this kind patient to me. Please call me with any questions or concerns. I will see the patient again in the office in 1 year.         Darling Espinosa MD  02/14/20  [facility]    Outpatient Encounter Medications as of 2/14/2020   Medication Sig Dispense Refill   • Acetaminophen (TYLENOL PO) Take 2 tablets by mouth As Needed.     • amLODIPine (NORVASC) 5 MG tablet TAKE 1 TABLET BY MOUTH DAILY 30 tablet 11   • Budesonide (ENTOCORT EC) 3 MG 24 hr capsule Take 3 capsules by mouth Every Morning. For 4 weeks, then 2 caps daily for 4 weeks and then 1 cap daily for 4 weeks 90 capsule 1   • CONTOUR NEXT TEST test strip CHECK BLOOD SUGAR EVERY  each 5   • dorzolamide-timolol (COSOPT) 22.3-6.8 MG/ML ophthalmic solution Apply 1 drop to eye 2 (two) times a day.     • ferrous sulfate 325 (65 FE) MG tablet Take 1 tablet by mouth Daily With Breakfast. 30 tablet 0   • hydrocortisone (ANUSOL-HC) 2.5 % rectal cream Insert  into the rectum 2 (Two) Times a Day. 28 g 0   • lisinopril (PRINIVIL,ZESTRIL) 20 MG tablet TAKE 1 TABLET BY MOUTH EVERY DAY 90 tablet 3   • mesalamine (LIALDA) 1.2 g EC tablet TAKE 4 TABLETS BY MOUTH DAILY WITH BREAKFAST 120 tablet 2   • metFORMIN ER (GLUCOPHAGE-XR) 500 MG 24 hr tablet TAKE 2 TABLETS BY MOUTH TWICE DAILY 360 tablet 1   • metoprolol tartrate  (LOPRESSOR) 50 MG tablet TAKE 1 TABLET BY MOUTH EVERY 12 HOURS 180 tablet 3   • Multiple Vitamin (MULTI-VITAMIN PO) Take 1 tablet by mouth Daily.     • pantoprazole (PROTONIX) 40 MG EC tablet Take 1 tablet by mouth Every Morning Before Breakfast. 60 tablet 1   • ROSUVASTATIN CALCIUM PO Take 10 mg by mouth Daily.     • [DISCONTINUED] aspirin 81 MG tablet Take 81 mg by mouth Daily.     • [DISCONTINUED] hydrocortisone (CORTENEMA) 100 MG/60ML enema Insert 1 enema into the rectum Every Night. 30 enema 0   • [DISCONTINUED] mesalamine (ROWASA) 4 g enema Insert 1 enema into the rectum Every Night. 90 enema 1     No facility-administered encounter medications on file as of 2/14/2020.

## 2020-03-06 DIAGNOSIS — K51.311 ULCERATIVE RECTOSIGMOIDITIS WITH RECTAL BLEEDING (HCC): Chronic | ICD-10-CM

## 2020-03-06 RX ORDER — MESALAMINE 1.2 G/1
4.8 TABLET, DELAYED RELEASE ORAL
Qty: 120 TABLET | Refills: 2 | Status: SHIPPED | OUTPATIENT
Start: 2020-03-06 | End: 2020-06-16 | Stop reason: SDUPTHER

## 2020-03-16 ENCOUNTER — OFFICE VISIT (OUTPATIENT)
Dept: INTERNAL MEDICINE | Age: 75
End: 2020-03-16

## 2020-03-16 VITALS
HEART RATE: 57 BPM | SYSTOLIC BLOOD PRESSURE: 124 MMHG | HEIGHT: 62 IN | WEIGHT: 119 LBS | DIASTOLIC BLOOD PRESSURE: 72 MMHG | OXYGEN SATURATION: 98 % | BODY MASS INDEX: 21.9 KG/M2 | TEMPERATURE: 97.7 F

## 2020-03-16 DIAGNOSIS — E11.59 TYPE 2 DIABETES MELLITUS WITH OTHER CIRCULATORY COMPLICATION, WITHOUT LONG-TERM CURRENT USE OF INSULIN (HCC): Primary | Chronic | ICD-10-CM

## 2020-03-16 DIAGNOSIS — E78.2 MIXED HYPERLIPIDEMIA: Chronic | ICD-10-CM

## 2020-03-16 LAB
ALBUMIN SERPL-MCNC: 4.4 G/DL (ref 3.5–5.2)
ALBUMIN/GLOB SERPL: 1.6 G/DL
ALP SERPL-CCNC: 51 U/L (ref 39–117)
ALT SERPL-CCNC: 13 U/L (ref 1–33)
APPEARANCE UR: CLEAR
AST SERPL-CCNC: 13 U/L (ref 1–32)
BACTERIA #/AREA URNS HPF: NORMAL /HPF
BILIRUB SERPL-MCNC: 0.4 MG/DL (ref 0.2–1.2)
BILIRUB UR QL STRIP: NEGATIVE
BUN SERPL-MCNC: 12 MG/DL (ref 8–23)
BUN/CREAT SERPL: 17.6 (ref 7–25)
CALCIUM SERPL-MCNC: 9 MG/DL (ref 8.6–10.5)
CASTS URNS MICRO: NORMAL
CHLORIDE SERPL-SCNC: 101 MMOL/L (ref 98–107)
CHOLEST SERPL-MCNC: 116 MG/DL (ref 0–200)
CHOLEST/HDLC SERPL: 2.7 {RATIO}
CO2 SERPL-SCNC: 25.9 MMOL/L (ref 22–29)
COLOR UR: ABNORMAL
CREAT SERPL-MCNC: 0.68 MG/DL (ref 0.57–1)
EPI CELLS #/AREA URNS HPF: NORMAL /HPF
GLOBULIN SER CALC-MCNC: 2.7 GM/DL
GLUCOSE SERPL-MCNC: 139 MG/DL (ref 65–99)
GLUCOSE UR QL: NEGATIVE
HBA1C MFR BLD: 6.7 % (ref 4.8–5.6)
HDLC SERPL-MCNC: 43 MG/DL (ref 40–60)
HGB UR QL STRIP: NEGATIVE
KETONES UR QL STRIP: ABNORMAL
LDLC SERPL CALC-MCNC: 50 MG/DL (ref 0–100)
LEUKOCYTE ESTERASE UR QL STRIP: ABNORMAL
NITRITE UR QL STRIP: POSITIVE
PH UR STRIP: <=5 [PH] (ref 5–8)
POTASSIUM SERPL-SCNC: 3.9 MMOL/L (ref 3.5–5.2)
PROT SERPL-MCNC: 7.1 G/DL (ref 6–8.5)
PROT UR QL STRIP: ABNORMAL
RBC #/AREA URNS HPF: NORMAL /HPF
SODIUM SERPL-SCNC: 141 MMOL/L (ref 136–145)
SP GR UR: ABNORMAL (ref 1–1.03)
TRIGL SERPL-MCNC: 114 MG/DL (ref 0–150)
UROBILINOGEN UR STRIP-MCNC: ABNORMAL MG/DL
VLDLC SERPL CALC-MCNC: 22.8 MG/DL
WBC #/AREA URNS HPF: NORMAL /HPF

## 2020-03-16 PROCEDURE — 99214 OFFICE O/P EST MOD 30 MIN: CPT | Performed by: INTERNAL MEDICINE

## 2020-03-16 RX ORDER — MESALAMINE 1000 MG/1
28 SUPPOSITORY RECTAL NIGHTLY
COMMUNITY
End: 2021-02-15

## 2020-03-16 RX ORDER — METOPROLOL TARTRATE 50 MG/1
50 TABLET, FILM COATED ORAL EVERY 12 HOURS
Qty: 180 TABLET | Refills: 3 | Status: SHIPPED | OUTPATIENT
Start: 2020-03-16 | End: 2020-11-18

## 2020-03-16 NOTE — PROGRESS NOTES
[facility]  ILA PATIÑO M.D.      Renee PARIKH From / 75 y.o. / female  03/16/2020      ASSESSMENT & PLAN:    Problem List Items Addressed This Visit        High    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) - Primary (Chronic)    Overview     Stable. Continue metformin  mg 2 tabs BID.          Relevant Medications    metFORMIN ER (GLUCOPHAGE-XR) 500 MG 24 hr tablet    CONTOUR NEXT TEST test strip    Other Relevant Orders    Hemoglobin A1c    Comprehensive Metabolic Panel    Urinalysis With Microscopic If Indicated (No Culture) - Urine, Clean Catch       Medium    Hyperlipidemia (Chronic)    Overview     Continue rosuvastatin 10 mg daily.          Relevant Medications    ROSUVASTATIN CALCIUM PO    Other Relevant Orders    Lipid Panel With / Chol / HDL Ratio        Orders Placed This Encounter   Procedures   • Hemoglobin A1c   • Lipid Panel With / Chol / HDL Ratio   • Comprehensive Metabolic Panel   • Urinalysis With Microscopic If Indicated (No Culture) - Urine, Clean Catch     No orders of the defined types were placed in this encounter.      Summary/Discussion:  Advance Care Planning   ACP discussion was held with the patient during this visit. Patient does not have an advance directive, information provided.      Next Appointment with me: Visit date not found    Return in about 5 months (around 8/16/2020) for Reassess chronic medical problems.  ____________________________________________________________________    MEDICATIONS  Current Outpatient Medications   Medication Sig Dispense Refill   • Acetaminophen (TYLENOL PO) Take 2 tablets by mouth As Needed.     • amLODIPine (NORVASC) 5 MG tablet TAKE 1 TABLET BY MOUTH DAILY 30 tablet 11   • CONTOUR NEXT TEST test strip CHECK BLOOD SUGAR EVERY  each 5   • dorzolamide-timolol (COSOPT) 22.3-6.8 MG/ML ophthalmic solution Apply 1 drop to eye 2 (two) times a day.     • ferrous sulfate 325 (65 FE) MG tablet Take 1 tablet by mouth Daily  "With Breakfast. 30 tablet 0   • hydrocortisone (ANUSOL-HC) 2.5 % rectal cream Insert  into the rectum 2 (Two) Times a Day. (Patient taking differently: Insert  into the rectum 2 (Two) Times a Day. prn) 28 g 0   • lisinopril (PRINIVIL,ZESTRIL) 20 MG tablet TAKE 1 TABLET BY MOUTH EVERY DAY 90 tablet 3   • mesalamine (CANASA) 1000 MG suppository Insert 28 mg into the rectum Every Night. 28/60 ml insert 1 enema into rectum every evening     • mesalamine (LIALDA) 1.2 g EC tablet TAKE 4 TABLETS BY MOUTH DAILY WITH BREAKFAST 120 tablet 2   • metFORMIN ER (GLUCOPHAGE-XR) 500 MG 24 hr tablet TAKE 2 TABLETS BY MOUTH TWICE DAILY 360 tablet 1   • metoprolol tartrate (LOPRESSOR) 50 MG tablet TAKE 1 TABLET BY MOUTH EVERY 12 HOURS 180 tablet 3   • pantoprazole (PROTONIX) 40 MG EC tablet Take 1 tablet by mouth Every Morning Before Breakfast. 60 tablet 1   • ROSUVASTATIN CALCIUM PO Take 10 mg by mouth Daily.     • Multiple Vitamin (MULTI-VITAMIN PO) Take 1 tablet by mouth Daily.       No current facility-administered medications for this visit.        VITALS    Visit Vitals  /72   Pulse 57   Temp 97.7 °F (36.5 °C)   Ht 157.5 cm (62\")   Wt 54 kg (119 lb)   SpO2 98%   BMI 21.77 kg/m²       BP Readings from Last 3 Encounters:   03/16/20 124/72   02/14/20 136/72   02/10/20 142/73     Wt Readings from Last 3 Encounters:   03/16/20 54 kg (119 lb)   02/14/20 54.4 kg (120 lb)   02/10/20 54 kg (119 lb)    Body mass index is 21.77 kg/m².    CC:  Main reason(s) for today's visit: Follow-up for diabetes and related medical problems    HPI:     Chronic type 2 diabetes:  Diabetic complications: coronary artery disease. Current therapy: metformin/metformin ER.  Most recent change to treatment plan: no recent change.  Home blood sugar levels: about the same as before, has no significant low sugar symptoms/problems.   Most recent relevant labs:   Lab Results   Component Value Date    HGBA1C 5.50 05/16/2019    HGBA1C 6.30 (H) 07/30/2018    " HGBA1C 6.02 (H) 02/19/2018    CREATININE 0.71 12/06/2019    LDL 52 05/16/2019    MICROALBUR 20.9 05/16/2019       Chronic essential hypertension:  Since prior visit: compliant with medication(s) and denies significant problems with medication(s).  Most recent in-office blood pressure readings:   BP Readings from Last 3 Encounters:   03/16/20 124/72   02/14/20 136/72   02/10/20 142/73       Chronic hyperlipidemia:  Current therapy include rosuvastatin, denies problems with medication.    Most recent labs:   Lab Results   Component Value Date    LDL 52 05/16/2019    LDL 61 02/19/2018    LDL 69 08/12/2016    HDL 42 05/16/2019    HDL 43 02/19/2018    HDL 48 08/12/2016    TRIG 113 05/16/2019    CHOLHDLRATIO 2.79 05/16/2019    CHOLHDLRATIO 2.91 02/19/2018    CHOLHDLRATIO 2.8 08/12/2016          Patient Care Team:  Esequiel Pacheco MD as PCP - General  Melissa Burleson MD as PCP - Claims Attributed  Ministerio Wells MD as Consulting Physician (Orthopedic Surgery)  Abhay Wooten MD (Inactive) as Consulting Physician (Cardiology)  Cisco Husain MD as Consulting Physician (Otolaryngology)  EZIO Garibay MD as Consulting Physician (Ophthalmology)  Melissa Burleson MD as Consulting Physician (Gastroenterology)  ____________________________________________________________________    REVIEW OF SYSTEMS    Review of Systems  As noted per HPI  Constitutional neg  Resp neg  CV neg      PHYSICAL EXAMINATION    Physical Exam  Constitutional  No distress  Psychiatric  Alert. Judgment and thought content normal. Mood normal    REVIEWED DATA:    Labs:   Lab Results   Component Value Date     12/06/2019    K 3.6 12/06/2019    CALCIUM 9.3 12/06/2019    AST 10 12/04/2019    ALT 13 12/04/2019    BUN 14 12/06/2019    CREATININE 0.71 12/06/2019    CREATININE 0.73 12/04/2019    CREATININE 0.70 07/24/2019    EGFRIFNONA 80 12/06/2019    EGFRIFAFRI 94 12/04/2019       Lab Results   Component Value Date    HGBA1C 5.50 05/16/2019     HGBA1C 6.30 (H) 07/30/2018    HGBA1C 6.02 (H) 02/19/2018    GLUCOSE 161 (H) 12/06/2019    GLUCOSE 148 (H) 07/24/2019    GLUCOSE 168 (H) 12/16/2018    MICROALBUR 20.9 05/16/2019       Lab Results   Component Value Date    LDL 52 05/16/2019    LDL 61 02/19/2018    LDL 69 08/12/2016    HDL 42 05/16/2019    HDL 43 02/19/2018    TRIG 113 05/16/2019    CHOLHDLRATIO 2.79 05/16/2019       No results found for: TSH, FREET4       Lab Results   Component Value Date    WBC 8.28 12/04/2019    HGB 11.5 (L) 02/10/2020    HGB 9.4 (L) 12/04/2019    HGB 10.5 (L) 10/21/2019     12/04/2019        Lab Results   Component Value Date    PROTEIN Negative 08/20/2015    GLUCOSEU Negative 08/20/2015    BLOODU Negative 05/05/2014    NITRITEU Negative 05/05/2014    LEUKOCYTESUR Negative 08/20/2015       Imaging:          Medical Tests:          Summary of old records / correspondence / consultant report:          Request outside records:          ALLERGIES  Allergies   Allergen Reactions   • Tetanus Toxoids Swelling     Hand and arm        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)   • Osteopenia   • Hypertension   • Hyperlipidemia   • Colon polyp   • Gastroesophageal reflux disease   • Glaucoma   • Coronary artery disease involving native coronary artery of native heart without angina pectoris   • Ulcerative colitis with rectal bleeding (CMS/HCC)   • Iron deficiency anemia secondary to inadequate dietary iron intake   • Iron deficiency anemia due to chronic blood loss   • Ulcerative rectosigmoiditis with rectal bleeding (CMS/HCC)       PAST MEDICAL HISTORY  Past Medical History:   Diagnosis Date   • Allergic rhinitis    • Broken heart syndrome    •  Colitis    • Colon polyps    • Diabetes mellitus (CMS/HCC)     type 2   • Dizziness    • Encounter for breast cancer screening other than mammogram    • GERD (gastroesophageal reflux disease)    • GI (gastrointestinal bleed)    • Glaucoma    • History of transfusion    • Hyperlipidemia    • Hypertension    • Iron deficiency anemia due to chronic blood loss    • Knee fracture, left    • Non-STEMI (non-ST elevated myocardial infarction) (CMS/HCC) 6/16/2017   • Osteopenia    • Ulcerative colitis (CMS/HCC)        SURGICAL HISTORY  Past Surgical History:   Procedure Laterality Date   • CARDIAC CATHETERIZATION N/A 6/16/2017    Procedure: Left Heart Cath;  Surgeon: Abhay Wooten MD;  Location: [facility] CATH INVASIVE LOCATION;  Service:    • CARDIAC CATHETERIZATION N/A 6/16/2017    Procedure: Left ventriculography;  Surgeon: Abhay Wooten MD;  Location: [facility] CATH INVASIVE LOCATION;  Service:    • CARDIAC CATHETERIZATION N/A 6/16/2017    Procedure: Coronary angiography;  Surgeon: Abhay Wooten MD;  Location: [facility] CATH INVASIVE LOCATION;  Service:    • CATARACT EXTRACTION     • COLONOSCOPY  08/14/2018   • ENDOSCOPY N/A 2/10/2020    Procedure: ESOPHAGOGASTRODUODENOSCOPY;  Surgeon: Melissa Burleson MD;  Location: [facility] ENDOSCOPY;  Service: Gastroenterology;  Laterality: N/A;  PRE- IRON DEFICIENCY ANEMIA  POST--SCHATZKY'S RING, GASTRITIS,DUODENITIS   • EYE SURGERY      cataract surgery   • PAP SMEAR     • SIGMOIDOSCOPY N/A 10/21/2019    EH, active ulcerative colitis, severe inflammation in rectum, TA       SOCIAL HISTORY  Social History     Socioeconomic History   • Marital status:      Spouse name: Eliazar*   • Number of children: 1   • Years of education: Not on file   • Highest education level: Not on file   Occupational History   • Occupation: Retired (retail)   Tobacco Use   • Smoking status: Never Smoker   • Smokeless tobacco: Never Used   Substance and Sexual Activity   • Alcohol use: No   • Drug use: No   • Sexual activity:  Not Currently     Partners: Male   Lifestyle   • Physical activity:     Days per week: Not on file     Minutes per session: Not on file   • Stress: Only a little       FAMILY HISTORY  Family History   Problem Relation Age of Onset   • Heart disease Mother    • Hypertension Mother    • Diabetes Mother    • Heart disease Father    • Hypertension Father    • Heart disease Sister    • Heart disease Brother    • No Known Problems Maternal Grandmother    • No Known Problems Maternal Grandfather    • No Known Problems Paternal Grandmother    • No Known Problems Paternal Grandfather    • Crohn's disease Niece    • Colon cancer Neg Hx    • Breast cancer Neg Hx    • Dementia Neg Hx          **Dragon Disclaimer:   Much of this encounter note is an electronic transcription/translation of spoken language to printed text. The electronic translation of spoken language may permit erroneous, or at times, nonsensical words or phrases to be inadvertently transcribed. Although I have reviewed the note for such errors, some may still exist.       Template created by Zachery Pacheco MD

## 2020-03-17 ENCOUNTER — TELEPHONE (OUTPATIENT)
Dept: INTERNAL MEDICINE | Age: 75
End: 2020-03-17

## 2020-03-17 NOTE — TELEPHONE ENCOUNTER
----- Message from Esequiel Pacheco MD sent at 3/17/2020  6:18 AM EDT -----  Call patient with results:    1. See if she has symptoms of UTI.   2. A1c level for blood sugar average is little higher. Watch diet closer.   3. Cholesterol level is stable

## 2020-04-14 DIAGNOSIS — E11.9 TYPE 2 DIABETES MELLITUS WITHOUT COMPLICATION, WITHOUT LONG-TERM CURRENT USE OF INSULIN (HCC): Chronic | ICD-10-CM

## 2020-04-15 RX ORDER — BLOOD SUGAR DIAGNOSTIC
STRIP MISCELLANEOUS
Qty: 100 EACH | Refills: 11 | Status: SHIPPED | OUTPATIENT
Start: 2020-04-15 | End: 2021-06-07

## 2020-04-22 ENCOUNTER — TELEPHONE (OUTPATIENT)
Dept: GASTROENTEROLOGY | Facility: CLINIC | Age: 75
End: 2020-04-22

## 2020-05-12 DIAGNOSIS — E11.9 TYPE 2 DIABETES MELLITUS WITHOUT COMPLICATION (HCC): Chronic | ICD-10-CM

## 2020-05-12 DIAGNOSIS — I10 ESSENTIAL HYPERTENSION: Chronic | ICD-10-CM

## 2020-05-12 RX ORDER — METFORMIN HYDROCHLORIDE 500 MG/1
TABLET, EXTENDED RELEASE ORAL
Qty: 360 TABLET | Refills: 1 | Status: SHIPPED | OUTPATIENT
Start: 2020-05-12 | End: 2020-08-21 | Stop reason: SDUPTHER

## 2020-05-12 RX ORDER — LISINOPRIL 20 MG/1
TABLET ORAL
Qty: 90 TABLET | Refills: 3 | Status: SHIPPED | OUTPATIENT
Start: 2020-05-12 | End: 2021-05-12

## 2020-05-14 RX ORDER — ROSUVASTATIN CALCIUM 10 MG/1
10 TABLET, COATED ORAL DAILY
Qty: 30 TABLET | Refills: 5 | Status: SHIPPED | OUTPATIENT
Start: 2020-05-14 | End: 2020-09-15 | Stop reason: SDUPTHER

## 2020-06-03 ENCOUNTER — TELEPHONE (OUTPATIENT)
Dept: GASTROENTEROLOGY | Facility: CLINIC | Age: 75
End: 2020-06-03

## 2020-06-03 RX ORDER — PANTOPRAZOLE SODIUM 40 MG/1
40 TABLET, DELAYED RELEASE ORAL
Qty: 30 TABLET | Refills: 1 | Status: SHIPPED | OUTPATIENT
Start: 2020-06-03 | End: 2020-08-13 | Stop reason: SDUPTHER

## 2020-06-03 NOTE — TELEPHONE ENCOUNTER
Faxed request received from [facility] for pantoprazole 40 mg 1 tab po daily, #60.     See note of 2/11.  Pt has f/u appt with DR Burleson on 7/16.  Esribe completed as requested, #30, R1.

## 2020-06-16 ENCOUNTER — TELEPHONE (OUTPATIENT)
Dept: GASTROENTEROLOGY | Facility: CLINIC | Age: 75
End: 2020-06-16

## 2020-06-16 DIAGNOSIS — K51.311 ULCERATIVE RECTOSIGMOIDITIS WITH RECTAL BLEEDING (HCC): Chronic | ICD-10-CM

## 2020-06-16 RX ORDER — MESALAMINE 1.2 G/1
4.8 TABLET, DELAYED RELEASE ORAL
Qty: 120 TABLET | Refills: 1 | Status: SHIPPED | OUTPATIENT
Start: 2020-06-16 | End: 2020-08-13 | Stop reason: SDUPTHER

## 2020-06-16 NOTE — TELEPHONE ENCOUNTER
----- Message from Simone Taylor sent at 6/16/2020 12:16 PM EDT -----  Regarding: pantoprazole (PROTONIX) 40 MG EC tablet [[id number]  Contact: [phone]  Pt says the pharmacy has not received this prescription.    [id number] [place], [place] - [facility] [address] AT [facility] [address]  - [phone]  - [phone] FX Phone:  [phone]

## 2020-06-16 NOTE — TELEPHONE ENCOUNTER
See rx of 6/3.     Call to [facility] @ [phone] and spoke with Pharmacist.  Pt picked up #60 on 6/6 - paid $20.      Call to pt.  Advise of above.  States needs mesalamine refilled - has been out x 4 days.     Esribe completed for lialda 1.2 g - take 4 tabs by mouth daily with breakfast, #120, R1.

## 2020-07-16 ENCOUNTER — OFFICE VISIT (OUTPATIENT)
Dept: GASTROENTEROLOGY | Facility: CLINIC | Age: 75
End: 2020-07-16

## 2020-07-16 VITALS
DIASTOLIC BLOOD PRESSURE: 74 MMHG | HEIGHT: 62 IN | TEMPERATURE: 97 F | WEIGHT: 119.9 LBS | BODY MASS INDEX: 22.07 KG/M2 | SYSTOLIC BLOOD PRESSURE: 124 MMHG

## 2020-07-16 DIAGNOSIS — D50.0 IRON DEFICIENCY ANEMIA DUE TO CHRONIC BLOOD LOSS: ICD-10-CM

## 2020-07-16 DIAGNOSIS — R63.4 WEIGHT LOSS: ICD-10-CM

## 2020-07-16 DIAGNOSIS — K51.311 ULCERATIVE RECTOSIGMOIDITIS WITH RECTAL BLEEDING (HCC): Primary | Chronic | ICD-10-CM

## 2020-07-16 PROCEDURE — 99214 OFFICE O/P EST MOD 30 MIN: CPT | Performed by: INTERNAL MEDICINE

## 2020-07-16 NOTE — PROGRESS NOTES
Chief Complaint   Patient presents with   • Anemia   • Ulcerative Colitis     Subjective     HPI  Renee Stevenson is a 75 y.o. female who presents for follow-up of left-sided UC, iron deficiency anemia, GERD, weight loss.    February EGD with mild esophagitis, gastritis and duodenitis.    Feeling better.  Rare bright red blood.  She is still on the oral Lialda as well as the Canasa suppositories every night.  She is still taking an iron pill.    She denies any symptoms of fatigue    She is eating and drinking well and her weight is stable.    She denies any abdominal pain.  No fecal urgency.  No diarrhea.    She has not had any recent lab testing since    Today's visit was in the office.  Both the patient and I were wearing face masks and proper hand hygiene was performed before and after the physical exam.     Past Medical History:   Diagnosis Date   • Allergic rhinitis    • Broken heart syndrome    • Colitis    • Colon polyps    • Diabetes mellitus (CMS/[facility])     type 2   • Dizziness    • Encounter for breast cancer screening other than mammogram    • GERD (gastroesophageal reflux disease)    • GI (gastrointestinal bleed)    • Glaucoma    • History of transfusion    • Hyperlipidemia    • Hypertension    • Iron deficiency anemia due to chronic blood loss    • Knee fracture, left    • Non-STEMI (non-ST elevated myocardial infarction) (CMS/HCC) 6/16/2017   • Osteopenia    • Ulcerative colitis (CMS/[facility])        Social History     Socioeconomic History   • Marital status:      Spouse name: Eliazar*   • Number of children: 1   • Years of education: Not on file   • Highest education level: Not on file   Occupational History   • Occupation: Retired (retail)   Tobacco Use   • Smoking status: Never Smoker   • Smokeless tobacco: Never Used   Substance and Sexual Activity   • Alcohol use: No   • Drug use: No   • Sexual activity: Not Currently     Partners: Male   Lifestyle   • Physical activity:     Days per week: Not on file      Minutes per session: Not on file   • Stress: Only a little         Current Outpatient Medications:   •  ACCU-CHEK JUAN PLUS test strip, CHECK BLOOD SUGAR EVERY DAY, Disp: 100 each, Rfl: 11  •  Acetaminophen (TYLENOL PO), Take 2 tablets by mouth As Needed., Disp: , Rfl:   •  amLODIPine (NORVASC) 5 MG tablet, TAKE 1 TABLET BY MOUTH DAILY, Disp: 30 tablet, Rfl: 11  •  dorzolamide-timolol (COSOPT) 22.3-6.8 MG/ML ophthalmic solution, Apply 1 drop to eye 2 (two) times a day., Disp: , Rfl:   •  ferrous sulfate 325 (65 FE) MG tablet, Take 1 tablet by mouth Daily With Breakfast., Disp: 30 tablet, Rfl: 0  •  hydrocortisone (ANUSOL-HC) 2.5 % rectal cream, Insert  into the rectum 2 (Two) Times a Day. (Patient taking differently: Insert  into the rectum 2 (Two) Times a Day. prn), Disp: 28 g, Rfl: 0  •  lisinopril (PRINIVIL,ZESTRIL) 20 MG tablet, TAKE 1 TABLET BY MOUTH EVERY DAY, Disp: 90 tablet, Rfl: 3  •  mesalamine (CANASA) 1000 MG suppository, Insert 28 mg into the rectum Every Night. 28/60 ml insert 1 enema into rectum every evening, Disp: , Rfl:   •  mesalamine (LIALDA) 1.2 g EC tablet, Take 4 tablets by mouth Daily With Breakfast., Disp: 120 tablet, Rfl: 1  •  metFORMIN ER (GLUCOPHAGE-XR) 500 MG 24 hr tablet, TAKE 2 TABLETS BY MOUTH TWICE DAILY, Disp: 360 tablet, Rfl: 1  •  metoprolol tartrate (LOPRESSOR) 50 MG tablet, Take 1 tablet by mouth Every 12 (Twelve) Hours., Disp: 180 tablet, Rfl: 3  •  pantoprazole (PROTONIX) 40 MG EC tablet, Take 1 tablet by mouth Every Morning Before Breakfast., Disp: 30 tablet, Rfl: 1  •  rosuvastatin (CRESTOR) 10 MG tablet, TAKE 1 TABLET BY MOUTH DAILY, Disp: 30 tablet, Rfl: 5  •  ROSUVASTATIN CALCIUM PO, Take 10 mg by mouth Daily., Disp: , Rfl:     Review of Systems   Constitutional: Negative for activity change, appetite change and fatigue.   HENT: Negative for sore throat and trouble swallowing.    Respiratory: Negative.    Cardiovascular: Negative.    Gastrointestinal: Negative for  "abdominal distention, abdominal pain,  and blood in stool.   Endocrine: Negative for cold intolerance and heat intolerance.   Genitourinary: Negative for difficulty urinating, dysuria and frequency.   Musculoskeletal: Negative for arthralgias, back pain and myalgias.   Skin: Negative.    Hematological: Negative for adenopathy. Does not bruise/bleed easily.   All other systems reviewed and are negative.    Objective   Vitals:    07/16/20 1144   BP: 124/74   Temp: 97 °F (36.1 °C)         07/16/20  1144   Weight: 54.4 kg (119 lb 14.4 oz)     Body mass index is 21.93 kg/m².          /74   Temp 97 °F (36.1 °C)   Ht 157.5 cm (62\")   Wt 54.4 kg (119 lb 14.4 oz)   BMI 21.93 kg/m²     General Appearance:  Alert, cooperative, no distress, appears stated age   Head:  Normocephalic, without obvious abnormality, atraumatic   Eyes:  PERRL, conjunctiva/corneas clear, EOM's intact   Ears:  Normal external appearance of ear, hearing intact   Nose: Nares normal,mucosa normal, no drainage or sinus tenderness   Throat: Lips, mucosa, and tongue normal; teeth and gums normal   Neck: Supple, symmetrical, trachea midline, no adenopathy;  thyroid: not enlarged, symmetric, no tenderness/mass/nodule   Back:   Symmetric, no curvature, ROM normal   Lungs:   Clear to auscultation bilaterally, respirations unlabored, effort normal   Heart:  Regular rate and rhythm, S1 and S2 normal, no murmur, rub, or gallop, no lower extremity edema   Abdomen:   Soft, non-tender, bowel sounds active all four quadrants,  no masses, no organomegaly   Rectal: Deferred   Musculoskeletal: Normal gait, normal station, no atrophy of extremities, normal strength and tone   Skin: Normal color, texture, and turgor, no rashes or lesions   Lymph nodes: Cervical and supraclavicular nodes normal   Psychiatric: Alert and oriented x 3, normal mood and affect,  normal recent and remote memory, normal judgment and insight         WBC   Date Value Ref Range Status "   12/04/2019 8.28 3.40 - 10.80 10*3/mm3 Final     RBC   Date Value Ref Range Status   12/04/2019 3.54 (L) 3.77 - 5.28 10*6/mm3 Final     Hemoglobin   Date Value Ref Range Status   02/10/2020 11.5 (L) 12.0 - 15.9 g/dL Final     Hematocrit   Date Value Ref Range Status   02/10/2020 34.4 34.0 - 46.6 % Final     MCV   Date Value Ref Range Status   12/04/2019 88.1 79.0 - 97.0 fL Final   07/24/2019 97.2 (H) 79.0 - 97.0 fL Final     MCH   Date Value Ref Range Status   12/04/2019 26.6 26.6 - 33.0 pg Final   07/24/2019 29.2 26.6 - 33.0 pg Final     MCHC   Date Value Ref Range Status   12/04/2019 30.1 (L) 31.5 - 35.7 g/dL Final   07/24/2019 30.0 (L) 31.5 - 35.7 g/dL Final     RDW   Date Value Ref Range Status   12/04/2019 13.2 12.3 - 15.4 % Final   07/24/2019 14.5 12.3 - 15.4 % Final     RDW-SD   Date Value Ref Range Status   07/24/2019 51.3 37.0 - 54.0 fl Final     MPV   Date Value Ref Range Status   07/24/2019 9.7 6.0 - 12.0 fL Final     Platelets   Date Value Ref Range Status   12/04/2019 278 140 - 450 10*3/mm3 Final   07/24/2019 266 140 - 450 10*3/mm3 Final     Neutrophil Rel %   Date Value Ref Range Status   12/04/2019 76.4 (H) 42.7 - 76.0 % Final     Neutrophil %   Date Value Ref Range Status   07/24/2019 53.6 42.7 - 76.0 % Final     Lymphocyte Rel %   Date Value Ref Range Status   12/04/2019 17.0 (L) 19.6 - 45.3 % Final     Lymphocyte %   Date Value Ref Range Status   07/24/2019 35.8 19.6 - 45.3 % Final     Monocyte Rel %   Date Value Ref Range Status   12/04/2019 6.0 5.0 - 12.0 % Final     Monocyte %   Date Value Ref Range Status   07/24/2019 9.0 5.0 - 12.0 % Final     Eosinophil Rel %   Date Value Ref Range Status   12/04/2019 0.0 (L) 0.3 - 6.2 % Final     Eosinophil %   Date Value Ref Range Status   07/24/2019 0.0 (L) 0.3 - 6.2 % Final     Basophil Rel %   Date Value Ref Range Status   12/04/2019 0.4 0.0 - 1.5 % Final     Basophil %   Date Value Ref Range Status   07/24/2019 0.5 0.0 - 1.5 % Final     Immature Grans  %   Date Value Ref Range Status   07/24/2019 1.1 (H) 0.0 - 0.5 % Final     Neutrophils Absolute   Date Value Ref Range Status   12/04/2019 6.32 1.70 - 7.00 10*3/mm3 Final     Neutrophils, Absolute   Date Value Ref Range Status   07/24/2019 3.32 1.70 - 7.00 10*3/mm3 Final     Lymphocytes Absolute   Date Value Ref Range Status   12/04/2019 1.41 0.70 - 3.10 10*3/mm3 Final     Lymphocytes, Absolute   Date Value Ref Range Status   07/24/2019 2.22 0.70 - 3.10 10*3/mm3 Final     Monocytes Absolute   Date Value Ref Range Status   12/04/2019 0.50 0.10 - 0.90 10*3/mm3 Final     Monocytes, Absolute   Date Value Ref Range Status   07/24/2019 0.56 0.10 - 0.90 10*3/mm3 Final     Eosinophils Absolute   Date Value Ref Range Status   12/04/2019 0.00 0.00 - 0.40 10*3/mm3 Final     Eosinophils, Absolute   Date Value Ref Range Status   07/24/2019 0.00 0.00 - 0.40 10*3/mm3 Final     Basophils Absolute   Date Value Ref Range Status   12/04/2019 0.03 0.00 - 0.20 10*3/mm3 Final     Basophils, Absolute   Date Value Ref Range Status   07/24/2019 0.03 0.00 - 0.20 10*3/mm3 Final     Immature Grans, Absolute   Date Value Ref Range Status   07/24/2019 0.07 (H) 0.00 - 0.05 10*3/mm3 Final     nRBC   Date Value Ref Range Status   12/04/2019 0.0 0.0 - 0.2 /100 WBC Final   07/24/2019 0.0 0.0 - 0.2 /100 WBC Final       Lab Results   Component Value Date    GLUCOSE 161 (H) 12/06/2019    BUN 12 03/16/2020    CREATININE 0.68 03/16/2020    EGFRIFNONA 84 03/16/2020    EGFRIFAFRI 102 03/16/2020    BCR 17.6 03/16/2020    CO2 25.9 03/16/2020    CALCIUM 9.0 03/16/2020    PROTENTOTREF 7.1 03/16/2020    ALBUMIN 4.40 03/16/2020    LABIL2 1.6 03/16/2020    AST 13 03/16/2020    ALT 13 03/16/2020         Imaging Results (Last 7 Days)     ** No results found for the last 168 hours. **            Assessment/Plan    1.  UC with rectal bleeding: She has done better recently, bleeding persists but not as severe as previous.  Remains on Lialda, Canasa    2.  Iron  deficiency anemia: This has been severe in the past and has required IV iron infusions.  I suspect secondary to blood loss from the above.  She has had EGD and colonoscopy work-up and I suspect this is due to ongoing inflammation from the left-sided UC.  She is not currently complaining of any symptoms    3.  Weight loss: Her weight has remained stable most of this year    Plan  Continue the mesalamine  I think it is okay to stop the Canasa suppositories for now.  However, if she develops more frequent rectal bleeding or fecal urgency then I advised that she get back on these medications  We will update labs with iron studies, CBC, CMP and vitamin D levels  After I get her labs back, I will determine if it is okay for her to hold the oral iron pills  Renee was seen today for anemia and ulcerative colitis.    Diagnoses and all orders for this visit:    Ulcerative rectosigmoiditis with rectal bleeding (CMS/HCC)  -     Ferritin; Future  -     Iron Profile; Future  -     CBC & Differential; Future  -     Comprehensive Metabolic Panel; Future  -     Vitamin D 25 Hydroxy; Future    Iron deficiency anemia due to chronic blood loss  -     Ferritin; Future  -     Iron Profile; Future  -     CBC & Differential; Future  -     Comprehensive Metabolic Panel; Future  -     Vitamin D 25 Hydroxy; Future    Weight loss        Dictated utilizing Dragon dictation

## 2020-07-20 ENCOUNTER — RESULTS ENCOUNTER (OUTPATIENT)
Dept: GASTROENTEROLOGY | Facility: CLINIC | Age: 75
End: 2020-07-20

## 2020-07-20 DIAGNOSIS — K51.311 ULCERATIVE RECTOSIGMOIDITIS WITH RECTAL BLEEDING (HCC): Chronic | ICD-10-CM

## 2020-07-20 DIAGNOSIS — D50.0 IRON DEFICIENCY ANEMIA DUE TO CHRONIC BLOOD LOSS: ICD-10-CM

## 2020-07-30 LAB
25(OH)D3+25(OH)D2 SERPL-MCNC: 32.9 NG/ML (ref 30–100)
ALBUMIN SERPL-MCNC: 4.6 G/DL (ref 3.5–5.2)
ALBUMIN/GLOB SERPL: 1.9 G/DL
ALP SERPL-CCNC: 45 U/L (ref 39–117)
ALT SERPL-CCNC: 11 U/L (ref 1–33)
AST SERPL-CCNC: 14 U/L (ref 1–32)
BASOPHILS # BLD AUTO: 0.02 10*3/MM3 (ref 0–0.2)
BASOPHILS NFR BLD AUTO: 0.4 % (ref 0–1.5)
BILIRUB SERPL-MCNC: 0.5 MG/DL (ref 0–1.2)
BUN SERPL-MCNC: 14 MG/DL (ref 8–23)
BUN/CREAT SERPL: 16.1 (ref 7–25)
CALCIUM SERPL-MCNC: 8.7 MG/DL (ref 8.6–10.5)
CHLORIDE SERPL-SCNC: 103 MMOL/L (ref 98–107)
CO2 SERPL-SCNC: 23.5 MMOL/L (ref 22–29)
CREAT SERPL-MCNC: 0.87 MG/DL (ref 0.57–1)
EOSINOPHIL # BLD AUTO: 0 10*3/MM3 (ref 0–0.4)
EOSINOPHIL NFR BLD AUTO: 0 % (ref 0.3–6.2)
ERYTHROCYTE [DISTWIDTH] IN BLOOD BY AUTOMATED COUNT: 12.8 % (ref 12.3–15.4)
FERRITIN SERPL-MCNC: 158 NG/ML (ref 13–150)
GLOBULIN SER CALC-MCNC: 2.4 GM/DL
GLUCOSE SERPL-MCNC: 135 MG/DL (ref 65–99)
HCT VFR BLD AUTO: 32.1 % (ref 34–46.6)
HGB BLD-MCNC: 11 G/DL (ref 12–15.9)
IMM GRANULOCYTES # BLD AUTO: 0.02 10*3/MM3 (ref 0–0.05)
IMM GRANULOCYTES NFR BLD AUTO: 0.4 % (ref 0–0.5)
IRON SATN MFR SERPL: 16 % (ref 20–50)
IRON SERPL-MCNC: 56 MCG/DL (ref 37–145)
LYMPHOCYTES # BLD AUTO: 1.77 10*3/MM3 (ref 0.7–3.1)
LYMPHOCYTES NFR BLD AUTO: 35 % (ref 19.6–45.3)
MCH RBC QN AUTO: 32.6 PG (ref 26.6–33)
MCHC RBC AUTO-ENTMCNC: 34.3 G/DL (ref 31.5–35.7)
MCV RBC AUTO: 95.3 FL (ref 79–97)
MONOCYTES # BLD AUTO: 0.42 10*3/MM3 (ref 0.1–0.9)
MONOCYTES NFR BLD AUTO: 8.3 % (ref 5–12)
NEUTROPHILS # BLD AUTO: 2.82 10*3/MM3 (ref 1.7–7)
NEUTROPHILS NFR BLD AUTO: 55.9 % (ref 42.7–76)
NRBC BLD AUTO-RTO: 0 /100 WBC (ref 0–0.2)
PLATELET # BLD AUTO: 173 10*3/MM3 (ref 140–450)
POTASSIUM SERPL-SCNC: 3.6 MMOL/L (ref 3.5–5.2)
PROT SERPL-MCNC: 7 G/DL (ref 6–8.5)
RBC # BLD AUTO: 3.37 10*6/MM3 (ref 3.77–5.28)
SODIUM SERPL-SCNC: 138 MMOL/L (ref 136–145)
TIBC SERPL-MCNC: 358 MCG/DL
UIBC SERPL-MCNC: 302 MCG/DL (ref 112–346)
WBC # BLD AUTO: 5.05 10*3/MM3 (ref 3.4–10.8)

## 2020-08-03 ENCOUNTER — TELEPHONE (OUTPATIENT)
Dept: GASTROENTEROLOGY | Facility: CLINIC | Age: 75
End: 2020-08-03

## 2020-08-03 NOTE — TELEPHONE ENCOUNTER
----- Message from Melissa Burleson MD sent at 7/31/2020  4:08 PM EDT -----  Please let her know that her blood count is stable at 11, her iron stores are very good at 158    Liver, kidney, and electrolyte labs are good    Vitamin D levels are normal

## 2020-08-13 DIAGNOSIS — K51.311 ULCERATIVE RECTOSIGMOIDITIS WITH RECTAL BLEEDING (HCC): Chronic | ICD-10-CM

## 2020-08-14 RX ORDER — PANTOPRAZOLE SODIUM 40 MG/1
40 TABLET, DELAYED RELEASE ORAL
Qty: 30 TABLET | Refills: 1 | Status: SHIPPED | OUTPATIENT
Start: 2020-08-14 | End: 2020-11-11 | Stop reason: SDUPTHER

## 2020-08-14 RX ORDER — MESALAMINE 1.2 G/1
4.8 TABLET, DELAYED RELEASE ORAL
Qty: 120 TABLET | Refills: 3 | Status: SHIPPED | OUTPATIENT
Start: 2020-08-14 | End: 2021-01-12 | Stop reason: SDUPTHER

## 2020-08-14 NOTE — TELEPHONE ENCOUNTER
Julio [facility] Clinical [address] [place]   Phone Number: [phone]             Pt still waiting on refill      Pantoprazole and mesalamine refilled. Called pt and advised of this pt verb undertstanding.

## 2020-08-17 ENCOUNTER — OFFICE VISIT (OUTPATIENT)
Dept: INTERNAL MEDICINE | Age: 75
End: 2020-08-17

## 2020-08-17 VITALS
OXYGEN SATURATION: 99 % | HEART RATE: 61 BPM | SYSTOLIC BLOOD PRESSURE: 123 MMHG | BODY MASS INDEX: 22.26 KG/M2 | DIASTOLIC BLOOD PRESSURE: 69 MMHG | WEIGHT: 121 LBS | TEMPERATURE: 96.8 F | HEIGHT: 62 IN

## 2020-08-17 DIAGNOSIS — Z11.59 NEED FOR HEPATITIS C SCREENING TEST: ICD-10-CM

## 2020-08-17 DIAGNOSIS — I10 ESSENTIAL HYPERTENSION: Chronic | ICD-10-CM

## 2020-08-17 DIAGNOSIS — Z78.0 POSTMENOPAUSAL STATE: ICD-10-CM

## 2020-08-17 DIAGNOSIS — Z13.820 ENCOUNTER FOR SCREENING FOR OSTEOPOROSIS: ICD-10-CM

## 2020-08-17 DIAGNOSIS — Z00.00 MEDICARE ANNUAL WELLNESS VISIT, SUBSEQUENT: ICD-10-CM

## 2020-08-17 DIAGNOSIS — E11.59 TYPE 2 DIABETES MELLITUS WITH OTHER CIRCULATORY COMPLICATION, WITHOUT LONG-TERM CURRENT USE OF INSULIN (HCC): Primary | Chronic | ICD-10-CM

## 2020-08-17 DIAGNOSIS — I25.10 CORONARY ARTERY DISEASE INVOLVING NATIVE CORONARY ARTERY OF NATIVE HEART WITHOUT ANGINA PECTORIS: Chronic | ICD-10-CM

## 2020-08-17 DIAGNOSIS — E78.2 MIXED HYPERLIPIDEMIA: Chronic | ICD-10-CM

## 2020-08-17 PROCEDURE — 99214 OFFICE O/P EST MOD 30 MIN: CPT | Performed by: INTERNAL MEDICINE

## 2020-08-17 PROCEDURE — G0439 PPPS, SUBSEQ VISIT: HCPCS | Performed by: INTERNAL MEDICINE

## 2020-08-17 NOTE — PROGRESS NOTES
"[facility] INTERNAL MEDICINE  ILA PATIÑO M.D.      Renee PARIKH From / 75 y.o. / female  08/17/2020      VITAL SIGNS     Visit Vitals  /69 Comment: home machine   Pulse 61   Temp 96.8 °F (36 °C)   Ht 157.5 cm (62\")   Wt 54.9 kg (121 lb)   SpO2 99%   BMI 22.13 kg/m²       BP Readings from Last 3 Encounters:   08/17/20 123/69   07/16/20 124/74   03/16/20 124/72     Wt Readings from Last 3 Encounters:   08/17/20 54.9 kg (121 lb)   07/16/20 54.4 kg (119 lb 14.4 oz)   03/16/20 54 kg (119 lb)     Body mass index is 22.13 kg/m².      MEDICATIONS     Current Outpatient Medications   Medication Sig Dispense Refill   • ACCU-CHEK JUAN PLUS test strip CHECK BLOOD SUGAR EVERY  each 11   • Acetaminophen (TYLENOL PO) Take 2 tablets by mouth As Needed.     • amLODIPine (NORVASC) 5 MG tablet TAKE 1 TABLET BY MOUTH DAILY 30 tablet 11   • dorzolamide-timolol (COSOPT) 22.3-6.8 MG/ML ophthalmic solution Apply 1 drop to eye 2 (two) times a day.     • ferrous sulfate 325 (65 FE) MG tablet Take 1 tablet by mouth Daily With Breakfast. 30 tablet 0   • hydrocortisone (ANUSOL-HC) 2.5 % rectal cream Insert  into the rectum 2 (Two) Times a Day. (Patient taking differently: Insert  into the rectum 2 (Two) Times a Day. prn) 28 g 0   • lisinopril (PRINIVIL,ZESTRIL) 20 MG tablet TAKE 1 TABLET BY MOUTH EVERY DAY 90 tablet 3   • mesalamine (CANASA) 1000 MG suppository Insert 28 mg into the rectum Every Night. 28/60 ml insert 1 enema into rectum every evening     • mesalamine (LIALDA) 1.2 g EC tablet Take 4 tablets by mouth Daily With Breakfast. 120 tablet 3   • metFORMIN ER (GLUCOPHAGE-XR) 500 MG 24 hr tablet TAKE 2 TABLETS BY MOUTH TWICE DAILY 360 tablet 1   • metoprolol tartrate (LOPRESSOR) 50 MG tablet Take 1 tablet by mouth Every 12 (Twelve) Hours. 180 tablet 3   • pantoprazole (PROTONIX) 40 MG EC tablet Take 1 tablet by mouth Every Morning Before Breakfast. 30 tablet 1   • rosuvastatin (CRESTOR) 10 MG tablet TAKE 1 TABLET BY MOUTH DAILY 30 " tablet 5     No current facility-administered medications for this visit.        CHIEF COMPLAINT     Diabetes and Hyperlipidemia      ASSESSMENT & PLAN     Problem List Items Addressed This Visit        High    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) - Primary (Chronic)    Overview     Stable. Continue metformin  mg 2 tabs BID.          Relevant Medications    ACCU-CHEK JUAN PLUS test strip    metFORMIN ER (GLUCOPHAGE-XR) 500 MG 24 hr tablet       Medium    Hypertension (Chronic)    Relevant Medications    amLODIPine (NORVASC) 5 MG tablet    metoprolol tartrate (LOPRESSOR) 50 MG tablet    lisinopril (PRINIVIL,ZESTRIL) 20 MG tablet    Hyperlipidemia (Chronic)    Overview     Continue rosuvastatin 10 mg daily.          Relevant Medications    rosuvastatin (CRESTOR) 10 MG tablet       Low    Coronary artery disease involving native coronary artery of native heart without angina pectoris (Chronic)    Overview     *[facility]    Stable. Continue statin, bblocker. Not on ASA due to recent LGIB         Relevant Medications    amLODIPine (NORVASC) 5 MG tablet    metoprolol tartrate (LOPRESSOR) 50 MG tablet        No orders of the defined types were placed in this encounter.    No orders of the defined types were placed in this encounter.      Summary/Discussion:  Advance Care Planning   • ACP discussion was held with the patient during this visit. Patient does not have an advance directive, information provided.      Next Appointment with me: Visit date not found    No follow-ups on file.    _____________________________________________________________________________________    HISTORY OF PRESENT ILLNESS     ***      Patient Care Team:  Esequiel Pacheco MD as PCP - General  Esequiel Pacheco MD as PCP - Claims Attributed  Ministerio Wells MD as Consulting Physician (Orthopedic Surgery)  Abhay Wooten MD (Inactive) as Consulting Physician (Cardiology)  Cisco Husain MD as Consulting  Physician (Otolaryngology)  EZIO Garibay MD as Consulting Physician (Ophthalmology)  Melissa Burleson MD as Consulting Physician (Gastroenterology)      REVIEW OF SYSTEMS     Review of Systems  ***      PHYSICAL EXAMINATION     Physical Exam  ***    REVIEWED DATA     Labs:     Lab Results   Component Value Date     07/30/2020    K 3.6 07/30/2020    CALCIUM 8.7 07/30/2020    AST 14 07/30/2020    ALT 11 07/30/2020    BUN 14 07/30/2020    CREATININE 0.87 07/30/2020    CREATININE 0.68 03/16/2020    CREATININE 0.71 12/06/2019    EGFRIFNONA 63 07/30/2020    EGFRIFAFRI 77 07/30/2020       Lab Results   Component Value Date    HGBA1C 6.70 (H) 03/16/2020    HGBA1C 5.50 05/16/2019    HGBA1C 6.30 (H) 07/30/2018     (H) 07/30/2020     (H) 03/16/2020     (H) 12/04/2019    MICROALBUR 20.9 05/16/2019       Lab Results   Component Value Date    LDL 50 03/16/2020    LDL 52 05/16/2019    LDL 61 02/19/2018    HDL 43 03/16/2020    HDL 42 05/16/2019    HDL 43 02/19/2018    TRIG 114 03/16/2020    TRIG 113 05/16/2019    TRIG 104 02/19/2018    CHOLHDLRATIO 2.70 03/16/2020    CHOLHDLRATIO 2.79 05/16/2019    CHOLHDLRATIO 2.91 02/19/2018       No results found for: TSH, FREET4    Lab Results   Component Value Date    WBC 5.05 07/30/2020    HGB 11.0 (L) 07/30/2020    HGB 11.5 (L) 02/10/2020    HGB 9.4 (L) 12/04/2019     07/30/2020       Lab Results   Component Value Date    PROTEIN See below: (A) 03/16/2020    GLUCOSEU Negative 03/16/2020    BLOODU Negative 03/16/2020    NITRITEU Positive (A) 03/16/2020    LEUKOCYTESUR Trace (A) 03/16/2020       Imaging:         Medical Tests:         Summary of old records / correspondence / consultant report:         Request outside records:         ALLERGIES  Allergies   Allergen Reactions   • Tetanus Toxoids Swelling     Hand and arm        PFSH:     The following portions of the patient's history were reviewed and updated as appropriate: Allergies / Current  Medications / Past Medical History / Surgical History / Social History / Family History    PROBLEM LIST   Patient Active Problem List   Diagnosis   • 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)   • Osteopenia   • Hypertension   • Hyperlipidemia   • Colon polyp   • Gastroesophageal reflux disease   • Glaucoma   • Coronary artery disease involving native coronary artery of native heart without angina pectoris   • Ulcerative colitis with rectal bleeding (CMS/HCC)   • Iron deficiency anemia secondary to inadequate dietary iron intake   • Iron deficiency anemia due to chronic blood loss   • Ulcerative rectosigmoiditis with rectal bleeding (CMS/HCC)       PAST MEDICAL HISTORY  Past Medical History:   Diagnosis Date   • Allergic rhinitis    • Broken heart syndrome    • Colitis    • Colon polyps    • Diabetes mellitus (CMS/HCC)     type 2   • Dizziness    • Encounter for breast cancer screening other than mammogram    • GERD (gastroesophageal reflux disease)    • GI (gastrointestinal bleed)    • Glaucoma    • History of transfusion    • Hyperlipidemia    • Hypertension    • Iron deficiency anemia due to chronic blood loss    • Knee fracture, left    • Non-STEMI (non-ST elevated myocardial infarction) (CMS/HCC) 6/16/2017   • Osteopenia    • Ulcerative colitis (CMS/HCC)        SURGICAL HISTORY  Past Surgical History:   Procedure Laterality Date   • CARDIAC CATHETERIZATION N/A 6/16/2017    Procedure: Left Heart Cath;  Surgeon: Abhay Wooten MD;  Location: [facility] CATH INVASIVE LOCATION;  Service:    • CARDIAC CATHETERIZATION N/A 6/16/2017    Procedure: Left ventriculography;  Surgeon: Abhay Wooten MD;  [facility];  Service:    • CARDIAC CATHETERIZATION N/A 6/16/2017    Procedure: Coronary angiography;  Surgeon: Abhay Wooten MD;  Location: [facility] CATH INVASIVE LOCATION;  Service:    • CATARACT EXTRACTION     • COLONOSCOPY  08/14/2018   • ENDOSCOPY N/A 2/10/2020    Procedure:  ESOPHAGOGASTRODUODENOSCOPY;  Surgeon: Melissa Burleson MD;  Location: [facility] ENDOSCOPY;  Service: Gastroenterology;  Laterality: N/A;  PRE- IRON DEFICIENCY ANEMIA  POST--SCHATZKY'S RING, GASTRITIS,DUODENITIS   • EYE SURGERY      cataract surgery   • PAP SMEAR     • SIGMOIDOSCOPY N/A 10/21/2019    EH, active ulcerative colitis, severe inflammation in rectum, TA       SOCIAL HISTORY  Social History     Socioeconomic History   • Marital status:      Spouse name: Eliazar*   • Number of children: 1   • Years of education: Not on file   • Highest education level: Not on file   Occupational History   • Occupation: Retired (retail)   Tobacco Use   • Smoking status: Never Smoker   • Smokeless tobacco: Never Used   Substance and Sexual Activity   • Alcohol use: No   • Drug use: No   • Sexual activity: Not Currently     Partners: Male   Lifestyle   • Physical activity:     Days per week: Not on file     Minutes per session: Not on file   • Stress: Only a little       FAMILY HISTORY  Family History   Problem Relation Age of Onset   • Heart disease Mother    • Hypertension Mother    • Diabetes Mother    • Heart disease Father    • Hypertension Father    • Heart disease Sister    • Heart disease Brother    • No Known Problems Maternal Grandmother    • No Known Problems Maternal Grandfather    • No Known Problems Paternal Grandmother    • No Known Problems Paternal Grandfather    • Crohn's disease Niece    • Colon cancer Neg Hx    • Breast cancer Neg Hx    • Dementia Neg Hx          **Dragon Disclaimer:   Much of this encounter note is an electronic transcription/translation of spoken language to printed text. The electronic translation of spoken language may permit erroneous, or at times, nonsensical words or phrases to be inadvertently transcribed. Although I have reviewed the note for such errors, some may still exist.     Template created by Zachery Pacheco MD

## 2020-08-17 NOTE — PATIENT INSTRUCTIONS
** IMPORTANT MESSAGE FROM DR. PATIÑO **    In our office, your satisfaction is VERY important to us.     You may receive a survey from [employer] by mail or E-mail for you to provide feedback about your visit. This information is invaluable for me to know what we can do to improve our services.     I ask that you please take a few minutes to complete the survey and let us know how we are doing in serving your needs. (You may receive the survey more than once for multiple visits)    Thank You !    Dr. Patiño & Staff    _________________________________________________________________________________________________________________________      ** ADDITIONAL INSTRUCTION / REMINDERS FROM DR. PATIÑO **      Medicare Wellness  Personal Prevention Plan of Service     Date of Office Visit:  2020  Encounter Provider:  Esequiel Patiño MD  Place of Service:  [facility] PRIMARY CARE  Patient Name: Renee PARIKH From  :  1945    As part of the Medicare Wellness portion of your visit today, we are providing you with this personalized preventive plan of services (PPPS). This plan is based upon recommendations of the [place] Preventive Services Task Force (USPSTF) and the Advisory Committee on Immunization Practices (ACIP).    This lists the preventive care services that should be considered, and provides dates of when you are due. Items listed as completed are up-to-date and do not require any further intervention.      Age-Appropriate Screening Schedule:  (Refer to the list below for future screening recommendations based on patient's age, sex and/or medical conditions. Orders for these recommended tests are listed in the plan section. The patient has been provided with a written plan)    Health Maintenance Topics  Health Maintenance   Topic Date Due   • ZOSTER VACCINE (3 of 3) 2017   • DXA SCAN  2020   • URINE MICROALBUMIN  2020   • INFLUENZA VACCINE  2020   • HEMOGLOBIN A1C   09/16/2020   • MEDICARE ANNUAL WELLNESS  10/16/2020   • DIABETIC EYE EXAM  11/14/2020   • LIPID PANEL  03/16/2021   • MAMMOGRAM  06/24/2021   • COLONOSCOPY  08/14/2021   • HEPATITIS C SCREENING  Completed   • Pneumococcal Vaccine Once at 65 Years Old  Completed   • TDAP/TD VACCINES  Discontinued       Health Maintenance Topics Due or Over-Due  Health Maintenance Due   Topic Date Due   • ZOSTER VACCINE (3 of 3) 04/12/2017   • DXA SCAN  03/02/2020   • URINE MICROALBUMIN  05/16/2020   • INFLUENZA VACCINE  08/01/2020         ADDITIONAL RESOURCES:    For excellent information on senior health and wellness refer to the [employer] [place] [employer] web-site at:    WWW.NIHSENIORHEALTH.GOV

## 2020-08-17 NOTE — PROGRESS NOTES
"08/17/2020      MEDICARE ANNUAL WELLNESS VISIT     Renee Stevenson is a 75 y.o. female who presents for Diabetes and Hyperlipidemia      Patient's general assessment of her health since a year ago:     - Compared to one year ago, she feels her physical health is about the same without significant change.    - Compared to one year ago, she feels her mental health is about the same without significant change.      HPI for other active medical problems:     DM 2: sugars are stable on metformin.   Lab Results   Component Value Date    HGBA1C 6.70 (H) 03/16/2020    HGBA1C 5.50 05/16/2019    HGBA1C 6.30 (H) 07/30/2018      Hypertension: home blood pressure is in good range < 130. On lisinopril and amlodipine.     Hyperlipidemia on statin without problems.   Lab Results   Component Value Date    LDL 50 03/16/2020    LDL 52 05/16/2019    LDL 61 02/19/2018    HDL 43 03/16/2020      CAD stable without angina.       * The required components of Health Risk Assessment (HRA) that were completed by the patient and/or my staff are contained within this note and in the scanned documents titled \"Health Risk Assessment\" within the media section of the patient's chart in [facility].       HISTORY    Recent Hospitalizations:    Recent hospitalization?:     If YES, location, date, and diagnoses:     · Location:   · Date:   · Principle Discharge Dx:   · Secondary Dx:       Patient Care Team:    Patient Care Team:  Esequiel Pacheco MD as PCP - General  Esequiel Pacheco MD as PCP - Claims Attributed  Ministerio Wells MD as Consulting Physician (Orthopedic Surgery)  Abhay Wooten MD (Inactive) as Consulting Physician (Cardiology)  Cisco Husain MD as Consulting Physician (Otolaryngology)  EZIO Garibay MD as Consulting Physician (Ophthalmology)  Melissa Burleson MD as Consulting Physician (Gastroenterology)      Allergies:  Tetanus toxoids    Medications:  Current Outpatient Medications   Medication Sig Dispense Refill   • ACCU-CHEK JUAN PLUS " test strip CHECK BLOOD SUGAR EVERY  each 11   • Acetaminophen (TYLENOL PO) Take 2 tablets by mouth As Needed.     • amLODIPine (NORVASC) 5 MG tablet TAKE 1 TABLET BY MOUTH DAILY 30 tablet 11   • dorzolamide-timolol (COSOPT) 22.3-6.8 MG/ML ophthalmic solution Apply 1 drop to eye 2 (two) times a day.     • ferrous sulfate 325 (65 FE) MG tablet Take 1 tablet by mouth Daily With Breakfast. 30 tablet 0   • hydrocortisone (ANUSOL-HC) 2.5 % rectal cream Insert  into the rectum 2 (Two) Times a Day. (Patient taking differently: Insert  into the rectum 2 (Two) Times a Day. prn) 28 g 0   • lisinopril (PRINIVIL,ZESTRIL) 20 MG tablet TAKE 1 TABLET BY MOUTH EVERY DAY 90 tablet 3   • mesalamine (CANASA) 1000 MG suppository Insert 28 mg into the rectum Every Night. 28/60 ml insert 1 enema into rectum every evening     • mesalamine (LIALDA) 1.2 g EC tablet Take 4 tablets by mouth Daily With Breakfast. 120 tablet 3   • metFORMIN ER (GLUCOPHAGE-XR) 500 MG 24 hr tablet TAKE 2 TABLETS BY MOUTH TWICE DAILY 360 tablet 1   • metoprolol tartrate (LOPRESSOR) 50 MG tablet Take 1 tablet by mouth Every 12 (Twelve) Hours. 180 tablet 3   • pantoprazole (PROTONIX) 40 MG EC tablet Take 1 tablet by mouth Every Morning Before Breakfast. 30 tablet 1   • rosuvastatin (CRESTOR) 10 MG tablet TAKE 1 TABLET BY MOUTH DAILY 30 tablet 5     No current facility-administered medications for this visit.         PFSH:     The following portions of the patient's history were reviewed and updated as appropriate: Allergies / Current Medications / Past Medical History / Surgical History / Social History / Family History    Problem List:  Patient Active Problem List   Diagnosis   • 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)   • Osteopenia   • Hypertension   • Hyperlipidemia   • Colon polyp   • Gastroesophageal reflux disease   • Glaucoma   • Coronary artery disease involving native coronary artery of native heart without angina pectoris   •  Ulcerative colitis with rectal bleeding (CMS/HCC)   • Iron deficiency anemia secondary to inadequate dietary iron intake   • Iron deficiency anemia due to chronic blood loss   • Ulcerative rectosigmoiditis with rectal bleeding (CMS/HCC)       Past Medical History:  Past Medical History:   Diagnosis Date   • Allergic rhinitis    • Broken heart syndrome    • Colitis    • Colon polyps    • Diabetes mellitus (CMS/HCC)     type 2   • Dizziness    • Encounter for breast cancer screening other than mammogram    • GERD (gastroesophageal reflux disease)    • GI (gastrointestinal bleed)    • Glaucoma    • History of transfusion    • Hyperlipidemia    • Hypertension    • Iron deficiency anemia due to chronic blood loss    • Knee fracture, left    • Non-STEMI (non-ST elevated myocardial infarction) (CMS/HCC) 6/16/2017   • Osteopenia    • Ulcerative colitis (CMS/HCC)        Past Surgical History:  Past Surgical History:   Procedure Laterality Date   • CARDIAC CATHETERIZATION N/A 6/16/2017    Procedure: Left Heart Cath;  Surgeon: Abhay Wooten MD;  Location: [facility] CATH INVASIVE LOCATION;  Service:    • CARDIAC CATHETERIZATION N/A 6/16/2017    Procedure: Left ventriculography;  Surgeon: Abhay Wooten MD;  Location: [facility] CATH INVASIVE LOCATION;  Service:    • CARDIAC CATHETERIZATION N/A 6/16/2017    Procedure: Coronary angiography;  Surgeon: Abhay Wooten MD;  Location: [facility] CATH INVASIVE LOCATION;  Service:    • CATARACT EXTRACTION     • COLONOSCOPY  08/14/2018   • ENDOSCOPY N/A 2/10/2020    Procedure: ESOPHAGOGASTRODUODENOSCOPY;  Surgeon: Melissa Burleson MD;  Location: [facility] ENDOSCOPY;  Service: Gastroenterology;  Laterality: N/A;  PRE- IRON DEFICIENCY ANEMIA  POST--SCHATZKY'S RING, GASTRITIS,DUODENITIS   • EYE SURGERY      cataract surgery   • PAP SMEAR     • SIGMOIDOSCOPY N/A 10/21/2019    EH, active ulcerative colitis, severe inflammation in rectum, TA       Social History:  Social History     Socioeconomic History   • Marital  "status:      Spouse name: Eliazar*   • Number of children: 1   • Years of education: Not on file   • Highest education level: Not on file   Occupational History   • Occupation: Retired (retail)   Tobacco Use   • Smoking status: Never Smoker   • Smokeless tobacco: Never Used   Substance and Sexual Activity   • Alcohol use: No   • Drug use: No   • Sexual activity: Not Currently     Partners: Male   Lifestyle   • Physical activity:     Days per week: Not on file     Minutes per session: Not on file   • Stress: Only a little       Family History:  Family History   Problem Relation Age of Onset   • Heart disease Mother    • Hypertension Mother    • Diabetes Mother    • Heart disease Father    • Hypertension Father    • Heart disease Sister    • Heart disease Brother    • No Known Problems Maternal Grandmother    • No Known Problems Maternal Grandfather    • No Known Problems Paternal Grandmother    • No Known Problems Paternal Grandfather    • Crohn's disease Niece    • Colon cancer Neg Hx    • Breast cancer Neg Hx    • Dementia Neg Hx          PATIENT ASSESSMENT    Vitals:  /69 Comment: home machine  Pulse 61   Temp 96.8 °F (36 °C)   Ht 157.5 cm (62\")   Wt 54.9 kg (121 lb)   SpO2 99%   BMI 22.13 kg/m²   BP Readings from Last 3 Encounters:   08/17/20 123/69   07/16/20 124/74   03/16/20 124/72     Wt Readings from Last 3 Encounters:   08/17/20 54.9 kg (121 lb)   07/16/20 54.4 kg (119 lb 14.4 oz)   03/16/20 54 kg (119 lb)      Body mass index is 22.13 kg/m².    There were no vitals filed for this visit.      Review of Systems:    Review of Systems  Constitutional neg except per HPI   Resp neg  CV neg   GI neg  Neuro neg  Psych neg     Physical Exam:    Physical Exam  Constitutional  No distress  Cardiovascular Rate  normal . Rhythm: regular . Heart sounds:  normal  Psychiatric  Alert. Judgment and thought content normal. Mood normal   Pulmonary/Chest: Effort normal and breath sounds normal.      Reviewed " Data:    Labs:   Lab Results   Component Value Date     07/30/2020    K 3.6 07/30/2020    CALCIUM 8.7 07/30/2020    AST 14 07/30/2020    ALT 11 07/30/2020    BUN 14 07/30/2020    CREATININE 0.87 07/30/2020    CREATININE 0.68 03/16/2020    CREATININE 0.71 12/06/2019    EGFRIFNONA 63 07/30/2020    EGFRIFAFRI 77 07/30/2020       Lab Results   Component Value Date     (H) 07/30/2020     (H) 03/16/2020     (H) 12/04/2019    HGBA1C 6.70 (H) 03/16/2020    HGBA1C 5.50 05/16/2019    HGBA1C 6.30 (H) 07/30/2018    MICROALBUR 20.9 05/16/2019       Lab Results   Component Value Date    LDL 50 03/16/2020    LDL 52 05/16/2019    LDL 61 02/19/2018    HDL 43 03/16/2020    TRIG 114 03/16/2020    CHOLHDLRATIO 2.70 03/16/2020       No results found for: TSH, FREET4       Lab Results   Component Value Date    WBC 5.05 07/30/2020    HGB 11.0 (L) 07/30/2020    HGB 11.5 (L) 02/10/2020    HGB 9.4 (L) 12/04/2019     07/30/2020                 No results found for: PSA    Imaging:          Medical Tests:          Screening for Glaucoma:  Previous screening for glaucoma?: Yes      Hearing Loss Screen:  Finger Rub Hearing Test (right ear): passed  Finger Rub Hearing Test (left ear): passed      Urinary Incontinence Screen:  Episodes of urinary incontinence? : No      Depression Screen:  PHQ-2/PHQ-9 Depression Screening 8/17/2020   Little interest or pleasure in doing things 0   Feeling down, depressed, or hopeless 0   Total Score 0        PHQ-2: 0 (Not depressed)    PHQ-9: 0 (Negative screening for depression)       FUNCTIONAL, FALL RISK, & COGNITIVE SCREENING (Components below):    DATA:    Functional & Cognitive Status 8/17/2020   Do you have difficulty preparing food and eating? No   Do you have difficulty bathing yourself, getting dressed or grooming yourself? No   Do you have difficulty using the toilet? No   Do you have difficulty moving around from place to place? No   Do you have trouble with steps or  getting out of a bed or a chair? No   Current Diet Diabetic Diet   Dental Exam Up to date   Eye Exam Up to date   Exercise (times per week) 7 times per week   Current Exercise Activities Include Walking   Do you need help using the phone?  No   Are you deaf or do you have serious difficulty hearing?  No   Do you need help with transportation? No   Do you need help shopping? No   Do you need help preparing meals?  No   Do you need help with housework?  No   Do you need help with laundry? No   Do you need help taking your medications? No   Do you need help managing money? No   Do you ever drive or ride in a car without wearing a seat belt? No   Have you felt unusual stress, anger or loneliness in the last month? No   Who do you live with? Spouse   If you need help, do you have trouble finding someone available to you? No   Have you been bothered in the last four weeks by sexual problems? No   Do you have difficulty concentrating, remembering or making decisions? No         A) Assessment of Functional Ability:  (Assessment of ability to perform ADL's (showering/bathing, using toilet, dressing, feeding self, moving self around) and IADL's (use telephone, shop, prepare food, housekeep, do laundry, transport independently, take medications independently, and handle finances)    Degree of functional impairment: NONE (based on assessment noted above)      B) Assessment of Fall Risk:  Fall Risk Assessment was completed, and patient is at low risk for falls.       Need for further evaluation of gait, strength, and balance? : No    Timed Up and Go (TUG):   (>= 12 seconds indicates high risk for falling)    Observable abnormalities included: Normal gait pattern       C. Assessment of Cognitive Function:    Mini-Cog Test:     1) Registration (3 objects): Yes   2) Number of objects recalled: 3   3) Clock Draw: Passed? : N/A       Further evaluation required? : No      COUNSELING    A. Identification of Health Risk  Factors:    Risk factors include: cardiovascular risk factors      B. Age-Appropriate Screening Schedule:  (Refer to the list below for future screening recommendations based on patient's age, sex and/or medical conditions. Orders for these recommended tests are listed in the plan section. The patient has been provided with a written plan)    Health Maintenance Topics  Health Maintenance   Topic Date Due   • ZOSTER VACCINE (3 of 3) 04/12/2017   • DXA SCAN  03/02/2020   • URINE MICROALBUMIN  05/16/2020   • INFLUENZA VACCINE  08/01/2020   • HEMOGLOBIN A1C  09/16/2020   • DIABETIC EYE EXAM  11/14/2020   • LIPID PANEL  03/16/2021   • MAMMOGRAM  06/24/2021   • COLONOSCOPY  08/14/2021   • TDAP/TD VACCINES  Discontinued       Health Maintenance Topics Due or Over-Due  Health Maintenance Due   Topic Date Due   • ZOSTER VACCINE (3 of 3) 04/12/2017   • DXA SCAN  03/02/2020   • URINE MICROALBUMIN  05/16/2020   • INFLUENZA VACCINE  08/01/2020         C. Advanced Care Planning:    Advance Care Planning   ACP discussion was held with the patient during this visit. Patient does not have an advance directive, information provided.       D. Patient Self-Management and Personalized Health Advice:    She has been provided with personalized counseling/information (including brochures/handouts) about:     -- reducing risk for cardiac/vascular events with pre-existing disease, designing advance directives and designating POA      She has been recommended for the following preventative services which has been performed today, will be ordered today or ordered/performed on upcoming follow-up visit:     -- fall risk assessment / plan of care completed, urinary incontinence assessment done, screening for osteoporosis (DEXA ordered), vaccination for influenza administered/recommended, vaccination for Shingrix administered  (or recommended at pharmacy), screening for hep c ordered or will be check with next lab      E. Miscellaneous  Items:    -Aspirin use counseling: Taking ASA appropriately as indicated    -Discussed BMI with her. The BMI is in the acceptable range    -Reviewed use of high risk medication in the elderly: YES    -Reviewed for potential of harmful drug interactions in the elderly: YES      IV. WRAP-UP    Assessment & Plan:    1) MEDICARE ANNUAL WELLNESS VISIT    2) OTHER MEDICAL CONDITIONS ADDRESSED TODAY:            Problem List Items Addressed This Visit        High    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) - Primary (Chronic)    Overview     Stable. Continue metformin  mg 2 tabs BID.          Relevant Medications    ACCU-CHEK JUAN PLUS test strip    metFORMIN ER (GLUCOPHAGE-XR) 500 MG 24 hr tablet    Other Relevant Orders    Hemoglobin A1c    Microalbumin / Creatinine Urine Ratio - Urine, Clean Catch       Medium    Hypertension (Chronic)    Current Assessment & Plan     Home blood pressure is in good range. Continue amlodipine 5 mg and lisinopril 20 mg qd.          Relevant Medications    amLODIPine (NORVASC) 5 MG tablet    metoprolol tartrate (LOPRESSOR) 50 MG tablet    lisinopril (PRINIVIL,ZESTRIL) 20 MG tablet    Hyperlipidemia (Chronic)    Overview     Continue rosuvastatin 10 mg daily.          Relevant Medications    rosuvastatin (CRESTOR) 10 MG tablet       Low    Coronary artery disease involving native coronary artery of native heart without angina pectoris (Chronic)    Overview     *[facility]    Stable. Continue statin, bblocker. Not on ASA due to recent LGIB         Relevant Medications    amLODIPine (NORVASC) 5 MG tablet    metoprolol tartrate (LOPRESSOR) 50 MG tablet      Other Visit Diagnoses     Medicare annual wellness visit, subsequent        Need for hepatitis C screening test        Relevant Orders    Hepatitis C Antibody    Encounter for screening for osteoporosis        Relevant Orders    DEXA Bone Density Axial    Postmenopausal state        Relevant Orders     DEXA Bone Density Axial                    Orders Placed This Encounter   Procedures   • DEXA Bone Density Axial   • Hemoglobin A1c   • Hepatitis C Antibody   • Microalbumin / Creatinine Urine Ratio - Urine, Clean Catch       Discussion / Summary:        Medications as of TODAY:              Current Outpatient Medications   Medication Sig Dispense Refill   • ACCU-CHEK JUAN PLUS test strip CHECK BLOOD SUGAR EVERY  each 11   • Acetaminophen (TYLENOL PO) Take 2 tablets by mouth As Needed.     • amLODIPine (NORVASC) 5 MG tablet TAKE 1 TABLET BY MOUTH DAILY 30 tablet 11   • dorzolamide-timolol (COSOPT) 22.3-6.8 MG/ML ophthalmic solution Apply 1 drop to eye 2 (two) times a day.     • ferrous sulfate 325 (65 FE) MG tablet Take 1 tablet by mouth Daily With Breakfast. 30 tablet 0   • hydrocortisone (ANUSOL-HC) 2.5 % rectal cream Insert  into the rectum 2 (Two) Times a Day. (Patient taking differently: Insert  into the rectum 2 (Two) Times a Day. prn) 28 g 0   • lisinopril (PRINIVIL,ZESTRIL) 20 MG tablet TAKE 1 TABLET BY MOUTH EVERY DAY 90 tablet 3   • mesalamine (CANASA) 1000 MG suppository Insert 28 mg into the rectum Every Night. 28/60 ml insert 1 enema into rectum every evening     • mesalamine (LIALDA) 1.2 g EC tablet Take 4 tablets by mouth Daily With Breakfast. 120 tablet 3   • metFORMIN ER (GLUCOPHAGE-XR) 500 MG 24 hr tablet TAKE 2 TABLETS BY MOUTH TWICE DAILY 360 tablet 1   • metoprolol tartrate (LOPRESSOR) 50 MG tablet Take 1 tablet by mouth Every 12 (Twelve) Hours. 180 tablet 3   • pantoprazole (PROTONIX) 40 MG EC tablet Take 1 tablet by mouth Every Morning Before Breakfast. 30 tablet 1   • rosuvastatin (CRESTOR) 10 MG tablet TAKE 1 TABLET BY MOUTH DAILY 30 tablet 5     No current facility-administered medications for this visit.          FOLLOW-UP:            Return in about 6 months (around 2/17/2021) for Reassess chronic medical problems.              Future Appointments   Date Time Provider Department  [place]   11/17/2020  1:30 PM Melissa Burleson MD [facility] None   2/15/2021  1:30 PM Darling Espinosa MD [facility] None   2/17/2021  9:45 AM Esequiel Pacheco MD [facility] None         ______________________________________________________________________      A printed After Visit Summary (AVS) including the Personalized Prevention  Plan Services (PPPS) was given to the patient at check-out today.     Educational Handouts    Strong & Stable / Balance / Physical Activity / Pain / Depression /  Forgetfulness / Healthy Eating / Alcohol / Smoking /  Medicine / Sexuality / Scams     **Dragon Disclaimer:   Much of this encounter note is an electronic transcription/translation of spoken language to printed text. The electronic translation of spoken language may permit erroneous, or at times, nonsensical words or phrases to be inadvertently transcribed. Although I have reviewed the note for such errors, some may still exist.     This template was created by Zachery Pacheco MD.

## 2020-08-18 LAB
ALBUMIN/CREAT UR: 51 MG/G CREAT (ref 0–29)
CREAT UR-MCNC: 79 MG/DL
HBA1C MFR BLD: 6 % (ref 4.8–5.6)
HCV AB S/CO SERPL IA: <0.1 S/CO RATIO (ref 0–0.9)
MICROALBUMIN UR-MCNC: 40.4 UG/ML

## 2020-08-18 NOTE — PROGRESS NOTES
Carlot:    Renee, here are the result(s) of your test(s):     1. A1c for average glucose level is improved.   2. Microalbumin (protein) present in urine (due to diabetes and hypertension).   3. Hepatitis C screen is negative.     Please do not hesitate to contact me if you have questions.

## 2020-08-21 DIAGNOSIS — E11.9 TYPE 2 DIABETES MELLITUS WITHOUT COMPLICATION (HCC): Chronic | ICD-10-CM

## 2020-08-21 RX ORDER — METFORMIN HYDROCHLORIDE 500 MG/1
TABLET, EXTENDED RELEASE ORAL
Qty: 124 TABLET | Refills: 3 | Status: SHIPPED | OUTPATIENT
Start: 2020-08-21 | End: 2020-09-15

## 2020-09-08 ENCOUNTER — E-VISIT (OUTPATIENT)
Dept: INTERNAL MEDICINE | Age: 75
End: 2020-09-08

## 2020-09-08 DIAGNOSIS — R30.0 DYSURIA: Primary | ICD-10-CM

## 2020-09-08 PROCEDURE — 99421 OL DIG E/M SVC 5-10 MIN: CPT | Performed by: INTERNAL MEDICINE

## 2020-09-08 RX ORDER — CEPHALEXIN 250 MG/1
250 CAPSULE ORAL 2 TIMES DAILY
Qty: 6 CAPSULE | Refills: 0 | Status: SHIPPED | OUTPATIENT
Start: 2020-09-08 | End: 2020-09-11

## 2020-09-08 NOTE — PROGRESS NOTES
[facility]  ILA PATIÑO M.D.      Renee PARIKH From / 75 y.o. / female  09/08/2020      CHIEF COMPLAINT     E-VISIT:  Dysuria     ASSESSMENT & PLAN     1. Dysuria      No orders of the defined types were placed in this encounter.    New Medications Ordered This Visit   Medications   • cephalexin (Keflex) 250 MG capsule     Sig: Take 1 capsule by mouth 2 (Two) Times a Day for 3 days.     Dispense:  6 capsule     Refill:  0       Summary/Discussion:  Probable UTI. Keflex BID x 3 days.     Time spent: 5 minutes      HISTORY OF PRESENT ILLNESS      Refer to Questionnaires section of the E-Visit      REVIEW OF SYSTEMS     Refer to the Questionnaires section of the E-Visit       ALLERGIES  Allergies   Allergen Reactions   • Tetanus Toxoids Swelling     Hand and arm        MEDICATIONS  Current Outpatient Medications   Medication Sig Dispense Refill   • ACCU-CHEK JUAN PLUS test strip CHECK BLOOD SUGAR EVERY  each 11   • Acetaminophen (TYLENOL PO) Take 2 tablets by mouth As Needed.     • amLODIPine (NORVASC) 5 MG tablet TAKE 1 TABLET BY MOUTH DAILY 30 tablet 11   • cephalexin (Keflex) 250 MG capsule Take 1 capsule by mouth 2 (Two) Times a Day for 3 days. 6 capsule 0   • dorzolamide-timolol (COSOPT) 22.3-6.8 MG/ML ophthalmic solution Apply 1 drop to eye 2 (two) times a day.     • ferrous sulfate 325 (65 FE) MG tablet Take 1 tablet by mouth Daily With Breakfast. 30 tablet 0   • hydrocortisone (ANUSOL-HC) 2.5 % rectal cream Insert  into the rectum 2 (Two) Times a Day. (Patient taking differently: Insert  into the rectum 2 (Two) Times a Day. prn) 28 g 0   • lisinopril (PRINIVIL,ZESTRIL) 20 MG tablet TAKE 1 TABLET BY MOUTH EVERY DAY 90 tablet 3   • mesalamine (CANASA) 1000 MG suppository Insert 28 mg into the rectum Every Night. 28/60 ml insert 1 enema into rectum every evening     • mesalamine (LIALDA) 1.2 g EC tablet Take 4 tablets by mouth Daily With Breakfast. 120 tablet 3   • metFORMIN ER (GLUCOPHAGE-XR) 500 MG  24 hr tablet TAKE 2 TABLETS BY MOUTH TWICE DAILY 124 tablet 3   • metoprolol tartrate (LOPRESSOR) 50 MG tablet Take 1 tablet by mouth Every 12 (Twelve) Hours. 180 tablet 3   • pantoprazole (PROTONIX) 40 MG EC tablet Take 1 tablet by mouth Every Morning Before Breakfast. 30 tablet 1   • rosuvastatin (CRESTOR) 10 MG tablet TAKE 1 TABLET BY MOUTH DAILY 30 tablet 5     No current facility-administered medications for this visit.        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)   • Osteopenia   • Hypertension   • Hyperlipidemia   • Colon polyp   • Gastroesophageal reflux disease   • Glaucoma   • Coronary artery disease involving native coronary artery of native heart without angina pectoris   • Ulcerative colitis with rectal bleeding (CMS/HCC)   • Iron deficiency anemia secondary to inadequate dietary iron intake   • Iron deficiency anemia due to chronic blood loss   • Ulcerative rectosigmoiditis with rectal bleeding (CMS/HCC)       PAST MEDICAL HISTORY  Past Medical History:   Diagnosis Date   • Allergic rhinitis    • Broken heart syndrome    • Colitis    • Colon polyps    • Diabetes mellitus (CMS/HCC)     type 2   • Dizziness    • Encounter for breast cancer screening other than mammogram    • GERD (gastroesophageal reflux disease)    • GI (gastrointestinal bleed)    • Glaucoma    • History of transfusion    • Hyperlipidemia    • Hypertension    • Iron deficiency anemia due to chronic blood loss    • Knee fracture, left    • Non-STEMI (non-ST elevated myocardial infarction) (CMS/HCC) 6/16/2017   • Osteopenia    • Ulcerative colitis (CMS/HCC)        SURGICAL HISTORY  Past Surgical History:   Procedure Laterality Date   • CARDIAC  CATHETERIZATION N/A 6/16/2017    Procedure: Left Heart Cath;  Surgeon: Abhay Wooten MD;  Location: [facility] CATH INVASIVE LOCATION;  Service:    • CARDIAC CATHETERIZATION N/A 6/16/2017    Procedure: Left ventriculography;  Surgeon: Abhay Wooten MD;  Location: [facility] CATH INVASIVE LOCATION;  Service:    • CARDIAC CATHETERIZATION N/A 6/16/2017    Procedure: Coronary angiography;  Surgeon: Abhay Wooten MD;  Location: [facility] CATH INVASIVE LOCATION;  Service:    • CATARACT EXTRACTION     • COLONOSCOPY  08/14/2018   • ENDOSCOPY N/A 2/10/2020    Procedure: ESOPHAGOGASTRODUODENOSCOPY;  Surgeon: Melissa Burleson MD;  Location: [facility] ENDOSCOPY;  Service: Gastroenterology;  Laterality: N/A;  PRE- IRON DEFICIENCY ANEMIA  POST--SCHATZKY'S RING, GASTRITIS,DUODENITIS   • EYE SURGERY      cataract surgery   • PAP SMEAR     • SIGMOIDOSCOPY N/A 10/21/2019    EH, active ulcerative colitis, severe inflammation in rectum, TA       SOCIAL HISTORY  Social History     Socioeconomic History   • Marital status:      Spouse name: Eliazar*   • Number of children: 1   • Years of education: Not on file   • Highest education level: Not on file   Occupational History   • Occupation: Retired (retail)   Tobacco Use   • Smoking status: Never Smoker   • Smokeless tobacco: Never Used   Substance and Sexual Activity   • Alcohol use: No   • Drug use: No   • Sexual activity: Not Currently     Partners: Male   Lifestyle   • Physical activity:     Days per week: Not on file     Minutes per session: Not on file   • Stress: Only a little           **Dragon Disclaimer:   Much of this encounter note is an electronic transcription/translation of spoken language to printed text. The electronic translation of spoken language may permit erroneous, or at times, nonsensical words or phrases to be inadvertently transcribed. Although I have reviewed the note for such errors, some may still exist.     Template created by Zachery Pacheco MD

## 2020-09-15 DIAGNOSIS — E11.9 TYPE 2 DIABETES MELLITUS WITHOUT COMPLICATION (HCC): Chronic | ICD-10-CM

## 2020-09-15 RX ORDER — ROSUVASTATIN CALCIUM 10 MG/1
10 TABLET, COATED ORAL DAILY
Qty: 30 TABLET | Refills: 11 | Status: SHIPPED | OUTPATIENT
Start: 2020-09-15 | End: 2021-07-26 | Stop reason: SDUPTHER

## 2020-09-15 RX ORDER — METFORMIN HYDROCHLORIDE 500 MG/1
TABLET, EXTENDED RELEASE ORAL
Qty: 360 TABLET | Refills: 1 | Status: SHIPPED | OUTPATIENT
Start: 2020-09-15 | End: 2021-05-12

## 2020-11-02 ENCOUNTER — HOSPITAL ENCOUNTER (OUTPATIENT)
Dept: BONE DENSITY | Facility: HOSPITAL | Age: 75
Discharge: HOME | End: 2020-11-02
Admitting: INTERNAL MEDICINE

## 2020-11-02 PROCEDURE — 77080 DXA BONE DENSITY AXIAL: CPT

## 2020-11-11 ENCOUNTER — TELEPHONE (OUTPATIENT)
Dept: GASTROENTEROLOGY | Facility: CLINIC | Age: 75
End: 2020-11-11

## 2020-11-11 RX ORDER — PANTOPRAZOLE SODIUM 40 MG/1
40 TABLET, DELAYED RELEASE ORAL
Qty: 30 TABLET | Refills: 3 | Status: SHIPPED | OUTPATIENT
Start: 2020-11-11 | End: 2021-03-09

## 2020-11-11 NOTE — TELEPHONE ENCOUNTER
----- Message from Simone Page sent at 11/11/2020  9:05 AM EST -----  Regarding: pantoprazole (PROTONIX) 40 MG EC tablet  PT needs prescription for pantoprazole (PROTONIX) 40 MG EC tablet refilled. PT is out of medication and has been out for a month.     Pharmacy:  [id number] [place] [place] [facility] [address] [facility] & [address] - [phone]  - [phone] FX  Phone:  [phone]  Fax:  [phone]  Address:  [address], [facility] [address]

## 2020-11-17 ENCOUNTER — OFFICE VISIT (OUTPATIENT)
Dept: GASTROENTEROLOGY | Facility: CLINIC | Age: 75
End: 2020-11-17

## 2020-11-17 VITALS
HEIGHT: 62 IN | WEIGHT: 124 LBS | TEMPERATURE: 97.1 F | DIASTOLIC BLOOD PRESSURE: 70 MMHG | BODY MASS INDEX: 22.82 KG/M2 | SYSTOLIC BLOOD PRESSURE: 125 MMHG

## 2020-11-17 DIAGNOSIS — K51.311 ULCERATIVE RECTOSIGMOIDITIS WITH RECTAL BLEEDING (HCC): Primary | ICD-10-CM

## 2020-11-17 DIAGNOSIS — K21.00 GASTROESOPHAGEAL REFLUX DISEASE WITH ESOPHAGITIS WITHOUT HEMORRHAGE: ICD-10-CM

## 2020-11-17 DIAGNOSIS — D50.0 IRON DEFICIENCY ANEMIA DUE TO CHRONIC BLOOD LOSS: ICD-10-CM

## 2020-11-17 PROCEDURE — 99214 OFFICE O/P EST MOD 30 MIN: CPT | Performed by: INTERNAL MEDICINE

## 2020-11-17 NOTE — PROGRESS NOTES
Chief Complaint   Patient presents with   • Ulcerative Colitis   • Anemia   • Heartburn   • Weight Loss       Subjective     HPI    Renee Stevenson is a 75 y.o. female with a past medical history noted below who presents for of left-sided UC, iron deficiency anemia, GERD, weight loss.     February 2020 EGD with mild esophagitis, gastritis and duodenitis.  She is on PPI.    Doing well.  Taking oral mesalamine only.  He has not needed any of the mesalamine suppositories no rectal bleeding.  No significant issues with diarrhea.  No significant issues with abdominal pain    She has been eating well and has gained some weight.    Energy levels are about baseline.  She does take a nap at least once a day in the afternoon but that has been a habit for many years.    Remains on daily oral iron supplementation    Today's visit was in the office.  Both the patient and I were wearing face masks and proper hand hygiene was performed before and after the physical exam.       Past Medical History:   Diagnosis Date   • Allergic rhinitis    • Broken heart syndrome    • Colitis    • Colon polyps    • Diabetes mellitus (CMS/HCC)     type 2   • Dizziness    • Encounter for breast cancer screening other than mammogram    • GERD (gastroesophageal reflux disease)    • GI (gastrointestinal bleed)    • Glaucoma    • History of transfusion    • Hyperlipidemia    • Hypertension    • Iron deficiency anemia due to chronic blood loss    • Knee fracture, left    • Non-STEMI (non-ST elevated myocardial infarction) (CMS/HCC) 6/16/2017   • Osteopenia    • Ulcerative colitis (CMS/HCC)          Current Outpatient Medications:   •  ACCU-CHEK JUAN PLUS test strip, CHECK BLOOD SUGAR EVERY DAY, Disp: 100 each, Rfl: 11  •  Acetaminophen (TYLENOL PO), Take 2 tablets by mouth As Needed., Disp: , Rfl:   •  amLODIPine (NORVASC) 5 MG tablet, TAKE 1 TABLET BY MOUTH DAILY, Disp: 30 tablet, Rfl: 11  •  dorzolamide-timolol (COSOPT) 22.3-6.8 MG/ML ophthalmic  solution, Apply 1 drop to eye 2 (two) times a day., Disp: , Rfl:   •  ferrous sulfate 325 (65 FE) MG tablet, Take 1 tablet by mouth Daily With Breakfast., Disp: 30 tablet, Rfl: 0  •  hydrocortisone (ANUSOL-HC) 2.5 % rectal cream, Insert  into the rectum 2 (Two) Times a Day. (Patient taking differently: Insert  into the rectum 2 (Two) Times a Day. prn), Disp: 28 g, Rfl: 0  •  lisinopril (PRINIVIL,ZESTRIL) 20 MG tablet, TAKE 1 TABLET BY MOUTH EVERY DAY, Disp: 90 tablet, Rfl: 3  •  mesalamine (CANASA) 1000 MG suppository, Insert 28 mg into the rectum Every Night. 28/60 ml insert 1 enema into rectum every evening, Disp: , Rfl:   •  mesalamine (LIALDA) 1.2 g EC tablet, Take 4 tablets by mouth Daily With Breakfast., Disp: 120 tablet, Rfl: 3  •  metFORMIN ER (GLUCOPHAGE-XR) 500 MG 24 hr tablet, TAKE 2 TABLETS BY MOUTH TWICE DAILY, Disp: 360 tablet, Rfl: 1  •  metoprolol tartrate (LOPRESSOR) 50 MG tablet, Take 1 tablet by mouth Every 12 (Twelve) Hours., Disp: 180 tablet, Rfl: 3  •  pantoprazole (PROTONIX) 40 MG EC tablet, Take 1 tablet by mouth Every Morning Before Breakfast., Disp: 30 tablet, Rfl: 3  •  rosuvastatin (CRESTOR) 10 MG tablet, TAKE 1 TABLET BY MOUTH DAILY, Disp: 30 tablet, Rfl: 11    Allergies   Allergen Reactions   • Tetanus Toxoids Swelling     Hand and arm       Social History     Socioeconomic History   • Marital status:      Spouse name: Eliazar*   • Number of children: 1   • Years of education: Not on file   • Highest education level: Not on file   Occupational History   • Occupation: Retired (retail)   Tobacco Use   • Smoking status: Never Smoker   • Smokeless tobacco: Never Used   Substance and Sexual Activity   • Alcohol use: No   • Drug use: No   • Sexual activity: Not Currently     Partners: Male   Lifestyle   • Physical activity     Days per week: Not on file     Minutes per session: Not on file   • Stress: Only a little       Family History   Problem Relation Age of Onset   • Heart disease  Mother    • Hypertension Mother    • Diabetes Mother    • Heart disease Father    • Hypertension Father    • Heart disease Sister    • Heart disease Brother    • No Known Problems Maternal Grandmother    • No Known Problems Maternal Grandfather    • No Known Problems Paternal Grandmother    • No Known Problems Paternal Grandfather    • Crohn's disease Niece    • Colon cancer Neg Hx    • Breast cancer Neg Hx    • Dementia Neg Hx        Review of Systems   Constitutional: Positive for fatigue. Negative for activity change and appetite change.   HENT: Negative for sore throat and trouble swallowing.    Respiratory: Negative.    Cardiovascular: Negative.    Gastrointestinal: Negative for abdominal distention, abdominal pain and blood in stool.   Endocrine: Negative for cold intolerance and heat intolerance.   Genitourinary: Negative for difficulty urinating, dysuria and frequency.   Musculoskeletal: Positive for arthralgias. Negative for back pain and myalgias.   Skin: Negative.    Hematological: Negative for adenopathy. Does not bruise/bleed easily.   All other systems reviewed and are negative.      Objective     Vitals:    11/17/20 1352   BP: 125/70   Temp: 97.1 °F (36.2 °C)         11/17/20  1352   Weight: 56.2 kg (124 lb)     Body mass index is 22.68 kg/m².    Physical Exam  Vitals signs reviewed.   Constitutional:       General: She is not in acute distress.     Appearance: Normal appearance. She is well-developed. She is not diaphoretic.   HENT:      Head: Normocephalic and atraumatic.      Right Ear: External ear normal.      Left Ear: External ear normal.      Nose: Nose normal.   Eyes:      General: No scleral icterus.        Right eye: No discharge.         Left eye: No discharge.      Conjunctiva/sclera: Conjunctivae normal.   Neck:      Musculoskeletal: Normal range of motion and neck supple.      Thyroid: No thyromegaly.      Comments: No supraclavicular adenopathy  Cardiovascular:      Rate and Rhythm:  Normal rate and regular rhythm.      Heart sounds: Normal heart sounds. No murmur. No gallop.       Comments: No lower extremity edema  Pulmonary:      Effort: Pulmonary effort is normal. No respiratory distress.      Breath sounds: Normal breath sounds. No wheezing.   Abdominal:      General: Bowel sounds are normal. There is no distension.      Palpations: Abdomen is soft. Abdomen is not rigid. There is no mass.      Tenderness: There is no abdominal tenderness. There is no guarding or rebound.      Hernia: No hernia is present.   Genitourinary:     Comments: Rectal exam deferred  Musculoskeletal: Normal range of motion.         General: No tenderness.      Comments: No atrophy of upper or lower extremities.  Normal digits and nails of both hands.  Antalgic gait   Lymphadenopathy:      Cervical: No cervical adenopathy.   Skin:     General: Skin is warm and dry.      Findings: No erythema or rash.   Neurological:      Mental Status: She is alert and oriented to person, place, and time.      Motor: No atrophy.      Coordination: Coordination normal.   Psychiatric:         Behavior: Behavior normal.         Thought Content: Thought content normal.         Judgment: Judgment normal.         WBC   Date Value Ref Range Status   07/30/2020 5.05 3.40 - 10.80 10*3/mm3 Final     RBC   Date Value Ref Range Status   07/30/2020 3.37 (L) 3.77 - 5.28 10*6/mm3 Final     Hemoglobin   Date Value Ref Range Status   07/30/2020 11.0 (L) 12.0 - 15.9 g/dL Final   02/10/2020 11.5 (L) 12.0 - 15.9 g/dL Final     Hematocrit   Date Value Ref Range Status   07/30/2020 32.1 (L) 34.0 - 46.6 % Final   02/10/2020 34.4 34.0 - 46.6 % Final     MCV   Date Value Ref Range Status   07/30/2020 95.3 79.0 - 97.0 fL Final   07/24/2019 97.2 (H) 79.0 - 97.0 fL Final     MCH   Date Value Ref Range Status   07/30/2020 32.6 26.6 - 33.0 pg Final   07/24/2019 29.2 26.6 - 33.0 pg Final     MCHC   Date Value Ref Range Status   07/30/2020 34.3 31.5 - 35.7 g/dL  Final   07/24/2019 30.0 (L) 31.5 - 35.7 g/dL Final     RDW   Date Value Ref Range Status   07/30/2020 12.8 12.3 - 15.4 % Final   07/24/2019 14.5 12.3 - 15.4 % Final     RDW-SD   Date Value Ref Range Status   07/24/2019 51.3 37.0 - 54.0 fl Final     MPV   Date Value Ref Range Status   07/24/2019 9.7 6.0 - 12.0 fL Final     Platelets   Date Value Ref Range Status   07/30/2020 173 140 - 450 10*3/mm3 Final   07/24/2019 266 140 - 450 10*3/mm3 Final     Neutrophil Rel %   Date Value Ref Range Status   07/30/2020 55.9 42.7 - 76.0 % Final     Neutrophil %   Date Value Ref Range Status   07/24/2019 53.6 42.7 - 76.0 % Final     Lymphocyte Rel %   Date Value Ref Range Status   07/30/2020 35.0 19.6 - 45.3 % Final     Lymphocyte %   Date Value Ref Range Status   07/24/2019 35.8 19.6 - 45.3 % Final     Monocyte Rel %   Date Value Ref Range Status   07/30/2020 8.3 5.0 - 12.0 % Final     Monocyte %   Date Value Ref Range Status   07/24/2019 9.0 5.0 - 12.0 % Final     Eosinophil Rel %   Date Value Ref Range Status   07/30/2020 0.0 (L) 0.3 - 6.2 % Final     Eosinophil %   Date Value Ref Range Status   07/24/2019 0.0 (L) 0.3 - 6.2 % Final     Basophil Rel %   Date Value Ref Range Status   07/30/2020 0.4 0.0 - 1.5 % Final     Basophil %   Date Value Ref Range Status   07/24/2019 0.5 0.0 - 1.5 % Final     Immature Grans %   Date Value Ref Range Status   07/24/2019 1.1 (H) 0.0 - 0.5 % Final     Neutrophils Absolute   Date Value Ref Range Status   07/30/2020 2.82 1.70 - 7.00 10*3/mm3 Final     Neutrophils, Absolute   Date Value Ref Range Status   07/24/2019 3.32 1.70 - 7.00 10*3/mm3 Final     Lymphocytes Absolute   Date Value Ref Range Status   07/30/2020 1.77 0.70 - 3.10 10*3/mm3 Final     Lymphocytes, Absolute   Date Value Ref Range Status   07/24/2019 2.22 0.70 - 3.10 10*3/mm3 Final     Monocytes Absolute   Date Value Ref Range Status   07/30/2020 0.42 0.10 - 0.90 10*3/mm3 Final     Monocytes, Absolute   Date Value Ref Range Status    07/24/2019 0.56 0.10 - 0.90 10*3/mm3 Final     Eosinophils Absolute   Date Value Ref Range Status   07/30/2020 0.00 0.00 - 0.40 10*3/mm3 Final     Eosinophils, Absolute   Date Value Ref Range Status   07/24/2019 0.00 0.00 - 0.40 10*3/mm3 Final     Basophils Absolute   Date Value Ref Range Status   07/30/2020 0.02 0.00 - 0.20 10*3/mm3 Final     Basophils, Absolute   Date Value Ref Range Status   07/24/2019 0.03 0.00 - 0.20 10*3/mm3 Final     Immature Grans, Absolute   Date Value Ref Range Status   07/24/2019 0.07 (H) 0.00 - 0.05 10*3/mm3 Final     nRBC   Date Value Ref Range Status   07/30/2020 0.0 0.0 - 0.2 /100 WBC Final   07/24/2019 0.0 0.0 - 0.2 /100 WBC Final       Glucose   Date Value Ref Range Status   12/06/2019 161 (H) 65 - 99 mg/dL Final     Sodium   Date Value Ref Range Status   07/30/2020 138 136 - 145 mmol/L Final   12/06/2019 145 136 - 145 mmol/L Final     Potassium   Date Value Ref Range Status   07/30/2020 3.6 3.5 - 5.2 mmol/L Final   12/06/2019 3.6 3.5 - 5.2 mmol/L Final     CO2   Date Value Ref Range Status   12/06/2019 28.0 22.0 - 29.0 mmol/L Final     Total CO2   Date Value Ref Range Status   07/30/2020 23.5 22.0 - 29.0 mmol/L Final     Chloride   Date Value Ref Range Status   07/30/2020 103 98 - 107 mmol/L Final   12/06/2019 103 98 - 107 mmol/L Final     Anion Gap   Date Value Ref Range Status   12/06/2019 14.0 5.0 - 15.0 mmol/L Final     Creatinine   Date Value Ref Range Status   07/30/2020 0.87 0.57 - 1.00 mg/dL Final   12/06/2019 0.71 0.57 - 1.00 mg/dL Final     BUN   Date Value Ref Range Status   07/30/2020 14 8 - 23 mg/dL Final   12/06/2019 14 8 - 23 mg/dL Final     BUN/Creatinine Ratio   Date Value Ref Range Status   07/30/2020 16.1 7.0 - 25.0 Final   12/06/2019 19.7 7.0 - 25.0 Final     Calcium   Date Value Ref Range Status   07/30/2020 8.7 8.6 - 10.5 mg/dL Final   12/06/2019 9.3 8.6 - 10.5 mg/dL Final     eGFR Non  Am   Date Value Ref Range Status   07/30/2020 63 >60 mL/min/1.73  Final     Comment:     The MDRD GFR formula is only valid for adults with stable  renal function between ages 18 and 70.       eGFR Non  Amer   Date Value Ref Range Status   12/06/2019 80 >60 mL/min/1.73 Final     Alkaline Phosphatase   Date Value Ref Range Status   07/30/2020 45 39 - 117 U/L Final   07/24/2019 58 39 - 117 U/L Final     Total Protein   Date Value Ref Range Status   07/24/2019 7.7 6.0 - 8.5 g/dL Final     ALT (SGPT)   Date Value Ref Range Status   07/30/2020 11 1 - 33 U/L Final   07/24/2019 11 1 - 33 U/L Final     AST (SGOT)   Date Value Ref Range Status   07/30/2020 14 1 - 32 U/L Final   07/24/2019 15 1 - 32 U/L Final     Total Bilirubin   Date Value Ref Range Status   07/30/2020 0.5 0.0 - 1.2 mg/dL Final   07/24/2019 0.2 0.2 - 1.2 mg/dL Final     Albumin   Date Value Ref Range Status   07/30/2020 4.60 3.50 - 5.20 g/dL Final   07/24/2019 4.40 3.50 - 5.20 g/dL Final     Globulin   Date Value Ref Range Status   07/24/2019 3.3 gm/dL Final     A/G Ratio   Date Value Ref Range Status   07/30/2020 1.9 g/dL Final         Imaging Results (Last 7 Days)     ** No results found for the last 168 hours. **            No notes on file    Assessment/Plan    1. Ulcerative rectosigmoiditis with rectal bleeding: Stable on oral mesalamine    2. Gastroesophageal reflux disease with esophagitis without hemorrhage: Able on PPI    3. Iron deficiency anemia: July labs were improving, she remains on iron supplementation    Plan  Continue mesalamine  Update labs today with CBC, CMP, iron studies and vitamin D levels  Continue to monitor for further bleeding  If iron stores and her labs are good, we will have her continue current medications and follow-up in the spring    Diagnoses and all orders for this visit:    1. Ulcerative rectosigmoiditis with rectal bleeding (CMS/HCC) (Primary)  -     CBC & Differential  -     Comprehensive Metabolic Panel  -     Iron Profile  -     Ferritin  -     Vitamin D 25 Hydroxy    2.  Gastroesophageal reflux disease with esophagitis without hemorrhage    3. Iron deficiency anemia due to chronic blood loss  -     CBC & Differential  -     Comprehensive Metabolic Panel  -     Iron Profile  -     Ferritin        I have discussed the above plan with the patient.  They verbalize understanding and are in agreement with the plan.  They have been advised to contact the office for any questions, concerns, or changes related to their health.    Dictated utilizing Dragon dictation

## 2020-11-18 LAB
25(OH)D3+25(OH)D2 SERPL-MCNC: 29.3 NG/ML (ref 30–100)
ALBUMIN SERPL-MCNC: 4.7 G/DL (ref 3.5–5.2)
ALBUMIN/GLOB SERPL: 1.5 G/DL
ALP SERPL-CCNC: 61 U/L (ref 39–117)
ALT SERPL-CCNC: 21 U/L (ref 1–33)
AST SERPL-CCNC: 17 U/L (ref 1–32)
BASOPHILS # BLD AUTO: 0.02 10*3/MM3 (ref 0–0.2)
BASOPHILS NFR BLD AUTO: 0.3 % (ref 0–1.5)
BILIRUB SERPL-MCNC: 0.3 MG/DL (ref 0–1.2)
BUN SERPL-MCNC: 15 MG/DL (ref 8–23)
BUN/CREAT SERPL: 21.4 (ref 7–25)
CALCIUM SERPL-MCNC: 9.5 MG/DL (ref 8.6–10.5)
CHLORIDE SERPL-SCNC: 101 MMOL/L (ref 98–107)
CO2 SERPL-SCNC: 27 MMOL/L (ref 22–29)
CREAT SERPL-MCNC: 0.7 MG/DL (ref 0.57–1)
EOSINOPHIL # BLD AUTO: 0 10*3/MM3 (ref 0–0.4)
EOSINOPHIL NFR BLD AUTO: 0 % (ref 0.3–6.2)
ERYTHROCYTE [DISTWIDTH] IN BLOOD BY AUTOMATED COUNT: 12.8 % (ref 12.3–15.4)
FERRITIN SERPL-MCNC: 87.6 NG/ML (ref 13–150)
GLOBULIN SER CALC-MCNC: 3.1 GM/DL
GLUCOSE SERPL-MCNC: 113 MG/DL (ref 65–99)
HCT VFR BLD AUTO: 36.4 % (ref 34–46.6)
HGB BLD-MCNC: 12.2 G/DL (ref 12–15.9)
IMM GRANULOCYTES # BLD AUTO: 0.03 10*3/MM3 (ref 0–0.05)
IMM GRANULOCYTES NFR BLD AUTO: 0.5 % (ref 0–0.5)
IRON SATN MFR SERPL: 18 % (ref 20–50)
IRON SERPL-MCNC: 81 MCG/DL (ref 37–145)
LYMPHOCYTES # BLD AUTO: 1.64 10*3/MM3 (ref 0.7–3.1)
LYMPHOCYTES NFR BLD AUTO: 26.9 % (ref 19.6–45.3)
MCH RBC QN AUTO: 31.4 PG (ref 26.6–33)
MCHC RBC AUTO-ENTMCNC: 33.5 G/DL (ref 31.5–35.7)
MCV RBC AUTO: 93.8 FL (ref 79–97)
MONOCYTES # BLD AUTO: 0.46 10*3/MM3 (ref 0.1–0.9)
MONOCYTES NFR BLD AUTO: 7.5 % (ref 5–12)
NEUTROPHILS # BLD AUTO: 3.95 10*3/MM3 (ref 1.7–7)
NEUTROPHILS NFR BLD AUTO: 64.8 % (ref 42.7–76)
NRBC BLD AUTO-RTO: 0 /100 WBC (ref 0–0.2)
PLATELET # BLD AUTO: 211 10*3/MM3 (ref 140–450)
POTASSIUM SERPL-SCNC: 3.8 MMOL/L (ref 3.5–5.2)
PROT SERPL-MCNC: 7.8 G/DL (ref 6–8.5)
RBC # BLD AUTO: 3.88 10*6/MM3 (ref 3.77–5.28)
SODIUM SERPL-SCNC: 141 MMOL/L (ref 136–145)
TIBC SERPL-MCNC: 450 MCG/DL
UIBC SERPL-MCNC: 369 MCG/DL (ref 112–346)
WBC # BLD AUTO: 6.1 10*3/MM3 (ref 3.4–10.8)

## 2020-11-18 RX ORDER — METOPROLOL TARTRATE 50 MG/1
TABLET, FILM COATED ORAL
Qty: 180 TABLET | Refills: 3 | Status: SHIPPED | OUTPATIENT
Start: 2020-11-18 | End: 2022-01-19

## 2020-11-18 NOTE — PROGRESS NOTES
Labs show that her hemoglobin is 12.2 which is the highest that it has been for her in over a year.    Glucose was 113.  Her liver, kidney and electrolyte tests were all normal.    Her iron stores are stable and improving.    Her vitamin D is on the slightly low side.  I am going to send her a calcium and vit D supplement    Continue all current medications

## 2020-11-24 ENCOUNTER — TELEPHONE (OUTPATIENT)
Dept: GASTROENTEROLOGY | Facility: CLINIC | Age: 75
End: 2020-11-24

## 2020-11-24 NOTE — TELEPHONE ENCOUNTER
----- Message from Melissa Burleson MD sent at 11/18/2020 11:21 AM EST -----  Labs show that her hemoglobin is 12.2 which is the highest that it has been for her in over a year.    Glucose was 113.  Her liver, kidney and electrolyte tests were all normal.    Her iron stores are stable and improving.    Her vitamin D is on the slightly low side.  I am going to send her a calcium and vit D supplement    Continue all current medications

## 2020-12-14 DIAGNOSIS — I10 ESSENTIAL HYPERTENSION: Chronic | ICD-10-CM

## 2020-12-14 RX ORDER — AMLODIPINE BESYLATE 5 MG/1
5 TABLET ORAL DAILY
Qty: 30 TABLET | Refills: 11 | Status: SHIPPED | OUTPATIENT
Start: 2020-12-14 | End: 2021-12-21

## 2020-12-14 RX ORDER — AMLODIPINE BESYLATE 5 MG/1
5 TABLET ORAL DAILY
Qty: 30 TABLET | Refills: 11 | OUTPATIENT
Start: 2020-12-14

## 2020-12-31 NOTE — TELEPHONE ENCOUNTER
BMP/CBC/PT/PTT/INR/Urinalysis/EKG Call to pt.  Advise per Dr Burlesno that hgb is good at 10.5.  Bx of colon showed active colitis, worse in rectum.  Budesonide has been ordered to take in addition to current meds.    Verb understanding.  Appt scheduled with Dr Burleson for 12/4 @ 1:30 pm.

## 2021-01-12 ENCOUNTER — TELEPHONE (OUTPATIENT)
Dept: GASTROENTEROLOGY | Facility: CLINIC | Age: 76
End: 2021-01-12

## 2021-01-12 DIAGNOSIS — K51.311 ULCERATIVE RECTOSIGMOIDITIS WITH RECTAL BLEEDING (HCC): Chronic | ICD-10-CM

## 2021-01-12 RX ORDER — MESALAMINE 1.2 G/1
4.8 TABLET, DELAYED RELEASE ORAL
Qty: 120 TABLET | Refills: 5 | Status: SHIPPED | OUTPATIENT
Start: 2021-01-12 | End: 2021-07-15 | Stop reason: SDUPTHER

## 2021-01-12 NOTE — TELEPHONE ENCOUNTER
----- Message from Simone Collins sent at 1/12/2021  9:24 AM EST -----  Regarding: med refill  Contact: [phone]  Pt called and needs medication refill.    mesalamine (LIALDA) 1.2 g EC tablet 120 tablet 3 8/14/2020    Sig - Route: Take 4 tablets by mouth Daily With Breakfast. - Oral     [id number] [place] [address] [address] AT [facility] [address] - [phone] PH - [phone] (Phone)  [phone] (Fax)

## 2021-02-08 NOTE — PROGRESS NOTES
Date of Office Visit: 2017  Encounter Provider: PEE Cruz  Place of Service: [facility] CARDIOLOGY  Patient Name: Renee Stevenson  :1945    Chief Complaint   Patient presents with   • Coronary Artery Disease     1 week hospital follow up   :     HPI: Renee Stevenson is a 72 y.o. female, new to me, who presents today for follow-up.  Old records have been obtained and reviewed by me.  She is a patient with a past medical history significant for diabetes, hyperlipidemia, and hypertension.  She also has a family history of coronary artery disease.  On 2017, she presented to the emergency room with a 1 day history of intermittent angina associated with nausea.  She ruled in for a non-STEMI with unstable angina.  On 2017, cardiac catheterization showed a normal left main, 10% proximal LAD, 30% mid LAD, 30% distal LAD, 2 small diagonal branches with 50% proximal stenosis, 20% mid circumflex, and a normal RCA.  An echocardiogram showed an EF of 42% and findings consistent with stress induced (Takotsubo) cardiomyopathy.  Her beta blockers were titrated up, and she did well.  She was discharged home on 2017 in stable condition   Since she's been home she's been doing well.  She denies any chest pain, shortness of breath, palpitations, edema, dizziness, or syncope.  She is very active at home and his not having any difficulties with this.      Past Medical History:   Diagnosis Date   • Diabetes mellitus    • Dizziness    • GERD (gastroesophageal reflux disease)    • Glaucoma    • Hyperlipidemia    • Knee fracture, left    • Non-STEMI (non-ST elevated myocardial infarction) 2017       Past Surgical History:   Procedure Laterality Date   • CARDIAC CATHETERIZATION N/A 2017    Procedure: Left Heart Cath;  Surgeon: Abhay Wooten MD;  Location: [facility] CATH INVASIVE LOCATION;  Service:    • CARDIAC CATHETERIZATION N/A 2017    Procedure: Left ventriculography;   Surgeon: Abhay Wootne MD;  Location: [facility] INVASIVE LOCATION;  Service:    • CARDIAC CATHETERIZATION N/A 6/16/2017    Procedure: Coronary angiography;  Surgeon: Abhay Wooten MD;  Location: [facility] INVASIVE LOCATION;  Service:    • CATARACT EXTRACTION     • EYE SURGERY      cataract surgery       Social History     Social History   • Marital status:      Spouse name: N/A   • Number of children: N/A   • Years of education: N/A     Occupational History   • retail      retired     Social History Main Topics   • Smoking status: Never Smoker   • Smokeless tobacco: Never Used   • Alcohol use No   • Drug use: No   • Sexual activity: Defer     Other Topics Concern   • Not on file     Social History Narrative       Family History   Problem Relation Age of Onset   • Heart disease Mother    • Heart disease Father    • Heart disease Sister    • Heart disease Brother    • Hypertension Other    • Diabetes Other      type 2   • No Known Problems Maternal Grandmother    • No Known Problems Maternal Grandfather    • No Known Problems Paternal Grandmother    • No Known Problems Paternal Grandfather        Review of Systems   Constitution: Negative for chills, fever, malaise/fatigue, weight gain and weight loss.   HENT: Negative for ear pain, headaches, hearing loss, nosebleeds and sore throat.    Eyes: Negative for double vision, pain and visual disturbance.   Cardiovascular: Negative for chest pain, dyspnea on exertion, irregular heartbeat, leg swelling, near-syncope, orthopnea, palpitations, paroxysmal nocturnal dyspnea and syncope.   Respiratory: Negative for cough, shortness of breath, sleep disturbances due to breathing, snoring and wheezing.    Endocrine: Negative for cold intolerance, heat intolerance and polyuria.   Skin: Negative for itching and rash.   Musculoskeletal: Negative for joint pain, joint swelling and myalgias.   Gastrointestinal: Negative for abdominal pain, diarrhea, melena, nausea and vomiting.    Genitourinary: Negative for frequency, hematuria and hesitancy.   Neurological: Negative for excessive daytime sleepiness, light-headedness, numbness, paresthesias and seizures.   Psychiatric/Behavioral: Negative for altered mental status and depression.   Allergic/Immunologic: Negative.    All other systems reviewed and are negative.      Allergies   Allergen Reactions   • Tetanus Toxoids Swelling     Hand and arm         Current Outpatient Prescriptions:   •  Acetaminophen (TYLENOL PO), Take 2 tablets by mouth As Needed., Disp: , Rfl:   •  aspirin 81 MG EC tablet, Take 81 mg by mouth daily., Disp: , Rfl:   •  Cholecalciferol (VITAMIN D) 2000 UNITS capsule, Take 1 capsule by mouth daily., Disp: , Rfl:   •  dorzolamide-timolol (COSOPT) 22.3-6.8 MG/ML ophthalmic solution, Apply 1 drop to eye 2 (two) times a day., Disp: , Rfl:   •  glucose blood (MAIA CONTOUR NEXT TEST) test strip, Check BS once daily, Disp: 100 each, Rfl: 3  •  lisinopril (PRINIVIL,ZESTRIL) 20 MG tablet, TAKE 1 TABLET BY MOUTH EVERY DAY, Disp: 90 tablet, Rfl: 1  •  mesalamine (LIALDA) 1.2 G EC tablet, Take 1,200 mg by mouth 2 (two) times a day., Disp: , Rfl:   •  metFORMIN ER (GLUCOPHAGE-XR) 500 MG 24 hr tablet, Take 2 tablets by mouth 2 (Two) Times a Day., Disp: 360 tablet, Rfl: 1  •  metoprolol tartrate (LOPRESSOR) 50 MG tablet, Take 1 tablet by mouth Every 12 (Twelve) Hours., Disp: 60 tablet, Rfl: 11  •  Multiple Vitamin (MULTI-VITAMIN PO), Take 1 tablet by mouth Daily., Disp: , Rfl:   •  omeprazole (PriLOSEC) 40 MG capsule, Take 1 capsule by mouth daily., Disp: , Rfl:   •  simvastatin (ZOCOR) 40 MG tablet, TAKE 1 TABLET BY MOUTH EVERY NIGHT AT BEDTIME, Disp: 90 tablet, Rfl: 1  •  timolol (TIMOPTIC) 0.25 % ophthalmic solution, 1 drop 2 (Two) Times a Day., Disp: , Rfl:      Objective:     Vitals:    06/22/17 1406 06/22/17 1419   BP: 126/72 128/70   BP Location: Right arm Left arm   Pulse: 60    SpO2: 99%    Weight: 127 lb (57.6 kg)    Height:  "62\" (157.5 cm)      Body mass index is 23.23 kg/(m^2).    PHYSICAL EXAM:    Physical Exam   Constitutional: She is oriented to person, place, and time. She appears well-developed and well-nourished. No distress.   HENT:   Head: Normocephalic and atraumatic.   Eyes: Pupils are equal, round, and reactive to light.   Neck: No JVD present. No thyromegaly present.   Cardiovascular: Normal rate, regular rhythm, normal heart sounds and intact distal pulses.    No murmur heard.  Right radial cath site well healed without erythema or echymosis, palpable proximal and distal pulses, good capillary refill   Pulmonary/Chest: Effort normal and breath sounds normal. No respiratory distress.   Abdominal: Soft. Bowel sounds are normal. She exhibits no distension. There is no splenomegaly or hepatomegaly. There is no tenderness.   Musculoskeletal: Normal range of motion. She exhibits no edema.   Neurological: She is alert and oriented to person, place, and time.   Skin: Skin is warm and dry. She is not diaphoretic. No erythema.   Psychiatric: She has a normal mood and affect. Her behavior is normal. Judgment normal.         ECG 12 Lead  Date/Time: 6/22/2017 2:32 PM  Performed by: ANN MARIE BEAR.  Authorized by: ANN MARIE BEAR.   Comparison: compared with previous ECG from 6/17/2017  Similar to previous ECG  Rhythm: sinus rhythm  BPM: 60  T depression: V2, V3, V4, V5 and V6  T flattening: V1  Q waves: V2  Clinical impression: abnormal ECG  Comments: Indication: Coronary artery disease, Takotsubo cardiomyopathy.              Assessment:       Diagnosis Plan   1. Mixed hyperlipidemia     2. Essential hypertension     3. Coronary artery disease involving native coronary artery of native heart without angina pectoris  ECG 12 Lead   4. Takotsubo cardiomyopathy  ECG 12 Lead     Orders Placed This Encounter   Procedures   • ECG 12 Lead     This order was created via procedure documentation          Plan:       1.  Coronary Artery " Disease  Assessment  • The patient has no angina  • There is a new diagnosis of stable angina in the past 12 months  • The patient is having symptoms consistent with unstable angina     Plan  • Lifestyle modifications discussed include adhering to a heart healthy diet, regular exercise and regular monitoring of cholesterol and blood pressure    Subjective - Objective  • There is a history of past MI  • Current antiplatelet therapy includes aspirin 81 mg  • Overall she is doing well.  She has nonobstructive coronary artery disease, and we did talk about healthy lifestyle and risk factor modification.  She is on a good medical regimen, continue current plan.    2.  Hypertension.  Her blood pressure is well-controlled today at 128/70.  Continue current medical regimen.    3.  Takotsubo cardiomyopathy.  She is on an ACE inhibitor and a beta blocker and tolerating both of these well.  We will most likely repeat an echocardiogram in 3 months to reassess her LV function.  I've asked her to weigh herself every day and to call us if she starts to go up and her weight.  She indicated that she understood.    She will follow-up with Dr. Wooten on 7/19/2017.    As always, it has been a pleasure to participate in your patient's care.      Sincerely,         Brandie Sorensen PA-C     Xeljanz Counseling: I discussed with the patient the risks of Xeljanz therapy including increased risk of infection, liver issues, headache, diarrhea, or cold symptoms. Live vaccines should be avoided. They were instructed to call if they have any problems.

## 2021-02-15 ENCOUNTER — OFFICE VISIT (OUTPATIENT)
Dept: CARDIOLOGY | Facility: CLINIC | Age: 76
End: 2021-02-15

## 2021-02-15 VITALS
DIASTOLIC BLOOD PRESSURE: 70 MMHG | HEIGHT: 62 IN | HEART RATE: 59 BPM | SYSTOLIC BLOOD PRESSURE: 134 MMHG | WEIGHT: 120 LBS | BODY MASS INDEX: 22.08 KG/M2

## 2021-02-15 DIAGNOSIS — I10 ESSENTIAL HYPERTENSION: ICD-10-CM

## 2021-02-15 DIAGNOSIS — I51.81 STRESS-INDUCED CARDIOMYOPATHY: Primary | ICD-10-CM

## 2021-02-15 DIAGNOSIS — E78.5 HYPERLIPIDEMIA, UNSPECIFIED HYPERLIPIDEMIA TYPE: ICD-10-CM

## 2021-02-15 PROCEDURE — 99441 PR PHYS/QHP TELEPHONE EVALUATION 5-10 MIN: CPT | Performed by: INTERNAL MEDICINE

## 2021-02-15 RX ORDER — MELATONIN
1000 DAILY
COMMUNITY
End: 2022-02-18

## 2021-02-15 NOTE — PROGRESS NOTES
Subjective:     Encounter Date: 02/15/21      Patient ID: Renee Stevenson is a 75 y.o. female.    Chief Complaint: Takosubo  HPI:   This is a 75-year-old woman who was previously followed by Dr. Gerardo Wooten.  In 2017 she presented to [facility] with chest pain and dynamic EKG changes.  She had a cardiac catheterization which showed mild to moderate disease of the proximal, mid and distal LAD as well as 2 small diagonal arteries.  She also had mild disease in the circumflex.  Her LV gram showed inferior apical hypokinesis consistent with Takosubo cardiomyopathy with an EF mildly reduced at 45%.  She recently suffered the loss of 2 of her brothers, which was considered to be the culprit.  After few months of goal directed medical therapy her EF improved to 65%.    Her other medical problems include diabetes, hypertension and ulcerative colitis.  She has not had an UC flare in a year or so.  No recent blood transfusions.  She is maintained on Lialda and does not require any biologic therapy.  Her hypertension is well controlled.  Her diabetes is also well controlled.    Overall she feels well. She walks for exercise on a daily basis. She never has angina or dyspnea. She has intermittent lower extremity edema which is not bothersome.     She has never smoked, she does not drink.  She is retired.  There is no family history of coronary disease.    The following portions of the patient's history were reviewed and updated as appropriate: allergies, current medications, past family history, past medical history, past social history, past surgical history and problem list.     REVIEW OF SYSTEMS:   All systems reviewed.  Pertinent positives identified in HPI.  All other systems are negative.    Past Medical History:   Diagnosis Date   • Allergic rhinitis    • Broken heart syndrome    • Colitis    • Colon polyps    • Diabetes mellitus (CMS/HCC)     type 2   • Dizziness    • Encounter for breast cancer screening other than  mammogram    • GERD (gastroesophageal reflux disease)    • GI (gastrointestinal bleed)    • Glaucoma    • History of transfusion    • Hyperlipidemia    • Hypertension    • Iron deficiency anemia due to chronic blood loss    • Knee fracture, left    • Non-STEMI (non-ST elevated myocardial infarction) (CMS/[facility]) 6/16/2017   • Osteopenia    • Ulcerative colitis (CMS/[facility])        Family History   Problem Relation Age of Onset   • Heart disease Mother    • Hypertension Mother    • Diabetes Mother    • Heart disease Father    • Hypertension Father    • Heart disease Sister    • Heart disease Brother    • No Known Problems Maternal Grandmother    • No Known Problems Maternal Grandfather    • No Known Problems Paternal Grandmother    • No Known Problems Paternal Grandfather    • Crohn's disease Niece    • Colon cancer Neg Hx    • Breast cancer Neg Hx    • Dementia Neg Hx        Social History     Socioeconomic History   • Marital status:      Spouse name: Eliazar*   • Number of children: 1   • Years of education: Not on file   • Highest education level: Not on file   Occupational History   • Occupation: Retired (retail)   Tobacco Use   • Smoking status: Never Smoker   • Smokeless tobacco: Never Used   Substance and Sexual Activity   • Alcohol use: No   • Drug use: No   • Sexual activity: Not Currently     Partners: Male   Lifestyle   • Physical activity     Days per week: Not on file     Minutes per session: Not on file   • Stress: Only a little       Allergies   Allergen Reactions   • Tetanus Toxoids Swelling     Hand and arm       Past Surgical History:   Procedure Laterality Date   • CARDIAC CATHETERIZATION N/A 6/16/2017    Procedure: Left Heart Cath;  Surgeon: Abhay Wooten MD;  Location: [facility] INVASIVE LOCATION;  Service:    • CARDIAC CATHETERIZATION N/A 6/16/2017    Procedure: Left ventriculography;  Surgeon: Abhay Wooten MD;  Location: [facility] INVASIVE LOCATION;  Service:    • CARDIAC CATHETERIZATION N/A  6/16/2017    Procedure: Coronary angiography;  Surgeon: Abhay Wooten MD;  Location: [facility] CATH INVASIVE LOCATION;  Service:    • CATARACT EXTRACTION     • COLONOSCOPY  08/14/2018   • ENDOSCOPY N/A 2/10/2020    Procedure: ESOPHAGOGASTRODUODENOSCOPY;  Surgeon: Melissa Burleson MD;  Location: [facility] ENDOSCOPY;  Service: Gastroenterology;  Laterality: N/A;  PRE- IRON DEFICIENCY ANEMIA  POST--SCHATZKY'S RING, GASTRITIS,DUODENITIS   • EYE SURGERY      cataract surgery   • PAP SMEAR     • SIGMOIDOSCOPY N/A 10/21/2019    EH, active ulcerative colitis, severe inflammation in rectum, TA       Procedures       Objective:         PHYSICAL EXAM:  GEN: VSS, no distress,   Deferred due to telehealth      Assessment:          Diagnosis Plan   1. Stress-induced cardiomyopathy     2. Essential hypertension     3. Hyperlipidemia, unspecified hyperlipidemia type            Plan:         1.  Stress-induced cardiomyopathy: Noted on cardiac catheterization in 2017.  Her ejection fraction has normalized.  Continue Toprol 50 and lisinopril 20.  2.  Coronary artery disease: Nonobstructive coronary disease in the LAD and circumflex system noted on cardiac catheterization.  Continue Crestor.  Not on aspirin, most likely related to prior UC flares requiring PRBC transfusion.  3.  Hypertension: Well-controlled on current therapy continue.  4.  Hyperlipidemia: Moderate intensity statin for diabetes, continue. Lipids from 2020 reviewed, well controlled.   5.  Ulcerative colitis: Under control    Dr. Pacheco, thank you very much for referring this kind patient to me. Please call me with any questions or concerns. I will see the patient again in the office in 1 year.      This patient has consented to a telehealth visit via phone. The visit was scheduled as a phone  visit to comply with patient safety concerns in accordance with CDC recommendations.  All vitals recorded within this visit are reported by the patient.  I spent  10 minutes in total  including but not limited to the 5 minutes spent in direct conversation with this patient.       Darling Espinosa MD  02/15/21  [facility]    Outpatient Encounter Medications as of 2/15/2021   Medication Sig Dispense Refill   • ACCU-CHEK JUAN PLUS test strip CHECK BLOOD SUGAR EVERY  each 11   • Acetaminophen (TYLENOL PO) Take 2 tablets by mouth As Needed.     • amLODIPine (NORVASC) 5 MG tablet TAKE 1 TABLET BY MOUTH DAILY 30 tablet 11   • Calcium Carb-Cholecalciferol (calcium-vitamin D) 500-200 MG-UNIT per tablet Take 1 tablet by mouth Daily. 90 tablet 3   • cholecalciferol (VITAMIN D3) 25 MCG (1000 UT) tablet Take 1,000 Units by mouth Daily.     • dorzolamide-timolol (COSOPT) 22.3-6.8 MG/ML ophthalmic solution Apply 1 drop to eye 2 (two) times a day.     • ferrous sulfate 325 (65 FE) MG tablet Take 1 tablet by mouth Daily With Breakfast. 30 tablet 0   • hydrocortisone (ANUSOL-HC) 2.5 % rectal cream Insert  into the rectum 2 (Two) Times a Day. (Patient taking differently: Insert  into the rectum 2 (Two) Times a Day. prn) 28 g 0   • lisinopril (PRINIVIL,ZESTRIL) 20 MG tablet TAKE 1 TABLET BY MOUTH EVERY DAY 90 tablet 3   • mesalamine (LIALDA) 1.2 g EC tablet Take 4 tablets by mouth Daily With Breakfast. 120 tablet 5   • metFORMIN ER (GLUCOPHAGE-XR) 500 MG 24 hr tablet TAKE 2 TABLETS BY MOUTH TWICE DAILY 360 tablet 1   • metoprolol tartrate (LOPRESSOR) 50 MG tablet TAKE 1 TABLET BY MOUTH EVERY 12 HOURS 180 tablet 3   • pantoprazole (PROTONIX) 40 MG EC tablet Take 1 tablet by mouth Every Morning Before Breakfast. 30 tablet 3   • rosuvastatin (CRESTOR) 10 MG tablet TAKE 1 TABLET BY MOUTH DAILY 30 tablet 11   • [DISCONTINUED] mesalamine (CANASA) 1000 MG suppository Insert 28 mg into the rectum Every Night. 28/60 ml insert 1 enema into rectum every evening     • [DISCONTINUED] rosuvastatin (CRESTOR) 10 MG tablet TAKE 1 TABLET BY MOUTH DAILY 30 tablet 5     No facility-administered encounter  medications on file as of 2/15/2021.

## 2021-02-24 ENCOUNTER — OFFICE VISIT (OUTPATIENT)
Dept: INTERNAL MEDICINE | Age: 76
End: 2021-02-24

## 2021-02-24 VITALS
TEMPERATURE: 97.5 F | OXYGEN SATURATION: 98 % | HEART RATE: 58 BPM | HEIGHT: 62 IN | WEIGHT: 120 LBS | DIASTOLIC BLOOD PRESSURE: 72 MMHG | BODY MASS INDEX: 22.08 KG/M2 | SYSTOLIC BLOOD PRESSURE: 144 MMHG

## 2021-02-24 DIAGNOSIS — E11.59 TYPE 2 DIABETES MELLITUS WITH OTHER CIRCULATORY COMPLICATION, WITHOUT LONG-TERM CURRENT USE OF INSULIN (HCC): Primary | Chronic | ICD-10-CM

## 2021-02-24 DIAGNOSIS — I10 ESSENTIAL HYPERTENSION: Chronic | ICD-10-CM

## 2021-02-24 DIAGNOSIS — E78.2 MIXED HYPERLIPIDEMIA: Chronic | ICD-10-CM

## 2021-02-24 DIAGNOSIS — I25.10 CORONARY ARTERY DISEASE INVOLVING NATIVE CORONARY ARTERY OF NATIVE HEART WITHOUT ANGINA PECTORIS: Chronic | ICD-10-CM

## 2021-02-24 PROCEDURE — 99214 OFFICE O/P EST MOD 30 MIN: CPT | Performed by: INTERNAL MEDICINE

## 2021-02-24 NOTE — PROGRESS NOTES
"    I N T E R N A L  M E D I C I N E  J U N O H  K I M,  M D      ENCOUNTER DATE:  02/24/2021    Renee PARIKH From / 76 y.o. / female      CHIEF COMPLAINT / REASON FOR OFFICE VISIT     Diabetes, Hypertension, and Hyperlipidemia      ASSESSMENT & PLAN     Problem List Items Addressed This Visit     Type 2 diabetes mellitus with circulatory disorder (CMS/[facility]) - Primary (Chronic)    Overview     **Complications of diabetes: microalbuminuria and coronary artery disease          Relevant Medications    ACCU-CHEK JUAN PLUS test strip    metFORMIN ER (GLUCOPHAGE-XR) 500 MG 24 hr tablet    Other Relevant Orders    Hemoglobin A1c    Hypertension (Chronic)    Relevant Medications    lisinopril (PRINIVIL,ZESTRIL) 20 MG tablet    metoprolol tartrate (LOPRESSOR) 50 MG tablet    amLODIPine (NORVASC) 5 MG tablet    Hyperlipidemia (Chronic)    Overview     Continue rosuvastatin 10 mg daily.          Relevant Medications    rosuvastatin (CRESTOR) 10 MG tablet    Other Relevant Orders    Lipid Panel With / Chol / HDL Ratio    Coronary artery disease involving native coronary artery of native heart without angina pectoris (Chronic)    Overview     *[facility]    Stable. Continue statin, bblocker. Not on ASA due to recent LGIB         Relevant Medications    metoprolol tartrate (LOPRESSOR) 50 MG tablet    amLODIPine (NORVASC) 5 MG tablet        Orders Placed This Encounter   Procedures   • Hemoglobin A1c   • Lipid Panel With / Chol / HDL Ratio     No orders of the defined types were placed in this encounter.      SUMMARY/DISCUSSION  • Continue current prescription medication(s) / plans.    • Assisted her to register for waiting list for COVID-19 vaccine at [facility]      Next Appointment with me: Visit date not found    Return in about 5 months (around 7/24/2021) for Reassess chronic medical problems.      VITAL SIGNS     Visit Vitals  /72   Pulse 58   Temp 97.5 °F (36.4 °C)   Ht 157.5 cm (62\")   Wt 54.4 kg (120 lb)   SpO2 98% "   BMI 21.95 kg/m²       BP Readings from Last 3 Encounters:   02/24/21 144/72   02/15/21 134/70   11/17/20 125/70     Wt Readings from Last 3 Encounters:   02/24/21 54.4 kg (120 lb)   02/15/21 54.4 kg (120 lb)   11/17/20 56.2 kg (124 lb)     Body mass index is 21.95 kg/m².      MEDICATIONS AT THE TIME OF OFFICE VISIT     Current Outpatient Medications on File Prior to Visit   Medication Sig   • ACCU-CHEK JUAN PLUS test strip CHECK BLOOD SUGAR EVERY DAY   • Acetaminophen (TYLENOL PO) Take 2 tablets by mouth As Needed.   • amLODIPine (NORVASC) 5 MG tablet TAKE 1 TABLET BY MOUTH DAILY   • cholecalciferol (VITAMIN D3) 25 MCG (1000 UT) tablet Take 1,000 Units by mouth Daily.   • dorzolamide-timolol (COSOPT) 22.3-6.8 MG/ML ophthalmic solution Apply 1 drop to eye 2 (two) times a day.   • ferrous sulfate 325 (65 FE) MG tablet Take 1 tablet by mouth Daily With Breakfast.   • hydrocortisone (ANUSOL-HC) 2.5 % rectal cream Insert  into the rectum 2 (Two) Times a Day. (Patient taking differently: Insert  into the rectum 2 (Two) Times a Day. prn)   • lisinopril (PRINIVIL,ZESTRIL) 20 MG tablet TAKE 1 TABLET BY MOUTH EVERY DAY   • mesalamine (LIALDA) 1.2 g EC tablet Take 4 tablets by mouth Daily With Breakfast.   • metFORMIN ER (GLUCOPHAGE-XR) 500 MG 24 hr tablet TAKE 2 TABLETS BY MOUTH TWICE DAILY   • metoprolol tartrate (LOPRESSOR) 50 MG tablet TAKE 1 TABLET BY MOUTH EVERY 12 HOURS   • Oyster Shell (OYSCO 500 PO) Take 500 mg by mouth Daily.   • pantoprazole (PROTONIX) 40 MG EC tablet Take 1 tablet by mouth Every Morning Before Breakfast.   • rosuvastatin (CRESTOR) 10 MG tablet TAKE 1 TABLET BY MOUTH DAILY   • Calcium Carb-Cholecalciferol (calcium-vitamin D) 500-200 MG-UNIT per tablet Take 1 tablet by mouth Daily.   • [DISCONTINUED] rosuvastatin (CRESTOR) 10 MG tablet TAKE 1 TABLET BY MOUTH DAILY     No current facility-administered medications on file prior to visit.           HISTORY OF PRESENT ILLNESS     Has not been able to  register for COVID-19 vaccine.     DM 2 stable at home  CAD stable without angina         REVIEW OF SYSTEMS           PHYSICAL EXAMINATION     Physical Exam  Cardiovascular Rate: normal. Rhythm: regular. Heart sounds: normal   Pulm/Chest: Effort normal, breath sounds normal.       REVIEWED DATA     Labs:     Lab Results   Component Value Date     11/17/2020    K 3.8 11/17/2020    AST 17 11/17/2020    ALT 21 11/17/2020    BUN 15 11/17/2020    CREATININE 0.70 11/17/2020    CREATININE 0.87 07/30/2020    CREATININE 0.68 03/16/2020    EGFRIFNONA 81 11/17/2020    EGFRIFAFRI 99 11/17/2020       Lab Results   Component Value Date    HGBA1C 6.00 (H) 08/17/2020    HGBA1C 6.70 (H) 03/16/2020    HGBA1C 5.50 05/16/2019       Lab Results   Component Value Date    LDL 50 03/16/2020    LDL 52 05/16/2019    HDL 43 03/16/2020    TRIG 114 03/16/2020       No results found for: TSH, FREET4    Lab Results   Component Value Date    WBC 6.10 11/17/2020    HGB 12.2 11/17/2020     11/17/2020         Imaging:           Medical Tests:           Summary of old records / correspondence / consultant report:           Request outside records:             *Examiner was wearing KN95 mask, face shield and exam gloves during the entire duration of the visit. Patient was masked the entire time.   Minimum social distance of 6 ft maintained entire visit except if physical contact was necessary as documented.     **Dragon Disclaimer:   Much of this encounter note is an electronic transcription/translation of spoken language to printed text. The electronic translation of spoken language may permit erroneous, or at times, nonsensical words or phrases to be inadvertently transcribed. Although I have reviewed the note for such errors, some may still exist.     Template created by Zachery Pacheco MD

## 2021-02-24 NOTE — PATIENT INSTRUCTIONS
** IMPORTANT MESSAGE FROM DR. PATIÑO **    In our office, your satisfaction is VERY important to us.     You may receive a survey from [employer] by mail or E-mail for you to provide feedback about your visit. This information is invaluable for me to know what we can do to improve our services.     I ask that you please take a few minutes to complete the survey and let us know how we are doing in serving your needs. (You may receive the survey more than once for multiple visits)    Thank You !    Dr. Patiño & Staff    _________________________________________________________________________________________________________________________      ** ADDITIONAL INSTRUCTION / REMINDERS FROM DR. PATIÑO **    Refer to [facility] website for resource/information on the COVID-19 vaccination:         [email or web address]      Below are direct links to request appointment for vaccination (from [facility] / [place] [facility]):    1) [employer]              [email or web address]      2) [facility]         [email or web address]      3) [facility] [employer]         [email or web address]        Stay safe, mask-up, and keep the distance.    Dr. Patiño

## 2021-02-25 LAB
CHOLEST SERPL-MCNC: 119 MG/DL (ref 0–200)
CHOLEST/HDLC SERPL: 2.38 {RATIO}
HBA1C MFR BLD: 5.8 % (ref 4.8–5.6)
HDLC SERPL-MCNC: 50 MG/DL (ref 40–60)
LDLC SERPL CALC-MCNC: 51 MG/DL (ref 0–100)
TRIGL SERPL-MCNC: 97 MG/DL (ref 0–150)
VLDLC SERPL CALC-MCNC: 18 MG/DL (ref 5–40)

## 2021-03-09 DIAGNOSIS — Z23 IMMUNIZATION DUE: ICD-10-CM

## 2021-03-09 RX ORDER — PANTOPRAZOLE SODIUM 40 MG/1
40 TABLET, DELAYED RELEASE ORAL
Qty: 30 TABLET | Refills: 3 | Status: SHIPPED | OUTPATIENT
Start: 2021-03-09 | End: 2021-07-07

## 2021-05-11 ENCOUNTER — OFFICE VISIT (OUTPATIENT)
Dept: GASTROENTEROLOGY | Facility: CLINIC | Age: 76
End: 2021-05-11

## 2021-05-11 VITALS
WEIGHT: 124 LBS | BODY MASS INDEX: 22.82 KG/M2 | SYSTOLIC BLOOD PRESSURE: 128 MMHG | DIASTOLIC BLOOD PRESSURE: 70 MMHG | HEIGHT: 62 IN

## 2021-05-11 DIAGNOSIS — K21.9 GASTROESOPHAGEAL REFLUX DISEASE WITHOUT ESOPHAGITIS: Chronic | ICD-10-CM

## 2021-05-11 DIAGNOSIS — D50.0 IRON DEFICIENCY ANEMIA DUE TO CHRONIC BLOOD LOSS: Chronic | ICD-10-CM

## 2021-05-11 DIAGNOSIS — K51.311 ULCERATIVE RECTOSIGMOIDITIS WITH RECTAL BLEEDING (HCC): Primary | Chronic | ICD-10-CM

## 2021-05-11 PROCEDURE — 99214 OFFICE O/P EST MOD 30 MIN: CPT | Performed by: INTERNAL MEDICINE

## 2021-05-11 NOTE — PROGRESS NOTES
Chief Complaint   Patient presents with   • Ulcerative Colitis   • Heartburn   • Anemia       Subjective     HPI    Renee Stevenson is a 76 y.o. female with a past medical history noted below who presents for left-sided UC, iron deficiency anemia, GERD, weight loss.    She is doing well on mesalamine.  She is moving her bowels regularly.  She says she has not seen any blood in over a year    GERD is well controlled on PPI    She is adherent to oral iron therapy    Appetite is good and her weight is stable    Last colonoscopy at the end of 2019.    Today's visit was in the office.  Both the patient and I were wearing face masks and proper hand hygiene was performed before and after the physical exam.           Current Outpatient Medications:   •  ACCU-CHEK JUAN PLUS test strip, CHECK BLOOD SUGAR EVERY DAY, Disp: 100 each, Rfl: 11  •  Acetaminophen (TYLENOL PO), Take 2 tablets by mouth As Needed., Disp: , Rfl:   •  amLODIPine (NORVASC) 5 MG tablet, TAKE 1 TABLET BY MOUTH DAILY, Disp: 30 tablet, Rfl: 11  •  Calcium Carb-Cholecalciferol (calcium-vitamin D) 500-200 MG-UNIT per tablet, Take 1 tablet by mouth Daily., Disp: 90 tablet, Rfl: 3  •  cholecalciferol (VITAMIN D3) 25 MCG (1000 UT) tablet, Take 1,000 Units by mouth Daily., Disp: , Rfl:   •  dorzolamide-timolol (COSOPT) 22.3-6.8 MG/ML ophthalmic solution, Apply 1 drop to eye 2 (two) times a day., Disp: , Rfl:   •  ferrous sulfate 325 (65 FE) MG tablet, Take 1 tablet by mouth Daily With Breakfast., Disp: 30 tablet, Rfl: 0  •  hydrocortisone (ANUSOL-HC) 2.5 % rectal cream, Insert  into the rectum 2 (Two) Times a Day. (Patient taking differently: Insert  into the rectum 2 (Two) Times a Day. prn), Disp: 28 g, Rfl: 0  •  lisinopril (PRINIVIL,ZESTRIL) 20 MG tablet, TAKE 1 TABLET BY MOUTH EVERY DAY, Disp: 90 tablet, Rfl: 3  •  mesalamine (LIALDA) 1.2 g EC tablet, Take 4 tablets by mouth Daily With Breakfast., Disp: 120 tablet, Rfl: 5  •  metFORMIN ER (GLUCOPHAGE-XR) 500 MG  24 hr tablet, TAKE 2 TABLETS BY MOUTH TWICE DAILY, Disp: 360 tablet, Rfl: 1  •  metoprolol tartrate (LOPRESSOR) 50 MG tablet, TAKE 1 TABLET BY MOUTH EVERY 12 HOURS, Disp: 180 tablet, Rfl: 3  •  pantoprazole (PROTONIX) 40 MG EC tablet, TAKE 1 TABLET BY MOUTH EVERY MORNING BEFORE BREAKFAST, Disp: 30 tablet, Rfl: 3  •  rosuvastatin (CRESTOR) 10 MG tablet, TAKE 1 TABLET BY MOUTH DAILY, Disp: 30 tablet, Rfl: 11  •  Oyster Shell (OYSCO 500 PO), Take 500 mg by mouth Daily., Disp: , Rfl:       Objective     Vitals:    05/11/21 1330   BP: 128/70         05/11/21  1330   Weight: 56.2 kg (124 lb)     Body mass index is 22.68 kg/m².    Physical Exam  Vitals reviewed.   Constitutional:       General: She is not in acute distress.     Appearance: Normal appearance. She is well-developed. She is not diaphoretic.   HENT:      Head: Normocephalic.   Neck:      Thyroid: No thyromegaly.   Cardiovascular:      Rate and Rhythm: Normal rate and regular rhythm.   Pulmonary:      Effort: Pulmonary effort is normal. No respiratory distress.      Breath sounds: Normal breath sounds. No wheezing.   Abdominal:      General: Bowel sounds are normal. There is no distension.      Palpations: Abdomen is soft. Abdomen is not rigid. There is no mass.      Tenderness: There is no abdominal tenderness. There is no guarding or rebound.      Hernia: No hernia is present.   Genitourinary:     Comments: Rectal exam deferred  Musculoskeletal:         General: No tenderness.   Neurological:      Mental Status: She is alert and oriented to person, place, and time.      Motor: No atrophy.      Coordination: Coordination normal.   Psychiatric:         Behavior: Behavior normal.         Thought Content: Thought content normal.         Judgment: Judgment normal.             WBC   Date Value Ref Range Status   11/17/2020 6.10 3.40 - 10.80 10*3/mm3 Final     RBC   Date Value Ref Range Status   11/17/2020 3.88 3.77 - 5.28 10*6/mm3 Final     Hemoglobin   Date Value  Ref Range Status   11/17/2020 12.2 12.0 - 15.9 g/dL Final   02/10/2020 11.5 (L) 12.0 - 15.9 g/dL Final     Hematocrit   Date Value Ref Range Status   11/17/2020 36.4 34.0 - 46.6 % Final   02/10/2020 34.4 34.0 - 46.6 % Final     MCV   Date Value Ref Range Status   11/17/2020 93.8 79.0 - 97.0 fL Final   07/24/2019 97.2 (H) 79.0 - 97.0 fL Final     MCH   Date Value Ref Range Status   11/17/2020 31.4 26.6 - 33.0 pg Final   07/24/2019 29.2 26.6 - 33.0 pg Final     MCHC   Date Value Ref Range Status   11/17/2020 33.5 31.5 - 35.7 g/dL Final   07/24/2019 30.0 (L) 31.5 - 35.7 g/dL Final     RDW   Date Value Ref Range Status   11/17/2020 12.8 12.3 - 15.4 % Final   07/24/2019 14.5 12.3 - 15.4 % Final     RDW-SD   Date Value Ref Range Status   07/24/2019 51.3 37.0 - 54.0 fl Final     MPV   Date Value Ref Range Status   07/24/2019 9.7 6.0 - 12.0 fL Final     Platelets   Date Value Ref Range Status   11/17/2020 211 140 - 450 10*3/mm3 Final   07/24/2019 266 140 - 450 10*3/mm3 Final     Neutrophil Rel %   Date Value Ref Range Status   11/17/2020 64.8 42.7 - 76.0 % Final     Neutrophil %   Date Value Ref Range Status   07/24/2019 53.6 42.7 - 76.0 % Final     Lymphocyte Rel %   Date Value Ref Range Status   11/17/2020 26.9 19.6 - 45.3 % Final     Lymphocyte %   Date Value Ref Range Status   07/24/2019 35.8 19.6 - 45.3 % Final     Monocyte Rel %   Date Value Ref Range Status   11/17/2020 7.5 5.0 - 12.0 % Final     Monocyte %   Date Value Ref Range Status   07/24/2019 9.0 5.0 - 12.0 % Final     Eosinophil Rel %   Date Value Ref Range Status   11/17/2020 0.0 (L) 0.3 - 6.2 % Final     Eosinophil %   Date Value Ref Range Status   07/24/2019 0.0 (L) 0.3 - 6.2 % Final     Basophil Rel %   Date Value Ref Range Status   11/17/2020 0.3 0.0 - 1.5 % Final     Basophil %   Date Value Ref Range Status   07/24/2019 0.5 0.0 - 1.5 % Final     Immature Grans %   Date Value Ref Range Status   07/24/2019 1.1 (H) 0.0 - 0.5 % Final     Neutrophils  Absolute   Date Value Ref Range Status   11/17/2020 3.95 1.70 - 7.00 10*3/mm3 Final     Neutrophils, Absolute   Date Value Ref Range Status   07/24/2019 3.32 1.70 - 7.00 10*3/mm3 Final     Lymphocytes Absolute   Date Value Ref Range Status   11/17/2020 1.64 0.70 - 3.10 10*3/mm3 Final     Lymphocytes, Absolute   Date Value Ref Range Status   07/24/2019 2.22 0.70 - 3.10 10*3/mm3 Final     Monocytes Absolute   Date Value Ref Range Status   11/17/2020 0.46 0.10 - 0.90 10*3/mm3 Final     Monocytes, Absolute   Date Value Ref Range Status   07/24/2019 0.56 0.10 - 0.90 10*3/mm3 Final     Eosinophils Absolute   Date Value Ref Range Status   11/17/2020 0.00 0.00 - 0.40 10*3/mm3 Final     Eosinophils, Absolute   Date Value Ref Range Status   07/24/2019 0.00 0.00 - 0.40 10*3/mm3 Final     Basophils Absolute   Date Value Ref Range Status   11/17/2020 0.02 0.00 - 0.20 10*3/mm3 Final     Basophils, Absolute   Date Value Ref Range Status   07/24/2019 0.03 0.00 - 0.20 10*3/mm3 Final     Immature Grans, Absolute   Date Value Ref Range Status   07/24/2019 0.07 (H) 0.00 - 0.05 10*3/mm3 Final     nRBC   Date Value Ref Range Status   11/17/2020 0.0 0.0 - 0.2 /100 WBC Final   07/24/2019 0.0 0.0 - 0.2 /100 WBC Final       Lab Results   Component Value Date    GLUCOSE 161 (H) 12/06/2019    BUN 15 11/17/2020    CREATININE 0.70 11/17/2020    EGFRIFNONA 81 11/17/2020    EGFRIFAFRI 99 11/17/2020    BCR 21.4 11/17/2020    CO2 27.0 11/17/2020    CALCIUM 9.5 11/17/2020    PROTENTOTREF 7.8 11/17/2020    ALBUMIN 4.70 11/17/2020    LABIL2 1.5 11/17/2020    AST 17 11/17/2020    ALT 21 11/17/2020         Imaging Results (Last 7 Days)     ** No results found for the last 168 hours. **          I personally reviewed data as detailed below:     The labs listed above.    Endoscopy procedures and pathology from: 10/2019 colonoscopy; 2/2020 EGD      No notes on file    Assessment/Plan    1.  Ulcerative rectosigmoiditis: She is doing quite well on oral  mesalamine    2.  GERD: Only minimal changes of inflammation of the GE junction.  She has been doing really well on PPI    3.  Iron deficiency anemia: Related to #1.  She is on oral iron.  Last labs showed improvement.  No overt bleeding    Plan  Update labs today with CBC, CMP, ferritin, iron  Continue mesalamine  We will plan to update her colonoscopy later this year  Continue to monitor stools, monitor for overt bleeding    Diagnoses and all orders for this visit:    1. Ulcerative rectosigmoiditis with rectal bleeding (CMS/HCC) (Primary)  -     CBC & Differential  -     Comprehensive Metabolic Panel  -     Ferritin  -     Iron Profile    2. Gastroesophageal reflux disease without esophagitis  -     CBC & Differential  -     Comprehensive Metabolic Panel  -     Ferritin  -     Iron Profile    3. Iron deficiency anemia due to chronic blood loss  -     CBC & Differential  -     Comprehensive Metabolic Panel  -     Ferritin  -     Iron Profile        I have discussed the above plan with the patient.  They verbalize understanding and are in agreement with the plan.  They have been advised to contact the office for any questions, concerns, or changes related to their health.    Dictated utilizing Dragon dictation

## 2021-05-12 ENCOUNTER — TELEPHONE (OUTPATIENT)
Dept: GASTROENTEROLOGY | Facility: CLINIC | Age: 76
End: 2021-05-12

## 2021-05-12 DIAGNOSIS — I10 ESSENTIAL HYPERTENSION: Chronic | ICD-10-CM

## 2021-05-12 DIAGNOSIS — E11.9 TYPE 2 DIABETES MELLITUS WITHOUT COMPLICATION (HCC): Chronic | ICD-10-CM

## 2021-05-12 LAB
ALBUMIN SERPL-MCNC: 4.5 G/DL (ref 3.5–5.2)
ALBUMIN/GLOB SERPL: 1.7 G/DL
ALP SERPL-CCNC: 48 U/L (ref 39–117)
ALT SERPL-CCNC: 13 U/L (ref 1–33)
AST SERPL-CCNC: 15 U/L (ref 1–32)
BASOPHILS # BLD AUTO: 0.01 10*3/MM3 (ref 0–0.2)
BASOPHILS NFR BLD AUTO: 0.2 % (ref 0–1.5)
BILIRUB SERPL-MCNC: 0.2 MG/DL (ref 0–1.2)
BUN SERPL-MCNC: 15 MG/DL (ref 8–23)
BUN/CREAT SERPL: 22.7 (ref 7–25)
CALCIUM SERPL-MCNC: 9.2 MG/DL (ref 8.6–10.5)
CHLORIDE SERPL-SCNC: 105 MMOL/L (ref 98–107)
CO2 SERPL-SCNC: 24.4 MMOL/L (ref 22–29)
CREAT SERPL-MCNC: 0.66 MG/DL (ref 0.57–1)
EOSINOPHIL # BLD AUTO: 0 10*3/MM3 (ref 0–0.4)
EOSINOPHIL NFR BLD AUTO: 0 % (ref 0.3–6.2)
ERYTHROCYTE [DISTWIDTH] IN BLOOD BY AUTOMATED COUNT: 12.5 % (ref 12.3–15.4)
FERRITIN SERPL-MCNC: 64.3 NG/ML (ref 13–150)
GLOBULIN SER CALC-MCNC: 2.7 GM/DL
GLUCOSE SERPL-MCNC: 133 MG/DL (ref 65–99)
HCT VFR BLD AUTO: 34 % (ref 34–46.6)
HGB BLD-MCNC: 11.3 G/DL (ref 12–15.9)
IMM GRANULOCYTES # BLD AUTO: 0.04 10*3/MM3 (ref 0–0.05)
IMM GRANULOCYTES NFR BLD AUTO: 0.7 % (ref 0–0.5)
IRON SATN MFR SERPL: 13 % (ref 20–50)
IRON SERPL-MCNC: 53 MCG/DL (ref 37–145)
LYMPHOCYTES # BLD AUTO: 1.49 10*3/MM3 (ref 0.7–3.1)
LYMPHOCYTES NFR BLD AUTO: 25 % (ref 19.6–45.3)
MCH RBC QN AUTO: 32.5 PG (ref 26.6–33)
MCHC RBC AUTO-ENTMCNC: 33.2 G/DL (ref 31.5–35.7)
MCV RBC AUTO: 97.7 FL (ref 79–97)
MONOCYTES # BLD AUTO: 0.5 10*3/MM3 (ref 0.1–0.9)
MONOCYTES NFR BLD AUTO: 8.4 % (ref 5–12)
NEUTROPHILS # BLD AUTO: 3.91 10*3/MM3 (ref 1.7–7)
NEUTROPHILS NFR BLD AUTO: 65.7 % (ref 42.7–76)
NRBC BLD AUTO-RTO: 0 /100 WBC (ref 0–0.2)
PLATELET # BLD AUTO: 214 10*3/MM3 (ref 140–450)
POTASSIUM SERPL-SCNC: 3.8 MMOL/L (ref 3.5–5.2)
PROT SERPL-MCNC: 7.2 G/DL (ref 6–8.5)
RBC # BLD AUTO: 3.48 10*6/MM3 (ref 3.77–5.28)
SODIUM SERPL-SCNC: 144 MMOL/L (ref 136–145)
TIBC SERPL-MCNC: 413 MCG/DL
UIBC SERPL-MCNC: 360 MCG/DL (ref 112–346)
WBC # BLD AUTO: 5.95 10*3/MM3 (ref 3.4–10.8)

## 2021-05-12 RX ORDER — LISINOPRIL 20 MG/1
TABLET ORAL
Qty: 90 TABLET | Refills: 3 | Status: SHIPPED | OUTPATIENT
Start: 2021-05-12 | End: 2022-03-11

## 2021-05-12 RX ORDER — METFORMIN HYDROCHLORIDE 500 MG/1
TABLET, EXTENDED RELEASE ORAL
Qty: 360 TABLET | Refills: 1 | Status: CANCELLED | OUTPATIENT
Start: 2021-05-12

## 2021-05-12 RX ORDER — METFORMIN HYDROCHLORIDE 500 MG/1
TABLET, EXTENDED RELEASE ORAL
Qty: 360 TABLET | Refills: 3 | Status: SHIPPED | OUTPATIENT
Start: 2021-05-12 | End: 2022-02-02

## 2021-05-12 NOTE — TELEPHONE ENCOUNTER
PATIENT CALLED TO CHECK THE STATUS OF THIS REFILL REQUEST AND STATED THAT SHE IS COMPLETLEY OUT OF MEDICATION.    PLEASE ADVISE  [phone]

## 2021-05-12 NOTE — PROGRESS NOTES
Hemoglobin is 11.3 and stable.  Iron stores are in normal range.  Iron saturation is a little low.  Continue oral iron.  Continue current medications.

## 2021-05-12 NOTE — TELEPHONE ENCOUNTER
----- Message from Melissa Burleson MD sent at 5/12/2021 11:36 AM EDT -----  Hemoglobin is 11.3 and stable.  Iron stores are in normal range.  Iron saturation is a little low.  Continue oral iron.  Continue current medications.

## 2021-06-07 DIAGNOSIS — E11.9 TYPE 2 DIABETES MELLITUS WITHOUT COMPLICATION, WITHOUT LONG-TERM CURRENT USE OF INSULIN (HCC): Chronic | ICD-10-CM

## 2021-06-07 RX ORDER — BLOOD SUGAR DIAGNOSTIC
STRIP MISCELLANEOUS
Qty: 100 EACH | Refills: 11 | Status: SHIPPED | OUTPATIENT
Start: 2021-06-07 | End: 2022-08-15

## 2021-07-07 RX ORDER — PANTOPRAZOLE SODIUM 40 MG/1
40 TABLET, DELAYED RELEASE ORAL
Qty: 30 TABLET | Refills: 3 | Status: SHIPPED | OUTPATIENT
Start: 2021-07-07 | End: 2021-11-03

## 2021-07-15 ENCOUNTER — TELEPHONE (OUTPATIENT)
Dept: GASTROENTEROLOGY | Facility: CLINIC | Age: 76
End: 2021-07-15

## 2021-07-15 DIAGNOSIS — K51.311 ULCERATIVE RECTOSIGMOIDITIS WITH RECTAL BLEEDING (HCC): Chronic | ICD-10-CM

## 2021-07-15 RX ORDER — MESALAMINE 1.2 G/1
4.8 TABLET, DELAYED RELEASE ORAL
Qty: 120 TABLET | Refills: 5 | Status: SHIPPED | OUTPATIENT
Start: 2021-07-15 | End: 2022-01-04 | Stop reason: SDUPTHER

## 2021-07-15 NOTE — TELEPHONE ENCOUNTER
----- Message from Simone Delgado sent at 7/15/2021  2:26 PM EDT -----  Regarding: Prescription  Contact: [phone]  Pt requesting refill for mesalamine (LIALDA) 1.2 g EC tablet to be sent to [facility].

## 2021-07-26 ENCOUNTER — OFFICE VISIT (OUTPATIENT)
Dept: INTERNAL MEDICINE | Age: 76
End: 2021-07-26

## 2021-07-26 VITALS
OXYGEN SATURATION: 99 % | BODY MASS INDEX: 21.53 KG/M2 | HEART RATE: 56 BPM | SYSTOLIC BLOOD PRESSURE: 130 MMHG | DIASTOLIC BLOOD PRESSURE: 60 MMHG | TEMPERATURE: 97.1 F | HEIGHT: 62 IN | WEIGHT: 117 LBS

## 2021-07-26 DIAGNOSIS — E78.2 MIXED HYPERLIPIDEMIA: ICD-10-CM

## 2021-07-26 DIAGNOSIS — I10 ESSENTIAL HYPERTENSION: ICD-10-CM

## 2021-07-26 DIAGNOSIS — E11.59 TYPE 2 DIABETES MELLITUS WITH OTHER CIRCULATORY COMPLICATION, WITHOUT LONG-TERM CURRENT USE OF INSULIN (HCC): Primary | ICD-10-CM

## 2021-07-26 DIAGNOSIS — I25.10 CORONARY ARTERY DISEASE INVOLVING NATIVE CORONARY ARTERY OF NATIVE HEART WITHOUT ANGINA PECTORIS: ICD-10-CM

## 2021-07-26 PROCEDURE — 99214 OFFICE O/P EST MOD 30 MIN: CPT | Performed by: INTERNAL MEDICINE

## 2021-07-26 RX ORDER — ROSUVASTATIN CALCIUM 10 MG/1
10 TABLET, COATED ORAL DAILY
COMMUNITY
Start: 2021-07-26 | End: 2022-10-06 | Stop reason: SDUPTHER

## 2021-07-26 NOTE — PROGRESS NOTES
I N T E R N A L  M E D I C I N E  J U N O H  K I M,  M D      ENCOUNTER DATE:  07/26/2021    Renee PARIKH From / 76 y.o. 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          Relevant Medications    metFORMIN ER (GLUCOPHAGE-XR) 500 MG 24 hr tablet    Accu-Chek Saira Plus test strip    Other Relevant Orders    Hemoglobin A1c       Medium    Hypertension (Chronic)    Overview     Continue lisinopril 20 mg, amlodipine 5 mg qd and metoprolol tartrate 50 mg BID.          Relevant Medications    metoprolol tartrate (LOPRESSOR) 50 MG tablet    amLODIPine (NORVASC) 5 MG tablet    lisinopril (PRINIVIL,ZESTRIL) 20 MG tablet    Other Relevant Orders    TSH+Free T4    Hyperlipidemia (Chronic)    Overview     Continue rosuvastatin 10 mg daily.          Relevant Medications    rosuvastatin (CRESTOR) 10 MG tablet       Low    Coronary artery disease involving native coronary artery of native heart without angina pectoris (Chronic)    Overview     *[facility]    Stable. Continue statin, bblocker. Not on ASA due to recent LGIB         Relevant Medications    metoprolol tartrate (LOPRESSOR) 50 MG tablet    amLODIPine (NORVASC) 5 MG tablet    rosuvastatin (CRESTOR) 10 MG tablet        Orders Placed This Encounter   Procedures   • Hemoglobin A1c   • TSH+Free T4     No orders of the defined types were placed in this encounter.      SUMMARY/DISCUSSION  •       Next Appointment with me: Visit date not found    Return in about 6 months (around 1/26/2022) for COMBINED SUBSEQUENT AWV and medical f/u (30 min).        VITAL SIGNS     Visit Vitals  /60 (BP Location: Left arm)   Pulse  "56   Temp 97.1 °F (36.2 °C)   Ht 157.5 cm (62\")   Wt 53.1 kg (117 lb)   SpO2 99%   BMI 21.40 kg/m²       BP Readings from Last 3 Encounters:   07/26/21 130/60   05/11/21 128/70   02/24/21 144/72     Wt Readings from Last 3 Encounters:   07/26/21 53.1 kg (117 lb)   05/11/21 56.2 kg (124 lb)   02/24/21 54.4 kg (120 lb)     Body mass index is 21.4 kg/m².      MEDICATIONS AT THE TIME OF OFFICE VISIT     Current Outpatient Medications on File Prior to Visit   Medication Sig   • Accu-Chek Saira Plus test strip TEST BLODO SUGAR EVERY DAY   • Acetaminophen (TYLENOL PO) Take 2 tablets by mouth As Needed.   • amLODIPine (NORVASC) 5 MG tablet TAKE 1 TABLET BY MOUTH DAILY   • Calcium Carb-Cholecalciferol (calcium-vitamin D) 500-200 MG-UNIT per tablet Take 1 tablet by mouth Daily.   • cholecalciferol (VITAMIN D3) 25 MCG (1000 UT) tablet Take 1,000 Units by mouth Daily.   • dorzolamide-timolol (COSOPT) 22.3-6.8 MG/ML ophthalmic solution Apply 1 drop to eye 2 (two) times a day.   • ferrous sulfate 325 (65 FE) MG tablet Take 1 tablet by mouth Daily With Breakfast.   • hydrocortisone (ANUSOL-HC) 2.5 % rectal cream Insert  into the rectum 2 (Two) Times a Day. (Patient taking differently: Insert  into the rectum 2 (Two) Times a Day. prn)   • lisinopril (PRINIVIL,ZESTRIL) 20 MG tablet TAKE 1 TABLET BY MOUTH EVERY DAY   • mesalamine (LIALDA) 1.2 g EC tablet Take 4 tablets by mouth Daily With Breakfast.   • metFORMIN ER (GLUCOPHAGE-XR) 500 MG 24 hr tablet TAKE 2 TABLETS BY MOUTH TWICE DAILY   • metoprolol tartrate (LOPRESSOR) 50 MG tablet TAKE 1 TABLET BY MOUTH EVERY 12 HOURS   • Oyster Shell (OYSCO 500 PO) Take 500 mg by mouth Daily.   • pantoprazole (PROTONIX) 40 MG EC tablet TAKE 1 TABLET BY MOUTH EVERY MORNING BEFORE BREAKFAST   • rosuvastatin (CRESTOR) 10 MG tablet Take 1 tablet by mouth Daily.       HISTORY OF PRESENT ILLNESS     Denies significant changes or problems.   No angina or MUNGUIA.   Diabetes and blood pressure remain " stable.     Lab Results   Component Value Date    HGBA1C 5.80 (H) 02/24/2021    HGBA1C 6.00 (H) 08/17/2020    HGBA1C 6.70 (H) 03/16/2020    CREATININE 0.66 05/11/2021    LDL 51 02/24/2021    MICROALBUR 40.4 08/17/2020          REVIEW OF SYSTEMS     Constitutional neg except per HPI   Resp neg  CV neg   GI neg   Neuro neg       PHYSICAL EXAMINATION     Physical Exam  General: No acute distress, alert with normal judgment   Cardiovascular Rate: normal. Rhythm: regular. Heart sounds: normal   Pulm/Chest: Effort normal, breath sounds normal.       REVIEWED DATA     Labs:       Lab Results   Component Value Date     05/11/2021    K 3.8 05/11/2021    CALCIUM 9.2 05/11/2021    AST 15 05/11/2021    ALT 13 05/11/2021    BUN 15 05/11/2021    CREATININE 0.66 05/11/2021    CREATININE 0.70 11/17/2020    CREATININE 0.87 07/30/2020    EGFRIFNONA 87 05/11/2021    EGFRIFAFRI 105 05/11/2021       Lab Results   Component Value Date    HGBA1C 5.80 (H) 02/24/2021    HGBA1C 6.00 (H) 08/17/2020    HGBA1C 6.70 (H) 03/16/2020       Lab Results   Component Value Date    LDL 51 02/24/2021    LDL 50 03/16/2020    HDL 50 02/24/2021    TRIG 97 02/24/2021       No results found for: TSH, FREET4    Lab Results   Component Value Date    WBC 5.95 05/11/2021    HGB 11.3 (L) 05/11/2021     05/11/2021         Imaging:           Medical Tests:           Summary of old records / correspondence / consultant report:           Request outside records:           *Examiner was wearing [id number] mask during the entire duration of the visit. Patient was masked the entire time.   Minimum social distance of 6 ft maintained entire visit except if physical contact was necessary as documented.     **Dragon Disclaimer:   Much of this encounter note is an electronic transcription/translation of spoken language to printed text. The electronic translation of spoken language may permit erroneous, or at times, nonsensical words or phrases to be inadvertently  transcribed. Although I have reviewed the note for such errors, some may still exist.     Template created by Zachery Pacheco MD

## 2021-07-27 LAB
HBA1C MFR BLD: 6.3 % (ref 4.8–5.6)
T4 FREE SERPL-MCNC: 1.19 NG/DL (ref 0.93–1.7)
TSH SERPL DL<=0.005 MIU/L-ACNC: 2.91 UIU/ML (ref 0.27–4.2)

## 2021-11-03 RX ORDER — PANTOPRAZOLE SODIUM 40 MG/1
40 TABLET, DELAYED RELEASE ORAL
Qty: 30 TABLET | Refills: 3 | Status: SHIPPED | OUTPATIENT
Start: 2021-11-03 | End: 2022-01-04 | Stop reason: SDUPTHER

## 2021-11-10 ENCOUNTER — APPOINTMENT (OUTPATIENT)
Dept: WOMENS IMAGING | Facility: HOSPITAL | Age: 76
End: 2021-11-10

## 2021-11-10 PROCEDURE — 77067 SCR MAMMO BI INCL CAD: CPT | Performed by: RADIOLOGY

## 2021-11-10 PROCEDURE — 77063 BREAST TOMOSYNTHESIS BI: CPT | Performed by: RADIOLOGY

## 2021-12-21 DIAGNOSIS — I10 ESSENTIAL HYPERTENSION: Chronic | ICD-10-CM

## 2021-12-21 RX ORDER — AMLODIPINE BESYLATE 5 MG/1
5 TABLET ORAL DAILY
Qty: 30 TABLET | Refills: 11 | Status: SHIPPED | OUTPATIENT
Start: 2021-12-21 | End: 2022-10-26

## 2021-12-27 RX ORDER — CALCIUM CARBONATE/VITAMIN D3 500MG-5MCG
TABLET ORAL
Qty: 90 TABLET | Refills: 3 | Status: SHIPPED | OUTPATIENT
Start: 2021-12-27 | End: 2022-12-18 | Stop reason: SDUPTHER

## 2022-01-01 NOTE — PROGRESS NOTES
"Renee PARIKH From / 74 y.o. / female  Encounter Date: 07/17/2019    ASSESSMENT & PLAN:    Problem List Items Addressed This Visit     None      Visit Diagnoses     Cough    -  Primary    Relevant Medications    promethazine-dextromethorphan (PROMETHAZINE-DM) 6.25-15 MG/5ML syrup    Other Relevant Orders    XR Chest PA & Lateral    URI, acute        Relevant Medications    promethazine-dextromethorphan (PROMETHAZINE-DM) 6.25-15 MG/5ML syrup    Other Relevant Orders    XR Chest PA & Lateral    Fever and chills        Relevant Orders    XR Chest PA & Lateral        Orders Placed This Encounter   Procedures   • XR Chest PA & Lateral     New Medications Ordered This Visit   Medications   • promethazine-dextromethorphan (PROMETHAZINE-DM) 6.25-15 MG/5ML syrup     Sig: Take 5 mL by mouth 4 (Four) Times a Day As Needed for Cough.     Dispense:  180 mL     Refill:  0       Summary/Discussion:  1. Check CXR for fever and URI symptoms including cough and SOA. Treatment pending results.   2. Advised patient to use cough syrup prescription as ordered, for cough suppressant, as needed.   3. Advised her it is ok to use saline irrigation for symptom relief. Make sure to get plenty of rest, maintain good hydration, wash hands frequently, avoid known sick contacts, and call if symptoms become notably worse.    Return if symptoms worsen or fail to improve, for Next scheduled follow up.  ________________________________________________________________    VITALS:    Visit Vitals  /60   Pulse 71   Temp 98.1 °F (36.7 °C) (Temporal)   Ht 157.5 cm (62.01\")   Wt 50.8 kg (112 lb)   SpO2 99%   BMI 20.48 kg/m²       BP Readings from Last 3 Encounters:   07/17/19 122/60   06/18/19 105/53   06/11/19 109/52     Wt Readings from Last 3 Encounters:   07/17/19 50.8 kg (112 lb)   05/24/19 55.2 kg (121 lb 12.8 oz)   05/16/19 51.3 kg (113 lb)      Body mass index is 20.48 kg/m².    CC: Main reason(s) for today's visit: Cough; Headache; Sore Throat; and " Generalized Body Aches      HPI    Patient is a 74 y.o. female who is here for URI complaints x 2 days.  She is a new patient to me.    Complains of low grade fever (<101.5), headache, bodyache, post nasal drip, sore throat, cough (dry) and generalized weakness for 2 days without improvement in sx's. Denies nasal congestion, sinus pressure/pain, wheezing, shortness of breath, diarrhea and vomiting.  Pt has been around sick contacts: Yes  OTC medications tried: acetaminophen      Patient Care Team:  Esequiel Pacheco MD as PCP - General  Esequiel Pacheco MD as PCP - Claims Attributed  Ministerio Wells MD as Consulting Physician (Orthopedic Surgery)  Abhay Wooten MD as Consulting Physician (Cardiology)  Cisco Husain MD as Consulting Physician (Otolaryngology)  EZIO Garibay MD as Consulting Physician (Ophthalmology)  Melissa Burleson MD as Consulting Physician (Gastroenterology)  ____________________________________________________________________    REVIEW OF SYSTEMS  See HPI note  Review of Systems   Cardiovascular: Negative.    Gastrointestinal: Negative.    Musculoskeletal: Negative.    Neurological: Negative.        PHYSICAL EXAMINATION    Physical Exam   Constitutional: She is oriented to person, place, and time. She appears well-developed and well-nourished. She appears distressed.   HENT:   Right Ear: External ear normal.   Left Ear: External ear normal.   Nose: Nose normal.   Mouth/Throat: Oropharyngeal exudate present.   Eyes: Conjunctivae are normal. Pupils are equal, round, and reactive to light.   Neck: Normal range of motion. Neck supple.   Cardiovascular: Normal rate, regular rhythm and normal heart sounds.   Pulmonary/Chest: Effort normal and breath sounds normal. No respiratory distress. She has no wheezes.   Persistent dry cough throughout exam     Abdominal: Soft. Bowel sounds are normal. There is no tenderness.   Lymphadenopathy:     She has no cervical adenopathy.   Neurological: She is alert  and oriented to person, place, and time.   Skin: Skin is warm and dry.   Vitals reviewed.    REVIEWED DATA:    Labs:   Lab Results   Component Value Date     05/16/2019    K 4.5 05/16/2019    AST 13 05/16/2019    ALT 10 05/16/2019    BUN 11 05/16/2019    CREATININE 0.67 05/16/2019    CREATININE 0.67 12/16/2018    CREATININE 0.79 12/15/2018    EGFRIFNONA 86 05/16/2019    EGFRIFAFRI 104 05/16/2019       Lab Results   Component Value Date    GLUCOSE 168 (H) 12/16/2018    GLUCOSE 186 (H) 12/15/2018    GLUCOSE 149 (H) 11/01/2018    HGBA1C 5.50 05/16/2019    HGBA1C 6.30 (H) 07/30/2018    HGBA1C 6.02 (H) 02/19/2018    MICROALBUR 20.9 05/16/2019       Lab Results   Component Value Date    LDL 52 05/16/2019    LDL 61 02/19/2018    LDL 69 08/12/2016    HDL 42 05/16/2019    TRIG 113 05/16/2019    CHOLHDLRATIO 2.79 05/16/2019       No results found for: TSH, FREET4       Lab Results   Component Value Date    WBC 4.91 07/03/2019    HGB 9.2 (L) 07/03/2019    HGB 8.6 (L) 05/24/2019    HGB 10.1 (L) 03/06/2019     07/03/2019         Imaging:      Medical Tests:      Summary of old records / correspondence / consultant report:      Request outside records:   ______________________________________________________________________    ALLERGIES  Allergies   Allergen Reactions   • Tetanus Toxoids Swelling     Hand and arm        MEDICATIONS  Current Outpatient Medications on File Prior to Visit   Medication Sig   • Acetaminophen (TYLENOL PO) Take 2 tablets by mouth As Needed.   • amLODIPine (NORVASC) 5 MG tablet TAKE 1 TABLET BY MOUTH DAILY   • Cholecalciferol (VITAMIN D) 2000 UNITS capsule Take 1 capsule by mouth daily.   • CONTOUR NEXT TEST test strip CHECK BLOOD SUGAR EVERY DAY   • dorzolamide-timolol (COSOPT) 22.3-6.8 MG/ML ophthalmic solution Apply 1 drop to eye 2 (two) times a day.   • ferrous sulfate 325 (65 FE) MG tablet Take 1 tablet by mouth Daily With Breakfast.   • hydrocortisone (ANUSOL-HC) 2.5 % rectal cream  Insert  into the rectum 2 (Two) Times a Day.   • lisinopril (PRINIVIL,ZESTRIL) 20 MG tablet TAKE 1 TABLET BY MOUTH EVERY DAY   • mesalamine (LIALDA) 1.2 g EC tablet TAKE 4 TABLETS BY MOUTH DAILY WITH BREAKFAST   • mesalamine (ROWASA) 4 g enema INSERT 1 ENEMA INTO THE RECTUM EVERY EVENING   • metFORMIN ER (GLUCOPHAGE-XR) 500 MG 24 hr tablet TAKE 2 TABLETS BY MOUTH TWICE DAILY   • metoprolol tartrate (LOPRESSOR) 50 MG tablet TAKE 1 TABLET BY MOUTH EVERY 12 HOURS   • Multiple Vitamin (MULTI-VITAMIN PO) Take 1 tablet by mouth Daily.   • omeprazole (PriLOSEC) 40 MG capsule Take 1 capsule by mouth daily.   • rosuvastatin (CRESTOR) 10 MG tablet Take 1 tablet by mouth Daily.     No current facility-administered medications on file prior to visit.        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)   • Osteopenia   • Hypertension   • Hyperlipidemia   • Colon polyp   • Gastroesophageal reflux disease   • Glaucoma   • Takotsubo cardiomyopathy   • Coronary artery disease involving native coronary artery of native heart without angina pectoris   • Ulcerative colitis with rectal bleeding (CMS/HCC)   • Acute post-hemorrhagic anemia   • Iron deficiency anemia secondary to inadequate dietary iron intake   • Iron deficiency anemia due to chronic blood loss   • Weight loss       PAST MEDICAL HISTORY  Past Medical History:   Diagnosis Date   • Allergic rhinitis    • Colitis    • Colon polyps    • Diabetes mellitus (CMS/HCC)     type 2   • Dizziness    • Encounter for breast cancer screening other than mammogram    • GERD (gastroesophageal reflux disease)    • GI (gastrointestinal bleed)    • Glaucoma    • Hyperlipidemia    • Hypertension    • Knee  fracture, left    • Non-STEMI (non-ST elevated myocardial infarction) (CMS/[facility]) 6/16/2017   • Osteopenia    • Ulcerative colitis (CMS/[facility])        SURGICAL HISTORY  Past Surgical History:   Procedure Laterality Date   • CARDIAC CATHETERIZATION N/A 6/16/2017    Procedure: Left Heart Cath;  Surgeon: Abhay Wooten MD;  Location[facility];  Service:    • CARDIAC CATHETERIZATION N/A 6/16/2017    Procedure: Left ventriculography;  Surgeon: Abhay Wooten MD;  Location[facility] LOC[facility];  Service:    • CARDIAC CATHETERIZATION N/A 6/16/2017    Procedure: Coronary angiography;  Surgeon: Abhay Wooten MD;  Location[facility] LOC[facility];  Service:    • CATARACT EXTRACTION     • COLONOSCOPY  08/14/2018   • EYE SURGERY      cataract surgery   • PAP SMEAR         SOCIAL HISTORY  Social History     Socioeconomic History   • Marital status:      Spouse name: Not on file   • Number of children: 1   • Years of education: Not on file   • Highest education level: Not on file   Occupational History   • Occupation: retail     Comment: retired   Tobacco Use   • Smoking status: Never Smoker   • Smokeless tobacco: Never Used   Substance and Sexual Activity   • Alcohol use: No   • Drug use: No   • Sexual activity: Not Currently     Partners: Male       FAMILY HISTORY  Family History   Problem Relation Age of Onset   • Heart disease Mother    • Hypertension Mother    • Diabetes Mother    • Heart disease Father    • Hypertension Father    • Heart disease Sister    • Heart disease Brother    • Hypertension Other    • Diabetes Other         type 2   • No Known Problems Maternal Grandmother    • No Known Problems Maternal Grandfather    • No Known Problems Paternal Grandmother    • No Known Problems Paternal Grandfather    • Crohn's disease Niece         PROVIDER:[TOKEN:[[id number]],FOLLOWUP:[1-3 days]]

## 2022-01-04 ENCOUNTER — OFFICE VISIT (OUTPATIENT)
Dept: GASTROENTEROLOGY | Facility: CLINIC | Age: 77
End: 2022-01-04

## 2022-01-04 VITALS
DIASTOLIC BLOOD PRESSURE: 65 MMHG | HEIGHT: 62 IN | WEIGHT: 119 LBS | SYSTOLIC BLOOD PRESSURE: 130 MMHG | BODY MASS INDEX: 21.9 KG/M2

## 2022-01-04 DIAGNOSIS — K21.9 GASTROESOPHAGEAL REFLUX DISEASE WITHOUT ESOPHAGITIS: Chronic | ICD-10-CM

## 2022-01-04 DIAGNOSIS — D50.0 IRON DEFICIENCY ANEMIA DUE TO CHRONIC BLOOD LOSS: Chronic | ICD-10-CM

## 2022-01-04 DIAGNOSIS — K51.311 ULCERATIVE RECTOSIGMOIDITIS WITH RECTAL BLEEDING: Primary | Chronic | ICD-10-CM

## 2022-01-04 PROCEDURE — 99214 OFFICE O/P EST MOD 30 MIN: CPT | Performed by: INTERNAL MEDICINE

## 2022-01-04 RX ORDER — PANTOPRAZOLE SODIUM 40 MG/1
40 TABLET, DELAYED RELEASE ORAL
Qty: 90 TABLET | Refills: 3 | Status: SHIPPED | OUTPATIENT
Start: 2022-01-04 | End: 2022-10-28 | Stop reason: SDUPTHER

## 2022-01-04 RX ORDER — MESALAMINE 1.2 G/1
4.8 TABLET, DELAYED RELEASE ORAL
Qty: 120 TABLET | Refills: 5 | Status: SHIPPED | OUTPATIENT
Start: 2022-01-04 | End: 2022-07-11 | Stop reason: SDUPTHER

## 2022-01-04 NOTE — PROGRESS NOTES
Chief Complaint   Patient presents with   • Ulcerative Colitis   • Heartburn   • Anemia       Subjective     HPI    Renee Stveenson is a 76 y.o. female with a past medical history noted below who presents for left-sided UC, iron deficiency anemia, GERD, weight loss.    No blood in her stool for about 2 years.     Heartburn well controlled on pantoprazole.      Rare diarrhea, rare cramping.  BMs are usually pretty normal and stable    Adherent to mesalamine, ppi.    Feels well.  Has been off iron supplementation for about 1 month.      Taking vitamin D regularly.      Last colonoscopy 10/2019,      Today's visit was in the office.  Both the patient and I were wearing face masks and proper hand hygiene was performed before and after the physical exam.           Current Outpatient Medications:   •  Accu-Chek Saira Plus test strip, TEST BLODO SUGAR EVERY DAY, Disp: 100 each, Rfl: 11  •  Acetaminophen (TYLENOL PO), Take 2 tablets by mouth As Needed., Disp: , Rfl:   •  amLODIPine (NORVASC) 5 MG tablet, TAKE 1 TABLET BY MOUTH DAILY, Disp: 30 tablet, Rfl: 11  •  cholecalciferol (VITAMIN D3) 25 MCG (1000 UT) tablet, Take 1,000 Units by mouth Daily., Disp: , Rfl:   •  dorzolamide-timolol (COSOPT) 22.3-6.8 MG/ML ophthalmic solution, Apply 1 drop to eye 2 (two) times a day., Disp: , Rfl:   •  ferrous sulfate 325 (65 FE) MG tablet, Take 1 tablet by mouth Daily With Breakfast., Disp: 30 tablet, Rfl: 0  •  hydrocortisone (ANUSOL-HC) 2.5 % rectal cream, Insert  into the rectum 2 (Two) Times a Day. (Patient taking differently: Insert  into the rectum 2 (Two) Times a Day. prn), Disp: 28 g, Rfl: 0  •  lisinopril (PRINIVIL,ZESTRIL) 20 MG tablet, TAKE 1 TABLET BY MOUTH EVERY DAY, Disp: 90 tablet, Rfl: 3  •  mesalamine (LIALDA) 1.2 g EC tablet, Take 4 tablets by mouth Daily With Breakfast., Disp: 120 tablet, Rfl: 5  •  metFORMIN ER (GLUCOPHAGE-XR) 500 MG 24 hr tablet, TAKE 2 TABLETS BY MOUTH TWICE DAILY, Disp: 360 tablet, Rfl: 3  •   metoprolol tartrate (LOPRESSOR) 50 MG tablet, TAKE 1 TABLET BY MOUTH EVERY 12 HOURS, Disp: 180 tablet, Rfl: 3  •  OYSCO 500 + D 500-200 MG-UNIT tablet, TAKE 1 TABLET BY MOUTH DAILY, Disp: 90 tablet, Rfl: 3  •  Oyster Shell (OYSCO 500 PO), Take 500 mg by mouth Daily., Disp: , Rfl:   •  pantoprazole (PROTONIX) 40 MG EC tablet, Take 1 tablet by mouth Every Morning Before Breakfast., Disp: 90 tablet, Rfl: 3  •  rosuvastatin (CRESTOR) 10 MG tablet, Take 1 tablet by mouth Daily., Disp: , Rfl:       Objective     Vitals:    01/04/22 1450   BP: 130/65         01/04/22  1450   Weight: 54 kg (119 lb)     Body mass index is 21.77 kg/m².    Physical Exam  Constitutional:       General: She is not in acute distress.  Pulmonary:      Effort: Pulmonary effort is normal.   Neurological:      Mental Status: She is alert and oriented to person, place, and time.   Psychiatric:         Mood and Affect: Mood normal.         Behavior: Behavior normal.         Thought Content: Thought content normal.         Judgment: Judgment normal.             WBC   Date Value Ref Range Status   05/11/2021 5.95 3.40 - 10.80 10*3/mm3 Final     RBC   Date Value Ref Range Status   05/11/2021 3.48 (L) 3.77 - 5.28 10*6/mm3 Final     Hemoglobin   Date Value Ref Range Status   05/11/2021 11.3 (L) 12.0 - 15.9 g/dL Final   02/10/2020 11.5 (L) 12.0 - 15.9 g/dL Final     Hematocrit   Date Value Ref Range Status   05/11/2021 34.0 34.0 - 46.6 % Final   02/10/2020 34.4 34.0 - 46.6 % Final     MCV   Date Value Ref Range Status   05/11/2021 97.7 (H) 79.0 - 97.0 fL Final   07/24/2019 97.2 (H) 79.0 - 97.0 fL Final     MCH   Date Value Ref Range Status   05/11/2021 32.5 26.6 - 33.0 pg Final   07/24/2019 29.2 26.6 - 33.0 pg Final     MCHC   Date Value Ref Range Status   05/11/2021 33.2 31.5 - 35.7 g/dL Final   07/24/2019 30.0 (L) 31.5 - 35.7 g/dL Final     RDW   Date Value Ref Range Status   05/11/2021 12.5 12.3 - 15.4 % Final   07/24/2019 14.5 12.3 - 15.4 % Final      RDW-SD   Date Value Ref Range Status   07/24/2019 51.3 37.0 - 54.0 fl Final     MPV   Date Value Ref Range Status   07/24/2019 9.7 6.0 - 12.0 fL Final     Platelets   Date Value Ref Range Status   05/11/2021 214 140 - 450 10*3/mm3 Final   07/24/2019 266 140 - 450 10*3/mm3 Final     Neutrophil Rel %   Date Value Ref Range Status   05/11/2021 65.7 42.7 - 76.0 % Final     Neutrophil %   Date Value Ref Range Status   07/24/2019 53.6 42.7 - 76.0 % Final     Lymphocyte Rel %   Date Value Ref Range Status   05/11/2021 25.0 19.6 - 45.3 % Final     Lymphocyte %   Date Value Ref Range Status   07/24/2019 35.8 19.6 - 45.3 % Final     Monocyte Rel %   Date Value Ref Range Status   05/11/2021 8.4 5.0 - 12.0 % Final     Monocyte %   Date Value Ref Range Status   07/24/2019 9.0 5.0 - 12.0 % Final     Eosinophil Rel %   Date Value Ref Range Status   05/11/2021 0.0 (L) 0.3 - 6.2 % Final     Eosinophil %   Date Value Ref Range Status   07/24/2019 0.0 (L) 0.3 - 6.2 % Final     Basophil Rel %   Date Value Ref Range Status   05/11/2021 0.2 0.0 - 1.5 % Final     Basophil %   Date Value Ref Range Status   07/24/2019 0.5 0.0 - 1.5 % Final     Immature Grans %   Date Value Ref Range Status   07/24/2019 1.1 (H) 0.0 - 0.5 % Final     Neutrophils Absolute   Date Value Ref Range Status   05/11/2021 3.91 1.70 - 7.00 10*3/mm3 Final     Neutrophils, Absolute   Date Value Ref Range Status   07/24/2019 3.32 1.70 - 7.00 10*3/mm3 Final     Lymphocytes Absolute   Date Value Ref Range Status   05/11/2021 1.49 0.70 - 3.10 10*3/mm3 Final     Lymphocytes, Absolute   Date Value Ref Range Status   07/24/2019 2.22 0.70 - 3.10 10*3/mm3 Final     Monocytes Absolute   Date Value Ref Range Status   05/11/2021 0.50 0.10 - 0.90 10*3/mm3 Final     Monocytes, Absolute   Date Value Ref Range Status   07/24/2019 0.56 0.10 - 0.90 10*3/mm3 Final     Eosinophils Absolute   Date Value Ref Range Status   05/11/2021 0.00 0.00 - 0.40 10*3/mm3 Final     Eosinophils,  Absolute   Date Value Ref Range Status   07/24/2019 0.00 0.00 - 0.40 10*3/mm3 Final     Basophils Absolute   Date Value Ref Range Status   05/11/2021 0.01 0.00 - 0.20 10*3/mm3 Final     Basophils, Absolute   Date Value Ref Range Status   07/24/2019 0.03 0.00 - 0.20 10*3/mm3 Final     Immature Grans, Absolute   Date Value Ref Range Status   07/24/2019 0.07 (H) 0.00 - 0.05 10*3/mm3 Final     nRBC   Date Value Ref Range Status   05/11/2021 0.0 0.0 - 0.2 /100 WBC Final   07/24/2019 0.0 0.0 - 0.2 /100 WBC Final       Lab Results   Component Value Date    GLUCOSE 133 (H) 05/11/2021    BUN 15 05/11/2021    CREATININE 0.66 05/11/2021    EGFRIFNONA 87 05/11/2021    EGFRIFAFRI 105 05/11/2021    BCR 22.7 05/11/2021    CO2 24.4 05/11/2021    CALCIUM 9.2 05/11/2021    PROTENTOTREF 7.2 05/11/2021    ALBUMIN 4.50 05/11/2021    LABIL2 1.7 05/11/2021    AST 15 05/11/2021    ALT 13 05/11/2021         Imaging Results (Last 7 Days)     ** No results found for the last 168 hours. **          I personally reviewed data as detailed below:     7/26/21 pcp notes        No notes on file    Assessment/Plan    1.  Ulcerative rectosigmoiditis: Symptom control on oral mesalamine.  She has had flares when dosing has been decreased so we will keep her at 4.8 g daily.  Due for updating her colonoscopy    2.  GERD: Well controlled on daily PPI    3.  Iron deficiency anemia: She has been off oral iron for about a month, update labs    Plan  Discussed needing to update her colonoscopy, she wishes to wait to the spring/summer due to current high Covid case levels  Continue Lialda  Continue pantoprazole  We will update labs with CBC, CMP, CRP, vitamin D levels, iron studies    Diagnoses and all orders for this visit:    1. Ulcerative rectosigmoiditis with rectal bleeding (HCC) (Primary)  -     mesalamine (LIALDA) 1.2 g EC tablet; Take 4 tablets by mouth Daily With Breakfast.  Dispense: 120 tablet; Refill: 5  -     Iron Profile  -     Ferritin  -      Comprehensive Metabolic Panel  -     CBC & Differential  -     Vitamin D 25 Hydroxy  -     C-reactive Protein    2. Gastroesophageal reflux disease without esophagitis  -     Iron Profile  -     Ferritin  -     Comprehensive Metabolic Panel  -     CBC & Differential  -     Vitamin D 25 Hydroxy  -     C-reactive Protein    3. Iron deficiency anemia due to chronic blood loss  -     Iron Profile  -     Ferritin  -     Comprehensive Metabolic Panel  -     CBC & Differential  -     Vitamin D 25 Hydroxy  -     C-reactive Protein    Other orders  -     pantoprazole (PROTONIX) 40 MG EC tablet; Take 1 tablet by mouth Every Morning Before Breakfast.  Dispense: 90 tablet; Refill: 3        I have discussed the above plan with the patient.  They verbalize understanding and are in agreement with the plan.  They have been advised to contact the office for any questions, concerns, or changes related to their health.    Dictated utilizing Dragon dictation

## 2022-01-05 ENCOUNTER — TELEPHONE (OUTPATIENT)
Dept: GASTROENTEROLOGY | Facility: CLINIC | Age: 77
End: 2022-01-05

## 2022-01-05 LAB
25(OH)D3+25(OH)D2 SERPL-MCNC: 29.4 NG/ML (ref 30–100)
ALBUMIN SERPL-MCNC: 4.5 G/DL (ref 3.7–4.7)
ALBUMIN/GLOB SERPL: 1.7 {RATIO} (ref 1.2–2.2)
ALP SERPL-CCNC: 43 IU/L (ref 44–121)
ALT SERPL-CCNC: 11 IU/L (ref 0–32)
AST SERPL-CCNC: 12 IU/L (ref 0–40)
BASOPHILS # BLD AUTO: 0 X10E3/UL (ref 0–0.2)
BASOPHILS NFR BLD AUTO: 0 %
BILIRUB SERPL-MCNC: 0.3 MG/DL (ref 0–1.2)
BUN SERPL-MCNC: 15 MG/DL (ref 8–27)
BUN/CREAT SERPL: 17 (ref 12–28)
CALCIUM SERPL-MCNC: 8.9 MG/DL (ref 8.7–10.3)
CHLORIDE SERPL-SCNC: 107 MMOL/L (ref 96–106)
CO2 SERPL-SCNC: 20 MMOL/L (ref 20–29)
CREAT SERPL-MCNC: 0.89 MG/DL (ref 0.57–1)
CRP SERPL-MCNC: 1 MG/L (ref 0–10)
EOSINOPHIL # BLD AUTO: 0 X10E3/UL (ref 0–0.4)
EOSINOPHIL NFR BLD AUTO: 0 %
ERYTHROCYTE [DISTWIDTH] IN BLOOD BY AUTOMATED COUNT: 13.4 % (ref 11.7–15.4)
FERRITIN SERPL-MCNC: 47 NG/ML (ref 15–150)
GLOBULIN SER CALC-MCNC: 2.6 G/DL (ref 1.5–4.5)
GLUCOSE SERPL-MCNC: 147 MG/DL (ref 65–99)
HCT VFR BLD AUTO: 33.1 % (ref 34–46.6)
HGB BLD-MCNC: 11 G/DL (ref 11.1–15.9)
IMM GRANULOCYTES # BLD AUTO: 0 X10E3/UL (ref 0–0.1)
IMM GRANULOCYTES NFR BLD AUTO: 0 %
IRON SATN MFR SERPL: 15 % (ref 15–55)
IRON SERPL-MCNC: 48 UG/DL (ref 27–139)
LYMPHOCYTES # BLD AUTO: 2 X10E3/UL (ref 0.7–3.1)
LYMPHOCYTES NFR BLD AUTO: 31 %
MCH RBC QN AUTO: 32.3 PG (ref 26.6–33)
MCHC RBC AUTO-ENTMCNC: 33.2 G/DL (ref 31.5–35.7)
MCV RBC AUTO: 97 FL (ref 79–97)
MONOCYTES # BLD AUTO: 0.5 X10E3/UL (ref 0.1–0.9)
MONOCYTES NFR BLD AUTO: 8 %
NEUTROPHILS # BLD AUTO: 3.8 X10E3/UL (ref 1.4–7)
NEUTROPHILS NFR BLD AUTO: 61 %
PLATELET # BLD AUTO: 197 X10E3/UL (ref 150–450)
POTASSIUM SERPL-SCNC: 4.7 MMOL/L (ref 3.5–5.2)
PROT SERPL-MCNC: 7.1 G/DL (ref 6–8.5)
RBC # BLD AUTO: 3.41 X10E6/UL (ref 3.77–5.28)
SODIUM SERPL-SCNC: 143 MMOL/L (ref 134–144)
TIBC SERPL-MCNC: 331 UG/DL (ref 250–450)
UIBC SERPL-MCNC: 283 UG/DL (ref 118–369)
WBC # BLD AUTO: 6.3 X10E3/UL (ref 3.4–10.8)

## 2022-01-05 NOTE — PROGRESS NOTES
Labs show that her iron stores are declining a bit.  I would like for her to get back on the iron supplementation and take it at least every other day.    Her vitamin D level remains slightly low.  I recommend that she double up her dosing for the next 2 weeks and then go back to once daily dosing    Her hemoglobin is at 11 which is slightly lower than 6 months ago but pretty stable for her.  We will continue to monitor this.

## 2022-01-05 NOTE — TELEPHONE ENCOUNTER
----- Message from Melissa Burleson MD sent at 1/5/2022  1:43 PM EST -----  Labs show that her iron stores are declining a bit.  I would like for her to get back on the iron supplementation and take it at least every other day.    Her vitamin D level remains slightly low.  I recommend that she double up her dosing for the next 2 weeks and then go back to once daily dosing    Her hemoglobin is at 11 which is slightly lower than 6 months ago but pretty stable for her.  We will continue to monitor this.

## 2022-01-19 RX ORDER — METOPROLOL TARTRATE 50 MG/1
TABLET, FILM COATED ORAL
Qty: 180 TABLET | Refills: 3 | Status: SHIPPED | OUTPATIENT
Start: 2022-01-19 | End: 2022-10-28 | Stop reason: SDUPTHER

## 2022-02-02 ENCOUNTER — OFFICE VISIT (OUTPATIENT)
Dept: INTERNAL MEDICINE | Age: 77
End: 2022-02-02

## 2022-02-02 VITALS
TEMPERATURE: 97.1 F | DIASTOLIC BLOOD PRESSURE: 60 MMHG | WEIGHT: 119 LBS | OXYGEN SATURATION: 100 % | SYSTOLIC BLOOD PRESSURE: 164 MMHG | HEART RATE: 62 BPM | HEIGHT: 62 IN | BODY MASS INDEX: 21.9 KG/M2

## 2022-02-02 DIAGNOSIS — E78.2 MIXED HYPERLIPIDEMIA: ICD-10-CM

## 2022-02-02 DIAGNOSIS — E11.59 TYPE 2 DIABETES MELLITUS WITH OTHER CIRCULATORY COMPLICATION, WITHOUT LONG-TERM CURRENT USE OF INSULIN: Primary | ICD-10-CM

## 2022-02-02 PROCEDURE — 1159F MED LIST DOCD IN RCRD: CPT | Performed by: INTERNAL MEDICINE

## 2022-02-02 PROCEDURE — G0439 PPPS, SUBSEQ VISIT: HCPCS | Performed by: INTERNAL MEDICINE

## 2022-02-02 PROCEDURE — 99214 OFFICE O/P EST MOD 30 MIN: CPT | Performed by: INTERNAL MEDICINE

## 2022-02-02 PROCEDURE — 1170F FXNL STATUS ASSESSED: CPT | Performed by: INTERNAL MEDICINE

## 2022-02-02 NOTE — PATIENT INSTRUCTIONS
** IMPORTANT MESSAGE FROM DR. PATIÑO **    In our office, your satisfaction is VERY important to us.     You may receive a survey from [facility] by mail or E-mail for you to provide feedback about your visit. This information is invaluable for me to know what we can do to improve our services.     I ask that you please take a few minutes to complete the survey and let us know how we are doing in serving your needs. (You may receive the survey more than once for multiple visits)    Thank You !    Dr. Patiño    _________________________________________________________________________________________________________________________      ** ADDITIONAL INSTRUCTION / REMINDERS FROM DR. PATIÑO **    Send blood pressure readings to Dr. Patiño in 2 weeks.

## 2022-02-02 NOTE — ASSESSMENT & PLAN NOTE
BP Readings from Last 3 Encounters:   02/02/22 164/60   01/04/22 130/65   07/26/21 130/60        Blood pressure is high today. Send blood pressure readings in 2 weeks. Continue same medications for now.

## 2022-02-02 NOTE — ASSESSMENT & PLAN NOTE
Lab Results   Component Value Date    HGBA1C 6.30 (H) 07/26/2021    HGBA1C 5.80 (H) 02/24/2021    HGBA1C 6.00 (H) 08/17/2020        Due to active colitis, discontinue metformin and avoid going forward.   Start Januvia 100 mg qAM.

## 2022-02-02 NOTE — PROGRESS NOTES
"    I N T E R N A L  M E D I C I N E  J U N O H  K I M,  M D      ENCOUNTER DATE:  02/02/2022    Renee PARIKH From / 76 y.o. / female        MEDICARE ANNUAL WELLNESS VISIT       Chief Complaint: AWV    Patient's general assessment of her health since a year ago:     - Compared to one year ago, she feels her physical health is about the same without significant change.    - Compared to one year ago, she feels her mental health is about the same without significant change.      HPI for other active medical problems:     Ulcerative colitis flare up x 2 years with diarrhea. Takes metformin still for DM 2.  Hypertension: has not been checking blood pressure at home. Compliant with medications.  Hyperlipidemia remains stable on medications.  CAD stable without angina.  She is followed by her cardiologist.        * The required components of Health Risk Assessment (HRA) that were completed by the patient and/or my staff are contained within this note and in the scanned documents titled \"Health Risk Assessment\" within the media section of the patient's chart in [employer]         HISTORY       Recent Hospitalizations:    Recent hospitalization?:     If YES, location, date, and diagnoses:     · Location:   · Date:   · Principle Discharge Dx:   · Secondary Dx:       Patient Care Team:    Patient Care Team:  Esequiel Pacheco MD as PCP - General  Ministerio Wells MD as Consulting Physician (Orthopedic Surgery)  Abhay Wooten MD (Inactive) as Consulting Physician (Cardiology)  Cisco Husain MD as Consulting Physician (Otolaryngology)  EZIO Garibay MD as Consulting Physician (Ophthalmology)  Melissa Burleson MD as Consulting Physician (Gastroenterology)      Allergies:  Tetanus toxoids    Medications:  Current Outpatient Medications on File Prior to Visit   Medication Sig Dispense Refill   • Accu-Chek Saira Plus test strip TEST BLODO SUGAR EVERY  each 11   • Acetaminophen (TYLENOL PO) Take 2 tablets by mouth As Needed.   "   • amLODIPine (NORVASC) 5 MG tablet TAKE 1 TABLET BY MOUTH DAILY 30 tablet 11   • cholecalciferol (VITAMIN D3) 25 MCG (1000 UT) tablet Take 1,000 Units by mouth Daily.     • dorzolamide-timolol (COSOPT) 22.3-6.8 MG/ML ophthalmic solution Apply 1 drop to eye 2 (two) times a day.     • ferrous sulfate 325 (65 FE) MG tablet Take 1 tablet by mouth Daily With Breakfast. (Patient taking differently: Take 325 mg by mouth Daily With Breakfast. Every otherday) 30 tablet 0   • hydrocortisone (ANUSOL-HC) 2.5 % rectal cream Insert  into the rectum 2 (Two) Times a Day. (Patient taking differently: Insert  into the rectum 2 (Two) Times a Day. prn) 28 g 0   • lisinopril (PRINIVIL,ZESTRIL) 20 MG tablet TAKE 1 TABLET BY MOUTH EVERY DAY 90 tablet 3   • mesalamine (LIALDA) 1.2 g EC tablet Take 4 tablets by mouth Daily With Breakfast. 120 tablet 5   • metoprolol tartrate (LOPRESSOR) 50 MG tablet TAKE 1 TABLET BY MOUTH EVERY 12 HOURS 180 tablet 3   • OYSCO 500 + D 500-200 MG-UNIT tablet TAKE 1 TABLET BY MOUTH DAILY 90 tablet 3   • pantoprazole (PROTONIX) 40 MG EC tablet Take 1 tablet by mouth Every Morning Before Breakfast. 90 tablet 3   • rosuvastatin (CRESTOR) 10 MG tablet Take 1 tablet by mouth Daily.     • [DISCONTINUED] metFORMIN ER (GLUCOPHAGE-XR) 500 MG 24 hr tablet TAKE 2 TABLETS BY MOUTH TWICE DAILY 360 tablet 3   • [DISCONTINUED] Oyster Shell (OYSCO 500 PO) Take 500 mg by mouth Daily.       No current facility-administered medications on file prior to visit.        PFSH:     The following portions of the patient's history were reviewed and updated as appropriate: Allergies / Current Medications / Past Medical History / Surgical History / Social History / Family History    Problem List:  Patient Active Problem List   Diagnosis   • Type 2 diabetes mellitus with circulatory disorder (HCC)   • Osteopenia   • Hypertension   • Hyperlipidemia   • Colon polyp   • Gastroesophageal reflux disease   • Glaucoma   • Coronary artery  disease involving native coronary artery of native heart without angina pectoris   • Ulcerative colitis with rectal bleeding (HCC)   • Iron deficiency anemia secondary to inadequate dietary iron intake   • Iron deficiency anemia due to chronic blood loss   • Ulcerative rectosigmoiditis with rectal bleeding (HCC)       Past Medical History:  Past Medical History:   Diagnosis Date   • Allergic rhinitis    • Broken heart syndrome    • Colitis    • Colon polyps    • Diabetes mellitus (HCC)     type 2   • Dizziness    • Encounter for breast cancer screening other than mammogram    • GERD (gastroesophageal reflux disease)    • GI (gastrointestinal bleed)    • Glaucoma    • History of transfusion    • Hyperlipidemia    • Hypertension    • Iron deficiency anemia due to chronic blood loss    • Knee fracture, left    • Non-STEMI (non-ST elevated myocardial infarction) (HCC) 6/16/2017   • Osteopenia    • Ulcerative colitis (HCC)        Past Surgical History:  Past Surgical History:   Procedure Laterality Date   • CARDIAC CATHETERIZATION N/A 6/16/2017    Procedure: Left Heart Cath;  Surgeon: Abhay Wooten MD;  Location: [facility] INVASIVE LOCATION;  Service:    • CARDIAC CATHETERIZATION N/A 6/16/2017    Procedure: Left ventriculography;  Surgeon: Abhay Wooten MD;  Location: [facility] CATH INVASIVE LOCATION;  Service:    • CARDIAC CATHETERIZATION N/A 6/16/2017    Procedure: Coronary angiography;  Surgeon: Abhay Wooten MD;  Location: [facility] CATH INVASIVE LOCATION;  Service:    • CATARACT EXTRACTION     • COLONOSCOPY  08/14/2018   • ENDOSCOPY N/A 2/10/2020    Procedure: ESOPHAGOGASTRODUODENOSCOPY;  Surgeon: Melissa Burleson MD;  Location: [facility] ENDOSCOPY;  Service: Gastroenterology;  Laterality: N/A;  PRE- IRON DEFICIENCY ANEMIA  POST--SCHATZKY'S RING, GASTRITIS,DUODENITIS   • EYE SURGERY      cataract surgery   • PAP SMEAR     • SIGMOIDOSCOPY N/A 10/21/2019    EH, active ulcerative colitis, severe inflammation in rectum, TA       Social  "History:  Social History     Socioeconomic History   • Marital status:      Spouse name: Eliazar*   • Number of children: 1   Tobacco Use   • Smoking status: Never Smoker   • Smokeless tobacco: Never Used   Substance and Sexual Activity   • Alcohol use: No   • Drug use: No   • Sexual activity: Not Currently     Partners: Male       Family History:  Family History   Problem Relation Age of Onset   • Heart disease Mother    • Hypertension Mother    • Diabetes Mother    • Heart disease Father    • Hypertension Father    • Heart disease Sister    • Heart disease Brother    • No Known Problems Maternal Grandmother    • No Known Problems Maternal Grandfather    • No Known Problems Paternal Grandmother    • No Known Problems Paternal Grandfather    • Crohn's disease Niece    • Colon cancer Neg Hx    • Breast cancer Neg Hx    • Dementia Neg Hx          PATIENT ASSESSMENT     Vitals:  /60 Comment: dr putnam  Pulse 62   Temp 97.1 °F (36.2 °C)   Ht 157.5 cm (62\")   Wt 54 kg (119 lb)   SpO2 100%   BMI 21.77 kg/m²   BP Readings from Last 3 Encounters:   02/02/22 164/60   01/04/22 130/65   07/26/21 130/60     Wt Readings from Last 3 Encounters:   02/02/22 54 kg (119 lb)   01/04/22 54 kg (119 lb)   07/26/21 53.1 kg (117 lb)      Body mass index is 21.77 kg/m².    There were no vitals filed for this visit.      Review of Systems:    Review of Systems  Constitutional neg except per HPI   Resp neg  CV neg   GI diarrhea, colitis     Physical Exam:    Physical Exam  General: No acute distress  Psych: Normal thought and judgment   Cardiovascular Rate: normal. Rhythm: regular. Heart sounds: normal.   Pulm/Chest: Effort normal, breath sounds normal.     Reviewed Data:    Labs:   Lab Results   Component Value Date     01/04/2022    K 4.7 01/04/2022    CALCIUM 8.9 01/04/2022    AST 12 01/04/2022    ALT 11 01/04/2022    BUN 15 01/04/2022    CREATININE 0.89 01/04/2022    CREATININE 0.66 05/11/2021    CREATININE 0.70 " 11/17/2020    EGFRIFNONA 63 01/04/2022    EGFRIFAFRI 72 01/04/2022       Lab Results   Component Value Date    HGBA1C 6.30 (H) 07/26/2021    HGBA1C 5.80 (H) 02/24/2021    HGBA1C 6.00 (H) 08/17/2020    MICROALBUR 40.4 08/17/2020       Lab Results   Component Value Date    LDL 51 02/24/2021    LDL 50 03/16/2020    LDL 52 05/16/2019    HDL 50 02/24/2021    TRIG 97 02/24/2021    CHOLHDLRATIO 2.38 02/24/2021       Lab Results   Component Value Date    TSH 2.910 07/26/2021    FREET4 1.19 07/26/2021          Lab Results   Component Value Date    WBC 6.3 01/04/2022    HGB 11.0 (L) 01/04/2022    HGB 11.3 (L) 05/11/2021    HGB 12.2 11/17/2020     01/04/2022                 No results found for: PSA    Imaging:          Medical Tests:          Screening for Glaucoma:  Previous screening for glaucoma?: Yes      Hearing Loss Screen:  Finger Rub Hearing Test (right ear): passed  Finger Rub Hearing Test (left ear): passed      Urinary Incontinence Screen:  Episodes of urinary incontinence? : No      Depression Screen:  PHQ-2/PHQ-9 Depression Screening 2/2/2022   Little interest or pleasure in doing things 0   Feeling down, depressed, or hopeless 0   Total Score 0        PHQ-2: 0 (Not depressed)    PHQ-9: 0 (Negative screening for depression)       FUNCTIONAL, FALL RISK, & COGNITIVE SCREENING (Components below):    DATA:    Functional & Cognitive Status 2/2/2022   Do you have difficulty preparing food and eating? No   Do you have difficulty bathing yourself, getting dressed or grooming yourself? No   Do you have difficulty using the toilet? No   Do you have difficulty moving around from place to place? No   Do you have trouble with steps or getting out of a bed or a chair? No   Current Diet Well Balanced Diet   Dental Exam Up to date   Eye Exam Up to date   Exercise (times per week) 2 times per week   Current Exercises Include Walking        Exercise Comment steps   Current Exercise Activities Include -   Do you need help  using the phone?  No   Are you deaf or do you have serious difficulty hearing?  No   Do you need help with transportation? No   Do you need help shopping? No   Do you need help preparing meals?  No   Do you need help with housework?  No   Do you need help with laundry? No   Do you need help taking your medications? No   Do you need help managing money? No   Do you ever drive or ride in a car without wearing a seat belt? No   Have you felt unusual stress, anger or loneliness in the last month? -   Who do you live with? -   If you need help, do you have trouble finding someone available to you? -   Have you been bothered in the last four weeks by sexual problems? -   Do you have difficulty concentrating, remembering or making decisions? No         A) Assessment of Functional Ability:  (Assessment of ability to perform ADL's (showering/bathing, using toilet, dressing, feeding self, moving self around) and IADL's (use telephone, shop, prepare food, housekeep, do laundry, transport independently, take medications independently, and handle finances)    Degree of functional impairment: NONE (based on assessment noted above)      B) Assessment of Fall Risk:  Fall Risk Assessment was completed, and patient is at moderate risk for falls.       Need for further evaluation of gait, strength, and balance? : Yes    Timed Up and Go (TUG):   (>= 12 seconds indicates high risk for falling)    Observable abnormalities included: Normal gait pattern       C. Assessment of Cognitive Function:    Mini-Cog Test:     1) Registration (3 objects): Yes   2) Number of objects recalled: 3   3) Clock Draw: Passed? : N/A       Further evaluation required? : No        COUNSELING       A. Identification of Health Risk Factors:    Risk factors include: cardiovascular risk factors and polypharmacy      B. Age-Appropriate Screening Schedule:  (Refer to the list below for future screening recommendations based on patient's age, sex and/or medical  conditions. Orders for these recommended tests are listed in the plan section. The patient has been provided with a written plan)    Health Maintenance Topics  Health Maintenance   Topic Date Due   • URINE MICROALBUMIN  08/17/2021   • DIABETIC EYE EXAM  01/07/2022   • HEMOGLOBIN A1C  01/26/2022   • ZOSTER VACCINE (3 of 3) 02/02/2022 (Originally 4/12/2017)   • LIPID PANEL  02/24/2022   • DXA SCAN  11/02/2022   • MAMMOGRAM  11/10/2023   • INFLUENZA VACCINE  Completed       Health Maintenance Topics Due or Over-Due  Health Maintenance Due   Topic Date Due   • COLORECTAL CANCER SCREENING  08/14/2021   • URINE MICROALBUMIN  08/17/2021   • DIABETIC EYE EXAM  01/07/2022   • HEMOGLOBIN A1C  01/26/2022         C. Advanced Care Planning:    Advance Care Planning   ACP discussion was held with the patient during this visit. Patient does not have an advance directive, information provided.       D. Patient Self-Management and Personalized Health Advice:    She has been provided with personalized counseling/information (including brochures/handouts) about:     -- optimizing diet/nutrition plans, reducing risk for cardiovascular disease (heart, stroke, vascular), fall prevention, polypharmacy concerns, side effects of medications, designing advance directives and designating POA      She has been recommended for the following preventative services which has been performed today, will be ordered today or ordered/performed on upcoming follow-up visit:     -- NUTRITION counseling provided, EXERCISE counseling provided, CARDIOVASCULAR disease risk reduction counseling performed, FALL RISK assessment / plan of care completed, URINARY incontinence assessment done, OSTEOPOROSIS screening DISCUSSED, **COLORECTAL cancer screening (colonoscopy/Cologuard discussed or ordered), vaccination for SHINGRIX administered  (or recommended)      E. Miscellaneous Items:    -Aspirin use counseling: Does not need ASA (and currently is not on  it)    -Discussed BMI with her. The BMI is in the acceptable range    -Reviewed use of high risk medication in the elderly: YES    -Reviewed for potential of harmful drug interactions in the elderly: YES        WRAP UP       Assessment & Plan:    1) MEDICARE ANNUAL WELLNESS VISIT    2) OTHER MEDICAL CONDITIONS ADDRESSED TODAY:            Problem List Items Addressed This Visit        High    Type 2 diabetes mellitus with circulatory disorder (HCC) - Primary (Chronic)    Overview     **Complications of diabetes: microalbuminuria and coronary artery disease          Current Assessment & Plan     Lab Results   Component Value Date    HGBA1C 6.30 (H) 07/26/2021    HGBA1C 5.80 (H) 02/24/2021    HGBA1C 6.00 (H) 08/17/2020        Due to active colitis, discontinue metformin and avoid going forward.   Start Januvia 100 mg qAM.          Relevant Medications    Accu-Chek Saira Plus test strip    SITagliptin (Januvia) 100 MG tablet    Other Relevant Orders    Hemoglobin A1c    Microalbumin / Creatinine Urine Ratio - Urine, Clean Catch    Urinalysis With Microscopic If Indicated (No Culture) - Urine, Clean Catch       Medium    Hyperlipidemia (Chronic)    Overview     Continue rosuvastatin 10 mg daily.          Relevant Medications    rosuvastatin (CRESTOR) 10 MG tablet    Other Relevant Orders    Lipid Panel With / Chol / HDL Ratio                    Orders Placed This Encounter   Procedures   • Hemoglobin A1c   • Lipid Panel With / Chol / HDL Ratio   • Microalbumin / Creatinine Urine Ratio - Urine, Clean Catch   • Urinalysis With Microscopic If Indicated (No Culture) - Urine, Clean Catch       Discussion / Summary:        Medications as of TODAY:              Current Outpatient Medications   Medication Sig Dispense Refill   • Accu-Chek Saira Plus test strip TEST BLODO SUGAR EVERY  each 11   • Acetaminophen (TYLENOL PO) Take 2 tablets by mouth As Needed.     • amLODIPine (NORVASC) 5 MG tablet TAKE 1 TABLET BY MOUTH  DAILY 30 tablet 11   • cholecalciferol (VITAMIN D3) 25 MCG (1000 UT) tablet Take 1,000 Units by mouth Daily.     • dorzolamide-timolol (COSOPT) 22.3-6.8 MG/ML ophthalmic solution Apply 1 drop to eye 2 (two) times a day.     • ferrous sulfate 325 (65 FE) MG tablet Take 1 tablet by mouth Daily With Breakfast. (Patient taking differently: Take 325 mg by mouth Daily With Breakfast. Every otherday) 30 tablet 0   • hydrocortisone (ANUSOL-HC) 2.5 % rectal cream Insert  into the rectum 2 (Two) Times a Day. (Patient taking differently: Insert  into the rectum 2 (Two) Times a Day. prn) 28 g 0   • lisinopril (PRINIVIL,ZESTRIL) 20 MG tablet TAKE 1 TABLET BY MOUTH EVERY DAY 90 tablet 3   • mesalamine (LIALDA) 1.2 g EC tablet Take 4 tablets by mouth Daily With Breakfast. 120 tablet 5   • metoprolol tartrate (LOPRESSOR) 50 MG tablet TAKE 1 TABLET BY MOUTH EVERY 12 HOURS 180 tablet 3   • OYSCO 500 + D 500-200 MG-UNIT tablet TAKE 1 TABLET BY MOUTH DAILY 90 tablet 3   • pantoprazole (PROTONIX) 40 MG EC tablet Take 1 tablet by mouth Every Morning Before Breakfast. 90 tablet 3   • rosuvastatin (CRESTOR) 10 MG tablet Take 1 tablet by mouth Daily.     • SITagliptin (Januvia) 100 MG tablet Take 1 tablet by mouth Daily With Breakfast. 30 tablet 3     No current facility-administered medications for this visit.         FOLLOW-UP:            Return in about 3 months (around 5/2/2022) for Diabetes, Hypertension.                 Future Appointments   Date Time Provider Department Center   2/18/2022  1:30 PM Darling Espinosa MD [facility]   5/4/2022  1:00 PM Jessica Cota APRN [facility]   6/8/2022  9:00 AM Esequiel Pacheco MD [facility]           After Visit Summary (AVS) including the Personalized Prevention  Plan Services (PPPS) was either printed and given to the patient at check-out today and/or sent to [facility] for review.         *Examiner was wearing KN95 mask and eye protection during the entire duration of the  visit. Patient was masked the entire time. Minimum social distance of 6 ft maintained entire visit except if physical contact was necessary as documented.       Template created by Zachery Pacheco MD

## 2022-02-03 LAB
ALBUMIN/CREAT UR: 43 MG/G CREAT (ref 0–29)
APPEARANCE UR: CLEAR
BILIRUB UR QL STRIP: NEGATIVE
CHOLEST SERPL-MCNC: 109 MG/DL (ref 100–199)
CHOLEST/HDLC SERPL: 2.6 RATIO (ref 0–4.4)
COLOR UR: YELLOW
CREAT UR-MCNC: 52.2 MG/DL
GLUCOSE UR QL STRIP: NEGATIVE
HBA1C MFR BLD: 6.5 % (ref 4.8–5.6)
HDLC SERPL-MCNC: 42 MG/DL
HGB UR QL STRIP: NEGATIVE
KETONES UR QL STRIP: ABNORMAL
LDLC SERPL CALC-MCNC: 49 MG/DL (ref 0–99)
LEUKOCYTE ESTERASE UR QL STRIP: NEGATIVE
MICRO URNS: ABNORMAL
MICROALBUMIN UR-MCNC: 22.2 UG/ML
NITRITE UR QL STRIP: NEGATIVE
PH UR STRIP: 5.5 [PH] (ref 5–7.5)
PROT UR QL STRIP: NEGATIVE
SP GR UR STRIP: 1.02 (ref 1–1.03)
TRIGL SERPL-MCNC: 95 MG/DL (ref 0–149)
UROBILINOGEN UR STRIP-MCNC: 0.2 MG/DL (ref 0.2–1)
VLDLC SERPL CALC-MCNC: 18 MG/DL (ref 5–40)

## 2022-02-03 NOTE — PROGRESS NOTES
MyChart:    Here are the result(s) of your test(s):     A1c for average glucose level is still in reasonably good range but trending up.   Cholesterol level is overall stable     Please do not hesitate to contact me if you have questions.

## 2022-02-18 ENCOUNTER — OFFICE VISIT (OUTPATIENT)
Dept: CARDIOLOGY | Facility: CLINIC | Age: 77
End: 2022-02-18

## 2022-02-18 VITALS
SYSTOLIC BLOOD PRESSURE: 142 MMHG | WEIGHT: 119 LBS | HEART RATE: 54 BPM | HEIGHT: 62 IN | DIASTOLIC BLOOD PRESSURE: 68 MMHG | BODY MASS INDEX: 21.9 KG/M2

## 2022-02-18 DIAGNOSIS — I51.81 STRESS-INDUCED CARDIOMYOPATHY: Primary | ICD-10-CM

## 2022-02-18 PROCEDURE — 99214 OFFICE O/P EST MOD 30 MIN: CPT | Performed by: INTERNAL MEDICINE

## 2022-02-18 PROCEDURE — 93000 ELECTROCARDIOGRAM COMPLETE: CPT | Performed by: INTERNAL MEDICINE

## 2022-02-18 NOTE — PROGRESS NOTES
Subjective:     Encounter Date: 02/18/22      Patient ID: Renee Stevenson is a 76 y.o. female.    Chief Complaint: Takosubo  HPI:   This is a 75-year-old woman who was previously followed by Dr. Gerardo Wooten.  In 2017 she presented to [facility] with chest pain and dynamic EKG changes.  She had a cardiac catheterization which showed mild to moderate disease of the proximal, mid and distal LAD as well as 2 small diagonal arteries.  She also had mild disease in the circumflex.  Her LV gram showed inferior apical hypokinesis consistent with Takosubo cardiomyopathy with an EF mildly reduced at 45%.  She had recently suffered the loss of 2 of her brothers, which was considered to be the culprit.  After few months of goal directed medical therapy her EF improved to 65%.    Her other medical problems include diabetes, hypertension and ulcerative colitis.  She has not had an UC flare in a year or so.  No recent blood transfusions.  She is maintained on Lialda and does not require any biologic therapy.  Her hypertension is well controlled.  Her diabetes is also well controlled.    Overall she feels well. She has no complaints. She likes to garden. Her diabetes is well controlled    She has never smoked, she does not drink.  She is retired.  There is no family history of coronary disease.    The following portions of the patient's history were reviewed and updated as appropriate: allergies, current medications, past family history, past medical history, past social history, past surgical history and problem list.     REVIEW OF SYSTEMS:   All systems reviewed.  Pertinent positives identified in HPI.  All other systems are negative.    Past Medical History:   Diagnosis Date   • Allergic rhinitis    • Broken heart syndrome    • Colitis    • Colon polyps    • Diabetes mellitus (HCC)     type 2   • Dizziness    • Encounter for breast cancer screening other than mammogram    • GERD (gastroesophageal reflux disease)    • GI  (gastrointestinal bleed)    • Glaucoma    • History of transfusion    • Hyperlipidemia    • Hypertension    • Iron deficiency anemia due to chronic blood loss    • Knee fracture, left    • Non-STEMI (non-ST elevated myocardial infarction) ([facility]) 6/16/2017   • Osteopenia    • Ulcerative colitis ([facility])        Family History   Problem Relation Age of Onset   • Heart disease Mother    • Hypertension Mother    • Diabetes Mother    • Heart disease Father    • Hypertension Father    • Heart disease Sister    • Heart disease Brother    • No Known Problems Maternal Grandmother    • No Known Problems Maternal Grandfather    • No Known Problems Paternal Grandmother    • No Known Problems Paternal Grandfather    • Crohn's disease Niece    • Colon cancer Neg Hx    • Breast cancer Neg Hx    • Dementia Neg Hx        Social History     Socioeconomic History   • Marital status:      Spouse name: Eliazar*   • Number of children: 1   Tobacco Use   • Smoking status: Never Smoker   • Smokeless tobacco: Never Used   Substance and Sexual Activity   • Alcohol use: No   • Drug use: No   • Sexual activity: Not Currently     Partners: Male       Allergies   Allergen Reactions   • Tetanus Toxoids Swelling     Hand and arm  Hand and arm  Hand and arm       Past Surgical History:   Procedure Laterality Date   • CARDIAC CATHETERIZATION N/A 6/16/2017    Procedure: Left Heart Cath;  Surgeon: Abhay Wooten MD;  Location: [facility] INVASIVE LOCATION;  Service:    • CARDIAC CATHETERIZATION N/A 6/16/2017    Procedure: Left ventriculography;  Surgeon: Abhay Wooten MD;  Location: [facility] INVASIVE LOCATION;  Service:    • CARDIAC CATHETERIZATION N/A 6/16/2017    Procedure: Coronary angiography;  Surgeon: Abhay Wooten MD;  Location: [facility] INVASIVE LOCATION;  Service:    • CATARACT EXTRACTION     • COLONOSCOPY  08/14/2018   • ENDOSCOPY N/A 2/10/2020    Procedure: ESOPHAGOGASTRODUODENOSCOPY;  Surgeon: Melissa Burleson MD;  Location: [facility] ENDOSCOPY;   Service: Gastroenterology;  Laterality: N/A;  PRE- IRON DEFICIENCY ANEMIA  POST--SCHATZKY'S RING, GASTRITIS,DUODENITIS   • EYE SURGERY      cataract surgery   • PAP SMEAR     • SIGMOIDOSCOPY N/A 10/21/2019    EH, active ulcerative colitis, severe inflammation in rectum, TA         ECG 12 Lead    Date/Time: 2/18/2022 2:17 PM  Performed by: Darling Espinosa MD  Authorized by: Darling Espinosa MD   Comparison: compared with previous ECG from 2/14/2020  Similar to previous ECG  Rhythm: sinus rhythm  Rate: normal  Conduction: conduction normal  ST Flattening: all  QRS axis: normal  Other findings: non-specific ST-T wave changes    Clinical impression: non-specific ECG             Objective:         PHYSICAL EXAM:  GEN: no distress, alert and oriented  Eyes: normal sclerae, normal lids and lashes  HENT: moist mucous membranes,   Lungs: CTAB, no rales or wheezes  Chest: no abnormalities  Neck: no JVD or carotid bruits  CV: RRR, no murmurs, +2 DP and 2+ carotid pulses b/l  Abdomen: soft, nontender, nondistended  Extremities: no edema  Skin: no rash, warm, dry  Heme/Lymph: no bruising  Psych: organized thought, normal behavior and affect        Assessment:          Diagnosis Plan   1. Stress-induced cardiomyopathy            Plan:         1.  Stress-induced cardiomyopathy: Noted on cardiac catheterization in 2017.  Her ejection fraction has normalized.  Continue Toprol 50 and lisinopril 20.  2.  Coronary artery disease: Nonobstructive coronary disease in the LAD and circumflex system noted on cardiac catheterization.  Continue Crestor.    3.  Hypertension: Well-controlled on current therapy continue.  4.  Hyperlipidemia: Crestor at goal  5.  Ulcerative colitis: Under control    Dr. Pacheco, thank you very much for referring this kind patient to me. Please call me with any questions or concerns. I will see the patient again in the office in 1 year.          Darling Espinosa MD  02/18/22  [facility]    Outpatient  Encounter Medications as of 2/18/2022   Medication Sig Dispense Refill   • Accu-Chek Saira Plus test strip TEST BLODO SUGAR EVERY  each 11   • Acetaminophen (TYLENOL PO) Take 2 tablets by mouth As Needed.     • amLODIPine (NORVASC) 5 MG tablet TAKE 1 TABLET BY MOUTH DAILY 30 tablet 11   • dorzolamide-timolol (COSOPT) 22.3-6.8 MG/ML ophthalmic solution Apply 1 drop to eye 2 (two) times a day.     • ferrous sulfate 325 (65 FE) MG tablet Take 1 tablet by mouth Daily With Breakfast. (Patient taking differently: Take 325 mg by mouth Daily With Breakfast. Every otherday) 30 tablet 0   • hydrocortisone (ANUSOL-HC) 2.5 % rectal cream Insert  into the rectum 2 (Two) Times a Day. (Patient taking differently: Insert  into the rectum 2 (Two) Times a Day. prn) 28 g 0   • lisinopril (PRINIVIL,ZESTRIL) 20 MG tablet TAKE 1 TABLET BY MOUTH EVERY DAY 90 tablet 3   • mesalamine (LIALDA) 1.2 g EC tablet Take 4 tablets by mouth Daily With Breakfast. 120 tablet 5   • metoprolol tartrate (LOPRESSOR) 50 MG tablet TAKE 1 TABLET BY MOUTH EVERY 12 HOURS 180 tablet 3   • OYSCO 500 + D 500-200 MG-UNIT tablet TAKE 1 TABLET BY MOUTH DAILY 90 tablet 3   • pantoprazole (PROTONIX) 40 MG EC tablet Take 1 tablet by mouth Every Morning Before Breakfast. 90 tablet 3   • rosuvastatin (CRESTOR) 10 MG tablet Take 1 tablet by mouth Daily.     • SITagliptin (Januvia) 100 MG tablet Take 1 tablet by mouth Daily With Breakfast. 30 tablet 3   • [DISCONTINUED] cholecalciferol (VITAMIN D3) 25 MCG (1000 UT) tablet Take 1,000 Units by mouth Daily.       No facility-administered encounter medications on file as of 2/18/2022.

## 2022-03-02 ENCOUNTER — OFFICE VISIT (OUTPATIENT)
Dept: INTERNAL MEDICINE | Age: 77
End: 2022-03-02

## 2022-03-02 VITALS
BODY MASS INDEX: 21.9 KG/M2 | TEMPERATURE: 98 F | HEIGHT: 62 IN | DIASTOLIC BLOOD PRESSURE: 60 MMHG | SYSTOLIC BLOOD PRESSURE: 156 MMHG | WEIGHT: 119 LBS | HEART RATE: 58 BPM | OXYGEN SATURATION: 99 %

## 2022-03-02 DIAGNOSIS — B02.9 HERPES ZOSTER WITHOUT COMPLICATION: Primary | ICD-10-CM

## 2022-03-02 DIAGNOSIS — E11.59 TYPE 2 DIABETES MELLITUS WITH OTHER CIRCULATORY COMPLICATION, WITHOUT LONG-TERM CURRENT USE OF INSULIN: ICD-10-CM

## 2022-03-02 PROCEDURE — 99214 OFFICE O/P EST MOD 30 MIN: CPT | Performed by: INTERNAL MEDICINE

## 2022-03-02 RX ORDER — PREGABALIN 50 MG/1
50 CAPSULE ORAL 3 TIMES DAILY
Qty: 90 CAPSULE | Refills: 0 | Status: SHIPPED | OUTPATIENT
Start: 2022-03-02 | End: 2022-06-08

## 2022-03-02 RX ORDER — PIOGLITAZONEHYDROCHLORIDE 15 MG/1
15 TABLET ORAL
Qty: 30 TABLET | Refills: 3 | Status: SHIPPED | OUTPATIENT
Start: 2022-03-02 | End: 2022-06-20

## 2022-03-02 RX ORDER — VALACYCLOVIR HYDROCHLORIDE 1 G/1
1000 TABLET, FILM COATED ORAL 3 TIMES DAILY
Qty: 21 TABLET | Refills: 0 | Status: SHIPPED | OUTPATIENT
Start: 2022-03-02 | End: 2022-03-09

## 2022-03-02 NOTE — PATIENT INSTRUCTIONS
** IMPORTANT MESSAGE FROM DR. PATIÑO **    In our office, your satisfaction is VERY important to us.     You may receive a survey from [employer]  Shingles    Shingles, which is also known as herpes zoster, is an infection that causes a painful skin rash and fluid-filled blisters. It is caused by a virus.  Shingles only develops in people who:  · Have had chickenpox.  · Have been given a medicine to protect against chickenpox (have been vaccinated). Shingles is rare in this group.  What are the causes?  Shingles is caused by varicella-zoster virus (VZV). This is the same virus that causes chickenpox. After a person is exposed to VZV, the virus stays in the body in an inactive (dormant) state. Shingles develops if the virus is reactivated. This can happen many years after the first (initial) exposure to VZV. It is not known what causes this virus to be reactivated.  What increases the risk?  People who have had chickenpox or received the chickenpox vaccine are at risk for shingles. Shingles infection is more common in people who:  · Are older than age 60.  · Have a weakened disease-fighting system (immune system), such as people with:  ? HIV.  ? AIDS.  ? Cancer.  · Are taking medicines that weaken the immune system, such as transplant medicines.  · Are experiencing a lot of stress.  What are the signs or symptoms?  Early symptoms of this condition include itching, tingling, and pain in an area on your skin. Pain may be described as burning, stabbing, or throbbing.  A few days or weeks after early symptoms start, a painful red rash appears. The rash is usually on one side of the body and has a band-like or belt-like pattern. The rash eventually turns into fluid-filled blisters that break open, change into scabs, and dry up in about 2-3 weeks.  At any time during the infection, you may also develop:  · A fever.  · Chills.  · A headache.  · An upset stomach.  How is this diagnosed?  This condition is diagnosed with a skin exam.  Skin or fluid samples may be taken from the blisters before a diagnosis is made. These samples are examined under a microscope or sent to a lab for testing.  How is this treated?  The rash may last for several weeks. There is not a specific cure for this condition. Your health care provider will probably prescribe medicines to help you manage pain, recover more quickly, and avoid long-term problems. Medicines may include:  · Antiviral drugs.  · Anti-inflammatory drugs.  · Pain medicines.  · Anti-itching medicines (antihistamines).  If the area involved is on your face, you may be referred to a specialist, such as an eye doctor (ophthalmologist) or an ear, nose, and throat (ENT) doctor (otolaryngologist) to help you avoid eye problems, chronic pain, or disability.  Follow these instructions at home:  Medicines  · Take over-the-counter and prescription medicines only as told by your health care provider.  · Apply an anti-itch cream or numbing cream to the affected area as told by your health care provider.  Relieving itching and discomfort    · Apply cold, wet cloths (cold compresses) to the area of the rash or blisters as told by your health care provider.  · Cool baths can be soothing. Try adding baking soda or dry oatmeal to the water to reduce itching. Do not bathe in hot water.    Blister and rash care  · Keep your rash covered with a loose bandage (dressing). Wear loose-fitting clothing to help ease the pain of material rubbing against the rash.  · Keep your rash and blisters clean by washing the area with mild soap and cool water as told by your health care provider.  · Check your rash every day for signs of infection. Check for:  ? More redness, swelling, or pain.  ? Fluid or blood.  ? Warmth.  ? Pus or a bad smell.  · Do not scratch your rash or pick at your blisters. To help avoid scratching:  ? Keep your fingernails clean and cut short.  ? Wear gloves or mittens while you sleep, if scratching is a  problem.  General instructions  · Rest as told by your health care provider.  · Keep all follow-up visits as told by your health care provider. This is important.  · Wash your hands often with soap and water. If soap and water are not available, use hand . Doing this lowers your chance of getting a bacterial skin infection.  · Before your blisters change into scabs, your shingles infection can cause chickenpox in people who have never had it or have never been vaccinated against it. To prevent this from happening, avoid contact with other people, especially:  ? Babies.  ? Pregnant women.  ? Children who have eczema.  ? Elderly people who have transplants.  ? People who have chronic illnesses, such as cancer or AIDS.  Contact a health care provider if:  · Your pain is not relieved with prescribed medicines.  · Your pain does not get better after the rash heals.  · You have signs of infection in the rash area, such as:  ? More redness, swelling, or pain around the rash.  ? Fluid or blood coming from the rash.  ? The rash area feeling warm to the touch.  ? Pus or a bad smell coming from the rash.  Get help right away if:  · The rash is on your face or nose.  · You have facial pain, pain around your eye area, or loss of feeling on one side of your face.  · You have difficulty seeing.  · You have ear pain or have ringing in your ear.  · You have a loss of taste.  · Your condition gets worse.  Summary  · Shingles, which is also known as herpes zoster, is an infection that causes a painful skin rash and fluid-filled blisters.  · This condition is diagnosed with a skin exam. Skin or fluid samples may be taken from the blisters and examined before the diagnosis is made.  · Keep your rash covered with a loose bandage (dressing). Wear loose-fitting clothing to help ease the pain of material rubbing against the rash.  · Before your blisters change into scabs, your shingles infection can cause chickenpox in people who  have never had it or have never been vaccinated against it.  This information is not intended to replace advice given to you by your health care provider. Make sure you discuss any questions you have with your health care provider.  Document Revised: 04/10/2020 Document Reviewed: 08/22/2018  Elsevier Patient Education © 2021 [employer] by mail or E-mail for you to provide feedback about your visit. This information is invaluable for me to know what we can do to improve our services.     I ask that you please take a few minutes to complete the survey and let us know how we are doing in serving your needs. (You may receive the survey more than once for multiple visits)    Thank You !    Dr. Patiño    _________________________________________________________________________________________________________________________      ** ADDITIONAL INSTRUCTION / REMINDERS FROM DR. PATIÑO **

## 2022-03-02 NOTE — ASSESSMENT & PLAN NOTE
This is a new problem to this examiner.     Valacyclovir 1000 mg TID x 7 days.   Pregabalin 50 mg TID #90, no refill.   Consent / agreement signed. Ágnel reviewed.      Information for shingles provided.

## 2022-03-02 NOTE — PROGRESS NOTES
I N T E R N A L  M E D I C I N E  J U N O H  K I M,  M D      ENCOUNTER DATE:  03/02/2022    Renee PARIKH From / 77 y.o. / female      CHIEF COMPLAINT / REASON FOR OFFICE VISIT     Rash (painful rash on left buttock region)      ASSESSMENT & PLAN     Problem List Items Addressed This Visit        High    Herpes zoster without complication - Primary    Current Assessment & Plan     This is a new problem to this examiner.     Valacyclovir 1000 mg TID x 7 days.   Pregabalin 50 mg TID #90, no refill.   Consent / agreement signed. Ángel reviewed.      Information for shingles provided.          Relevant Medications    valACYclovir (Valtrex) 1000 MG tablet    pregabalin (LYRICA) 50 MG capsule       Medium    Type 2 diabetes mellitus with circulatory disorder (HCC) (Chronic)    Overview     **Complications of diabetes: microalbuminuria and coronary artery disease          Current Assessment & Plan     Sugar is running higher at home.   Add pioglitazone 15 mg qd. Continue Januvia 100 mg qd.     Lab Results   Component Value Date    HGBA1C 6.5 (H) 02/02/2022    HGBA1C 6.30 (H) 07/26/2021    HGBA1C 5.80 (H) 02/24/2021             Relevant Medications    Accu-Chek Saira Plus test strip    SITagliptin (Januvia) 100 MG tablet    pioglitazone (ACTOS) 15 MG tablet        No orders of the defined types were placed in this encounter.    New Medications Ordered This Visit   Medications   • valACYclovir (Valtrex) 1000 MG tablet     Sig: Take 1 tablet by mouth 3 (Three) Times a Day for 7 days.     Dispense:  21 tablet     Refill:  0   • pregabalin (LYRICA) 50 MG capsule     Sig: Take 1 capsule by mouth 3 (Three) Times a Day.     Dispense:  90 capsule     Refill:  0   • pioglitazone (ACTOS) 15 MG tablet     Sig: Take 1 tablet by mouth Daily With Breakfast.     Dispense:  30 tablet     Refill:  3       SUMMARY/DISCUSSION  •       Next Appointment with me: 6/8/2022    Return for worsening or lack of improvement, Next scheduled follow  "up.      VITAL SIGNS     Visit Vitals  /60 (BP Location: Left arm)   Pulse 58   Temp 98 °F (36.7 °C)   Ht 157.5 cm (62\")   Wt 54 kg (119 lb)   SpO2 99%   BMI 21.77 kg/m²       BP Readings from Last 3 Encounters:   03/02/22 156/60   02/18/22 142/68   02/02/22 164/60     Wt Readings from Last 3 Encounters:   03/02/22 54 kg (119 lb)   02/18/22 54 kg (119 lb)   02/02/22 54 kg (119 lb)     Body mass index is 21.77 kg/m².    Blood pressure readings recorded on patient flowsheet:  No flowsheet data found.       MEDICATIONS AT THE TIME OF OFFICE VISIT     Current Outpatient Medications on File Prior to Visit   Medication Sig   • Accu-Chek Saira Plus test strip TEST BLODO SUGAR EVERY DAY   • Acetaminophen (TYLENOL PO) Take 2 tablets by mouth As Needed.   • amLODIPine (NORVASC) 5 MG tablet TAKE 1 TABLET BY MOUTH DAILY   • dorzolamide-timolol (COSOPT) 22.3-6.8 MG/ML ophthalmic solution Apply 1 drop to eye 2 (two) times a day.   • ferrous sulfate 325 (65 FE) MG tablet Take 1 tablet by mouth Daily With Breakfast. (Patient taking differently: Take 325 mg by mouth Daily With Breakfast. Every otherday)   • hydrocortisone (ANUSOL-HC) 2.5 % rectal cream Insert  into the rectum 2 (Two) Times a Day. (Patient taking differently: Insert  into the rectum 2 (Two) Times a Day. prn)   • lisinopril (PRINIVIL,ZESTRIL) 20 MG tablet TAKE 1 TABLET BY MOUTH EVERY DAY   • mesalamine (LIALDA) 1.2 g EC tablet Take 4 tablets by mouth Daily With Breakfast.   • metoprolol tartrate (LOPRESSOR) 50 MG tablet TAKE 1 TABLET BY MOUTH EVERY 12 HOURS   • OYSCO 500 + D 500-200 MG-UNIT tablet TAKE 1 TABLET BY MOUTH DAILY   • pantoprazole (PROTONIX) 40 MG EC tablet Take 1 tablet by mouth Every Morning Before Breakfast.   • rosuvastatin (CRESTOR) 10 MG tablet Take 1 tablet by mouth Daily.   • SITagliptin (Januvia) 100 MG tablet Take 1 tablet by mouth Daily With Breakfast.     No current facility-administered medications on file prior to visit.    "       HISTORY OF PRESENT ILLNESS     Painful rash on left buttock region x 3 days. Pain is becoming more severe. Quality: burning / searing. Has not had Shingrix vaccine.     Blood sugar at home is higher since stopping metformin. On Januvia 100 mg.       Patient Care Team:  Esequiel Pacheco MD as PCP - General  Ministerio Wells MD as Consulting Physician (Orthopedic Surgery)  Abhay Wooten MD (Inactive) as Consulting Physician (Cardiology)  Cisco Husain MD as Consulting Physician (Otolaryngology)  EZIO Garibay MD as Consulting Physician (Ophthalmology)  Melissa Burleson MD as Consulting Physician (Gastroenterology)    REVIEW OF SYSTEMS     Review of Systems   Constitutional: Negative for fever.   Neurological: Negative.           PHYSICAL EXAMINATION     Physical Exam  Constitutional:       General: She is not in acute distress.     Appearance: She is not ill-appearing.   Skin:         Neurological:      Mental Status: She is alert.             REVIEWED DATA     Labs:     Lab Results   Component Value Date     01/04/2022    K 4.7 01/04/2022    CALCIUM 8.9 01/04/2022    AST 12 01/04/2022    ALT 11 01/04/2022    BUN 15 01/04/2022    CREATININE 0.89 01/04/2022    CREATININE 0.66 05/11/2021    CREATININE 0.70 11/17/2020    EGFRIFNONA 63 01/04/2022    EGFRIFAFRI 72 01/04/2022       Lab Results   Component Value Date    HGBA1C 6.5 (H) 02/02/2022    HGBA1C 6.30 (H) 07/26/2021    HGBA1C 5.80 (H) 02/24/2021       Lab Results   Component Value Date    LDL 49 02/02/2022    LDL 51 02/24/2021    HDL 42 02/02/2022    TRIG 95 02/02/2022       Lab Results   Component Value Date    TSH 2.910 07/26/2021    FREET4 1.19 07/26/2021       Lab Results   Component Value Date    WBC 6.3 01/04/2022    HGB 11.0 (L) 01/04/2022     01/04/2022       Lab Results   Component Value Date    MICROALBUR 22.2 02/02/2022           Imaging:           Medical Tests:           Summary of old records / correspondence / consultant  report:           Request outside records:             *Examiner was wearing [id number] mask and eye protection during the entire duration of the visit. Patient was masked the entire time. Minimum social distance of 6 ft maintained entire visit except if physical contact was necessary as documented.       Template created by Zachery Pacheco MD

## 2022-03-02 NOTE — ASSESSMENT & PLAN NOTE
Sugar is running higher at home.   Add pioglitazone 15 mg qd. Continue Januvia 100 mg qd.     Lab Results   Component Value Date    HGBA1C 6.5 (H) 02/02/2022    HGBA1C 6.30 (H) 07/26/2021    HGBA1C 5.80 (H) 02/24/2021

## 2022-03-11 DIAGNOSIS — I10 ESSENTIAL HYPERTENSION: Chronic | ICD-10-CM

## 2022-03-11 RX ORDER — LISINOPRIL 20 MG/1
TABLET ORAL
Qty: 90 TABLET | Refills: 3 | Status: SHIPPED | OUTPATIENT
Start: 2022-03-11 | End: 2023-01-24

## 2022-05-04 ENCOUNTER — OFFICE VISIT (OUTPATIENT)
Dept: GASTROENTEROLOGY | Facility: CLINIC | Age: 77
End: 2022-05-04

## 2022-05-04 VITALS
SYSTOLIC BLOOD PRESSURE: 136 MMHG | BODY MASS INDEX: 22.78 KG/M2 | TEMPERATURE: 94.1 F | HEART RATE: 52 BPM | WEIGHT: 123.8 LBS | HEIGHT: 62 IN | DIASTOLIC BLOOD PRESSURE: 75 MMHG

## 2022-05-04 DIAGNOSIS — D50.0 IRON DEFICIENCY ANEMIA DUE TO CHRONIC BLOOD LOSS: ICD-10-CM

## 2022-05-04 DIAGNOSIS — Z51.81 THERAPEUTIC DRUG MONITORING: ICD-10-CM

## 2022-05-04 DIAGNOSIS — K51.30 ULCERATIVE RECTOSIGMOIDITIS WITHOUT COMPLICATION: Primary | ICD-10-CM

## 2022-05-04 DIAGNOSIS — K21.9 GASTROESOPHAGEAL REFLUX DISEASE WITHOUT ESOPHAGITIS: ICD-10-CM

## 2022-05-04 PROCEDURE — 99214 OFFICE O/P EST MOD 30 MIN: CPT | Performed by: NURSE PRACTITIONER

## 2022-05-04 NOTE — PROGRESS NOTES
Chief Complaint   Patient presents with   • Follow-up     Ulcerative rectosigmoiditis        Renee Stevenson is a  77 y.o. female here for a follow up visit for ulcerative rectosigmoiditis.    HPI  77-year-old female presents today for follow-up visit for ulcerative rectosigmoiditis.  She is a patient of Dr. Burleson.  She was last seen in the office by her on 1/4/2022.  She is new to me today.  She has a history of ulcerative rectosigmoiditis and admits she is doing well on Lialda 4 tabs every morning.  She reports her bowels are moving well.  She is no longer having any diarrhea.  She also has a history of iron deficiency anemia secondary to chronic blood loss and admits she is doing well on iron supplementation every other day.  Most recent hemoglobin was good at 11.0.  He does have a history of colon polyps.  She denies any dysphagia, reflux, abdominal pain, nausea and vomiting, diarrhea, constipation, rectal bleeding or melena.  She admits her appetite is good and her weight is stable.  She denies any significant GI family history at this time.  Tells me she is up-to-date with all of her health screenings and vaccinations. Last colonoscopy was in 2018.  Last EGD was in 2/2020.  Past Medical History:   Diagnosis Date   • Allergic rhinitis    • Broken heart syndrome    • Colitis    • Colon polyps    • Diabetes mellitus (HCC)     type 2   • Dizziness    • Encounter for breast cancer screening other than mammogram    • GERD (gastroesophageal reflux disease)    • GI (gastrointestinal bleed)    • Glaucoma    • History of transfusion    • Hyperlipidemia    • Hypertension    • Iron deficiency anemia due to chronic blood loss    • Knee fracture, left    • Non-STEMI (non-ST elevated myocardial infarction) (HCC) 06/16/2017   • Osteopenia    • Shingles    • Ulcerative colitis (HCC)        Past Surgical History:   Procedure Laterality Date   • CARDIAC CATHETERIZATION N/A 6/16/2017    Procedure: Left Heart Cath;  Surgeon: Abhay  MD Je;  Location: [facility] CATH INVASIVE LOCATION;  Service:    • CARDIAC CATHETERIZATION N/A 6/16/2017    Procedure: Left ventriculography;  Surgeon: Abhay Wooten MD;  Location: [facility] CATH INVASIVE LOCATION;  Service:    • CARDIAC CATHETERIZATION N/A 6/16/2017    Procedure: Coronary angiography;  Surgeon: Abhay Wooten MD;  Location: [facility] CATH INVASIVE LOCATION;  Service:    • CATARACT EXTRACTION     • COLONOSCOPY  08/14/2018   • ENDOSCOPY N/A 2/10/2020    Procedure: ESOPHAGOGASTRODUODENOSCOPY;  Surgeon: Melissa Burleson MD;  Location: [facility] ENDOSCOPY;  Service: Gastroenterology;  Laterality: N/A;  PRE- IRON DEFICIENCY ANEMIA  POST--SCHATZKY'S RING, GASTRITIS,DUODENITIS   • EYE SURGERY      cataract surgery   • PAP SMEAR     • SIGMOIDOSCOPY N/A 10/21/2019    EH, active ulcerative colitis, severe inflammation in rectum, TA       Scheduled Meds:    Continuous Infusions:No current facility-administered medications for this visit.      PRN Meds:.    Allergies   Allergen Reactions   • Tetanus Toxoids Swelling     Hand and arm  Hand and arm  Hand and arm       Social History     Socioeconomic History   • Marital status:      Spouse name: Eliazar*   • Number of children: 1   Tobacco Use   • Smoking status: Never Smoker   • Smokeless tobacco: Never Used   Vaping Use   • Vaping Use: Never used   Substance and Sexual Activity   • Alcohol use: No   • Drug use: No   • Sexual activity: Not Currently     Partners: Male       Family History   Problem Relation Age of Onset   • Heart disease Mother    • Hypertension Mother    • Diabetes Mother    • Heart disease Father    • Hypertension Father    • Heart disease Sister    • Heart disease Brother    • No Known Problems Maternal Grandmother    • No Known Problems Maternal Grandfather    • No Known Problems Paternal Grandmother    • No Known Problems Paternal Grandfather    • Crohn's disease Niece    • Colon cancer Neg Hx    • Breast cancer Neg Hx    • Dementia Neg Hx        Review  of Systems   Constitutional: Negative for appetite change, chills, diaphoresis, fatigue, fever and unexpected weight change.   HENT: Negative for nosebleeds, postnasal drip, sore throat, trouble swallowing and voice change.    Respiratory: Negative for cough, choking, chest tightness, shortness of breath, wheezing and stridor.    Cardiovascular: Negative for chest pain, palpitations and leg swelling.   Gastrointestinal: Negative for abdominal distention, abdominal pain, anal bleeding, blood in stool, constipation, diarrhea, nausea, rectal pain and vomiting.   Endocrine: Negative for polydipsia, polyphagia and polyuria.   Musculoskeletal: Negative for gait problem.   Skin: Negative for rash and wound.   Allergic/Immunologic: Negative for food allergies.   Neurological: Negative for dizziness, speech difficulty and light-headedness.   Psychiatric/Behavioral: Negative for confusion, self-injury, sleep disturbance and suicidal ideas.       Vitals:    05/04/22 1304   BP: 136/75   Pulse: 52   Temp: 94.1 °F (34.5 °C)       Physical Exam  Constitutional:       General: She is not in acute distress.     Appearance: She is well-developed. She is not ill-appearing.   HENT:      Head: Normocephalic.   Eyes:      Pupils: Pupils are equal, round, and reactive to light.   Cardiovascular:      Rate and Rhythm: Normal rate and regular rhythm.      Heart sounds: Normal heart sounds.   Pulmonary:      Effort: Pulmonary effort is normal.      Breath sounds: Normal breath sounds.   Abdominal:      General: Bowel sounds are normal. There is no distension.      Palpations: Abdomen is soft. There is no mass.      Tenderness: There is no abdominal tenderness. There is no guarding or rebound.      Hernia: No hernia is present.   Musculoskeletal:         General: Normal range of motion.   Skin:     General: Skin is warm and dry.   Neurological:      Mental Status: She is alert and oriented to person, place, and time.   Psychiatric:          Speech: Speech normal.         Behavior: Behavior normal.         Judgment: Judgment normal.         No radiology results for the last 7 days     Diagnoses and all orders for this visit:    1. Ulcerative rectosigmoiditis without complication (HCC) (Primary)  -     CBC & Differential    2. Iron deficiency anemia due to chronic blood loss  -     CBC & Differential    3. Gastroesophageal reflux disease without esophagitis    4. Therapeutic drug monitoring      Reviewed most recent lab work results with her today.  Hemoglobin stable.  Other labs were stable.  Ulcerative rectosigmoiditis seems to be well controlled currently on 4 Lialda daily.  Bowels are moving well.  No longer having diarrhea.  Hemoglobin seems stable at 11.0.  Continue iron every other day.  We will repeat CBC today.  GERD seems well controlled on Protonix 40 mg daily.  Continue GERD precautions.  She is up-to-date with all of her health screenings and vaccinations.  Overall seems to be doing very well.  Patient to call the office with any issues.  Patient to follow-up with me in 6 months.  Patient is agreeable to the plan.

## 2022-05-05 ENCOUNTER — TELEPHONE (OUTPATIENT)
Dept: GASTROENTEROLOGY | Facility: CLINIC | Age: 77
End: 2022-05-05

## 2022-05-05 LAB
BASOPHILS # BLD AUTO: 0 X10E3/UL (ref 0–0.2)
BASOPHILS NFR BLD AUTO: 1 %
EOSINOPHIL # BLD AUTO: 0.1 X10E3/UL (ref 0–0.4)
EOSINOPHIL NFR BLD AUTO: 2 %
ERYTHROCYTE [DISTWIDTH] IN BLOOD BY AUTOMATED COUNT: 12.7 % (ref 11.7–15.4)
HCT VFR BLD AUTO: 34.4 % (ref 34–46.6)
HGB BLD-MCNC: 11.5 G/DL (ref 11.1–15.9)
IMM GRANULOCYTES # BLD AUTO: 0 X10E3/UL (ref 0–0.1)
IMM GRANULOCYTES NFR BLD AUTO: 0 %
LYMPHOCYTES # BLD AUTO: 1.6 X10E3/UL (ref 0.7–3.1)
LYMPHOCYTES NFR BLD AUTO: 30 %
MCH RBC QN AUTO: 31.2 PG (ref 26.6–33)
MCHC RBC AUTO-ENTMCNC: 33.4 G/DL (ref 31.5–35.7)
MCV RBC AUTO: 93 FL (ref 79–97)
MONOCYTES # BLD AUTO: 0.4 X10E3/UL (ref 0.1–0.9)
MONOCYTES NFR BLD AUTO: 8 %
NEUTROPHILS # BLD AUTO: 3.3 X10E3/UL (ref 1.4–7)
NEUTROPHILS NFR BLD AUTO: 59 %
PLATELET # BLD AUTO: 172 X10E3/UL (ref 150–450)
RBC # BLD AUTO: 3.69 X10E6/UL (ref 3.77–5.28)
WBC # BLD AUTO: 5.5 X10E3/UL (ref 3.4–10.8)

## 2022-05-05 NOTE — TELEPHONE ENCOUNTER
----- Message from OLIVA Freeman sent at 5/5/2022  9:13 AM EDT -----  Call the patient and let her know her hemoglobin was great at 11.5.  Thanks

## 2022-05-22 DIAGNOSIS — E11.59 TYPE 2 DIABETES MELLITUS WITH OTHER CIRCULATORY COMPLICATION, WITHOUT LONG-TERM CURRENT USE OF INSULIN: ICD-10-CM

## 2022-05-23 RX ORDER — SITAGLIPTIN 100 MG/1
TABLET, FILM COATED ORAL
Qty: 90 TABLET | Refills: 3 | Status: SHIPPED | OUTPATIENT
Start: 2022-05-23

## 2022-06-08 ENCOUNTER — OFFICE VISIT (OUTPATIENT)
Dept: INTERNAL MEDICINE | Age: 77
End: 2022-06-08

## 2022-06-08 VITALS
OXYGEN SATURATION: 99 % | HEIGHT: 62 IN | HEART RATE: 50 BPM | WEIGHT: 123 LBS | TEMPERATURE: 97.5 F | SYSTOLIC BLOOD PRESSURE: 140 MMHG | BODY MASS INDEX: 22.63 KG/M2 | DIASTOLIC BLOOD PRESSURE: 70 MMHG

## 2022-06-08 DIAGNOSIS — E78.2 MIXED HYPERLIPIDEMIA: Chronic | ICD-10-CM

## 2022-06-08 DIAGNOSIS — E11.59 TYPE 2 DIABETES MELLITUS WITH OTHER CIRCULATORY COMPLICATION, WITHOUT LONG-TERM CURRENT USE OF INSULIN: Primary | Chronic | ICD-10-CM

## 2022-06-08 DIAGNOSIS — I10 PRIMARY HYPERTENSION: Chronic | ICD-10-CM

## 2022-06-08 DIAGNOSIS — I25.10 CORONARY ARTERY DISEASE INVOLVING NATIVE CORONARY ARTERY OF NATIVE HEART WITHOUT ANGINA PECTORIS: Chronic | ICD-10-CM

## 2022-06-08 PROBLEM — B02.9 HERPES ZOSTER WITHOUT COMPLICATION: Status: RESOLVED | Noted: 2022-03-02 | Resolved: 2022-06-08

## 2022-06-08 LAB
EXPIRATION DATE: NORMAL
HBA1C MFR BLD: 6.9 %
Lab: NORMAL

## 2022-06-08 PROCEDURE — 99214 OFFICE O/P EST MOD 30 MIN: CPT | Performed by: INTERNAL MEDICINE

## 2022-06-08 PROCEDURE — 83036 HEMOGLOBIN GLYCOSYLATED A1C: CPT | Performed by: INTERNAL MEDICINE

## 2022-06-08 PROCEDURE — 36416 COLLJ CAPILLARY BLOOD SPEC: CPT | Performed by: INTERNAL MEDICINE

## 2022-06-08 PROCEDURE — 3044F HG A1C LEVEL LT 7.0%: CPT | Performed by: INTERNAL MEDICINE

## 2022-06-08 PROCEDURE — 91305 COVID-19 (PFIZER) 12+ YRS: CPT | Performed by: INTERNAL MEDICINE

## 2022-06-08 PROCEDURE — 0051A COVID-19 (PFIZER) 12+ YRS: CPT | Performed by: INTERNAL MEDICINE

## 2022-06-08 NOTE — PROGRESS NOTES
MyChart:    Here are the result(s) of your test(s):     A1c level is a bit higher than last time at 6.9.  Try to watch your diet little more closely.  Continue current medications at this time.    Please do not hesitate to contact me if you have questions.

## 2022-06-08 NOTE — PATIENT INSTRUCTIONS
** IMPORTANT MESSAGE FROM DR. PATIÑO **    In our office, your satisfaction is VERY important to us.     You may receive a survey from [employer] by mail or E-mail for you to provide feedback about your visit. This information is invaluable for me to know what we can do to improve our services.     I ask that you please take a few minutes to complete the survey and let us know how we are doing in serving your needs. (You may receive the survey more than once for multiple visits)    Thank You !    Dr. Patiño    _________________________________________________________________________________________________________________________      ** ADDITIONAL INSTRUCTION / REMINDERS FROM DR. PATIÑO **    Get your Shingrix vaccine end of this year.  Contact your gastroenterologist regarding colonoscopy scheduling.

## 2022-06-08 NOTE — PROGRESS NOTES
I N T E R N A L  M E D I C I N E  J U N O H  K I M,  M D      ENCOUNTER DATE:  06/08/2022    Renee PARIKH From / 77 y.o. / female      CHIEF COMPLAINT / REASON FOR OFFICE VISIT     Diabetes and Hypertension      ASSESSMENT & PLAN     Problem List Items Addressed This Visit        Medium    Type 2 diabetes mellitus with circulatory disorder (HCC) - Primary (Chronic)    Overview     **Complications of diabetes: microalbuminuria and coronary artery disease     Continue pioglitazone 15 mg and Januvia 100 mg           Relevant Medications    Accu-Chek Saira Plus test strip    pioglitazone (ACTOS) 15 MG tablet    Januvia 100 MG tablet    Other Relevant Orders    POC Glycosylated Hemoglobin (Hb A1C)    Hyperlipidemia (Chronic)    Overview     Continue rosuvastatin 10 mg daily.            Relevant Medications    rosuvastatin (CRESTOR) 10 MG tablet    Coronary artery disease involving native coronary artery of native heart without angina pectoris (Chronic)    Overview     *[facility]    Stable. Continue statin, bblocker. Not on ASA due to recent LGIB           Relevant Medications    rosuvastatin (CRESTOR) 10 MG tablet    amLODIPine (NORVASC) 5 MG tablet    metoprolol tartrate (LOPRESSOR) 50 MG tablet       Low    Hypertension (Chronic)    Overview     Continue lisinopril 20 mg, amlodipine 5 mg qd and metoprolol tartrate 50 mg BID.            Relevant Medications    amLODIPine (NORVASC) 5 MG tablet    metoprolol tartrate (LOPRESSOR) 50 MG tablet    lisinopril (PRINIVIL,ZESTRIL) 20 MG tablet        Orders Placed This Encounter   Procedures   • COVID-19 Vaccine (Pfizer) Gray Cap   • POC Glycosylated Hemoglobin (Hb A1C)     No orders of the defined types were placed in this encounter.      SUMMARY/DISCUSSION  •       Next Appointment with me: Visit date not found    Return in about 6 months (around 12/8/2022) for Reassess chronic medical problems.        VITAL SIGNS     Vitals:    06/08/22 0907   BP: 140/70   BP  "Location: Left arm   Pulse: 50   Temp: 97.5 °F (36.4 °C)   SpO2: 99%   Weight: 55.8 kg (123 lb)   Height: 157.5 cm (62\")       BP Readings from Last 3 Encounters:   06/08/22 140/70   05/04/22 136/75   03/25/22 156/72     Wt Readings from Last 3 Encounters:   06/08/22 55.8 kg (123 lb)   05/04/22 56.2 kg (123 lb 12.8 oz)   03/25/22 54.4 kg (120 lb)     Body mass index is 22.5 kg/m².    Blood pressure readings recorded on patient flowsheet:  No flowsheet data found.     MEDICATIONS AT THE TIME OF OFFICE VISIT     Current Outpatient Medications on File Prior to Visit   Medication Sig   • Accu-Chek Saira Plus test strip TEST BLODO SUGAR EVERY DAY   • Acetaminophen (TYLENOL PO) Take 2 tablets by mouth As Needed.   • amLODIPine (NORVASC) 5 MG tablet TAKE 1 TABLET BY MOUTH DAILY   • dorzolamide-timolol (COSOPT) 22.3-6.8 MG/ML ophthalmic solution Apply 1 drop to eye 2 (two) times a day.   • ferrous sulfate 325 (65 FE) MG tablet Take 1 tablet by mouth Daily With Breakfast. (Patient taking differently: Take 325 mg by mouth Daily With Breakfast. Every otherday)   • hydrocortisone (ANUSOL-HC) 2.5 % rectal cream Insert  into the rectum 2 (Two) Times a Day. (Patient taking differently: Insert  into the rectum 2 (Two) Times a Day. prn)   • Januvia 100 MG tablet TAKE 1 TABLET BY MOUTH DAILY WITH BREAKFAST   • lisinopril (PRINIVIL,ZESTRIL) 20 MG tablet TAKE 1 TABLET BY MOUTH EVERY DAY   • mesalamine (LIALDA) 1.2 g EC tablet Take 4 tablets by mouth Daily With Breakfast.   • metoprolol tartrate (LOPRESSOR) 50 MG tablet TAKE 1 TABLET BY MOUTH EVERY 12 HOURS   • OYSCO 500 + D 500-200 MG-UNIT tablet TAKE 1 TABLET BY MOUTH DAILY   • pantoprazole (PROTONIX) 40 MG EC tablet Take 1 tablet by mouth Every Morning Before Breakfast.   • pioglitazone (ACTOS) 15 MG tablet Take 1 tablet by mouth Daily With Breakfast.   • rosuvastatin (CRESTOR) 10 MG tablet Take 1 tablet by mouth Daily.   • [DISCONTINUED] cephalexin (KEFLEX) 500 MG capsule Take 1 " capsule by mouth 3 (Three) Times a Day.   • [DISCONTINUED] pregabalin (LYRICA) 50 MG capsule Take 1 capsule by mouth 3 (Three) Times a Day.     No current facility-administered medications on file prior to visit.         HISTORY OF PRESENT ILLNESS     Blood sugar readings recorded on patient's flowsheet:  No flowsheet data found.     Diabetes hypertension and hyperlipidemia remains stable on current medications.  CAD remains stable without angina or dyspnea on exertion.  She is compliant with all her medications.  She got over shingles and is off of pregabalin.       Lab Results   Component Value Date    HGBA1C 6.5 (H) 02/02/2022    HGBA1C 6.30 (H) 07/26/2021    HGBA1C 5.80 (H) 02/24/2021    CREATININE 0.89 01/04/2022    LDL 49 02/02/2022    MALBCRERATIO 43 (H) 02/02/2022        55.8 kg (123 lb)    REVIEW OF SYSTEMS     Constitutional neg except per HPI   Resp neg  CV neg   GI UC stable       PHYSICAL EXAMINATION     Physical Exam  General: No acute distress  Psych: Normal thought and judgment   Cardiovascular Rate: normal. Rhythm: regular. Heart sounds: normal.   Pulm/Chest: Effort normal, breath sounds normal.  No lower extremity edema       REVIEWED DATA     Labs:       Lab Results   Component Value Date     01/04/2022    K 4.7 01/04/2022    CALCIUM 8.9 01/04/2022    AST 12 01/04/2022    ALT 11 01/04/2022    BUN 15 01/04/2022    CREATININE 0.89 01/04/2022    CREATININE 0.66 05/11/2021    CREATININE 0.70 11/17/2020    EGFRIFNONA 63 01/04/2022    EGFRIFAFRI 72 01/04/2022       Lab Results   Component Value Date    HGBA1C 6.5 (H) 02/02/2022    HGBA1C 6.30 (H) 07/26/2021    HGBA1C 5.80 (H) 02/24/2021       Lab Results   Component Value Date    LDL 49 02/02/2022    LDL 51 02/24/2021    LDL 50 03/16/2020    HDL 42 02/02/2022    HDL 50 02/24/2021    TRIG 95 02/02/2022    TRIG 97 02/24/2021       Lab Results   Component Value Date    TSH 2.910 07/26/2021    FREET4 1.19 07/26/2021       Lab Results   Component Value  Date    WBC 5.5 05/04/2022    HGB 11.5 05/04/2022     05/04/2022       Lab Results   Component Value Date    MALBCRERATIO 43 (H) 02/02/2022          Imaging:           Medical Tests:           Summary of old records / correspondence / consultant report:           Request outside records:           *Examiner was wearing [id number] mask and eye protection during the entire duration of the visit. Patient was masked the entire time. Minimum social distance of 6 ft maintained entire visit except if physical contact was necessary as documented.       Template created by Zachery Pacheco MD

## 2022-06-19 DIAGNOSIS — E11.59 TYPE 2 DIABETES MELLITUS WITH OTHER CIRCULATORY COMPLICATION, WITHOUT LONG-TERM CURRENT USE OF INSULIN: ICD-10-CM

## 2022-06-20 RX ORDER — PIOGLITAZONEHYDROCHLORIDE 15 MG/1
15 TABLET ORAL
Qty: 30 TABLET | Refills: 11 | Status: SHIPPED | OUTPATIENT
Start: 2022-06-20

## 2022-07-11 DIAGNOSIS — K51.311 ULCERATIVE RECTOSIGMOIDITIS WITH RECTAL BLEEDING: Chronic | ICD-10-CM

## 2022-07-11 RX ORDER — MESALAMINE 1.2 G/1
4.8 TABLET, DELAYED RELEASE ORAL
Qty: 120 TABLET | Refills: 5 | Status: SHIPPED | OUTPATIENT
Start: 2022-07-11 | End: 2022-12-30 | Stop reason: SDUPTHER

## 2022-08-15 DIAGNOSIS — E11.9 TYPE 2 DIABETES MELLITUS WITHOUT COMPLICATION, WITHOUT LONG-TERM CURRENT USE OF INSULIN: Chronic | ICD-10-CM

## 2022-08-31 ENCOUNTER — TELEPHONE (OUTPATIENT)
Dept: OTHER | Facility: OTHER | Age: 77
End: 2022-08-31

## 2022-08-31 RX ORDER — MESALAMINE 1000 MG/1
SUPPOSITORY RECTAL
Qty: 30 SUPPOSITORY | Refills: 0 | OUTPATIENT
Start: 2022-08-31 | End: 2022-12-09 | Stop reason: SDUPTHER

## 2022-08-31 RX ORDER — MESALAMINE 1000 MG/1
SUPPOSITORY RECTAL
Qty: 30 SUPPOSITORY | Refills: 0 | OUTPATIENT
Start: 2022-08-31 | End: 2022-08-31 | Stop reason: SDUPTHER

## 2022-08-31 NOTE — TELEPHONE ENCOUNTER
Call to pt.  Advise per M Beata note.  Verb understanding.     Attempt escribe canasa per M Beata order - unable to completed.  Call to Edward @ [phone] -  left with canasa rx.

## 2022-08-31 NOTE — TELEPHONE ENCOUNTER
Caller: Renee Stevenson    Relationship: Self    Best call back number: [phone]    What is the best time to reach you: ANY    Who are you requesting to speak with (clinical staff, provider,  specific staff member): DR GAMBLE OR HER APRN    PT STATES THAT HER COLITIS IS FLARING UP AND SHE IS NOW BLEEDING. SHE WOULD LIKE TO KNOW IF THEY WOULD LIKE TO SEE HER IN OFFICE FOR THIS? IF THERE IS ANY MEDICATION SHE COULD TAKE TO HELP?        Do you require a callback: YES

## 2022-08-31 NOTE — TELEPHONE ENCOUNTER
Yes I will send her in a prescription for the Canasa suppositories but she needs to go ahead make an office follow-up with me or Dr. Burleson.  In the next 2 weeks.  Thanks    Nurses can you try to send the Canasa to her pharmacy is giving me an error message saying I cannot prescribe it electronically???  I was going to give her 30 night supply with no refills.  Thanks

## 2022-08-31 NOTE — TELEPHONE ENCOUNTER
Called pt and pt reports that this is the second day she is having lots of rectal bleeding.   Pt reports she had 2 bloody bm and one today. Pt reports that this is how she starts with a flare.  Pt reports feeling good. Pt denies a fever and chills.  Pt reports her last flare was about 3 yrs ago.  Pt reports that in the past Dr Burleson had put her on the mesalamine supp for her last flare.   Advised Dr Burleson is out of the office , but will send message to Jessica NP and in the meantime if symptoms worsen advised to go to ER.  Verb understanding.

## 2022-09-12 NOTE — PROGRESS NOTES
Please let her know that her blood count is stable at 11, her iron stores are very good at 158    Liver, kidney, and electrolyte labs are good    Vitamin D levels are normal Preop Diagnosis: use parent diagnosis

## 2022-09-14 ENCOUNTER — OFFICE VISIT (OUTPATIENT)
Dept: GASTROENTEROLOGY | Facility: CLINIC | Age: 77
End: 2022-09-14

## 2022-09-14 VITALS
OXYGEN SATURATION: 99 % | WEIGHT: 126.6 LBS | HEIGHT: 62 IN | TEMPERATURE: 97.1 F | SYSTOLIC BLOOD PRESSURE: 131 MMHG | BODY MASS INDEX: 23.3 KG/M2 | HEART RATE: 54 BPM | DIASTOLIC BLOOD PRESSURE: 81 MMHG

## 2022-09-14 DIAGNOSIS — K21.9 GASTROESOPHAGEAL REFLUX DISEASE, UNSPECIFIED WHETHER ESOPHAGITIS PRESENT: ICD-10-CM

## 2022-09-14 DIAGNOSIS — K51.311 ULCERATIVE RECTOSIGMOIDITIS WITH RECTAL BLEEDING: Primary | Chronic | ICD-10-CM

## 2022-09-14 DIAGNOSIS — D50.0 IRON DEFICIENCY ANEMIA DUE TO CHRONIC BLOOD LOSS: ICD-10-CM

## 2022-09-14 DIAGNOSIS — K63.5 POLYP OF COLON, UNSPECIFIED PART OF COLON, UNSPECIFIED TYPE: Chronic | ICD-10-CM

## 2022-09-14 PROCEDURE — 99214 OFFICE O/P EST MOD 30 MIN: CPT | Performed by: NURSE PRACTITIONER

## 2022-09-14 NOTE — PROGRESS NOTES
Chief Complaint   Patient presents with   • Ulcerative Colitis     Flare       Renee Stevenson is a  77 y.o. female here for a follow up visit for ulcerative colitis.    HPI  77-year-old female presents today for follow-up visit for ulcerative colitis.  She is a patient of Dr. Burleson.  She was last seen in the office by me on 5/4/2022.  She has a history of ulcerative rectosigmoiditis and normally does really well on Lialda 4 tabs daily.  She tells me its been a long time since she has had a flareup.  She did notice though recently that she started having rectal bleeding with some discomfort.  She immediately called the office and we started her on Canasa nightly rectal suppositories.  She tells me she has been on them now for a couple weeks and within 2 days of taking and the bleeding stopped.  Does have a history of iron deficiency anemia and normally takes her iron every other day.  When the bleeding started she was worried so she put her self on it daily.  Her last EGD was on 2/2020.  Her last colonoscopy was in 2018.  She does have a history of GERD and does really well on pantoprazole 40 mg daily.  She denies any breakthrough reflux at this time.  She denies any dysphagia, abdominal pain, nausea and vomiting, diarrhea, constipation, to bleeding or melena.  She admits her bowels are back to normal.  She does have a history of colon polyps. Does have a GI family history of Crohn's disease and colon cancer.  Past Medical History:   Diagnosis Date   • Allergic rhinitis    • Broken heart syndrome    • Colitis    • Colon polyps    • Diabetes mellitus (HCC)     type 2   • Dizziness    • Encounter for breast cancer screening other than mammogram    • GERD (gastroesophageal reflux disease)    • GI (gastrointestinal bleed)    • Glaucoma    • Herpes zoster without complication 3/2/2022   • History of transfusion    • Hyperlipidemia    • Hypertension    • Iron deficiency anemia due to chronic blood loss    • Knee fracture,  left    • Non-STEMI (non-ST elevated myocardial infarction) (HCC) 06/16/2017   • Osteopenia    • Shingles    • Ulcerative colitis (HCC)        Past Surgical History:   Procedure Laterality Date   • CARDIAC CATHETERIZATION N/A 6/16/2017    Procedure: Left Heart Cath;  Surgeon: Abhay Wooten MD;  Location: [facility] CATH INVASIVE LOCATION;  Service:    • CARDIAC CATHETERIZATION N/A 6/16/2017    Procedure: Left ventriculography;  Surgeon: Abhay Wooten MD;  Location: [facility] CATH INVASIVE LOCATION;  Service:    • CARDIAC CATHETERIZATION N/A 6/16/2017    Procedure: Coronary angiography;  Surgeon: Abhay Wooten MD;  Location: [facility] CATH INVASIVE LOCATION;  Service:    • CATARACT EXTRACTION     • COLONOSCOPY  08/14/2018   • ENDOSCOPY N/A 2/10/2020    Procedure: ESOPHAGOGASTRODUODENOSCOPY;  Surgeon: Melissa Burleson MD;  Location: [facility] ENDOSCOPY;  Service: Gastroenterology;  Laterality: N/A;  PRE- IRON DEFICIENCY ANEMIA  POST--SCHATZKY'S RING, GASTRITIS,DUODENITIS   • EYE SURGERY      cataract surgery   • PAP SMEAR     • SIGMOIDOSCOPY N/A 10/21/2019    EH, active ulcerative colitis, severe inflammation in rectum, TA       Scheduled Meds:    Continuous Infusions:No current facility-administered medications for this visit.      PRN Meds:.    Allergies   Allergen Reactions   • Tetanus Toxoids Swelling     Hand and arm  Hand and arm  Hand and arm       Social History     Socioeconomic History   • Marital status:      Spouse name: Eliazar*   • Number of children: 1   Tobacco Use   • Smoking status: Never Smoker   • Smokeless tobacco: Never Used   Vaping Use   • Vaping Use: Never used   Substance and Sexual Activity   • Alcohol use: No   • Drug use: No   • Sexual activity: Not Currently     Partners: Male       Family History   Problem Relation Age of Onset   • Heart disease Mother    • Hypertension Mother    • Diabetes Mother    • Heart disease Father    • Hypertension Father    • Heart disease Sister    • Heart disease Brother    •  No Known Problems Maternal Grandmother    • No Known Problems Maternal Grandfather    • No Known Problems Paternal Grandmother    • No Known Problems Paternal Grandfather    • Crohn's disease Niece    • Colon cancer Neg Hx    • Breast cancer Neg Hx    • Dementia Neg Hx        Review of Systems   Constitutional: Negative for appetite change, chills, diaphoresis, fatigue, fever and unexpected weight change.   HENT: Negative for nosebleeds, postnasal drip, sore throat, trouble swallowing and voice change.    Respiratory: Negative for cough, choking, chest tightness, shortness of breath, wheezing and stridor.    Cardiovascular: Negative for chest pain, palpitations and leg swelling.   Gastrointestinal: Negative for abdominal distention, abdominal pain, anal bleeding, blood in stool, constipation, diarrhea, nausea, rectal pain and vomiting.   Endocrine: Negative for polydipsia, polyphagia and polyuria.   Musculoskeletal: Negative for gait problem.   Skin: Negative for rash and wound.   Allergic/Immunologic: Negative for food allergies.   Neurological: Negative for dizziness, speech difficulty and light-headedness.   Psychiatric/Behavioral: Negative for confusion, self-injury, sleep disturbance and suicidal ideas.       Vitals:    09/14/22 1349   BP: 131/81   Pulse: 54   Temp: 97.1 °F (36.2 °C)   SpO2: 99%       Physical Exam  Constitutional:       General: She is not in acute distress.     Appearance: She is well-developed. She is not ill-appearing.   HENT:      Head: Normocephalic.   Eyes:      Pupils: Pupils are equal, round, and reactive to light.   Cardiovascular:      Rate and Rhythm: Normal rate and regular rhythm.      Heart sounds: Normal heart sounds.   Pulmonary:      Effort: Pulmonary effort is normal.      Breath sounds: Normal breath sounds.   Abdominal:      General: Bowel sounds are normal. There is no distension.      Palpations: Abdomen is soft. There is no mass.      Tenderness: There is no abdominal  tenderness. There is no guarding or rebound.      Hernia: No hernia is present.   Musculoskeletal:         General: Normal range of motion.   Skin:     General: Skin is warm and dry.   Neurological:      Mental Status: She is alert and oriented to person, place, and time.   Psychiatric:         Speech: Speech normal.         Behavior: Behavior normal.         Judgment: Judgment normal.         No radiology results for the last 7 days     Diagnoses and all orders for this visit:    1. Ulcerative rectosigmoiditis with rectal bleeding (HCC) (Primary)  Overview:  Added automatically from request for surgery [id number]    Orders:  -     CBC & Differential  -     Comprehensive Metabolic Panel    2. Gastroesophageal reflux disease, unspecified whether esophagitis present    3. Iron deficiency anemia due to chronic blood loss  -     CBC & Differential  -     Comprehensive Metabolic Panel    4. Polyp of colon, unspecified part of colon, unspecified type  Overview:  *[facility]    Ulcerative rectosigmoiditis sounds like it is coming back down since the addition of Canasa suppositories at night.  Patient to go ahead and finish the 30-day supply of the Canasa.  Bleeding has already stopped.  Bowels seem to be back to her normal.  No longer having any abdominal pain or discomfort.  Patient has been increasing her iron to daily since the bleeding started.  We will go ahead and repeat a CBC today to check the hemoglobin.  If the hemoglobin is stable she can go back to iron every other day.  GERD seems well controlled on pantoprazole 40 mg daily.  Continue GERD precautions.  Continue Lialda 4 tabs daily.  She is up-to-date with all of her health screenings and vaccinations.  Patient to call the office next week with an update.  Patient to keep follow-up with Dr. Burleson in November.  We will plan for EGD and colonoscopy next year.  Patient is agreeable to the plan.

## 2022-09-15 ENCOUNTER — TELEPHONE (OUTPATIENT)
Dept: GASTROENTEROLOGY | Facility: CLINIC | Age: 77
End: 2022-09-15

## 2022-09-15 LAB
ALBUMIN SERPL-MCNC: 4.4 G/DL (ref 3.5–5.2)
ALBUMIN/GLOB SERPL: 1.8 G/DL
ALP SERPL-CCNC: 59 U/L (ref 39–117)
ALT SERPL-CCNC: 7 U/L (ref 1–33)
AST SERPL-CCNC: 14 U/L (ref 1–32)
BASOPHILS # BLD AUTO: 0.01 10*3/MM3 (ref 0–0.2)
BASOPHILS NFR BLD AUTO: 0.2 % (ref 0–1.5)
BILIRUB SERPL-MCNC: 0.2 MG/DL (ref 0–1.2)
BUN SERPL-MCNC: 17 MG/DL (ref 8–23)
BUN/CREAT SERPL: 14.8 (ref 7–25)
CALCIUM SERPL-MCNC: 9.3 MG/DL (ref 8.6–10.5)
CHLORIDE SERPL-SCNC: 107 MMOL/L (ref 98–107)
CO2 SERPL-SCNC: 22 MMOL/L (ref 22–29)
CREAT SERPL-MCNC: 1.15 MG/DL (ref 0.57–1)
EGFRCR-CYS SERPLBLD CKD-EPI 2021: 48.9 ML/MIN/1.73
EOSINOPHIL # BLD AUTO: 0 10*3/MM3 (ref 0–0.4)
EOSINOPHIL NFR BLD AUTO: 0 % (ref 0.3–6.2)
ERYTHROCYTE [DISTWIDTH] IN BLOOD BY AUTOMATED COUNT: 12.4 % (ref 12.3–15.4)
GLOBULIN SER CALC-MCNC: 2.4 GM/DL
GLUCOSE SERPL-MCNC: 166 MG/DL (ref 65–99)
HCT VFR BLD AUTO: 32.8 % (ref 34–46.6)
HGB BLD-MCNC: 10.7 G/DL (ref 12–15.9)
IMM GRANULOCYTES # BLD AUTO: 0.06 10*3/MM3 (ref 0–0.05)
IMM GRANULOCYTES NFR BLD AUTO: 1 % (ref 0–0.5)
LYMPHOCYTES # BLD AUTO: 1.83 10*3/MM3 (ref 0.7–3.1)
LYMPHOCYTES NFR BLD AUTO: 31.9 % (ref 19.6–45.3)
MCH RBC QN AUTO: 31.7 PG (ref 26.6–33)
MCHC RBC AUTO-ENTMCNC: 32.6 G/DL (ref 31.5–35.7)
MCV RBC AUTO: 97 FL (ref 79–97)
MONOCYTES # BLD AUTO: 0.48 10*3/MM3 (ref 0.1–0.9)
MONOCYTES NFR BLD AUTO: 8.4 % (ref 5–12)
NEUTROPHILS # BLD AUTO: 3.36 10*3/MM3 (ref 1.7–7)
NEUTROPHILS NFR BLD AUTO: 58.5 % (ref 42.7–76)
NRBC BLD AUTO-RTO: 0.2 /100 WBC (ref 0–0.2)
PLATELET # BLD AUTO: 204 10*3/MM3 (ref 140–450)
POTASSIUM SERPL-SCNC: 4.4 MMOL/L (ref 3.5–5.2)
PROT SERPL-MCNC: 6.8 G/DL (ref 6–8.5)
RBC # BLD AUTO: 3.38 10*6/MM3 (ref 3.77–5.28)
SODIUM SERPL-SCNC: 140 MMOL/L (ref 136–145)
WBC # BLD AUTO: 5.74 10*3/MM3 (ref 3.4–10.8)

## 2022-09-15 NOTE — TELEPHONE ENCOUNTER
----- Message from OLIVA Freeman sent at 9/15/2022  9:11 AM EDT -----  Please call the patient and let her know her hemoglobin did dip down a little bit to 10.7.  I would like to repeat a CBC in 2 weeks.  Creatinine is elevated and GFR is low which tells me she is not drinking enough water.  Thanks

## 2022-09-15 NOTE — TELEPHONE ENCOUNTER
Called pt and advised of Jessica DIAZ's note. Verb understanding.     Lab appt made for 09/29 at 9a.

## 2022-09-29 ENCOUNTER — LAB (OUTPATIENT)
Dept: GASTROENTEROLOGY | Facility: CLINIC | Age: 77
End: 2022-09-29

## 2022-09-30 LAB
25(OH)D3+25(OH)D2 SERPL-MCNC: 47.9 NG/ML (ref 30–100)
ALBUMIN SERPL-MCNC: 4.2 G/DL (ref 3.5–5.2)
ALBUMIN/GLOB SERPL: 1.8 G/DL
ALP SERPL-CCNC: 49 U/L (ref 39–117)
ALT SERPL-CCNC: 11 U/L (ref 1–33)
AST SERPL-CCNC: 15 U/L (ref 1–32)
BASOPHILS # BLD AUTO: 0.02 10*3/MM3 (ref 0–0.2)
BASOPHILS NFR BLD AUTO: 0.4 % (ref 0–1.5)
BILIRUB SERPL-MCNC: 0.3 MG/DL (ref 0–1.2)
BUN SERPL-MCNC: 18 MG/DL (ref 8–23)
BUN/CREAT SERPL: 14.4 (ref 7–25)
CALCIUM SERPL-MCNC: 9 MG/DL (ref 8.6–10.5)
CHLORIDE SERPL-SCNC: 105 MMOL/L (ref 98–107)
CO2 SERPL-SCNC: 24.5 MMOL/L (ref 22–29)
CREAT SERPL-MCNC: 1.25 MG/DL (ref 0.57–1)
CRP SERPL-MCNC: <0.3 MG/DL (ref 0–0.5)
EGFRCR SERPLBLD CKD-EPI 2021: 44.2 ML/MIN/1.73
EOSINOPHIL # BLD AUTO: 0.09 10*3/MM3 (ref 0–0.4)
EOSINOPHIL NFR BLD AUTO: 1.7 % (ref 0.3–6.2)
ERYTHROCYTE [DISTWIDTH] IN BLOOD BY AUTOMATED COUNT: 12.8 % (ref 12.3–15.4)
FERRITIN SERPL-MCNC: 82.6 NG/ML (ref 13–150)
GLOBULIN SER CALC-MCNC: 2.4 GM/DL
GLUCOSE SERPL-MCNC: 111 MG/DL (ref 65–99)
HCT VFR BLD AUTO: 30.8 % (ref 34–46.6)
HGB BLD-MCNC: 10.2 G/DL (ref 12–15.9)
IMM GRANULOCYTES # BLD AUTO: 0.02 10*3/MM3 (ref 0–0.05)
IMM GRANULOCYTES NFR BLD AUTO: 0.4 % (ref 0–0.5)
IRON SATN MFR SERPL: 9 % (ref 20–50)
IRON SERPL-MCNC: 38 MCG/DL (ref 37–145)
LYMPHOCYTES # BLD AUTO: 1.65 10*3/MM3 (ref 0.7–3.1)
LYMPHOCYTES NFR BLD AUTO: 31.2 % (ref 19.6–45.3)
MCH RBC QN AUTO: 31.7 PG (ref 26.6–33)
MCHC RBC AUTO-ENTMCNC: 33.1 G/DL (ref 31.5–35.7)
MCV RBC AUTO: 95.7 FL (ref 79–97)
MONOCYTES # BLD AUTO: 0.5 10*3/MM3 (ref 0.1–0.9)
MONOCYTES NFR BLD AUTO: 9.5 % (ref 5–12)
NEUTROPHILS # BLD AUTO: 3.01 10*3/MM3 (ref 1.7–7)
NEUTROPHILS NFR BLD AUTO: 56.8 % (ref 42.7–76)
NRBC BLD AUTO-RTO: 0 /100 WBC (ref 0–0.2)
PLATELET # BLD AUTO: 163 10*3/MM3 (ref 140–450)
POTASSIUM SERPL-SCNC: 4 MMOL/L (ref 3.5–5.2)
PROT SERPL-MCNC: 6.6 G/DL (ref 6–8.5)
RBC # BLD AUTO: 3.22 10*6/MM3 (ref 3.77–5.28)
SODIUM SERPL-SCNC: 140 MMOL/L (ref 136–145)
TIBC SERPL-MCNC: 416 MCG/DL
UIBC SERPL-MCNC: 378 MCG/DL (ref 112–346)
WBC # BLD AUTO: 5.29 10*3/MM3 (ref 3.4–10.8)

## 2022-10-06 ENCOUNTER — TELEPHONE (OUTPATIENT)
Dept: GASTROENTEROLOGY | Facility: CLINIC | Age: 77
End: 2022-10-06

## 2022-10-06 DIAGNOSIS — I25.10 CORONARY ARTERY DISEASE INVOLVING NATIVE CORONARY ARTERY OF NATIVE HEART WITHOUT ANGINA PECTORIS: ICD-10-CM

## 2022-10-06 DIAGNOSIS — E78.2 MIXED HYPERLIPIDEMIA: ICD-10-CM

## 2022-10-06 NOTE — TELEPHONE ENCOUNTER
----- Message from Melissa Burleson MD sent at 10/5/2022  4:41 PM EDT -----  Her hemoglobin is down a little bit at 10.2.    Her renal function is a little off.  She needs to make sure she is drinking plenty of water to stay hydrated.    Her iron saturation is on the low side.  She needs to be taking her iron regularly.  Iron stores are good.  Inflammatory markers are low.    Continue to monitor symptoms    Can we see if she can come in to see me October 27 at 11 AM to touch base, thanks

## 2022-10-07 ENCOUNTER — TELEPHONE (OUTPATIENT)
Dept: GASTROENTEROLOGY | Facility: CLINIC | Age: 77
End: 2022-10-07

## 2022-10-07 RX ORDER — ROSUVASTATIN CALCIUM 10 MG/1
10 TABLET, COATED ORAL DAILY
Qty: 90 TABLET | Refills: 3 | Status: SHIPPED | OUTPATIENT
Start: 2022-10-07

## 2022-10-07 NOTE — TELEPHONE ENCOUNTER
Hub staff attempted to follow warm transfer process and was unsuccessful     Caller: Renee Stevenson    Relationship to patient: Self    Best call back number: [phone]    Patient is needing: PATIENT RETURNING MISSED CALL FROM PRACTICE. WOULD LIKE FOR SOMEONE GIVE HER ANOTHER CALL ANYTIME.

## 2022-10-07 NOTE — TELEPHONE ENCOUNTER
Called pt and advised of Dr Burleson note. Verb understanding.       Pt can make appt on 10/27 at 11a with Dr Burleson.  Update sent to Dr Burleson and message sent to manager to book pt.

## 2022-10-26 DIAGNOSIS — I10 ESSENTIAL HYPERTENSION: Chronic | ICD-10-CM

## 2022-10-26 RX ORDER — AMLODIPINE BESYLATE 5 MG/1
5 TABLET ORAL DAILY
Qty: 90 TABLET | Refills: 3 | Status: SHIPPED | OUTPATIENT
Start: 2022-10-26

## 2022-10-27 ENCOUNTER — TELEPHONE (OUTPATIENT)
Dept: GASTROENTEROLOGY | Facility: CLINIC | Age: 77
End: 2022-10-27

## 2022-10-27 ENCOUNTER — OFFICE VISIT (OUTPATIENT)
Dept: GASTROENTEROLOGY | Facility: CLINIC | Age: 77
End: 2022-10-27

## 2022-10-27 VITALS
DIASTOLIC BLOOD PRESSURE: 66 MMHG | TEMPERATURE: 96.6 F | HEART RATE: 65 BPM | WEIGHT: 129.8 LBS | BODY MASS INDEX: 23.89 KG/M2 | HEIGHT: 62 IN | SYSTOLIC BLOOD PRESSURE: 150 MMHG

## 2022-10-27 DIAGNOSIS — K62.5 RECTAL BLEEDING: ICD-10-CM

## 2022-10-27 DIAGNOSIS — K51.311 ULCERATIVE RECTOSIGMOIDITIS WITH RECTAL BLEEDING: Primary | Chronic | ICD-10-CM

## 2022-10-27 DIAGNOSIS — D50.0 IRON DEFICIENCY ANEMIA DUE TO CHRONIC BLOOD LOSS: Chronic | ICD-10-CM

## 2022-10-27 DIAGNOSIS — N17.9 AKI (ACUTE KIDNEY INJURY): ICD-10-CM

## 2022-10-27 PROCEDURE — 99214 OFFICE O/P EST MOD 30 MIN: CPT | Performed by: INTERNAL MEDICINE

## 2022-10-27 NOTE — PROGRESS NOTES
Chief Complaint   Patient presents with   • ulcerative rectosigmoiditis   • Anemia   • Heartburn       Subjective     HPI    Renee Stevenson is a 77 y.o. female with a past medical history noted below who presents for left-sided UC, iron deficiency anemia, GERD, weight loss.    Recent labs have shown that her hemoglobin has started to decline again, down to 10.2 on recent checks with low iron saturation.  Her creatinine was a little high.      She had a flare with rectal bleeding in about August.  Says she saw a lot of bleeding.  Called in, started on canasa.  No further bleeding.  Taking iron every other day.      Weight is stable.    Blood sugars are stable.      Today's visit was in the office.  Both the patient and I were wearing face masks and proper hand hygiene was performed before and after the physical exam.           Current Outpatient Medications:   •  Acetaminophen (TYLENOL PO), Take 2 tablets by mouth As Needed., Disp: , Rfl:   •  amLODIPine (NORVASC) 5 MG tablet, TAKE 1 TABLET BY MOUTH DAILY, Disp: 90 tablet, Rfl: 3  •  dorzolamide-timolol (COSOPT) 22.3-6.8 MG/ML ophthalmic solution, Apply 1 drop to eye 2 (two) times a day., Disp: , Rfl:   •  ferrous sulfate 325 (65 FE) MG tablet, Take 1 tablet by mouth Daily With Breakfast. (Patient taking differently: Take 1 tablet by mouth Daily With Breakfast. Every otherday), Disp: 30 tablet, Rfl: 0  •  glucose blood (Accu-Chek Saira Plus) test strip, Test FBG once every dayE11.59/ DM2 with circularly disorder, Disp: 100 each, Rfl: 11  •  hydrocortisone (ANUSOL-HC) 2.5 % rectal cream, Insert  into the rectum 2 (Two) Times a Day. (Patient taking differently: Insert  into the rectum 2 (Two) Times a Day. prn), Disp: 28 g, Rfl: 0  •  Januvia 100 MG tablet, TAKE 1 TABLET BY MOUTH DAILY WITH BREAKFAST, Disp: 90 tablet, Rfl: 3  •  lisinopril (PRINIVIL,ZESTRIL) 20 MG tablet, TAKE 1 TABLET BY MOUTH EVERY DAY, Disp: 90 tablet, Rfl: 3  •  mesalamine (CANASA) 1000 MG  suppository, Insert one suppository into rectum at bedtime, Disp: 30 suppository, Rfl: 0  •  mesalamine (LIALDA) 1.2 g EC tablet, Take 4 tablets by mouth Daily With Breakfast., Disp: 120 tablet, Rfl: 5  •  metoprolol tartrate (LOPRESSOR) 50 MG tablet, TAKE 1 TABLET BY MOUTH EVERY 12 HOURS, Disp: 180 tablet, Rfl: 3  •  OYSCO 500 + D 500-200 MG-UNIT tablet, TAKE 1 TABLET BY MOUTH DAILY, Disp: 90 tablet, Rfl: 3  •  pantoprazole (PROTONIX) 40 MG EC tablet, Take 1 tablet by mouth Every Morning Before Breakfast., Disp: 90 tablet, Rfl: 3  •  pioglitazone (ACTOS) 15 MG tablet, TAKE 1 TABLET BY MOUTH DAILY WITH BREAKFAST, Disp: 30 tablet, Rfl: 11  •  rosuvastatin (CRESTOR) 10 MG tablet, Take 1 tablet by mouth Daily., Disp: 90 tablet, Rfl: 3      Objective     Vitals:    10/27/22 1058   BP: 150/66   Pulse: 65   Temp: 96.6 °F (35.9 °C)         10/27/22  1058   Weight: 58.9 kg (129 lb 12.8 oz)     Body mass index is 23.74 kg/m².    Physical Exam        WBC   Date Value Ref Range Status   09/29/2022 5.29 3.40 - 10.80 10*3/mm3 Final     RBC   Date Value Ref Range Status   09/29/2022 3.22 (L) 3.77 - 5.28 10*6/mm3 Final     Hemoglobin   Date Value Ref Range Status   09/29/2022 10.2 (L) 12.0 - 15.9 g/dL Final   02/10/2020 11.5 (L) 12.0 - 15.9 g/dL Final     Hematocrit   Date Value Ref Range Status   09/29/2022 30.8 (L) 34.0 - 46.6 % Final   02/10/2020 34.4 34.0 - 46.6 % Final     MCV   Date Value Ref Range Status   09/29/2022 95.7 79.0 - 97.0 fL Final   07/24/2019 97.2 (H) 79.0 - 97.0 fL Final     MCH   Date Value Ref Range Status   09/29/2022 31.7 26.6 - 33.0 pg Final   07/24/2019 29.2 26.6 - 33.0 pg Final     MCHC   Date Value Ref Range Status   09/29/2022 33.1 31.5 - 35.7 g/dL Final   07/24/2019 30.0 (L) 31.5 - 35.7 g/dL Final     RDW   Date Value Ref Range Status   09/29/2022 12.8 12.3 - 15.4 % Final   07/24/2019 14.5 12.3 - 15.4 % Final     RDW-SD   Date Value Ref Range Status   07/24/2019 51.3 37.0 - 54.0 fl Final     MPV    Date Value Ref Range Status   07/24/2019 9.7 6.0 - 12.0 fL Final     Platelets   Date Value Ref Range Status   09/29/2022 163 140 - 450 10*3/mm3 Final   07/24/2019 266 140 - 450 10*3/mm3 Final     Neutrophil Rel %   Date Value Ref Range Status   09/29/2022 56.8 42.7 - 76.0 % Final     Neutrophil %   Date Value Ref Range Status   07/24/2019 53.6 42.7 - 76.0 % Final     Lymphocyte Rel %   Date Value Ref Range Status   09/29/2022 31.2 19.6 - 45.3 % Final     Lymphocyte %   Date Value Ref Range Status   07/24/2019 35.8 19.6 - 45.3 % Final     Monocyte Rel %   Date Value Ref Range Status   09/29/2022 9.5 5.0 - 12.0 % Final     Monocyte %   Date Value Ref Range Status   07/24/2019 9.0 5.0 - 12.0 % Final     Eosinophil Rel %   Date Value Ref Range Status   09/29/2022 1.7 0.3 - 6.2 % Final     Eosinophil %   Date Value Ref Range Status   07/24/2019 0.0 (L) 0.3 - 6.2 % Final     Basophil Rel %   Date Value Ref Range Status   09/29/2022 0.4 0.0 - 1.5 % Final     Basophil %   Date Value Ref Range Status   07/24/2019 0.5 0.0 - 1.5 % Final     Immature Grans %   Date Value Ref Range Status   07/24/2019 1.1 (H) 0.0 - 0.5 % Final     Neutrophils Absolute   Date Value Ref Range Status   09/29/2022 3.01 1.70 - 7.00 10*3/mm3 Final     Neutrophils, Absolute   Date Value Ref Range Status   07/24/2019 3.32 1.70 - 7.00 10*3/mm3 Final     Lymphocytes Absolute   Date Value Ref Range Status   09/29/2022 1.65 0.70 - 3.10 10*3/mm3 Final     Lymphocytes, Absolute   Date Value Ref Range Status   07/24/2019 2.22 0.70 - 3.10 10*3/mm3 Final     Monocytes Absolute   Date Value Ref Range Status   09/29/2022 0.50 0.10 - 0.90 10*3/mm3 Final     Monocytes, Absolute   Date Value Ref Range Status   07/24/2019 0.56 0.10 - 0.90 10*3/mm3 Final     Eosinophils Absolute   Date Value Ref Range Status   09/29/2022 0.09 0.00 - 0.40 10*3/mm3 Final     Eosinophils, Absolute   Date Value Ref Range Status   07/24/2019 0.00 0.00 - 0.40 10*3/mm3 Final     Basophils  Absolute   Date Value Ref Range Status   09/29/2022 0.02 0.00 - 0.20 10*3/mm3 Final     Basophils, Absolute   Date Value Ref Range Status   07/24/2019 0.03 0.00 - 0.20 10*3/mm3 Final     Immature Grans, Absolute   Date Value Ref Range Status   07/24/2019 0.07 (H) 0.00 - 0.05 10*3/mm3 Final     nRBC   Date Value Ref Range Status   09/29/2022 0.0 0.0 - 0.2 /100 WBC Final   07/24/2019 0.0 0.0 - 0.2 /100 WBC Final       Lab Results   Component Value Date    GLUCOSE 111 (H) 09/29/2022    BUN 18 09/29/2022    CREATININE 1.25 (H) 09/29/2022    EGFRIFNONA 63 01/04/2022    EGFRIFAFRI 72 01/04/2022    BCR 14.4 09/29/2022    CO2 24.5 09/29/2022    CALCIUM 9.0 09/29/2022    PROTENTOTREF 6.6 09/29/2022    ALBUMIN 4.20 09/29/2022    LABIL2 1.8 09/29/2022    AST 15 09/29/2022    ALT 11 09/29/2022         Imaging Results (Last 7 Days)     ** No results found for the last 168 hours. **          I personally reviewed data as detailed below:     The labs listed above.    Office notes from: 6/8/22 pcp      Assessment and Plan         Diagnoses and all orders for this visit:    1. Ulcerative rectosigmoiditis with rectal bleeding (HCC) (Primary)  -     CBC & Differential; Future  -     Basic Metabolic Panel; Future  -     Case Request; Standing  -     Follow Anesthesia Guidelines / Protocol; Future  -     Obtain Informed Consent; Future  -     Implement Anesthesia Orders Day of Procedure; Standing  -     Obtain Informed Consent; Standing  -     Verify bowel prep was successful; Standing  -     Give tap water enema if bowel prep was insufficient; Standing  -     Case Request    2. Iron deficiency anemia due to chronic blood loss  -     CBC & Differential; Future  -     Basic Metabolic Panel; Future    3. ALMA ROSA (acute kidney injury) (HCC)  -     CBC & Differential; Future  -     Basic Metabolic Panel; Future    4. Rectal bleeding  -     Case Request; Standing  -     Follow Anesthesia Guidelines / Protocol; Future  -     Obtain Informed  Consent; Future  -     Implement Anesthesia Orders Day of Procedure; Standing  -     Obtain Informed Consent; Standing  -     Verify bowel prep was successful; Standing  -     Give tap water enema if bowel prep was insufficient; Standing  -     Case Request    Plan  I am going to have her get back on iron every day  CBC and BMP in 1 month  Flexible sigmoidoscopy to assess disease status.  We discussed repeating her colonoscopy--the last one was done in 2018--but she does not want to take the prep.  She does understand the risk of not having a full colonoscopy, including missed colon cancers, colon polyps, other lesions.  Follow-up after her flex sig      I have discussed the above plan with the patient.  They verbalize understanding and are in agreement with the plan.  They have been advised to contact the office for any questions, concerns, or changes related to their health.    Dictated utilizing Dragon dictation

## 2022-10-27 NOTE — TELEPHONE ENCOUNTER
SYMONE patient in office. Scheduled 11/21/2022 with arrival time of  200PM . Prep paper work given to patient. Patient advised arrival time may change based on [facility] guidelines. SYMONE RAMOS

## 2022-10-28 RX ORDER — METOPROLOL TARTRATE 50 MG/1
TABLET, FILM COATED ORAL
Qty: 180 TABLET | Refills: 3 | Status: SHIPPED | OUTPATIENT
Start: 2022-10-28 | End: 2022-12-09 | Stop reason: SDUPTHER

## 2022-10-28 RX ORDER — METOPROLOL TARTRATE 50 MG/1
50 TABLET, FILM COATED ORAL EVERY 12 HOURS
Qty: 180 TABLET | Refills: 3 | Status: SHIPPED | OUTPATIENT
Start: 2022-10-28 | End: 2023-01-29 | Stop reason: SDUPTHER

## 2022-10-31 RX ORDER — PANTOPRAZOLE SODIUM 40 MG/1
40 TABLET, DELAYED RELEASE ORAL
Qty: 90 TABLET | Refills: 3 | Status: SHIPPED | OUTPATIENT
Start: 2022-10-31 | End: 2022-12-30 | Stop reason: SDUPTHER

## 2022-11-21 ENCOUNTER — ANESTHESIA EVENT (OUTPATIENT)
Dept: GASTROENTEROLOGY | Facility: HOSPITAL | Age: 77
End: 2022-11-21

## 2022-11-21 ENCOUNTER — HOSPITAL ENCOUNTER (OUTPATIENT)
Facility: HOSPITAL | Age: 77
Setting detail: HOSPITAL OUTPATIENT SURGERY
Discharge: HOME | End: 2022-11-21
Attending: INTERNAL MEDICINE | Admitting: INTERNAL MEDICINE

## 2022-11-21 ENCOUNTER — ANESTHESIA (OUTPATIENT)
Dept: GASTROENTEROLOGY | Facility: HOSPITAL | Age: 77
End: 2022-11-21

## 2022-11-21 VITALS
HEART RATE: 59 BPM | HEIGHT: 62 IN | OXYGEN SATURATION: 99 % | WEIGHT: 127 LBS | BODY MASS INDEX: 23.37 KG/M2 | TEMPERATURE: 98.4 F | DIASTOLIC BLOOD PRESSURE: 68 MMHG | SYSTOLIC BLOOD PRESSURE: 133 MMHG | RESPIRATION RATE: 18 BRPM

## 2022-11-21 DIAGNOSIS — K51.311 ULCERATIVE RECTOSIGMOIDITIS WITH RECTAL BLEEDING: ICD-10-CM

## 2022-11-21 DIAGNOSIS — K62.5 RECTAL BLEEDING: ICD-10-CM

## 2022-11-21 LAB — GLUCOSE BLDC GLUCOMTR-MCNC: 175 MG/DL (ref 70–130)

## 2022-11-21 PROCEDURE — 82962 GLUCOSE BLOOD TEST: CPT

## 2022-11-21 PROCEDURE — 25010000002 PROPOFOL 10 MG/ML EMULSION: Performed by: NURSE ANESTHETIST, CERTIFIED REGISTERED

## 2022-11-21 PROCEDURE — 45331 SIGMOIDOSCOPY AND BIOPSY: CPT | Performed by: INTERNAL MEDICINE

## 2022-11-21 PROCEDURE — 88305 TISSUE EXAM BY PATHOLOGIST: CPT | Performed by: INTERNAL MEDICINE

## 2022-11-21 RX ORDER — SODIUM CHLORIDE 0.9 % (FLUSH) 0.9 %
10 SYRINGE (ML) INJECTION AS NEEDED
Status: DISCONTINUED | OUTPATIENT
Start: 2022-11-21 | End: 2022-11-21 | Stop reason: HOSPADM

## 2022-11-21 RX ORDER — PROPOFOL 10 MG/ML
VIAL (ML) INTRAVENOUS CONTINUOUS PRN
Status: DISCONTINUED | OUTPATIENT
Start: 2022-11-21 | End: 2022-11-21 | Stop reason: SURG

## 2022-11-21 RX ORDER — HYDROCORTISONE 100 MG/60ML
100 SUSPENSION RECTAL NIGHTLY
Qty: 30 ENEMA | Refills: 0 | Status: SHIPPED | OUTPATIENT
Start: 2022-11-21 | End: 2022-12-21

## 2022-11-21 RX ORDER — LIDOCAINE HYDROCHLORIDE 10 MG/ML
0.5 INJECTION, SOLUTION INFILTRATION; PERINEURAL ONCE AS NEEDED
Status: DISCONTINUED | OUTPATIENT
Start: 2022-11-21 | End: 2022-11-21 | Stop reason: HOSPADM

## 2022-11-21 RX ORDER — SODIUM CHLORIDE 9 MG/ML
1000 INJECTION, SOLUTION INTRAVENOUS CONTINUOUS
Status: DISCONTINUED | OUTPATIENT
Start: 2022-11-21 | End: 2022-11-21 | Stop reason: HOSPADM

## 2022-11-21 RX ORDER — LIDOCAINE HYDROCHLORIDE 20 MG/ML
INJECTION, SOLUTION INFILTRATION; PERINEURAL AS NEEDED
Status: DISCONTINUED | OUTPATIENT
Start: 2022-11-21 | End: 2022-11-21 | Stop reason: SURG

## 2022-11-21 RX ORDER — PROPOFOL 10 MG/ML
VIAL (ML) INTRAVENOUS AS NEEDED
Status: DISCONTINUED | OUTPATIENT
Start: 2022-11-21 | End: 2022-11-21 | Stop reason: SURG

## 2022-11-21 RX ORDER — SODIUM CHLORIDE, SODIUM LACTATE, POTASSIUM CHLORIDE, CALCIUM CHLORIDE 600; 310; 30; 20 MG/100ML; MG/100ML; MG/100ML; MG/100ML
1000 INJECTION, SOLUTION INTRAVENOUS CONTINUOUS
Status: DISCONTINUED | OUTPATIENT
Start: 2022-11-21 | End: 2022-11-21 | Stop reason: HOSPADM

## 2022-11-21 RX ADMIN — SODIUM CHLORIDE, POTASSIUM CHLORIDE, SODIUM LACTATE AND CALCIUM CHLORIDE: 600; 310; 30; 20 INJECTION, SOLUTION INTRAVENOUS at 15:03

## 2022-11-21 RX ADMIN — Medication 160 MCG/KG/MIN: at 15:09

## 2022-11-21 RX ADMIN — SODIUM CHLORIDE, POTASSIUM CHLORIDE, SODIUM LACTATE AND CALCIUM CHLORIDE 1000 ML: 600; 310; 30; 20 INJECTION, SOLUTION INTRAVENOUS at 14:00

## 2022-11-21 RX ADMIN — LIDOCAINE HYDROCHLORIDE 50 MG: 20 INJECTION, SOLUTION INFILTRATION; PERINEURAL at 14:47

## 2022-11-21 RX ADMIN — PROPOFOL 100 MG: 10 INJECTION, EMULSION INTRAVENOUS at 14:47

## 2022-11-21 RX ADMIN — SODIUM CHLORIDE, POTASSIUM CHLORIDE, SODIUM LACTATE AND CALCIUM CHLORIDE: 600; 310; 30; 20 INJECTION, SOLUTION INTRAVENOUS at 15:04

## 2022-11-21 NOTE — DISCHARGE INSTRUCTIONS
For the next 24 hours patient needs to be with a responsible adult.    For 24 hours DO NOT drive, operate machinery, appliances, drink alcohol, make important decisions or sign legal documents.    Start with a light or bland diet if you are feeling sick to your stomach otherwise advance to regular diet as tolerated.    Follow recommendations on procedure report if provided by your doctor.    DR GAMBLE     Problems may include but not limited to: large amounts of bleeding, trouble breathing, repeated vomiting, severe unrelieved pain, fever or chills.

## 2022-11-21 NOTE — ANESTHESIA PREPROCEDURE EVALUATION
Anesthesia Evaluation     NPO Solid Status: > 8 hours  NPO Liquid Status: > 8 hours           Airway   Dental      Pulmonary    Cardiovascular     (+) hypertension, past MI , CAD, hyperlipidemia,       Neuro/Psych  (+) dizziness/light headedness,    GI/Hepatic/Renal/Endo    (+)  GERD, GI bleeding , diabetes mellitus,     Musculoskeletal     Abdominal    Substance History      OB/GYN          Other                        Anesthesia Plan    ASA 3     MAC     intravenous induction     Anesthetic plan, risks, benefits, and alternatives have been provided, discussed and informed consent has been obtained with: patient.    Plan discussed with CRNA.        CODE STATUS:

## 2022-11-21 NOTE — ANESTHESIA POSTPROCEDURE EVALUATION
Patient: Renee PARIKH From    Procedure Summary     Date: 11/21/22 Room / Location: [facility] ENDOSCOPY 1 / [facility] ENDOSCOPY    Anesthesia Start: 1503 Anesthesia Stop: 1528    Procedure: SIGMOIDOSCOPY FLEXIBLE to transverse colon with cold biopsies Diagnosis:       Ulcerative rectosigmoiditis with rectal bleeding (HCC)      Rectal bleeding      (Ulcerative rectosigmoiditis with rectal bleeding (HCC) [K51.311])      (Rectal bleeding [K62.5])    Surgeons: Melissa Burleson MD Provider: Emre Blount MD    Anesthesia Type: MAC ASA Status: 3          Anesthesia Type: MAC    Vitals  Vitals Value Taken Time   /68 11/21/22 1546   Temp 36.9 °C (98.4 °F) 11/21/22 1526   Pulse 59 11/21/22 1546   Resp 18 11/21/22 1546   SpO2 99 % 11/21/22 1546           Post Anesthesia Care and Evaluation    Patient location during evaluation: bedside  Patient participation: complete - patient participated  Level of consciousness: responsive to light touch, responsive to verbal stimuli and sleepy but conscious  Pain score: 0  Pain management: adequate    Airway patency: patent  Anesthetic complications: No anesthetic complications  PONV Status: none  Cardiovascular status: acceptable and hemodynamically stable  Respiratory status: acceptable  Hydration status: acceptable

## 2022-11-22 LAB
LAB AP CASE REPORT: NORMAL
PATH REPORT.FINAL DX SPEC: NORMAL
PATH REPORT.GROSS SPEC: NORMAL

## 2022-11-22 NOTE — PROGRESS NOTES
Flex sig biopsies show moderately active colitis with ulceration from the rectum to approximately 35 cm from anal verge.  The cortisone enemas that I ordered should help heal this up; if she is having a problem getting those, she should contact the office as soon as possible so an alternative medication can be prescribed.

## 2022-11-28 ENCOUNTER — LAB (OUTPATIENT)
Dept: GASTROENTEROLOGY | Facility: CLINIC | Age: 77
End: 2022-11-28

## 2022-11-29 ENCOUNTER — TELEPHONE (OUTPATIENT)
Dept: GASTROENTEROLOGY | Facility: CLINIC | Age: 77
End: 2022-11-29

## 2022-11-29 NOTE — TELEPHONE ENCOUNTER
----- Message from Melissa Burleson MD sent at 11/29/2022  8:29 AM EST -----  Her hemoglobin is up to 10.9.  This is better than 2 months ago.  Creatinine is down to 1.10, also improved for her.  We will plan to follow her hemoglobin level and her creatinine at her next appointment at the end of December.

## 2022-12-09 ENCOUNTER — TELEPHONE (OUTPATIENT)
Dept: GASTROENTEROLOGY | Facility: CLINIC | Age: 77
End: 2022-12-09

## 2022-12-09 ENCOUNTER — OFFICE VISIT (OUTPATIENT)
Dept: INTERNAL MEDICINE | Age: 77
End: 2022-12-09

## 2022-12-09 VITALS
TEMPERATURE: 97.5 F | SYSTOLIC BLOOD PRESSURE: 144 MMHG | OXYGEN SATURATION: 99 % | WEIGHT: 132 LBS | BODY MASS INDEX: 24.29 KG/M2 | HEART RATE: 54 BPM | DIASTOLIC BLOOD PRESSURE: 60 MMHG | HEIGHT: 62 IN

## 2022-12-09 DIAGNOSIS — E11.59 TYPE 2 DIABETES MELLITUS WITH OTHER CIRCULATORY COMPLICATION, WITHOUT LONG-TERM CURRENT USE OF INSULIN: Primary | Chronic | ICD-10-CM

## 2022-12-09 DIAGNOSIS — K51.211 ULCERATIVE PROCTITIS WITH RECTAL BLEEDING: Chronic | ICD-10-CM

## 2022-12-09 DIAGNOSIS — E78.2 MIXED HYPERLIPIDEMIA: Chronic | ICD-10-CM

## 2022-12-09 DIAGNOSIS — I10 PRIMARY HYPERTENSION: Chronic | ICD-10-CM

## 2022-12-09 LAB
CHOLEST SERPL-MCNC: 110 MG/DL (ref 0–200)
CHOLEST/HDLC SERPL: 2.68 {RATIO}
HBA1C MFR BLD: 7.2 % (ref 4.8–5.6)
HDLC SERPL-MCNC: 41 MG/DL (ref 40–60)
LDLC SERPL CALC-MCNC: 57 MG/DL (ref 0–100)
TRIGL SERPL-MCNC: 53 MG/DL (ref 0–150)
VLDLC SERPL CALC-MCNC: 12 MG/DL (ref 5–40)

## 2022-12-09 PROCEDURE — 99214 OFFICE O/P EST MOD 30 MIN: CPT | Performed by: INTERNAL MEDICINE

## 2022-12-09 NOTE — ASSESSMENT & PLAN NOTE
5 pounds weight gain and mild increase in edema of the lower extremity noted since starting pioglitazone 15 mg.  Check A1c level today.  Continue pioglitazone 15 mg and Januvia 100 mg daily.    Lab Results   Component Value Date    HGBA1C 6.9 06/08/2022    HGBA1C 6.5 (H) 02/02/2022    HGBA1C 6.30 (H) 07/26/2021

## 2022-12-09 NOTE — PROGRESS NOTES
I N T E R N A L  M E D I C I N E    J U N O H  K I M,  M D      ENCOUNTER DATE:  12/09/2022    Renee PARIKH From / 77 y.o. / female    CHIEF COMPLAINT / REASON FOR OFFICE VISIT     Diabetes, Hyperlipidemia, and Hypertension      ASSESSMENT & PLAN     Problem List Items Addressed This Visit        High    Type 2 diabetes mellitus with circulatory disorder (HCC) - Primary (Chronic)    Overview     **Complications of diabetes: microalbuminuria and coronary artery disease     Continue pioglitazone 15 mg and Januvia 100 mg         Current Assessment & Plan     5 pounds weight gain and mild increase in edema of the lower extremity noted since starting pioglitazone 15 mg.  Check A1c level today.  Continue pioglitazone 15 mg and Januvia 100 mg daily.    Lab Results   Component Value Date    HGBA1C 6.9 06/08/2022    HGBA1C 6.5 (H) 02/02/2022    HGBA1C 6.30 (H) 07/26/2021             Relevant Medications    Januvia 100 MG tablet    pioglitazone (ACTOS) 15 MG tablet    glucose blood (Accu-Chek Saira Plus) test strip    Other Relevant Orders    Hemoglobin A1c    Hypertension (Chronic)    Overview     Continue lisinopril 20 mg, amlodipine 5 mg qd and metoprolol tartrate 50 mg BID.          Current Assessment & Plan     Systolic blood pressure remains marginal with diastolics on the lower side.  Considering her age I would continue her current regimen.  Try to maintain a lower salt diet.    BP Readings from Last 3 Encounters:   12/09/22 144/60   11/21/22 133/68   10/27/22 150/66             Relevant Medications    lisinopril (PRINIVIL,ZESTRIL) 20 MG tablet    amLODIPine (NORVASC) 5 MG tablet    metoprolol tartrate (LOPRESSOR) 50 MG tablet    metoprolol tartrate (LOPRESSOR) 50 MG tablet       Medium    Hyperlipidemia (Chronic)    Overview     Continue rosuvastatin 10 mg daily.          Relevant Medications    rosuvastatin (CRESTOR) 10 MG tablet    Other Relevant Orders    Lipid Panel With / Chol / HDL Ratio       Low    Ulcerative  "colitis with rectal bleeding (HCC) (Chronic)    Overview     *[facility]         Relevant Medications    mesalamine (LIALDA) 1.2 g EC tablet    pantoprazole (PROTONIX) 40 MG EC tablet     Orders Placed This Encounter   Procedures   • Hemoglobin A1c   • Lipid Panel With / Chol / HDL Ratio     No orders of the defined types were placed in this encounter.      SUMMARY/DISCUSSION  •       Next Appointment with me: Visit date not found    Return in about 5 months (around 5/9/2023) for Reassess chronic medical problems.        VITAL SIGNS     Vitals:    12/09/22 0943   BP: 144/60   Pulse: 54   Temp: 97.5 °F (36.4 °C)   SpO2: 99%   Weight: 59.9 kg (132 lb)   Height: 157.5 cm (62\")       BP Readings from Last 3 Encounters:   12/09/22 144/60   11/21/22 133/68   10/27/22 150/66     Wt Readings from Last 3 Encounters:   12/09/22 59.9 kg (132 lb)   11/21/22 57.6 kg (127 lb)   10/27/22 58.9 kg (129 lb 12.8 oz)     Body mass index is 24.14 kg/m².    Blood pressure readings recorded on patient flowsheet:  No flowsheet data found.     MEDICATIONS AT THE TIME OF OFFICE VISIT     Current Outpatient Medications on File Prior to Visit   Medication Sig   • Acetaminophen (TYLENOL PO) Take 2 tablets by mouth As Needed.   • amLODIPine (NORVASC) 5 MG tablet TAKE 1 TABLET BY MOUTH DAILY   • dorzolamide-timolol (COSOPT) 22.3-6.8 MG/ML ophthalmic solution Apply 1 drop to eye 2 (two) times a day.   • ferrous sulfate 325 (65 FE) MG tablet Take 1 tablet by mouth Daily With Breakfast. (Patient taking differently: Take 325 mg by mouth Daily With Breakfast. Every otherday)   • glucose blood (Accu-Chek Saira Plus) test strip Test FBG once every dayE11.59/ DM2 with circularly disorder   • hydrocortisone (CORTENEMA) 100 MG/60ML enema Insert 1 enema into the rectum Every Night for 30 days.   • Januvia 100 MG tablet TAKE 1 TABLET BY MOUTH DAILY WITH BREAKFAST   • lisinopril (PRINIVIL,ZESTRIL) 20 MG tablet TAKE 1 TABLET BY MOUTH EVERY DAY   • mesalamine (CANASA) " 1000 MG suppository Insert one suppository into rectum at bedtime   • mesalamine (LIALDA) 1.2 g EC tablet Take 4 tablets by mouth Daily With Breakfast.   • metoprolol tartrate (LOPRESSOR) 50 MG tablet TAKE 1 TABLET BY MOUTH EVERY 12 HOURS   • metoprolol tartrate (LOPRESSOR) 50 MG tablet Take 1 tablet by mouth Every 12 (Twelve) Hours.   • OYSCO 500 + D 500-200 MG-UNIT tablet TAKE 1 TABLET BY MOUTH DAILY   • pantoprazole (PROTONIX) 40 MG EC tablet Take 1 tablet by mouth Every Morning Before Breakfast.   • pioglitazone (ACTOS) 15 MG tablet TAKE 1 TABLET BY MOUTH DAILY WITH BREAKFAST   • rosuvastatin (CRESTOR) 10 MG tablet Take 1 tablet by mouth Daily.   • hydrocortisone (ANUSOL-HC) 2.5 % rectal cream Insert  into the rectum 2 (Two) Times a Day. (Patient taking differently: Insert  into the rectum 2 (Two) Times a Day. prn)     No current facility-administered medications on file prior to visit.         HISTORY OF PRESENT ILLNESS     She has noticed mild weight gain and mild increase in edema of the lower extremities since starting pioglitazone 15 mg for diabetes.  Most recent A1c level was 6.9.  She is also taking Januvia 100 mg daily.  Systolic blood pressure remains marginally elevated but diastolic blood pressure is in the low 60s.  She denies excessive lightheadedness or fatigue.  She is on rosuvastatin 10 mg for hyperlipidemia without significant problems.  She recently underwent flexible sigmoidoscopy showing rectosigmoid colitis.  No significant changes made to her medications.       Lab Results   Component Value Date    HGBA1C 6.9 06/08/2022    HGBA1C 6.5 (H) 02/02/2022    HGBA1C 6.30 (H) 07/26/2021    CREATININE 1.10 (H) 11/28/2022    LDL 49 02/02/2022    MALBCRERATIO 43 (H) 02/02/2022     Blood sugar readings recorded on patient's flowsheet:  No flowsheet data found.    59.9 kg (132 lb)    REVIEW OF SYSTEMS     Constitutional neg except per HPI   Resp neg  CV neg   GI negative for significant change in bowel  movements       PHYSICAL EXAMINATION     Physical Exam  General: No acute distress  Psych: Normal thought and judgment   Cardiovascular Rate: normal. Rhythm: regular. Heart sounds: normal.   Pulm/Chest: Effort normal, breath sounds normal.   Trace bilateral lower extremities edema       REVIEWED DATA     Labs:       Lab Results   Component Value Date     11/28/2022    K 3.8 11/28/2022    CALCIUM 9.0 11/28/2022    AST 15 09/29/2022    ALT 11 09/29/2022    BUN 15 11/28/2022    CREATININE 1.10 (H) 11/28/2022    CREATININE 1.25 (H) 09/29/2022    CREATININE 1.15 (H) 09/14/2022    EGFRIFNONA 63 01/04/2022    EGFRIFAFRI 72 01/04/2022       Lab Results   Component Value Date    HGBA1C 6.9 06/08/2022    HGBA1C 6.5 (H) 02/02/2022    HGBA1C 6.30 (H) 07/26/2021       Lab Results   Component Value Date    LDL 49 02/02/2022    LDL 51 02/24/2021    LDL 50 03/16/2020    HDL 42 02/02/2022    HDL 50 02/24/2021    TRIG 95 02/02/2022    TRIG 97 02/24/2021       Lab Results   Component Value Date    TSH 2.910 07/26/2021    FREET4 1.19 07/26/2021       Lab Results   Component Value Date    WBC 5.74 11/28/2022    HGB 10.9 (L) 11/28/2022     11/28/2022       Lab Results   Component Value Date    MALBCRERATIO 43 (H) 02/02/2022          Imaging:           Medical Tests:           Summary of old records / correspondence / consultant report:           Request outside records:           *Examiner was wearing [id number] mask and eye protection during the entire duration of the visit. Patient was masked the entire time. Minimum social distance of 6 ft maintained entire visit except if physical contact was necessary as documented.       Template created by Zachery Pacheco MD

## 2022-12-09 NOTE — PATIENT INSTRUCTIONS
** IMPORTANT MESSAGE FROM DR. PATIÑO **    In our office, your satisfaction is VERY important to us.     You may receive a survey from [employer] by mail or E-mail for you to provide feedback about your visit. This information is invaluable for me to know what we can do to improve our services.     I ask that you please take a few minutes to complete the survey and let us know how we are doing in serving your needs. (You may receive the survey more than once for multiple visits)    Thank You !    Dr. Patiño    _________________________________________________________________________________________________________________________      ** ADDITIONAL INSTRUCTION / REMINDERS FROM DR. PATIÑO **    Watch for any worsening swelling of the legs.   Maintain low sodium diet of less than 2.5 grams daily.

## 2022-12-09 NOTE — ASSESSMENT & PLAN NOTE
Reviewed findings from recent flexible sigmoidoscopy.  Continue current medications and continue close follow-up with GI.

## 2022-12-09 NOTE — ASSESSMENT & PLAN NOTE
Systolic blood pressure remains marginal with diastolics on the lower side.  Considering her age I would continue her current regimen.  Try to maintain a lower salt diet.    BP Readings from Last 3 Encounters:   12/09/22 144/60   11/21/22 133/68   10/27/22 150/66

## 2022-12-09 NOTE — TELEPHONE ENCOUNTER
Flex sig biopsies show moderately active colitis with ulceration from the rectum to approximately 35 cm from anal verge.  The cortisone enemas that I ordered should help heal this up; if she is having a problem getting those, she should contact the office as soon as possible so an alternative medication can be prescribed.   Written by Melissa Burleson MD on 11/22/2022  3:40 PM EST  Seen by patient Renee PARIKH From on 11/24/2022 10:55 PM

## 2022-12-10 NOTE — PROGRESS NOTES
MyChart:    Here are the result(s) of your test(s):     A1c is slightly higher at 7.2.  Try to watch her diet more closely.  Continue same meds for now.    Cholesterol level is overall stable       Please do not hesitate to contact me if you have questions.

## 2022-12-19 RX ORDER — CALCIUM CARBONATE/VITAMIN D3 500MG-5MCG
1 TABLET ORAL DAILY
Qty: 90 TABLET | Refills: 3 | Status: SHIPPED | OUTPATIENT
Start: 2022-12-19 | End: 2022-12-30 | Stop reason: SDUPTHER

## 2022-12-30 ENCOUNTER — OFFICE VISIT (OUTPATIENT)
Dept: GASTROENTEROLOGY | Facility: CLINIC | Age: 77
End: 2022-12-30

## 2022-12-30 VITALS
SYSTOLIC BLOOD PRESSURE: 137 MMHG | TEMPERATURE: 95.6 F | WEIGHT: 131.4 LBS | HEIGHT: 62 IN | BODY MASS INDEX: 24.18 KG/M2 | DIASTOLIC BLOOD PRESSURE: 67 MMHG | HEART RATE: 56 BPM

## 2022-12-30 DIAGNOSIS — Z11.59 ENCOUNTER FOR SCREENING FOR OTHER VIRAL DISEASES: ICD-10-CM

## 2022-12-30 DIAGNOSIS — K51.311 ULCERATIVE RECTOSIGMOIDITIS WITH RECTAL BLEEDING: Primary | Chronic | ICD-10-CM

## 2022-12-30 PROCEDURE — 99214 OFFICE O/P EST MOD 30 MIN: CPT | Performed by: PHYSICIAN ASSISTANT

## 2022-12-30 RX ORDER — PANTOPRAZOLE SODIUM 40 MG/1
40 TABLET, DELAYED RELEASE ORAL
Qty: 90 TABLET | Refills: 3 | Status: SHIPPED | OUTPATIENT
Start: 2022-12-30

## 2022-12-30 RX ORDER — HYDROCORTISONE 100 MG/60ML
100 SUSPENSION RECTAL NIGHTLY
Qty: 30 ENEMA | Refills: 0 | Status: SHIPPED | OUTPATIENT
Start: 2022-12-30

## 2022-12-30 RX ORDER — MESALAMINE 1.2 G/1
4.8 TABLET, DELAYED RELEASE ORAL
Qty: 120 TABLET | Refills: 5 | Status: SHIPPED | OUTPATIENT
Start: 2022-12-30 | End: 2023-04-02 | Stop reason: SDUPTHER

## 2022-12-30 RX ORDER — CALCIUM CARBONATE/VITAMIN D3 500MG-5MCG
1 TABLET ORAL DAILY
Qty: 90 TABLET | Refills: 3 | Status: SHIPPED | OUTPATIENT
Start: 2022-12-30

## 2022-12-30 NOTE — PROGRESS NOTES
Chief Complaint  Ulcerative rectosigmoiditis with rectal bleeding    Subjective        History of Present Illness  Renee Stevenson is a  77 y.o. female here for follow-up for ulcerative colitis with rectal bleeding.  She is a patient of Dr. Burleson, new to me today.  She was last seen in the clinic on 11/21/2022 it was recommended she undergo full colonoscopy which she declined and instead opted for flexible sigmoidoscopy.    Flexible sigmoidoscopy completed 11/21/2022 with external hemorrhoids moderately severe inflammation in a continuous and circumferential pattern from the anus to the sigmoid colon.  Pathology with benign colonic mucosa in the transverse and descending colon however there was moderate chronic active colitis sigmoid and rectosigmoid with moderate to marked chronic active colitis with ulceration of the rectum.  She subsequently placed on Carter enemas.    She presents today began having rectal bleeding again yesterday.  She is averaging 3 bowel movements a day.  She denies any abdominal pain.  She is currently on 4.8 g of Lialda daily.    Most recent hemoglobin 11/28/2022 was 10.9 g/dL.      Past Medical History:   Diagnosis Date   • Allergic rhinitis    • Broken heart syndrome    • Colitis    • Colon polyps    • Diabetes mellitus (HCC)     type 2   • Dizziness    • Encounter for breast cancer screening other than mammogram    • GERD (gastroesophageal reflux disease)    • GI (gastrointestinal bleed)    • Glaucoma    • Herpes zoster without complication 3/2/2022   • History of transfusion    • Hyperlipidemia    • Hypertension    • Iron deficiency anemia due to chronic blood loss    • Knee fracture, left    • Non-STEMI (non-ST elevated myocardial infarction) (HCC) 06/16/2017   • Osteopenia    • Shingles    • Ulcerative colitis (HCC)        Past Surgical History:   Procedure Laterality Date   • CARDIAC CATHETERIZATION N/A 6/16/2017    Procedure: Left Heart Cath;  Surgeon: Abhay Wooten MD;  Location: [facility]  CATH INVASIVE LOCATION;  Service:    • CARDIAC CATHETERIZATION N/A 6/16/2017    Procedure: Left ventriculography;  Surgeon: Abhay Wooten MD;  Location: [facility] CATH INVASIVE LOCATION;  Service:    • CARDIAC CATHETERIZATION N/A 6/16/2017    Procedure: Coronary angiography;  Surgeon: Abhay Wooten MD;  Location: [facility] CATH INVASIVE LOCATION;  Service:    • CATARACT EXTRACTION     • COLONOSCOPY  08/14/2018   • ENDOSCOPY N/A 2/10/2020    Procedure: ESOPHAGOGASTRODUODENOSCOPY;  Surgeon: Melissa Burleson MD;  Location: [facility] ENDOSCOPY;  Service: Gastroenterology;  Laterality: N/A;  PRE- IRON DEFICIENCY ANEMIA  POST--SCHATZKY'S RING, GASTRITIS,DUODENITIS   • EYE SURGERY      cataract surgery   • PAP SMEAR     • SIGMOIDOSCOPY N/A 10/21/2019    EH, active ulcerative colitis, severe inflammation in rectum, TA   • SIGMOIDOSCOPY N/A 11/21/2022    Procedure: SIGMOIDOSCOPY FLEXIBLE to transverse colon with cold biopsies;  Surgeon: Melissa Burleson MD;  Location: [facility] ENDOSCOPY;  Service: Gastroenterology;  Laterality: N/A;  pre: h/o ulcerative colitis and rectal bleeding  post: colitis, hemorrhoids       Family History   Problem Relation Age of Onset   • Heart disease Mother    • Hypertension Mother    • Diabetes Mother    • Heart disease Father    • Hypertension Father    • Heart disease Sister    • Heart disease Brother    • No Known Problems Maternal Grandmother    • No Known Problems Maternal Grandfather    • No Known Problems Paternal Grandmother    • No Known Problems Paternal Grandfather    • Crohn's disease Niece    • Colon cancer Neg Hx    • Breast cancer Neg Hx    • Dementia Neg Hx        Social History     Socioeconomic History   • Marital status:      Spouse name: Eliazar*   • Number of children: 1   Tobacco Use   • Smoking status: Never   • Smokeless tobacco: Never   Vaping Use   • Vaping Use: Never used   Substance and Sexual Activity   • Alcohol use: No   • Drug use: No   • Sexual activity: Not Currently      "Partners: Male       Allergies   Allergen Reactions   • Tetanus Toxoids Swelling     Hand and arm  Hand and arm  Hand and arm       Current Outpatient Medications on File Prior to Visit   Medication Sig Dispense Refill   • Acetaminophen (TYLENOL PO) Take 2 tablets by mouth As Needed.     • amLODIPine (NORVASC) 5 MG tablet TAKE 1 TABLET BY MOUTH DAILY 90 tablet 3   • dorzolamide-timolol (COSOPT) 22.3-6.8 MG/ML ophthalmic solution Apply 1 drop to eye 2 (two) times a day.     • ferrous sulfate 325 (65 FE) MG tablet Take 1 tablet by mouth Daily With Breakfast. (Patient taking differently: Take 325 mg by mouth Daily With Breakfast. Every otherday) 30 tablet 0   • glucose blood (Accu-Chek Saira Plus) test strip Test FBG once every dayE11.59/ DM2 with circularly disorder 100 each 11   • hydrocortisone (ANUSOL-HC) 2.5 % rectal cream Insert  into the rectum 2 (Two) Times a Day. (Patient taking differently: Insert  into the rectum 2 (Two) Times a Day. prn) 28 g 0   • Januvia 100 MG tablet TAKE 1 TABLET BY MOUTH DAILY WITH BREAKFAST 90 tablet 3   • lisinopril (PRINIVIL,ZESTRIL) 20 MG tablet TAKE 1 TABLET BY MOUTH EVERY DAY 90 tablet 3   • metoprolol tartrate (LOPRESSOR) 50 MG tablet Take 1 tablet by mouth Every 12 (Twelve) Hours. 180 tablet 3   • pioglitazone (ACTOS) 15 MG tablet TAKE 1 TABLET BY MOUTH DAILY WITH BREAKFAST 30 tablet 11   • rosuvastatin (CRESTOR) 10 MG tablet Take 1 tablet by mouth Daily. 90 tablet 3     No current facility-administered medications on file prior to visit.       Review of Systems     Objective   Vital Signs:   /67   Pulse 56   Temp 95.6 °F (35.3 °C)   Ht 157.5 cm (62\")   Wt 59.6 kg (131 lb 6.4 oz)   BMI 24.03 kg/m²       Physical Exam  Vitals and nursing note reviewed.   Constitutional:       General: She is not in acute distress.     Appearance: Normal appearance. She is not ill-appearing.   HENT:      Head: Normocephalic and atraumatic.      Right Ear: External ear normal.      Left " Ear: External ear normal.   Eyes:      General: No scleral icterus.     Conjunctiva/sclera: Conjunctivae normal.      Pupils: Pupils are equal, round, and reactive to light.   Pulmonary:      Effort: Pulmonary effort is normal.   Abdominal:      General: Bowel sounds are normal. There is no distension.      Palpations: Abdomen is soft.      Tenderness: There is no abdominal tenderness. There is no guarding or rebound.   Musculoskeletal:      Cervical back: Normal range of motion and neck supple.   Skin:     General: Skin is warm and dry.   Neurological:      Mental Status: She is alert and oriented to person, place, and time.   Psychiatric:         Mood and Affect: Mood normal.         Behavior: Behavior normal.          Result Review :       Common labs    Common Labs 9/29/22 9/29/22 11/28/22 11/28/22 12/9/22 12/9/22    1503 1503 1316 1316 1033 1033   Glucose  111 (A)  223 (A)     BUN  18  15     Creatinine  1.25 (A)  1.10 (A)     Sodium  140  141     Potassium  4.0  3.8     Chloride  105  107     Calcium  9.0  9.0     Total Protein  6.6       Albumin  4.20       Total Bilirubin  0.3       Alkaline Phosphatase  49       AST (SGOT)  15       ALT (SGPT)  11       WBC 5.29  5.74      Hemoglobin 10.2 (A)  10.9 (A)      Hematocrit 30.8 (A)  32.6 (A)      Platelets 163  160      Total Cholesterol      110   Triglycerides      53   HDL Cholesterol      41   LDL Cholesterol       57   Hemoglobin A1C     7.20 (A)    (A) Abnormal value       Comments are available for some flowsheets but are not being displayed.                               Assessment and Plan    Diagnoses and all orders for this visit:    1. 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  Dispense: 120 tablet; Refill: 5    2. Encounter for screening for other viral diseases  -     Hepatitis B Surface Antigen    Other orders  -     hydrocortisone (Cortenema) 100 MG/60ML enema; Insert 1 enema into the rectum Every Night.  Dispense: 30 enema; Refill: 0  -     pantoprazole (PROTONIX) 40 MG EC tablet; Take 1 tablet by mouth Every Morning Before Breakfast.  Dispense: 90 tablet; Refill: 3  -     Calcium Carb-Cholecalciferol (OYSCO 500 + D) 500-5 MG-MCG tablet per tablet; Take 1 tablet by mouth Daily.  Dispense: 90 tablet; Refill: 3        · Obtain fecal calprotectin  · Obtain CBC, CMP, CRP, iron profile, vitamin D, hep B surface antigen, QuantiFERON  · Begin Carter enemas.  · Continue Lialda.  · Refills submitted for Lialda, pantoprazole and calcium/vitamin D supplementation  · Follow-up in 4 weeks, sooner if necessary.      Follow Up   Return in about 4 weeks (around 1/27/2023).    Dragon dictation used throughout this note.     PEE Mo

## 2023-01-03 ENCOUNTER — TELEPHONE (OUTPATIENT)
Dept: GASTROENTEROLOGY | Facility: CLINIC | Age: 78
End: 2023-01-03

## 2023-01-03 NOTE — TELEPHONE ENCOUNTER
Called pt and pt reports seeing lots of blood in the water when she has a bm.  Today is when she saw the increase in bleeding. Pt states she feels pretty good and she denies any abd pain.   Pt denies a fever and chills. .  She denies straining.       Pt is coming in today for lab work. Advised pt will send message to Iris GLASGOW and if symptoms worsen advised to go to ER.  Verb understanding.

## 2023-01-03 NOTE — TELEPHONE ENCOUNTER
Caller: Renee Stevenson    Relationship to patient: Self    Best call back number: [phone]    Patient is needing: PATIENT CALLED IN WANTING TO INFORM DR. WILLINGHAM THAT HER RECTAL BLEEDING HAS BECAME SEVERE. SHE WAS LAST SEEN IN OFFICE 12/30/2022. SCHEDULED AN APPT FOR 1/13/2023 ADVISED MRS. FROM IF HER SYMPTOMS WORSE TO HEAD TO ED OR FOLLOW UP WITH PCP

## 2023-01-04 LAB
25(OH)D3+25(OH)D2 SERPL-MCNC: 33.8 NG/ML (ref 30–100)
ALBUMIN SERPL-MCNC: 4.4 G/DL (ref 3.5–5.2)
ALBUMIN/GLOB SERPL: 1.7 G/DL
ALP SERPL-CCNC: 57 U/L (ref 39–117)
ALT SERPL-CCNC: 7 U/L (ref 1–33)
AST SERPL-CCNC: 13 U/L (ref 1–32)
BASOPHILS # BLD AUTO: 0.01 10*3/MM3 (ref 0–0.2)
BASOPHILS NFR BLD AUTO: 0.2 % (ref 0–1.5)
BILIRUB SERPL-MCNC: 0.3 MG/DL (ref 0–1.2)
BUN SERPL-MCNC: 17 MG/DL (ref 8–23)
BUN/CREAT SERPL: 14.9 (ref 7–25)
CALCIUM SERPL-MCNC: 8.9 MG/DL (ref 8.6–10.5)
CHLORIDE SERPL-SCNC: 103 MMOL/L (ref 98–107)
CO2 SERPL-SCNC: 24.2 MMOL/L (ref 22–29)
CREAT SERPL-MCNC: 1.14 MG/DL (ref 0.57–1)
EGFRCR SERPLBLD CKD-EPI 2021: 49.4 ML/MIN/1.73
EOSINOPHIL # BLD AUTO: 0 10*3/MM3 (ref 0–0.4)
EOSINOPHIL NFR BLD AUTO: 0 % (ref 0.3–6.2)
ERYTHROCYTE [DISTWIDTH] IN BLOOD BY AUTOMATED COUNT: 12.2 % (ref 12.3–15.4)
GLOBULIN SER CALC-MCNC: 2.6 GM/DL
GLUCOSE SERPL-MCNC: 236 MG/DL (ref 65–99)
HBV SURFACE AG SERPL QL IA: NEGATIVE
HCT VFR BLD AUTO: 34.3 % (ref 34–46.6)
HGB BLD-MCNC: 10.8 G/DL (ref 12–15.9)
IMM GRANULOCYTES # BLD AUTO: 0.04 10*3/MM3 (ref 0–0.05)
IMM GRANULOCYTES NFR BLD AUTO: 0.8 % (ref 0–0.5)
LYMPHOCYTES # BLD AUTO: 1.34 10*3/MM3 (ref 0.7–3.1)
LYMPHOCYTES NFR BLD AUTO: 28.2 % (ref 19.6–45.3)
MCH RBC QN AUTO: 30.5 PG (ref 26.6–33)
MCHC RBC AUTO-ENTMCNC: 31.5 G/DL (ref 31.5–35.7)
MCV RBC AUTO: 96.9 FL (ref 79–97)
MONOCYTES # BLD AUTO: 0.41 10*3/MM3 (ref 0.1–0.9)
MONOCYTES NFR BLD AUTO: 8.6 % (ref 5–12)
NEUTROPHILS # BLD AUTO: 2.95 10*3/MM3 (ref 1.7–7)
NEUTROPHILS NFR BLD AUTO: 62.2 % (ref 42.7–76)
NRBC BLD AUTO-RTO: 0 /100 WBC (ref 0–0.2)
PLATELET # BLD AUTO: 176 10*3/MM3 (ref 140–450)
POTASSIUM SERPL-SCNC: 3.8 MMOL/L (ref 3.5–5.2)
PROT SERPL-MCNC: 7 G/DL (ref 6–8.5)
RBC # BLD AUTO: 3.54 10*6/MM3 (ref 3.77–5.28)
SODIUM SERPL-SCNC: 139 MMOL/L (ref 136–145)
WBC # BLD AUTO: 4.75 10*3/MM3 (ref 3.4–10.8)

## 2023-01-06 ENCOUNTER — TELEPHONE (OUTPATIENT)
Dept: GASTROENTEROLOGY | Facility: CLINIC | Age: 78
End: 2023-01-06
Payer: MEDICARE

## 2023-01-06 NOTE — TELEPHONE ENCOUNTER
Called and spoke with Renee. Her hemoglobin levels are stable. She is doing the cortenemas and taking the lialda as prescribed. She had some significant blood per rectum on Tuesday, scant amount Wednesday and no further bleeding yesterday. No orthostatic symptoms-discussed if these should occur she is to proceed directly to the ED. She is scheduled for a follow up with me in one week. I will check to see if there is an appointment slot with Dr. Burleson as I do believe that she will need to go on a biologic-lab testing has returned and she will be ready for initiation.

## 2023-01-10 NOTE — TELEPHONE ENCOUNTER
Called and spoke with patient and scheduled her to see Sarah Beth on Jan 19th at 11:30am. Appointment for Neal on 01/13 has been cancelled since we are working the patient in to see Sarah Beth. Patient expressed understanding to everything discussed.

## 2023-01-10 NOTE — TELEPHONE ENCOUNTER
Could you please contact Renee and schedule her with Dr. Burleson for 1/19 at 1130. She does not have to keep the appointment with me. Thanks!

## 2023-01-12 ENCOUNTER — HOSPITAL ENCOUNTER (EMERGENCY)
Facility: HOSPITAL | Age: 78
Discharge: HOME | End: 2023-01-12
Attending: EMERGENCY MEDICINE | Admitting: EMERGENCY MEDICINE
Payer: MEDICARE

## 2023-01-12 VITALS
WEIGHT: 127 LBS | BODY MASS INDEX: 23.37 KG/M2 | TEMPERATURE: 96.3 F | RESPIRATION RATE: 16 BRPM | DIASTOLIC BLOOD PRESSURE: 68 MMHG | SYSTOLIC BLOOD PRESSURE: 149 MMHG | HEART RATE: 53 BPM | HEIGHT: 62 IN | OXYGEN SATURATION: 98 %

## 2023-01-12 DIAGNOSIS — K92.2 LOWER GI BLEEDING: Primary | ICD-10-CM

## 2023-01-12 DIAGNOSIS — K51.80 OTHER ULCERATIVE COLITIS WITHOUT COMPLICATION: ICD-10-CM

## 2023-01-12 LAB
ABO GROUP BLD: NORMAL
ALBUMIN SERPL-MCNC: 4.7 G/DL (ref 3.5–5.2)
ALBUMIN/GLOB SERPL: 1.4 G/DL
ALP SERPL-CCNC: 63 U/L (ref 39–117)
ALT SERPL W P-5'-P-CCNC: 10 U/L (ref 1–33)
ANION GAP SERPL CALCULATED.3IONS-SCNC: 11.3 MMOL/L (ref 5–15)
AST SERPL-CCNC: 13 U/L (ref 1–32)
BASOPHILS # BLD AUTO: 0.02 10*3/MM3 (ref 0–0.2)
BASOPHILS NFR BLD AUTO: 0.3 % (ref 0–1.5)
BILIRUB SERPL-MCNC: 0.4 MG/DL (ref 0–1.2)
BLD GP AB SCN SERPL QL: NEGATIVE
BUN SERPL-MCNC: 17 MG/DL (ref 8–23)
BUN/CREAT SERPL: 14.4 (ref 7–25)
CALCIUM SPEC-SCNC: 9.6 MG/DL (ref 8.6–10.5)
CHLORIDE SERPL-SCNC: 105 MMOL/L (ref 98–107)
CO2 SERPL-SCNC: 23.7 MMOL/L (ref 22–29)
CREAT SERPL-MCNC: 1.18 MG/DL (ref 0.57–1)
D-LACTATE SERPL-SCNC: 1.2 MMOL/L (ref 0.5–2)
DEPRECATED RDW RBC AUTO: 43.2 FL (ref 37–54)
EGFRCR SERPLBLD CKD-EPI 2021: 47.4 ML/MIN/1.73
EOSINOPHIL # BLD AUTO: 0 10*3/MM3 (ref 0–0.4)
EOSINOPHIL NFR BLD AUTO: 0 % (ref 0.3–6.2)
ERYTHROCYTE [DISTWIDTH] IN BLOOD BY AUTOMATED COUNT: 12.5 % (ref 12.3–15.4)
GLOBULIN UR ELPH-MCNC: 3.4 GM/DL
GLUCOSE SERPL-MCNC: 170 MG/DL (ref 65–99)
HCT VFR BLD AUTO: 34.3 % (ref 34–46.6)
HGB BLD-MCNC: 11.4 G/DL (ref 12–15.9)
HOLD SPECIMEN: NORMAL
HOLD SPECIMEN: NORMAL
IMM GRANULOCYTES # BLD AUTO: 0.04 10*3/MM3 (ref 0–0.05)
IMM GRANULOCYTES NFR BLD AUTO: 0.7 % (ref 0–0.5)
INR PPP: 0.97 (ref 0.9–1.1)
LYMPHOCYTES # BLD AUTO: 1.55 10*3/MM3 (ref 0.7–3.1)
LYMPHOCYTES NFR BLD AUTO: 25.8 % (ref 19.6–45.3)
MCH RBC QN AUTO: 31 PG (ref 26.6–33)
MCHC RBC AUTO-ENTMCNC: 33.2 G/DL (ref 31.5–35.7)
MCV RBC AUTO: 93.2 FL (ref 79–97)
MONOCYTES # BLD AUTO: 0.46 10*3/MM3 (ref 0.1–0.9)
MONOCYTES NFR BLD AUTO: 7.7 % (ref 5–12)
NEUTROPHILS NFR BLD AUTO: 3.94 10*3/MM3 (ref 1.7–7)
NEUTROPHILS NFR BLD AUTO: 65.5 % (ref 42.7–76)
NRBC BLD AUTO-RTO: 0.2 /100 WBC (ref 0–0.2)
PLATELET # BLD AUTO: 169 10*3/MM3 (ref 140–450)
PMV BLD AUTO: 9.5 FL (ref 6–12)
POTASSIUM SERPL-SCNC: 4.2 MMOL/L (ref 3.5–5.2)
PROT SERPL-MCNC: 8.1 G/DL (ref 6–8.5)
PROTHROMBIN TIME: 13 SECONDS (ref 11.7–14.2)
RBC # BLD AUTO: 3.68 10*6/MM3 (ref 3.77–5.28)
RH BLD: POSITIVE
SODIUM SERPL-SCNC: 140 MMOL/L (ref 136–145)
T&S EXPIRATION DATE: NORMAL
WBC NRBC COR # BLD: 6.01 10*3/MM3 (ref 3.4–10.8)
WHOLE BLOOD HOLD COAG: NORMAL
WHOLE BLOOD HOLD SPECIMEN: NORMAL

## 2023-01-12 PROCEDURE — 99282 EMERGENCY DEPT VISIT SF MDM: CPT

## 2023-01-12 PROCEDURE — 83605 ASSAY OF LACTIC ACID: CPT

## 2023-01-12 PROCEDURE — 36415 COLL VENOUS BLD VENIPUNCTURE: CPT

## 2023-01-12 PROCEDURE — 86850 RBC ANTIBODY SCREEN: CPT

## 2023-01-12 PROCEDURE — 86900 BLOOD TYPING SEROLOGIC ABO: CPT

## 2023-01-12 PROCEDURE — 85610 PROTHROMBIN TIME: CPT

## 2023-01-12 PROCEDURE — 85025 COMPLETE CBC W/AUTO DIFF WBC: CPT

## 2023-01-12 PROCEDURE — 86901 BLOOD TYPING SEROLOGIC RH(D): CPT

## 2023-01-12 PROCEDURE — 80053 COMPREHEN METABOLIC PANEL: CPT

## 2023-01-12 RX ORDER — SODIUM CHLORIDE 0.9 % (FLUSH) 0.9 %
10 SYRINGE (ML) INJECTION AS NEEDED
Status: DISCONTINUED | OUTPATIENT
Start: 2023-01-12 | End: 2023-01-12 | Stop reason: HOSPADM

## 2023-01-12 NOTE — ED PROVIDER NOTES
EMERGENCY DEPARTMENT ENCOUNTER    Room Number:  29/29  Date of encounter:  1/12/2023  PCP: Esequiel Pacheco MD  Historian: Patient  Full history not obtainable due to: None    HPI:  Chief Complaint: bloody stool    Context: Renee Stevenson is a 77 y.o. female with a PMH significant for ulcerative colitis, iron deficiency anemia, and HTN who presents to the ED c/o bloody stool. Patient started having bright red blood in her stool on Sunday. She has been taking mesalamine and hydrocortisone enemas for ulcerative colitis. Patient called her GI doctor today and was told to come to the ER for further evaluation. Denies any abdominal pain, nausea, vomiting, diarrhea, fever, chills, lightheadedness, palpitations, or shortness of breath.       MEDICAL RECORD REVIEW:    Reviewed office visit with GI for ulcerative colitis with rectal bleeding on 12/30/2022.     PAST MEDICAL HISTORY    Active Ambulatory Problems     Diagnosis Date Noted   • Type 2 diabetes mellitus with circulatory disorder (HCC) 01/19/2016   • Osteopenia 01/19/2016   • Hypertension 01/19/2016   • Hyperlipidemia 01/19/2016   • Colon polyp 01/19/2016   • Gastroesophageal reflux disease 01/19/2016   • Glaucoma 06/22/2017   • Coronary artery disease involving native coronary artery of native heart without angina pectoris 06/23/2017   • Ulcerative colitis with rectal bleeding (HCC) 10/31/2018   • Iron deficiency anemia secondary to inadequate dietary iron intake 01/03/2019   • Iron deficiency anemia due to chronic blood loss 03/06/2019   • Ulcerative rectosigmoiditis with rectal bleeding (HCC) 08/08/2019   • Rectal bleeding 10/27/2022     Resolved Ambulatory Problems     Diagnosis Date Noted   • Non-specific colitis 01/19/2016   • Atopic rhinitis 01/19/2016   • History of non-ST elevation myocardial infarction (NSTEMI) 06/16/2017   • Takotsubo cardiomyopathy 06/22/2017   • Acute post-hemorrhagic anemia 11/01/2018   • Herpes zoster without complication 03/02/2022      Past Medical History:   Diagnosis Date   • Allergic rhinitis    • Broken heart syndrome    • Colitis    • Colon polyps    • Diabetes mellitus (HCC)    • Dizziness    • Encounter for breast cancer screening other than mammogram    • GERD (gastroesophageal reflux disease)    • GI (gastrointestinal bleed)    • History of transfusion    • Knee fracture, left    • Non-STEMI (non-ST elevated myocardial infarction) (HCC) 06/16/2017   • Shingles    • Ulcerative colitis (HCC)          PAST SURGICAL HISTORY  Past Surgical History:   Procedure Laterality Date   • CARDIAC CATHETERIZATION N/A 6/16/2017    Procedure: Left Heart Cath;  Surgeon: Abhay Wooten MD;  Location: [facility] CATH INVASIVE LOCATION;  Service:    • CARDIAC CATHETERIZATION N/A 6/16/2017    Procedure: Left ventriculography;  Surgeon: Abhay Wooten MD;  Location: [facility] CATH INVASIVE LOCATION;  Service:    • CARDIAC CATHETERIZATION N/A 6/16/2017    Procedure: Coronary angiography;  Surgeon: Abhay Wooten MD;  Location: [facility] CATH INVASIVE LOCATION;  Service:    • CATARACT EXTRACTION     • COLONOSCOPY  08/14/2018   • ENDOSCOPY N/A 2/10/2020    Procedure: ESOPHAGOGASTRODUODENOSCOPY;  Surgeon: Melissa Burleson MD;  Location: [facility] ENDOSCOPY;  Service: Gastroenterology;  Laterality: N/A;  PRE- IRON DEFICIENCY ANEMIA  POST--SCHATZKY'S RING, GASTRITIS,DUODENITIS   • EYE SURGERY      cataract surgery   • PAP SMEAR     • SIGMOIDOSCOPY N/A 10/21/2019    EH, active ulcerative colitis, severe inflammation in rectum, TA   • SIGMOIDOSCOPY N/A 11/21/2022    Procedure: SIGMOIDOSCOPY FLEXIBLE to transverse colon with cold biopsies;  Surgeon: Melissa Burleson MD;  Location: [facility] ENDOSCOPY;  Service: Gastroenterology;  Laterality: N/A;  pre: h/o ulcerative colitis and rectal bleeding  post: colitis, hemorrhoids         FAMILY HISTORY  Family History   Problem Relation Age of Onset   • Heart disease Mother    • Hypertension Mother    • Diabetes Mother    • Heart disease Father    •  Hypertension Father    • Heart disease Sister    • Heart disease Brother    • No Known Problems Maternal Grandmother    • No Known Problems Maternal Grandfather    • No Known Problems Paternal Grandmother    • No Known Problems Paternal Grandfather    • Crohn's disease Niece    • Colon cancer Neg Hx    • Breast cancer Neg Hx    • Dementia Neg Hx          SOCIAL HISTORY  Social History     Socioeconomic History   • Marital status:      Spouse name: Eliazar*   • Number of children: 1   Tobacco Use   • Smoking status: Never   • Smokeless tobacco: Never   Vaping Use   • Vaping Use: Never used   Substance and Sexual Activity   • Alcohol use: No   • Drug use: No   • Sexual activity: Not Currently     Partners: Male         ALLERGIES  Tetanus toxoids        REVIEW OF SYSTEMS    All systems reviewed and marked as negative except as listed in HPI     PHYSICAL EXAM    I have reviewed the triage vital signs and nursing notes.    ED Triage Vitals   Temp Heart Rate Resp BP SpO2   01/12/23 1514 01/12/23 1514 01/12/23 1514 01/12/23 1524 01/12/23 1514   96.3 °F (35.7 °C) 55 16 165/69 97 %      Temp src Heart Rate Source Patient Position BP Location FiO2 (%)   01/12/23 1514 01/12/23 1514 01/12/23 1524 01/12/23 1524 --   Oral Monitor Sitting Left arm        Physical Exam  Constitutional:       General: She is not in acute distress.     Appearance: She is well-developed.   HENT:      Head: Normocephalic and atraumatic.   Eyes:      General: No scleral icterus.     Conjunctiva/sclera: Conjunctivae normal.   Neck:      Trachea: No tracheal deviation.   Cardiovascular:      Rate and Rhythm: Normal rate and regular rhythm.   Pulmonary:      Effort: Pulmonary effort is normal.      Breath sounds: Normal breath sounds.   Abdominal:      Palpations: Abdomen is soft.      Tenderness: There is no abdominal tenderness.   Musculoskeletal:         General: No deformity.      Cervical back: Normal range of motion.   Lymphadenopathy:       Cervical: No cervical adenopathy.   Skin:     General: Skin is warm and dry.   Neurological:      Mental Status: She is alert and oriented to person, place, and time.   Psychiatric:         Behavior: Behavior normal.         Vital signs and nursing notes reviewed.            LAB RESULTS  Recent Results (from the past 24 hour(s))   Comprehensive Metabolic Panel    Collection Time: 01/12/23  3:53 PM    Specimen: Blood   Result Value Ref Range    Glucose 170 (H) 65 - 99 mg/dL    BUN 17 8 - 23 mg/dL    Creatinine 1.18 (H) 0.57 - 1.00 mg/dL    Sodium 140 136 - 145 mmol/L    Potassium 4.2 3.5 - 5.2 mmol/L    Chloride 105 98 - 107 mmol/L    CO2 23.7 22.0 - 29.0 mmol/L    Calcium 9.6 8.6 - 10.5 mg/dL    Total Protein 8.1 6.0 - 8.5 g/dL    Albumin 4.7 3.5 - 5.2 g/dL    ALT (SGPT) 10 1 - 33 U/L    AST (SGOT) 13 1 - 32 U/L    Alkaline Phosphatase 63 39 - 117 U/L    Total Bilirubin 0.4 0.0 - 1.2 mg/dL    Globulin 3.4 gm/dL    A/G Ratio 1.4 g/dL    BUN/Creatinine Ratio 14.4 7.0 - 25.0    Anion Gap 11.3 5.0 - 15.0 mmol/L    eGFR 47.4 (L) >60.0 mL/min/1.73   Type & Screen    Collection Time: 01/12/23  3:53 PM    Specimen: Blood   Result Value Ref Range    ABO Type O     RH type Positive     Antibody Screen Negative     T&S Expiration Date 1/15/2023 11:59:59 PM    Lactic Acid, Plasma    Collection Time: 01/12/23  3:53 PM    Specimen: Blood   Result Value Ref Range    Lactate 1.2 0.5 - 2.0 mmol/L   Protime-INR    Collection Time: 01/12/23  3:53 PM    Specimen: Blood   Result Value Ref Range    Protime 13.0 11.7 - 14.2 Seconds    INR 0.97 0.90 - 1.10   Green Top (Gel)    Collection Time: 01/12/23  3:53 PM   Result Value Ref Range    Extra Tube Hold for add-ons.    Lavender Top    Collection Time: 01/12/23  3:53 PM   Result Value Ref Range    Extra Tube hold for add-on    Gold Top - SST    Collection Time: 01/12/23  3:53 PM   Result Value Ref Range    Extra Tube Hold for add-ons.    Light Blue Top    Collection Time: 01/12/23  3:53 PM    Result Value Ref Range    Extra Tube Hold for add-ons.    CBC Auto Differential    Collection Time: 01/12/23  3:53 PM    Specimen: Blood   Result Value Ref Range    WBC 6.01 3.40 - 10.80 10*3/mm3    RBC 3.68 (L) 3.77 - 5.28 10*6/mm3    Hemoglobin 11.4 (L) 12.0 - 15.9 g/dL    Hematocrit 34.3 34.0 - 46.6 %    MCV 93.2 79.0 - 97.0 fL    MCH 31.0 26.6 - 33.0 pg    MCHC 33.2 31.5 - 35.7 g/dL    RDW 12.5 12.3 - 15.4 %    RDW-SD 43.2 37.0 - 54.0 fl    MPV 9.5 6.0 - 12.0 fL    Platelets 169 140 - 450 10*3/mm3    Neutrophil % 65.5 42.7 - 76.0 %    Lymphocyte % 25.8 19.6 - 45.3 %    Monocyte % 7.7 5.0 - 12.0 %    Eosinophil % 0.0 (L) 0.3 - 6.2 %    Basophil % 0.3 0.0 - 1.5 %    Immature Grans % 0.7 (H) 0.0 - 0.5 %    Neutrophils, Absolute 3.94 1.70 - 7.00 10*3/mm3    Lymphocytes, Absolute 1.55 0.70 - 3.10 10*3/mm3    Monocytes, Absolute 0.46 0.10 - 0.90 10*3/mm3    Eosinophils, Absolute 0.00 0.00 - 0.40 10*3/mm3    Basophils, Absolute 0.02 0.00 - 0.20 10*3/mm3    Immature Grans, Absolute 0.04 0.00 - 0.05 10*3/mm3    nRBC 0.2 0.0 - 0.2 /100 WBC       Ordered the above labs and independently reviewed the results.        RADIOLOGY  No Radiology Exams Resulted Within Past 24 Hours    I ordered the above noted radiological studies. Independently reviewed by me and discussed with radiologist.  See dictation above for official radiology interpretation.      PROCEDURES    Procedures        MEDICATIONS GIVEN IN ER    Medications   sodium chloride 0.9 % flush 10 mL (has no administration in time range)         PROGRESS, DATA ANALYSIS, CONSULTS, AND MEDICAL DECISION MAKING    All labs have been independently reviewed by me.  All radiology studies have been reviewed by me.   EKG's independently reviewed by me.  Discussion below represents my analysis of pertinent findings related to patient's condition, differential diagnosis, treatment plan and final disposition.    - Chronic or social conditions impacting care: None      DIFFERENTIAL  DIAGNOSIS INCLUDE BUT NOT LIMITED TO:     Differential diagnosis includes but is not limited to:  - Hepatobiliary pathology such as cholecystitis, cholangitis, and symptomatic cholelithiasis  - Pancreatitis  - Dyspepsia  - Small bowel obstruction  - Appendicitis  - Diverticulitis  - UTI including pyelonephritis  - Ureteral stone  - Zoster  - Colitis, including infectious and ischemic  - Atypical ACS        Orders placed during this visit:  Orders Placed This Encounter   Procedures   • [place] Draw   • Comprehensive Metabolic Panel   • Lactic Acid, Plasma   • Protime-INR   • CBC Auto Differential   • NPO Diet NPO Type: Strict NPO   • Undress & Gown   • Cardiac Monitoring   • Measure Blood Pressure   • Vital Signs   • Orthostatic Blood Pressure   • Gastroenterology (on-call MD unless specified)   • Pulse Oximetry   • Oxygen Therapy- Nasal Cannula; 2 LPM; Titrate for SPO2: equal to or greater than, 92%   • Type & Screen   • Insert Peripheral IV   • CBC & Differential   • Green Top (Gel)   • Lavender Top   • Gold Top - SST   • Light Blue Top         ED Course as of 01/12/23 1726   Thu Jan 12, 2023   1718 I discussed the case with MD Dileep with GI at this time regarding the patient.  I discussed work-up, results, concerns.  I discussed the consulting provider's desire for discharging the patient to follow-up in the office next week with close return precautions.   [DC]      ED Course User Index  [DC] Trell Pugh PA       AS OF 17:11 EST VITALS:    BP - 165/69  HR - 55  TEMP - 96.3 °F (35.7 °C) (Oral)  02 SATS - 97%    1730 I rechecked the patient.   I discussed the patient's labs, radiology findings (including all incidental findings), diagnosis, and plan for discharge.  A repeat exam reveals no new worrisome changes from my initial exam findings.  The patient understands that the fact that they are being discharged does not denote that nothing is abnormal, it indicates that no clinical emergency is present  and that they must follow-up as directed in order to properly maintain their health.  Follow-up instructions (specifically listed below) and return to ER precautions were given at this time.  I specifically instructed the patient to follow-up with their PCP.  The patient understands and agrees with the plan, and is ready for d/c.  All questions answered.      DIAGNOSIS  Final diagnoses:   Lower GI bleeding   Other ulcerative colitis without complication (HCC)         DISPOSITION  D/c    Pt masked in first look. I wore a surgical mask throughout my encounters with the pt. I performed hand hygiene on entry into the pt room and upon exit.     Dictated utilizing Dragon dictation     Note Disclaimer: At [facility], we believe that sharing information builds trust and better relationships. You are receiving this note because you recently visited [facility]. It is possible you will see health information before a provider has talked with you about it. This kind of information can be easy to misunderstand. To help you fully understand what it means for your health, we urge you to discuss this note with your provider.      Trell Pugh PA  01/20/23 0537

## 2023-01-12 NOTE — ED PROVIDER NOTES
MD ATTESTATION NOTE    The MARITA and I have discussed this patient's history, physical exam, and treatment plan.  I have reviewed the documentation and personally had a face to face interaction with the patient. I affirm the documentation and agree with the treatment and plan.  The attached note describes my personal findings.      I provided a substantive portion of the care of the patient.  I personally performed the physical exam in its entirety, and below are my findings.  For this patient encounter, the patient wore surgical mask, I wore full protective PPE including N95 and eye protection.      Brief HPI: Patient with history of ulcerative colitis presents for evaluation of rectal bleeding.  Has had intermittent rectal bleeding for the last few days.  Patient has been using steroid enema.  Patient states bleeding actually happens in between bowel movements and set up with bowel movements.  Has no abdominal pain tenderness.  Was sent in by her gastroenterologist for hemoglobin check.    PHYSICAL EXAM  ED Triage Vitals   Temp Heart Rate Resp BP SpO2   01/12/23 1514 01/12/23 1514 01/12/23 1514 01/12/23 1524 01/12/23 1514   96.3 °F (35.7 °C) 55 16 165/69 97 %      Temp src Heart Rate Source Patient Position BP Location FiO2 (%)   01/12/23 1514 01/12/23 1514 01/12/23 1524 01/12/23 1524 --   Oral Monitor Sitting Left arm          GENERAL: no acute distress  HENT: nares patent  EYES: no scleral icterus  CV: regular rhythm, normal rate  RESPIRATORY: normal effort  ABDOMEN: soft  MUSCULOSKELETAL: no deformity  NEURO: alert, moves all extremities, follows commands  PSYCH:  calm, cooperative  SKIN: warm, dry    Vital signs and nursing notes reviewed.        Plan: Hemoglobin unchanged.  Patient was discussed with GI and will be discharged home       Laci Coley MD  01/12/23 5860

## 2023-01-19 ENCOUNTER — OFFICE VISIT (OUTPATIENT)
Dept: GASTROENTEROLOGY | Facility: CLINIC | Age: 78
End: 2023-01-19
Payer: MEDICARE

## 2023-01-19 VITALS
BODY MASS INDEX: 23.52 KG/M2 | TEMPERATURE: 94.3 F | WEIGHT: 127.8 LBS | HEART RATE: 51 BPM | HEIGHT: 62 IN | DIASTOLIC BLOOD PRESSURE: 76 MMHG | SYSTOLIC BLOOD PRESSURE: 138 MMHG

## 2023-01-19 DIAGNOSIS — K62.5 RECTAL BLEEDING: Chronic | ICD-10-CM

## 2023-01-19 DIAGNOSIS — K51.311 ULCERATIVE RECTOSIGMOIDITIS WITH RECTAL BLEEDING: Primary | Chronic | ICD-10-CM

## 2023-01-19 DIAGNOSIS — D50.0 IRON DEFICIENCY ANEMIA DUE TO CHRONIC BLOOD LOSS: Chronic | ICD-10-CM

## 2023-01-19 PROCEDURE — 99214 OFFICE O/P EST MOD 30 MIN: CPT | Performed by: INTERNAL MEDICINE

## 2023-01-19 RX ORDER — HYDROCORTISONE 25 MG/G
CREAM TOPICAL NIGHTLY PRN
Qty: 28 G | Refills: 1 | Status: SHIPPED | OUTPATIENT
Start: 2023-01-19

## 2023-01-19 NOTE — PROGRESS NOTES
Chief Complaint   Patient presents with   • Ulcerative Colitis       Subjective     HPI    Renee Stevenson is a 77 y.o. female with a past medical history noted below who presents for follow-up of left-sided ulcerative colitis, rectal bleeding, iron deficiency anemia.  We updated her flex sig (she did not want a full colonoscopy) in November and she had moderately active colitis from the rectum to 35 cm from the anal verge.    She has continued to have bleeding.  Blood in the toilet water.  Went to ER, was told it was just hemorrhoids.  Remains mildly anemic.    She is finishing up the steroid enemas.  She remains on mesalamine 4.8g daily.  Just got a 90 day refill of this.      No diarrhea.  No blood for the past 2 days.  No pain or cramping.  One easy to pass BM daily.      Eating and drinking well.  Weight is stable      Today's visit was in the office.  Both the patient and I were wearing face masks and proper hand hygiene was performed before and after the physical exam.           Current Outpatient Medications:   •  amLODIPine (NORVASC) 5 MG tablet, TAKE 1 TABLET BY MOUTH DAILY, Disp: 90 tablet, Rfl: 3  •  Calcium Carb-Cholecalciferol (OYSCO 500 + D) 500-5 MG-MCG tablet per tablet, Take 1 tablet by mouth Daily., Disp: 90 tablet, Rfl: 3  •  dorzolamide-timolol (COSOPT) 22.3-6.8 MG/ML ophthalmic solution, Apply 1 drop to eye 2 (two) times a day., Disp: , Rfl:   •  ferrous sulfate 325 (65 FE) MG tablet, Take 1 tablet by mouth Daily With Breakfast. (Patient taking differently: Take 325 mg by mouth Daily With Breakfast. Every otherday), Disp: 30 tablet, Rfl: 0  •  glucose blood (Accu-Chek Saira Plus) test strip, Test FBG once every dayE11.59/ DM2 with circularly disorder, Disp: 100 each, Rfl: 11  •  hydrocortisone (Cortenema) 100 MG/60ML enema, Insert 1 enema into the rectum Every Night., Disp: 30 enema, Rfl: 0  •  Januvia 100 MG tablet, TAKE 1 TABLET BY MOUTH DAILY WITH BREAKFAST, Disp: 90 tablet, Rfl: 3  •   lisinopril (PRINIVIL,ZESTRIL) 20 MG tablet, TAKE 1 TABLET BY MOUTH EVERY DAY, Disp: 90 tablet, Rfl: 3  •  mesalamine (LIALDA) 1.2 g EC tablet, Take 4 tablets by mouth Daily With Breakfast., Disp: 120 tablet, Rfl: 5  •  metoprolol tartrate (LOPRESSOR) 50 MG tablet, Take 1 tablet by mouth Every 12 (Twelve) Hours., Disp: 180 tablet, Rfl: 3  •  pantoprazole (PROTONIX) 40 MG EC tablet, Take 1 tablet by mouth Every Morning Before Breakfast., Disp: 90 tablet, Rfl: 3  •  pioglitazone (ACTOS) 15 MG tablet, TAKE 1 TABLET BY MOUTH DAILY WITH BREAKFAST, Disp: 30 tablet, Rfl: 11  •  rosuvastatin (CRESTOR) 10 MG tablet, Take 1 tablet by mouth Daily., Disp: 90 tablet, Rfl: 3  •  Hydrocortisone, Perianal, (ANUSOL-HC) 2.5 % rectal cream, Insert  into the rectum At Night As Needed for Hemorrhoids., Disp: 28 g, Rfl: 1      Objective     Vitals:    01/19/23 1137   BP: 138/76   Pulse: 51   Temp: 94.3 °F (34.6 °C)         01/19/23  1137   Weight: 58 kg (127 lb 12.8 oz)     Body mass index is 23.37 kg/m².    Physical Exam        WBC   Date Value Ref Range Status   01/12/2023 6.01 3.40 - 10.80 10*3/mm3 Final   01/03/2023 4.75 3.40 - 10.80 10*3/mm3 Final     RBC   Date Value Ref Range Status   01/12/2023 3.68 (L) 3.77 - 5.28 10*6/mm3 Final   01/03/2023 3.54 (L) 3.77 - 5.28 10*6/mm3 Final     Hemoglobin   Date Value Ref Range Status   01/12/2023 11.4 (L) 12.0 - 15.9 g/dL Final     Hematocrit   Date Value Ref Range Status   01/12/2023 34.3 34.0 - 46.6 % Final     MCV   Date Value Ref Range Status   01/12/2023 93.2 79.0 - 97.0 fL Final     MCH   Date Value Ref Range Status   01/12/2023 31.0 26.6 - 33.0 pg Final     MCHC   Date Value Ref Range Status   01/12/2023 33.2 31.5 - 35.7 g/dL Final     RDW   Date Value Ref Range Status   01/12/2023 12.5 12.3 - 15.4 % Final     RDW-SD   Date Value Ref Range Status   01/12/2023 43.2 37.0 - 54.0 fl Final     MPV   Date Value Ref Range Status   01/12/2023 9.5 6.0 - 12.0 fL Final     Platelets   Date Value  Ref Range Status   01/12/2023 169 140 - 450 10*3/mm3 Final     Neutrophil %   Date Value Ref Range Status   01/12/2023 65.5 42.7 - 76.0 % Final     Lymphocyte %   Date Value Ref Range Status   01/12/2023 25.8 19.6 - 45.3 % Final     Monocyte %   Date Value Ref Range Status   01/12/2023 7.7 5.0 - 12.0 % Final     Eosinophil %   Date Value Ref Range Status   01/12/2023 0.0 (L) 0.3 - 6.2 % Final     Basophil %   Date Value Ref Range Status   01/12/2023 0.3 0.0 - 1.5 % Final     Immature Grans %   Date Value Ref Range Status   01/12/2023 0.7 (H) 0.0 - 0.5 % Final     Neutrophils, Absolute   Date Value Ref Range Status   01/12/2023 3.94 1.70 - 7.00 10*3/mm3 Final     Lymphocytes, Absolute   Date Value Ref Range Status   01/12/2023 1.55 0.70 - 3.10 10*3/mm3 Final     Monocytes, Absolute   Date Value Ref Range Status   01/12/2023 0.46 0.10 - 0.90 10*3/mm3 Final     Eosinophils, Absolute   Date Value Ref Range Status   01/12/2023 0.00 0.00 - 0.40 10*3/mm3 Final     Basophils, Absolute   Date Value Ref Range Status   01/12/2023 0.02 0.00 - 0.20 10*3/mm3 Final     Immature Grans, Absolute   Date Value Ref Range Status   01/12/2023 0.04 0.00 - 0.05 10*3/mm3 Final     nRBC   Date Value Ref Range Status   01/12/2023 0.2 0.0 - 0.2 /100 WBC Final       Lab Results   Component Value Date    GLUCOSE 170 (H) 01/12/2023    BUN 17 01/12/2023    CREATININE 1.18 (H) 01/12/2023    EGFRIFNONA 63 01/04/2022    EGFRIFAFRI 72 01/04/2022    BCR 14.4 01/12/2023    CO2 23.7 01/12/2023    CALCIUM 9.6 01/12/2023    PROTENTOTREF 7.0 01/03/2023    ALBUMIN 4.7 01/12/2023    LABIL2 1.7 01/03/2023    AST 13 01/12/2023    ALT 10 01/12/2023         Imaging Results (Last 7 Days)     ** No results found for the last 168 hours. **          I personally reviewed data as detailed below:     The labs listed above.      Office notes from: 1/12/23 ER     Endoscopy procedures and pathology from: 11/21/22 flex sig    Assessment and Plan    1. Ulcerative  rectosigmoiditis with rectal bleeding: active disease despite lialda    2. Rectal bleeding    3. Iron deficiency anemia due to chronic blood loss    Plan  Discussed options.  Given ongoing colitis, the mesalamine is not working well for her. We discussed ustekinumab and vedolizumab.  Since she just filled her mesalamine she wants to wait.  She will finish the hydrocortisone enemas soon.  She wants some anusol for hemorrhoids which I think is reasonable, though I am not convinced this is the only cause of her bleeding.  Will consider a short course of prednisone if she continues to have bleeding.       Diagnoses and all orders for this visit:    1. Ulcerative rectosigmoiditis with rectal bleeding (HCC) (Primary)    2. Rectal bleeding    3. Iron deficiency anemia due to chronic blood loss    Other orders  -     Hydrocortisone, Perianal, (ANUSOL-HC) 2.5 % rectal cream; Insert  into the rectum At Night As Needed for Hemorrhoids.  Dispense: 28 g; Refill: 1          I have discussed the above plan with the patient.  They verbalize understanding and are in agreement with the plan.  They have been advised to contact the office for any questions, concerns, or changes related to their health.    Dictated utilizing Dragon dictation

## 2023-01-24 DIAGNOSIS — I10 ESSENTIAL HYPERTENSION: Chronic | ICD-10-CM

## 2023-01-24 RX ORDER — LISINOPRIL 20 MG/1
TABLET ORAL
Qty: 90 TABLET | Refills: 3 | Status: SHIPPED | OUTPATIENT
Start: 2023-01-24

## 2023-01-27 ENCOUNTER — TELEPHONE (OUTPATIENT)
Dept: GASTROENTEROLOGY | Facility: CLINIC | Age: 78
End: 2023-01-27
Payer: MEDICARE

## 2023-01-27 NOTE — TELEPHONE ENCOUNTER
I called and spoke with her.  Had some blood after her hydrocortisone enema and then had a normal BM no blood and then later went to urinate and had painless rectal bleeding dripping into the toilet.  She is feeling her hemorrhoids flaring up, feels bumps on the outside of her rectum.    I advised Preparation H phenylephrine based suppositories.  Stop the hydrocortisone enemas    Will check on her Monday    Advised to ER if bleeding does not stop or becomes severe or she feels symptoms from anemia.    She agrees with the plan as detailed above

## 2023-01-27 NOTE — TELEPHONE ENCOUNTER
Pt stated she woke up this am and when she went to the bathroom there was bright red blood coming from her rectum.  She stated later this am, after a bowel movement, she reported no blood, but the blood returned the next time she went to urinate.  She said it was really bad this am.  Stated it changed the color of the toilet water.  She said she passed a little blood yesterday.    She has been using the hydrocortisone enemas, but stated she doesn't think these are helping her.    Please advise

## 2023-01-27 NOTE — TELEPHONE ENCOUNTER
----- Message from Renee MASSEY From sent at 1/27/2023 10:53 AM EST -----  Regarding: message for Dr. Burleson  Contact: [phone]  Dr. Burleson this is Renee From I am bleeding again and need to know what to do.  I would appreciate hearing from you. My phone number is [phone]. Thank you.

## 2023-01-30 RX ORDER — METOPROLOL TARTRATE 50 MG/1
50 TABLET, FILM COATED ORAL EVERY 12 HOURS
Qty: 180 TABLET | Refills: 0 | Status: SHIPPED | OUTPATIENT
Start: 2023-01-30

## 2023-01-31 NOTE — TELEPHONE ENCOUNTER
"Called pt and she stated she had very little blood yesterday and \"not a drop today\"  She feels that the issues is resolved and she is feeling much better.    Advised she call back if anything changes.  "

## 2023-04-02 DIAGNOSIS — K51.311 ULCERATIVE RECTOSIGMOIDITIS WITH RECTAL BLEEDING: Chronic | ICD-10-CM

## 2023-04-04 RX ORDER — MESALAMINE 1.2 G/1
4.8 TABLET, DELAYED RELEASE ORAL
Qty: 120 TABLET | Refills: 5 | Status: SHIPPED | OUTPATIENT
Start: 2023-04-04

## 2023-04-19 ENCOUNTER — OFFICE VISIT (OUTPATIENT)
Dept: GASTROENTEROLOGY | Facility: CLINIC | Age: 78
End: 2023-04-19
Payer: MEDICARE

## 2023-04-19 VITALS
DIASTOLIC BLOOD PRESSURE: 65 MMHG | OXYGEN SATURATION: 98 % | BODY MASS INDEX: 23.08 KG/M2 | HEART RATE: 54 BPM | WEIGHT: 125.4 LBS | HEIGHT: 62 IN | SYSTOLIC BLOOD PRESSURE: 157 MMHG | TEMPERATURE: 96.8 F

## 2023-04-19 DIAGNOSIS — K51.211 ULCERATIVE PROCTITIS WITH RECTAL BLEEDING: Primary | Chronic | ICD-10-CM

## 2023-04-19 NOTE — PROGRESS NOTES
Chief Complaint  Ulcerative rectosigmoiditis with rectal bleeding    Subjective        History of Present Illness  Renee Stevenson is a  78 y.o. female here for follow-up for left-sided ulcerative colitis, rectal bleeding and iron deficiency anemia.  She is a patient of Dr. Burleson and was last seen in the clinic by Dr. Burleson on 1/19/2023.      She had an updated flexible sigmoidoscopy in November 22-declined full colonoscopy-moderately active colitis from the rectum to 35 cm from the anal verge was noted at that time.    At her last visit there was a discussion regarding transition from mesalamine 4.9 g daily to Stelara or Entyvio but patient had just filled a 90-day prescription at this time and wanted to wait.     She presents today with bowels moving daily with occasional second bowel movement occurring.  No more blood per rectum.  No abdominal pain, nausea, vomiting.  Heartburn well controlled with 40 mg pantoprazole daily.  No extraintestinal manifestations.    Past Medical History:   Diagnosis Date   • Allergic rhinitis    • Broken heart syndrome    • Colitis    • Colon polyps    • Diabetes mellitus     type 2   • Dizziness    • Encounter for breast cancer screening other than mammogram    • GERD (gastroesophageal reflux disease)    • GI (gastrointestinal bleed)    • Glaucoma    • Herpes zoster without complication 3/2/2022   • History of transfusion    • Hyperlipidemia    • Hypertension    • Iron deficiency anemia due to chronic blood loss    • Knee fracture, left    • Non-STEMI (non-ST elevated myocardial infarction) 06/16/2017   • Osteopenia    • Shingles    • Ulcerative colitis        Past Surgical History:   Procedure Laterality Date   • CARDIAC CATHETERIZATION N/A 6/16/2017    Procedure: Left Heart Cath;  Surgeon: Abhay Wooten MD;  Location: [facility] INVASIVE LOCATION;  Service:    • CARDIAC CATHETERIZATION N/A 6/16/2017    Procedure: Left ventriculography;  Surgeon: Abhay Wooten MD;  Location: [facility]  INVASIVE LOCATION;  Service:    • CARDIAC CATHETERIZATION N/A 6/16/2017    Procedure: Coronary angiography;  Surgeon: Abhay Wootne MD;  Location:  [facility] INVASIVE LOCATION;  Service:    • CATARACT EXTRACTION     • COLONOSCOPY  08/14/2018   • ENDOSCOPY N/A 2/10/2020    Procedure: ESOPHAGOGASTRODUODENOSCOPY;  Surgeon: Melissa Burleson MD;  Location: [facility] ENDOSCOPY;  Service: Gastroenterology;  Laterality: N/A;  PRE- IRON DEFICIENCY ANEMIA  POST--SCHATZKY'S RING, GASTRITIS,DUODENITIS   • EYE SURGERY      cataract surgery   • PAP SMEAR     • SIGMOIDOSCOPY N/A 10/21/2019    EH, active ulcerative colitis, severe inflammation in rectum, TA   • SIGMOIDOSCOPY N/A 11/21/2022    Procedure: SIGMOIDOSCOPY FLEXIBLE to transverse colon with cold biopsies;  Surgeon: Melissa Burleson MD;  Location: [facility] ENDOSCOPY;  Service: Gastroenterology;  Laterality: N/A;  pre: h/o ulcerative colitis and rectal bleeding  post: colitis, hemorrhoids       Family History   Problem Relation Age of Onset   • Heart disease Mother    • Hypertension Mother    • Diabetes Mother    • Heart disease Father    • Hypertension Father    • Heart disease Sister    • Heart disease Brother    • No Known Problems Maternal Grandmother    • No Known Problems Maternal Grandfather    • No Known Problems Paternal Grandmother    • No Known Problems Paternal Grandfather    • Crohn's disease Niece    • Colon cancer Neg Hx    • Breast cancer Neg Hx    • Dementia Neg Hx        Social History     Socioeconomic History   • Marital status:      Spouse name: Eliazar*   • Number of children: 1   Tobacco Use   • Smoking status: Never   • Smokeless tobacco: Never   Vaping Use   • Vaping Use: Never used   Substance and Sexual Activity   • Alcohol use: No   • Drug use: No   • Sexual activity: Not Currently     Partners: Male       Allergies   Allergen Reactions   • Tetanus Toxoids Swelling     Hand and arm  Hand and arm  Hand and arm       Current Outpatient Medications on  "File Prior to Visit   Medication Sig Dispense Refill   • amLODIPine (NORVASC) 5 MG tablet TAKE 1 TABLET BY MOUTH DAILY 90 tablet 3   • Calcium Carb-Cholecalciferol (OYSCO 500 + D) 500-5 MG-MCG tablet per tablet Take 1 tablet by mouth Daily. 90 tablet 3   • dorzolamide-timolol (COSOPT) 22.3-6.8 MG/ML ophthalmic solution Apply 1 drop to eye(s) as directed by provider 2 (Two) Times a Day.     • ferrous sulfate 325 (65 FE) MG tablet Take 1 tablet by mouth Daily With Breakfast. (Patient taking differently: Take 1 tablet by mouth Daily With Breakfast. Every otherday) 30 tablet 0   • glucose blood (Accu-Chek Saira Plus) test strip Test FBG once every dayE11.59/ DM2 with circularly disorder 100 each 11   • hydrocortisone (Cortenema) 100 MG/60ML enema Insert 1 enema into the rectum Every Night. 30 enema 0   • Hydrocortisone, Perianal, (ANUSOL-HC) 2.5 % rectal cream Insert  into the rectum At Night As Needed for Hemorrhoids. 28 g 1   • Januvia 100 MG tablet TAKE 1 TABLET BY MOUTH DAILY WITH BREAKFAST 90 tablet 3   • lisinopril (PRINIVIL,ZESTRIL) 20 MG tablet TAKE 1 TABLET BY MOUTH EVERY DAY 90 tablet 3   • mesalamine (LIALDA) 1.2 g EC tablet Take 4 tablets by mouth Daily With Breakfast. 120 tablet 5   • metoprolol tartrate (LOPRESSOR) 50 MG tablet Take 1 tablet by mouth Every 12 (Twelve) Hours. 180 tablet 0   • pantoprazole (PROTONIX) 40 MG EC tablet Take 1 tablet by mouth Every Morning Before Breakfast. 90 tablet 3   • pioglitazone (ACTOS) 15 MG tablet TAKE 1 TABLET BY MOUTH DAILY WITH BREAKFAST 30 tablet 11   • rosuvastatin (CRESTOR) 10 MG tablet Take 1 tablet by mouth Daily. 90 tablet 3     No current facility-administered medications on file prior to visit.       Review of Systems     Objective   Vital Signs:   /65   Pulse 54   Temp 96.8 °F (36 °C)   Ht 157.5 cm (62.01\")   Wt 56.9 kg (125 lb 6.4 oz)   SpO2 98%   BMI 22.93 kg/m²       Physical Exam  Vitals and nursing note reviewed.   Constitutional:       " General: She is not in acute distress.     Appearance: Normal appearance. She is not ill-appearing.   HENT:      Head: Normocephalic and atraumatic.      Right Ear: External ear normal.      Left Ear: External ear normal.   Eyes:      General: No scleral icterus.     Conjunctiva/sclera: Conjunctivae normal.      Pupils: Pupils are equal, round, and reactive to light.   Cardiovascular:      Rate and Rhythm: Normal rate and regular rhythm.      Heart sounds: Normal heart sounds.   Pulmonary:      Effort: Pulmonary effort is normal.      Breath sounds: Normal breath sounds.   Abdominal:      General: Bowel sounds are normal. There is no distension.      Palpations: Abdomen is soft.      Tenderness: There is no abdominal tenderness. There is no guarding or rebound.   Musculoskeletal:      Cervical back: Normal range of motion and neck supple.   Skin:     General: Skin is warm and dry.   Neurological:      Mental Status: She is alert and oriented to person, place, and time.   Psychiatric:         Mood and Affect: Mood normal.         Behavior: Behavior normal.          Result Review :       Common labs        12/9/2022    10:33 1/3/2023    15:18 1/12/2023    15:53   Common Labs   Glucose  236   170     BUN  17   17     Creatinine  1.14   1.18     Sodium  139   140     Potassium  3.8   4.2     Chloride  103   105     Calcium  8.9   9.6     Total Protein  7.0      Albumin  4.4   4.7     Total Bilirubin  0.3   0.4     Alkaline Phosphatase  57   63     AST (SGOT)  13   13     ALT (SGPT)  7   10     WBC  4.75   6.01     Hemoglobin  10.8   11.4     Hematocrit  34.3   34.3     Platelets  176   169     Total Cholesterol 110       Triglycerides 53       HDL Cholesterol 41       LDL Cholesterol  57       Hemoglobin A1C 7.20                             Assessment and Plan    Diagnoses and all orders for this visit:    1. Ulcerative proctitis with rectal bleeding (Primary)  -     Calprotectin, Fecal - Stool, Per Rectum;  Future      · Discussed escalation of therapy to a biologic such as Stelara or Entyvio.  Patient like to hold off at this time she is not having abdominal pain or any rectal bleeding.  We did discuss that at her most recent flex sig in November, she had moderately severe disease.  · Proceed with fecal calprotectin.  · For now continue Lialda 4.8 g/day.  · Follow-up in 3 months, sooner if necessary.      Follow Up   Return in about 3 months (around 7/19/2023) for Dr. Burleson.    Dragon dictation used throughout this note.     PEE Mo

## 2023-04-24 DIAGNOSIS — E11.59 TYPE 2 DIABETES MELLITUS WITH OTHER CIRCULATORY COMPLICATION, WITHOUT LONG-TERM CURRENT USE OF INSULIN: ICD-10-CM

## 2023-04-24 RX ORDER — SITAGLIPTIN 100 MG/1
TABLET, FILM COATED ORAL
Qty: 90 TABLET | Refills: 3 | Status: SHIPPED | OUTPATIENT
Start: 2023-04-24

## 2023-05-03 RX ORDER — METOPROLOL TARTRATE 50 MG/1
TABLET, FILM COATED ORAL
Qty: 180 TABLET | Refills: 0 | Status: SHIPPED | OUTPATIENT
Start: 2023-05-03

## 2023-05-10 ENCOUNTER — OFFICE VISIT (OUTPATIENT)
Dept: INTERNAL MEDICINE | Age: 78
End: 2023-05-10
Payer: MEDICARE

## 2023-05-10 VITALS
TEMPERATURE: 97.3 F | WEIGHT: 123 LBS | BODY MASS INDEX: 22.63 KG/M2 | SYSTOLIC BLOOD PRESSURE: 178 MMHG | HEIGHT: 62 IN | DIASTOLIC BLOOD PRESSURE: 78 MMHG | HEART RATE: 51 BPM | OXYGEN SATURATION: 99 %

## 2023-05-10 DIAGNOSIS — E11.59 TYPE 2 DIABETES MELLITUS WITH OTHER CIRCULATORY COMPLICATION, WITHOUT LONG-TERM CURRENT USE OF INSULIN: Chronic | ICD-10-CM

## 2023-05-10 DIAGNOSIS — R35.0 URINARY FREQUENCY: ICD-10-CM

## 2023-05-10 DIAGNOSIS — I25.10 CORONARY ARTERY DISEASE INVOLVING NATIVE CORONARY ARTERY OF NATIVE HEART WITHOUT ANGINA PECTORIS: Chronic | ICD-10-CM

## 2023-05-10 DIAGNOSIS — E78.2 MIXED HYPERLIPIDEMIA: Chronic | ICD-10-CM

## 2023-05-10 DIAGNOSIS — I10 PRIMARY HYPERTENSION: Primary | Chronic | ICD-10-CM

## 2023-05-10 PROCEDURE — 1159F MED LIST DOCD IN RCRD: CPT | Performed by: INTERNAL MEDICINE

## 2023-05-10 PROCEDURE — 99214 OFFICE O/P EST MOD 30 MIN: CPT | Performed by: INTERNAL MEDICINE

## 2023-05-10 PROCEDURE — 3077F SYST BP >= 140 MM HG: CPT | Performed by: INTERNAL MEDICINE

## 2023-05-10 PROCEDURE — 3078F DIAST BP <80 MM HG: CPT | Performed by: INTERNAL MEDICINE

## 2023-05-10 PROCEDURE — 1160F RVW MEDS BY RX/DR IN RCRD: CPT | Performed by: INTERNAL MEDICINE

## 2023-05-10 RX ORDER — DAPAGLIFLOZIN 5 MG/1
5 TABLET, FILM COATED ORAL
Qty: 30 TABLET | Refills: 3 | Status: SHIPPED | OUTPATIENT
Start: 2023-05-10

## 2023-05-10 NOTE — ASSESSMENT & PLAN NOTE
Lab Results   Component Value Date    HGBA1C 7.20 (H) 12/09/2022    HGBA1C 6.9 06/08/2022    HGBA1C 6.5 (H) 02/02/2022        Add Farxiga 5 mg qAM

## 2023-05-10 NOTE — ASSESSMENT & PLAN NOTE
Blood pressure is higher here and home with increased edema of feet/ankles.   · Start Farxiga 5 mg qAM for diabetes   · Send blood pressure readings in 1 month

## 2023-05-10 NOTE — PROGRESS NOTES
I N T E R N A L  M E D I C I N E    J U N O H  K I M,  M D      ENCOUNTER DATE:  05/10/2023    Renee PARIKH From / 78 y.o. / female    CHIEF COMPLAINT / REASON FOR OFFICE VISIT     Diabetes, Hypertension, and Hyperlipidemia      ASSESSMENT & PLAN     Problem List Items Addressed This Visit        High    Type 2 diabetes mellitus with circulatory disorder (Chronic)    Overview     **Complications of diabetes: microalbuminuria and coronary artery disease          Current Assessment & Plan     Lab Results   Component Value Date    HGBA1C 7.20 (H) 12/09/2022    HGBA1C 6.9 06/08/2022    HGBA1C 6.5 (H) 02/02/2022        Add Farxiga 5 mg qAM          Relevant Medications    pioglitazone (ACTOS) 15 MG tablet    glucose blood (Accu-Chek Saira Plus) test strip    Januvia 100 MG tablet    dapagliflozin (Farxiga) 5 MG tablet tablet    Other Relevant Orders    Microalbumin / Creatinine Urine Ratio - Urine, Clean Catch    Hemoglobin A1c    Hypertension - Primary (Chronic)    Overview     Continue lisinopril 20 mg, amlodipine 5 mg qd and metoprolol tartrate 50 mg BID.          Current Assessment & Plan     Blood pressure is higher here and home with increased edema of feet/ankles.   · Start Farxiga 5 mg qAM for diabetes   · Send blood pressure readings in 1 month         Relevant Medications    amLODIPine (NORVASC) 5 MG tablet    lisinopril (PRINIVIL,ZESTRIL) 20 MG tablet    metoprolol tartrate (LOPRESSOR) 50 MG tablet    Other Relevant Orders    Microalbumin / Creatinine Urine Ratio - Urine, Clean Catch       Medium    Hyperlipidemia (Chronic)    Overview     Continue rosuvastatin 10 mg daily.          Relevant Medications    rosuvastatin (CRESTOR) 10 MG tablet    Coronary artery disease involving native coronary artery of native heart without angina pectoris (Chronic)    Overview     *[facility]    Continue statin, bblocker. Not on ASA due to recent LGIB         Relevant Medications    rosuvastatin (CRESTOR) 10 MG  "tablet    amLODIPine (NORVASC) 5 MG tablet    metoprolol tartrate (LOPRESSOR) 50 MG tablet   Other Visit Diagnoses     Urinary frequency        Relevant Orders    Urinalysis With Culture If Indicated - Urine, Clean Catch        Orders Placed This Encounter   Procedures   • Urinalysis With Culture If Indicated - Urine, Clean Catch   • Microalbumin / Creatinine Urine Ratio - Urine, Clean Catch   • Hemoglobin A1c     New Medications Ordered This Visit   Medications   • dapagliflozin (Farxiga) 5 MG tablet tablet     Sig: Take 1 tablet by mouth Every Morning Before Breakfast.     Dispense:  30 tablet     Refill:  3       SUMMARY/DISCUSSION  •       Next Appointment with me: Visit date not found    Return in about 4 months (around 9/10/2023) for Schedule AWV WITH APRN in SEPTEMBER, COMBINED AWV AND MEDICAL F/U (30 MIN).        VITAL SIGNS     Vitals:    05/10/23 0820   BP: 178/78   Pulse: 51   Temp: 97.3 °F (36.3 °C)   SpO2: 99%   Weight: 55.8 kg (123 lb)   Height: 157.5 cm (62.01\")       BP Readings from Last 3 Encounters:   05/10/23 178/78   04/19/23 157/65   01/19/23 138/76     Wt Readings from Last 3 Encounters:   05/10/23 55.8 kg (123 lb)   04/19/23 56.9 kg (125 lb 6.4 oz)   01/19/23 58 kg (127 lb 12.8 oz)     Body mass index is 22.49 kg/m².    Blood pressure readings recorded on patient flowsheet:      MEDICATIONS AT THE TIME OF OFFICE VISIT     Current Outpatient Medications on File Prior to Visit   Medication Sig   • amLODIPine (NORVASC) 5 MG tablet TAKE 1 TABLET BY MOUTH DAILY   • Calcium Carb-Cholecalciferol (OYSCO 500 + D) 500-5 MG-MCG tablet per tablet Take 1 tablet by mouth Daily.   • dorzolamide-timolol (COSOPT) 22.3-6.8 MG/ML ophthalmic solution Apply 1 drop to eye(s) as directed by provider 2 (Two) Times a Day.   • ferrous sulfate 325 (65 FE) MG tablet Take 1 tablet by mouth Daily With Breakfast. (Patient taking differently: Take 1 tablet by mouth Daily With Breakfast. Every otherday)   • glucose blood " (Accu-Chek Saira Plus) test strip Test FBG once every dayE11.59/ DM2 with circularly disorder   • Hydrocortisone, Perianal, (ANUSOL-HC) 2.5 % rectal cream Insert  into the rectum At Night As Needed for Hemorrhoids.   • Januvia 100 MG tablet TAKE 1 TABLET BY MOUTH DAILY WITH BREAKFAST   • lisinopril (PRINIVIL,ZESTRIL) 20 MG tablet TAKE 1 TABLET BY MOUTH EVERY DAY   • mesalamine (LIALDA) 1.2 g EC tablet Take 4 tablets by mouth Daily With Breakfast.   • metoprolol tartrate (LOPRESSOR) 50 MG tablet TAKE 1 TABLET BY MOUTH EVERY 12 HOURS   • pantoprazole (PROTONIX) 40 MG EC tablet Take 1 tablet by mouth Every Morning Before Breakfast.   • pioglitazone (ACTOS) 15 MG tablet TAKE 1 TABLET BY MOUTH DAILY WITH BREAKFAST   • rosuvastatin (CRESTOR) 10 MG tablet Take 1 tablet by mouth Daily.   • hydrocortisone (Cortenema) 100 MG/60ML enema Insert 1 enema into the rectum Every Night. (Patient not taking: Reported on 5/10/2023)     No current facility-administered medications on file prior to visit.         HISTORY OF PRESENT ILLNESS     Complains of increased edema of bilateral feet and ankles.  Blood pressure recently at home has been running higher.  She reports compliance with her blood pressure meds including metoprolol titrate, amlodipine 5 mg and lisinopril 20 mg.  Denies chest pain or dyspnea on exertion.  Prior A1c was higher at 7.2.  Blood sugars at home are remaining relatively stable.  On rosuvastatin 10 mg for hyperlipidemia without problems.  Ulcerative colitis remains clinically stable.       Lab Results   Component Value Date    HGBA1C 7.20 (H) 12/09/2022    HGBA1C 6.9 06/08/2022    HGBA1C 6.5 (H) 02/02/2022    CREATININE 1.18 (H) 01/12/2023    LDL 57 12/09/2022    MALBCRERATIO 43 (H) 02/02/2022     Blood sugar readings recorded on patient's flowsheet:       View : No data to display.                55.8 kg (123 lb)    REVIEW OF SYSTEMS     Constitutional neg except per HPI   Resp neg  CV neg   GI negative for  change  Neuro negative for change      PHYSICAL EXAMINATION     Physical Exam  General: No acute distress  Psych: Normal thought and judgment   Cardiovascular Rate: normal. Rhythm: regular. Heart sounds: normal  Pulmonary/Chest: Effort normal and breath sounds normal.   1+ bilateral lower extremity edema of the ankles and feet      REVIEWED DATA     Labs:       Lab Results   Component Value Date     01/12/2023    K 4.2 01/12/2023    CALCIUM 9.6 01/12/2023    AST 13 01/12/2023    ALT 10 01/12/2023    BUN 17 01/12/2023    CREATININE 1.18 (H) 01/12/2023    CREATININE 1.14 (H) 01/03/2023    CREATININE 1.10 (H) 11/28/2022    EGFRIFNONA 63 01/04/2022    EGFRIFAFRI 72 01/04/2022       Lab Results   Component Value Date    HGBA1C 7.20 (H) 12/09/2022    HGBA1C 6.9 06/08/2022    HGBA1C 6.5 (H) 02/02/2022       Lab Results   Component Value Date    LDL 57 12/09/2022    LDL 49 02/02/2022    LDL 51 02/24/2021    HDL 41 12/09/2022    HDL 42 02/02/2022    TRIG 53 12/09/2022    TRIG 95 02/02/2022       Lab Results   Component Value Date    TSH 2.910 07/26/2021    FREET4 1.19 07/26/2021       Lab Results   Component Value Date    WBC 6.01 01/12/2023    HGB 11.4 (L) 01/12/2023     01/12/2023       Lab Results   Component Value Date    MALBCRERATIO 43 (H) 02/02/2022            Imaging:           Medical Tests:           Summary of old records / correspondence / consultant report:           Request outside records:           *Examiner was wearing [id number] mask during the entire duration of the visit. Patient was masked the entire time. Minimum social distance of 6 ft maintained entire visit except if physical contact was necessary as documented.       Template created by Zachery Pacheco MD

## 2023-05-11 LAB
ALBUMIN/CREAT UR: 743 MG/G CREAT (ref 0–29)
APPEARANCE UR: CLEAR
BACTERIA #/AREA URNS HPF: NORMAL /HPF
BILIRUB UR QL STRIP: NEGATIVE
CASTS URNS QL MICRO: NORMAL /LPF
COLOR UR: YELLOW
CREAT UR-MCNC: 71.7 MG/DL
EPI CELLS #/AREA URNS HPF: NORMAL /HPF (ref 0–10)
GLUCOSE UR QL STRIP: NEGATIVE
HBA1C MFR BLD: 7.3 % (ref 4.8–5.6)
HGB UR QL STRIP: NEGATIVE
KETONES UR QL STRIP: ABNORMAL
LEUKOCYTE ESTERASE UR QL STRIP: NEGATIVE
MICRO URNS: ABNORMAL
MICROALBUMIN UR-MCNC: 532.6 UG/ML
NITRITE UR QL STRIP: NEGATIVE
PH UR STRIP: 5.5 [PH] (ref 5–7.5)
PROT UR QL STRIP: ABNORMAL
RBC #/AREA URNS HPF: NORMAL /HPF (ref 0–2)
SP GR UR STRIP: 1.01 (ref 1–1.03)
URINALYSIS REFLEX: ABNORMAL
UROBILINOGEN UR STRIP-MCNC: 0.2 MG/DL (ref 0.2–1)
WBC #/AREA URNS HPF: NORMAL /HPF (ref 0–5)

## 2023-05-12 NOTE — PROGRESS NOTES
CALL PATIENT WITH TEST RESULTS:  - Be sure to communicate DIRECTLY with the patient/surrogate EVEN IF the result(s) has been released or viewed by the patient on [employer]) .  - Also, mail the results IF "[employer]t is NOT active.      A1c is little higher and there is increased protein in urine (probably due to diabetes and hypertension).   - Be sure to start Farxiga 5 mg as recommended as this will help with diabetes, hypertension and urine protein.   - Let us know how your blood pressure and sugar readings are doing in 1 month.   - Will need to titrate Farxiga up to 10 mg if possible if kidney function remains stable.   - Please schedule her for BMP lab in 1 month. Will place order in the system.   - Be sure to keep appointment with APRN in September but also schedule her to see me sometime in November for diabetes and hypertension.

## 2023-06-04 DIAGNOSIS — E11.59 TYPE 2 DIABETES MELLITUS WITH OTHER CIRCULATORY COMPLICATION, WITHOUT LONG-TERM CURRENT USE OF INSULIN: ICD-10-CM

## 2023-06-05 RX ORDER — PIOGLITAZONEHYDROCHLORIDE 15 MG/1
15 TABLET ORAL
Qty: 30 TABLET | Refills: 11 | Status: SHIPPED | OUTPATIENT
Start: 2023-06-05

## 2023-06-08 ENCOUNTER — OFFICE VISIT (OUTPATIENT)
Dept: CARDIOLOGY | Facility: CLINIC | Age: 78
End: 2023-06-08
Payer: MEDICARE

## 2023-06-08 VITALS
HEART RATE: 54 BPM | HEIGHT: 62 IN | WEIGHT: 125 LBS | BODY MASS INDEX: 23 KG/M2 | DIASTOLIC BLOOD PRESSURE: 82 MMHG | OXYGEN SATURATION: 98 % | SYSTOLIC BLOOD PRESSURE: 148 MMHG

## 2023-06-08 DIAGNOSIS — I10 PRIMARY HYPERTENSION: Primary | Chronic | ICD-10-CM

## 2023-06-08 PROCEDURE — 3077F SYST BP >= 140 MM HG: CPT | Performed by: INTERNAL MEDICINE

## 2023-06-08 PROCEDURE — 99214 OFFICE O/P EST MOD 30 MIN: CPT | Performed by: INTERNAL MEDICINE

## 2023-06-08 PROCEDURE — 3079F DIAST BP 80-89 MM HG: CPT | Performed by: INTERNAL MEDICINE

## 2023-06-08 PROCEDURE — 93000 ELECTROCARDIOGRAM COMPLETE: CPT | Performed by: INTERNAL MEDICINE

## 2023-06-08 NOTE — PROGRESS NOTES
Subjective:     Encounter Date: 06/08/23      Patient ID: Renee Stevenson is a 78 y.o. female.    Chief Complaint: Takotsubo  HPI:   This is a 75-year-old woman who was previously followed by Dr. Gerardo Wooten.  In 2017 she presented to [facility] with chest pain and dynamic EKG changes.  She had a cardiac catheterization which showed mild to moderate disease of the proximal, mid and distal LAD as well as 2 small diagonal arteries.  She also had mild disease in the circumflex.  Her LV gram showed inferior apical hypokinesis consistent with Takosubo cardiomyopathy with an EF mildly reduced at 45%.  She had recently suffered the loss of 2 of her brothers, which was considered to be the culprit.  After few months of goal directed medical therapy her EF improved to 65%.    Her other medical problems include diabetes, hypertension and ulcerative colitis.      Overall she feels well. She has no complaints. Her  has Parkinsons and she is taking care of him. This is stressful but he is doing well. She has no angina or dyspnea. Energy level is good. No palpitations.     She has never smoked, she does not drink.  She is retired.  There is no family history of coronary disease.    The following portions of the patient's history were reviewed and updated as appropriate: allergies, current medications, past family history, past medical history, past social history, past surgical history and problem list.     REVIEW OF SYSTEMS:   All systems reviewed.  Pertinent positives identified in HPI.  All other systems are negative.    Past Medical History:   Diagnosis Date    Allergic rhinitis     Broken heart syndrome     Colitis     Colon polyps     Diabetes mellitus     type 2    Dizziness     Encounter for breast cancer screening other than mammogram     GERD (gastroesophageal reflux disease)     GI (gastrointestinal bleed)     Glaucoma     Herpes zoster without complication 3/2/2022    History of transfusion     Hyperlipidemia      Hypertension     Iron deficiency anemia due to chronic blood loss     Knee fracture, left     Non-STEMI (non-ST elevated myocardial infarction) 06/16/2017    Osteopenia     Shingles     Ulcerative colitis        Family History   Problem Relation Age of Onset    Heart disease Mother     Hypertension Mother     Diabetes Mother     Heart disease Father     Hypertension Father     Heart disease Sister     Heart disease Brother     No Known Problems Maternal Grandmother     No Known Problems Maternal Grandfather     No Known Problems Paternal Grandmother     No Known Problems Paternal Grandfather     Crohn's disease Niece     Colon cancer Neg Hx     Breast cancer Neg Hx     Dementia Neg Hx        Social History     Socioeconomic History    Marital status:      Spouse name: Eliazar*    Number of children: 1   Tobacco Use    Smoking status: Never    Smokeless tobacco: Never   Vaping Use    Vaping Use: Never used   Substance and Sexual Activity    Alcohol use: No    Drug use: No    Sexual activity: Not Currently     Partners: Male       Allergies   Allergen Reactions    Tetanus Toxoids Swelling     Hand and arm  Hand and arm  Hand and arm       Past Surgical History:   Procedure Laterality Date    CARDIAC CATHETERIZATION N/A 6/16/2017    Procedure: Left Heart Cath;  Surgeon: Abhay Wooten MD;  Location: [facility] CATH INVASIVE LOCATION;  Service:     CARDIAC CATHETERIZATION N/A 6/16/2017    Procedure: Left ventriculography;  Surgeon: Abhay Wooten MD;  Location: [facility] CATH INVASIVE LOCATION;  Service:     CARDIAC CATHETERIZATION N/A 6/16/2017    Procedure: Coronary angiography;  Surgeon: Abhay Wooten MD;  Location: [facility] CATH INVASIVE LOCATION;  Service:     CATARACT EXTRACTION      COLONOSCOPY  08/14/2018    ENDOSCOPY N/A 2/10/2020    Procedure: ESOPHAGOGASTRODUODENOSCOPY;  Surgeon: Melissa Burleson MD;  Location: [facility] ENDOSCOPY;  Service: Gastroenterology;  Laterality: N/A;  PRE- IRON DEFICIENCY ANEMIA  POST--SCHATZKY'S  RING, GASTRITIS,DUODENITIS    EYE SURGERY      cataract surgery    PAP SMEAR      SIGMOIDOSCOPY N/A 10/21/2019    EH, active ulcerative colitis, severe inflammation in rectum, TA    SIGMOIDOSCOPY N/A 11/21/2022    Procedure: SIGMOIDOSCOPY FLEXIBLE to transverse colon with cold biopsies;  Surgeon: Melissa Burleson MD;  Location: [facility] ENDOSCOPY;  Service: Gastroenterology;  Laterality: N/A;  pre: h/o ulcerative colitis and rectal bleeding  post: colitis, hemorrhoids         ECG 12 Lead    Date/Time: 6/8/2023 4:05 PM  Performed by: Darling Espinosa MD  Authorized by: Darling Espinosa MD   Comparison: compared with previous ECG from 2/18/2022  Similar to previous ECG  Rhythm: sinus tachycardia  Rate: normal  Conduction: conduction normal  T flattening: all  QRS axis: normal  Other findings: non-specific ST-T wave changes    Clinical impression: normal ECG         Objective:         PHYSICAL EXAM:  GEN: no distress, alert and oriented  Eyes: normal sclerae, normal lids and lashes  HENT: moist mucous membranes,   Lungs: CTAB, no rales or wheezes  Chest: no abnormalities  Neck: no JVD or carotid bruits  CV: RRR, no murmurs, +2 DP and 2+ carotid pulses b/l  Abdomen: soft, nontender, nondistended  Extremities: no edema  Skin: no rash, warm, dry  Heme/Lymph: no bruising  Psych: organized thought, normal behavior and affect        Assessment:          Diagnosis Plan   1. Primary hypertension               Plan:         1.  Stress-induced cardiomyopathy. HR with recovered EF: Noted on cardiac catheterization in 2017.  Her ejection fraction has normalized.  Continue Toprol 50 and lisinopril 20.  2.  Coronary artery disease: Nonobstructive coronary disease in the LAD and circumflex system noted on cardiac catheterization.  Continue Crestor.  Farxiga.  3.  Hypertension:  Mildly elevated here, normal at home. Cont current meds.   4.  Hyperlipidemia: Crestor at goal  5.  Ulcerative colitis: Under control    Dr. Pacheco, thank you very  much for referring this kind patient to me. Please call me with any questions or concerns. I will see the patient again in the office in 1 year.          Darling Espinosa MD  06/08/23  [facility]    Outpatient Encounter Medications as of 6/8/2023   Medication Sig Dispense Refill    amLODIPine (NORVASC) 5 MG tablet TAKE 1 TABLET BY MOUTH DAILY 90 tablet 3    Calcium Carb-Cholecalciferol (OYSCO 500 + D) 500-5 MG-MCG tablet per tablet Take 1 tablet by mouth Daily. 90 tablet 3    dapagliflozin (Farxiga) 5 MG tablet tablet Take 1 tablet by mouth Every Morning Before Breakfast. 30 tablet 3    dorzolamide-timolol (COSOPT) 22.3-6.8 MG/ML ophthalmic solution Apply 1 drop to eye(s) as directed by provider 2 (Two) Times a Day.      ferrous sulfate 325 (65 FE) MG tablet Take 1 tablet by mouth Daily With Breakfast. (Patient taking differently: Take 1 tablet by mouth Daily With Breakfast. Every otherday) 30 tablet 0    glucose blood (Accu-Chek Saira Plus) test strip Test FBG once every dayE11.59/ DM2 with circularly disorder 100 each 11    hydrocortisone (Cortenema) 100 MG/60ML enema Insert 1 enema into the rectum Every Night. 30 enema 0    Hydrocortisone, Perianal, (ANUSOL-HC) 2.5 % rectal cream Insert  into the rectum At Night As Needed for Hemorrhoids. 28 g 1    Januvia 100 MG tablet TAKE 1 TABLET BY MOUTH DAILY WITH BREAKFAST 90 tablet 3    lisinopril (PRINIVIL,ZESTRIL) 20 MG tablet TAKE 1 TABLET BY MOUTH EVERY DAY 90 tablet 3    mesalamine (LIALDA) 1.2 g EC tablet Take 4 tablets by mouth Daily With Breakfast. 120 tablet 5    metoprolol tartrate (LOPRESSOR) 50 MG tablet TAKE 1 TABLET BY MOUTH EVERY 12 HOURS 180 tablet 0    pantoprazole (PROTONIX) 40 MG EC tablet Take 1 tablet by mouth Every Morning Before Breakfast. 90 tablet 3    pioglitazone (ACTOS) 15 MG tablet TAKE 1 TABLET BY MOUTH DAILY WITH BREAKFAST 30 tablet 11    rosuvastatin (CRESTOR) 10 MG tablet Take 1 tablet by mouth Daily. 90 tablet 3     No  facility-administered encounter medications on file as of 6/8/2023.

## 2023-08-01 RX ORDER — METOPROLOL TARTRATE 50 MG/1
TABLET, FILM COATED ORAL
Qty: 180 TABLET | Refills: 2 | Status: SHIPPED | OUTPATIENT
Start: 2023-08-01

## 2023-08-28 ENCOUNTER — OFFICE VISIT (OUTPATIENT)
Dept: GASTROENTEROLOGY | Facility: CLINIC | Age: 78
End: 2023-08-28
Payer: MEDICARE

## 2023-08-28 VITALS
DIASTOLIC BLOOD PRESSURE: 73 MMHG | WEIGHT: 129.2 LBS | OXYGEN SATURATION: 96 % | SYSTOLIC BLOOD PRESSURE: 124 MMHG | HEIGHT: 62 IN | HEART RATE: 54 BPM | BODY MASS INDEX: 23.77 KG/M2 | TEMPERATURE: 97.3 F

## 2023-08-28 DIAGNOSIS — K51.30 ULCERATIVE RECTOSIGMOIDITIS WITHOUT COMPLICATION: Primary | ICD-10-CM

## 2023-08-28 DIAGNOSIS — K51.311 ULCERATIVE RECTOSIGMOIDITIS WITH RECTAL BLEEDING: Chronic | ICD-10-CM

## 2023-08-28 DIAGNOSIS — K21.9 GASTROESOPHAGEAL REFLUX DISEASE WITHOUT ESOPHAGITIS: ICD-10-CM

## 2023-08-28 RX ORDER — PANTOPRAZOLE SODIUM 40 MG/1
40 TABLET, DELAYED RELEASE ORAL
Qty: 90 TABLET | Refills: 3 | Status: SHIPPED | OUTPATIENT
Start: 2023-08-28

## 2023-08-28 RX ORDER — MESALAMINE 1.2 G/1
4.8 TABLET, DELAYED RELEASE ORAL
Qty: 360 TABLET | Refills: 1 | Status: SHIPPED | OUTPATIENT
Start: 2023-08-28

## 2023-08-28 NOTE — PROGRESS NOTES
"Chief Complaint  Ulcerative proctitis with rectal bleeding    Subjective          History of Present Illness    Renee Stevenson is a  78 y.o. female presents for left-sided ulcerative colitis dx aaround 2016 and iron deficiency anemia.  She is a patient of Dr. Burleson last seen on 4/19/2023.  She is new to me.      She takes Lialda 4.8 g daily.  It was recommended previously to start biologic therapy but patient declined due to lack of symptoms.  She did show moderate to severe active colitis on November 2022 sigmoidoscope despite being on Lialda.  She also showed elevated fecal calprotectin at 183 on 5/26/2023.    She reports 3 Bms mostly in the morning with some urgency and small amounts at a time. She reports intermittent rectal bleeding--see's on toilet paper and in toilet bowl, worse with straining.  Believes this is due to hemorrhoids.  No abdominal pain, weight is stable.     She is on pantoprazole 40 mg daily for GERD. This works well for her.  Denies melena, dyspepsia, reflux, dysphagia.    11/2022 flexible sigmoidoscopy showed moderately active colitis from the rectum to 35 cm from anal verge.      Objective   Vital Signs:   /73   Pulse 54   Temp 97.3 øF (36.3 øC)   Ht 157.5 cm (62\")   Wt 58.6 kg (129 lb 3.2 oz)   SpO2 96%   BMI 23.63 kg/mý       Physical Exam  Vitals reviewed.   Constitutional:       General: She is awake. She is not in acute distress.     Appearance: Normal appearance. She is well-developed and well-groomed.   HENT:      Head: Normocephalic.   Pulmonary:      Effort: Pulmonary effort is normal. No respiratory distress.   Abdominal:      General: Abdomen is flat. There is no distension.      Palpations: Abdomen is soft. There is no hepatomegaly or mass.      Tenderness: There is no abdominal tenderness.   Skin:     Coloration: Skin is not pale.   Neurological:      Mental Status: She is alert and oriented to person, place, and time.      Gait: Gait is intact.   Psychiatric:        "  Mood and Affect: Mood and affect normal.         Speech: Speech normal.         Behavior: Behavior is cooperative.         Judgment: Judgment normal.        Result Review :             Assessment and Plan    Diagnoses and all orders for this visit:    1. Ulcerative rectosigmoiditis without complication (Primary)  -     CBC & Differential  -     Comprehensive Metabolic Panel  -     C-reactive Protein  -     Sedimentation Rate    2. Gastroesophageal reflux disease without esophagitis    3. Ulcerative rectosigmoiditis with rectal bleeding  -     mesalamine (LIALDA) 1.2 g EC tablet; Take 4 tablets by mouth Daily With Breakfast.  Dispense: 360 tablet; Refill: 1    Other orders  -     pantoprazole (PROTONIX) 40 MG EC tablet; Take 1 tablet by mouth Every Morning Before Breakfast.  Dispense: 90 tablet; Refill: 3    Symptomatically doing well on Lialda 4/day although likely with some inflammation based on previous calprotectin and sigmoidoscopy.  This has been discussed with her and she declined Biologics.  We will check labs today.    Continue pantoprazole for GERD which is well controlled.    Follow Up   Return in about 6 months (around 2/28/2024).    Dragon dictation used throughout this note.     Audelia King PA-C

## 2023-08-29 LAB
ALBUMIN SERPL-MCNC: 4.3 G/DL (ref 3.5–5.2)
ALBUMIN/GLOB SERPL: 1.4 G/DL
ALP SERPL-CCNC: 69 U/L (ref 39–117)
ALT SERPL-CCNC: 8 U/L (ref 1–33)
AST SERPL-CCNC: 14 U/L (ref 1–32)
BASOPHILS # BLD AUTO: 0.02 10*3/MM3 (ref 0–0.2)
BASOPHILS NFR BLD AUTO: 0.3 % (ref 0–1.5)
BILIRUB SERPL-MCNC: 0.3 MG/DL (ref 0–1.2)
BUN SERPL-MCNC: 21 MG/DL (ref 8–23)
BUN/CREAT SERPL: 18.9 (ref 7–25)
CALCIUM SERPL-MCNC: 9.1 MG/DL (ref 8.6–10.5)
CHLORIDE SERPL-SCNC: 107 MMOL/L (ref 98–107)
CO2 SERPL-SCNC: 23.6 MMOL/L (ref 22–29)
CREAT SERPL-MCNC: 1.11 MG/DL (ref 0.57–1)
CRP SERPL-MCNC: <0.3 MG/DL (ref 0–0.5)
EGFRCR SERPLBLD CKD-EPI 2021: 50.7 ML/MIN/1.73
EOSINOPHIL # BLD AUTO: 0.01 10*3/MM3 (ref 0–0.4)
EOSINOPHIL NFR BLD AUTO: 0.2 % (ref 0.3–6.2)
ERYTHROCYTE [DISTWIDTH] IN BLOOD BY AUTOMATED COUNT: 12.5 % (ref 12.3–15.4)
ERYTHROCYTE [SEDIMENTATION RATE] IN BLOOD BY WESTERGREN METHOD: 13 MM/HR (ref 0–30)
GLOBULIN SER CALC-MCNC: 3.1 GM/DL
GLUCOSE SERPL-MCNC: 162 MG/DL (ref 65–99)
HCT VFR BLD AUTO: 32 % (ref 34–46.6)
HGB BLD-MCNC: 10.7 G/DL (ref 12–15.9)
IMM GRANULOCYTES # BLD AUTO: 0.03 10*3/MM3 (ref 0–0.05)
IMM GRANULOCYTES NFR BLD AUTO: 0.5 % (ref 0–0.5)
LYMPHOCYTES # BLD AUTO: 1.59 10*3/MM3 (ref 0.7–3.1)
LYMPHOCYTES NFR BLD AUTO: 27.3 % (ref 19.6–45.3)
MCH RBC QN AUTO: 31.3 PG (ref 26.6–33)
MCHC RBC AUTO-ENTMCNC: 33.4 G/DL (ref 31.5–35.7)
MCV RBC AUTO: 93.6 FL (ref 79–97)
MONOCYTES # BLD AUTO: 0.53 10*3/MM3 (ref 0.1–0.9)
MONOCYTES NFR BLD AUTO: 9.1 % (ref 5–12)
NEUTROPHILS # BLD AUTO: 3.64 10*3/MM3 (ref 1.7–7)
NEUTROPHILS NFR BLD AUTO: 62.6 % (ref 42.7–76)
NRBC BLD AUTO-RTO: 0 /100 WBC (ref 0–0.2)
PLATELET # BLD AUTO: 172 10*3/MM3 (ref 140–450)
POTASSIUM SERPL-SCNC: 3.5 MMOL/L (ref 3.5–5.2)
PROT SERPL-MCNC: 7.4 G/DL (ref 6–8.5)
RBC # BLD AUTO: 3.42 10*6/MM3 (ref 3.77–5.28)
SODIUM SERPL-SCNC: 141 MMOL/L (ref 136–145)
WBC # BLD AUTO: 5.82 10*3/MM3 (ref 3.4–10.8)

## 2023-08-30 ENCOUNTER — TELEPHONE (OUTPATIENT)
Dept: INTERNAL MEDICINE | Age: 78
End: 2023-08-30
Payer: MEDICARE

## 2023-08-30 NOTE — TELEPHONE ENCOUNTER
Hub can read     Tried to reach pt and leave vm but there was no answer or vm.  Trying to reschedule appointment - Telma no longer here.

## 2023-09-03 DIAGNOSIS — E11.59 TYPE 2 DIABETES MELLITUS WITH OTHER CIRCULATORY COMPLICATION, WITHOUT LONG-TERM CURRENT USE OF INSULIN: Chronic | ICD-10-CM

## 2023-09-03 DIAGNOSIS — I10 ESSENTIAL HYPERTENSION: Chronic | ICD-10-CM

## 2023-09-06 DIAGNOSIS — E11.59 TYPE 2 DIABETES MELLITUS WITH OTHER CIRCULATORY COMPLICATION, WITHOUT LONG-TERM CURRENT USE OF INSULIN: Chronic | ICD-10-CM

## 2023-09-06 RX ORDER — DAPAGLIFLOZIN 5 MG/1
5 TABLET, FILM COATED ORAL
Qty: 30 TABLET | Refills: 5 | Status: SHIPPED | OUTPATIENT
Start: 2023-09-06

## 2023-09-06 RX ORDER — LISINOPRIL 20 MG/1
20 TABLET ORAL DAILY
Qty: 90 TABLET | Refills: 1 | Status: SHIPPED | OUTPATIENT
Start: 2023-09-06

## 2023-09-13 ENCOUNTER — OFFICE VISIT (OUTPATIENT)
Dept: INTERNAL MEDICINE | Age: 78
End: 2023-09-13
Payer: MEDICARE

## 2023-09-13 VITALS
HEART RATE: 49 BPM | SYSTOLIC BLOOD PRESSURE: 128 MMHG | DIASTOLIC BLOOD PRESSURE: 72 MMHG | BODY MASS INDEX: 23.74 KG/M2 | WEIGHT: 129 LBS | OXYGEN SATURATION: 99 % | HEIGHT: 62 IN | TEMPERATURE: 97.6 F

## 2023-09-13 DIAGNOSIS — Z00.00 ENCOUNTER FOR ANNUAL WELLNESS VISIT (AWV) IN MEDICARE PATIENT: Primary | ICD-10-CM

## 2023-09-13 DIAGNOSIS — Z12.31 ENCOUNTER FOR SCREENING MAMMOGRAM FOR MALIGNANT NEOPLASM OF BREAST: ICD-10-CM

## 2023-09-13 DIAGNOSIS — I10 PRIMARY HYPERTENSION: Chronic | ICD-10-CM

## 2023-09-13 DIAGNOSIS — E78.2 MIXED HYPERLIPIDEMIA: Chronic | ICD-10-CM

## 2023-09-13 DIAGNOSIS — E11.65 TYPE 2 DIABETES MELLITUS WITH HYPERGLYCEMIA, WITHOUT LONG-TERM CURRENT USE OF INSULIN: ICD-10-CM

## 2023-09-13 LAB
ALBUMIN SERPL-MCNC: 4.6 G/DL (ref 3.5–5.2)
ALBUMIN/GLOB SERPL: 1.5 G/DL
ALP SERPL-CCNC: 68 U/L (ref 39–117)
ALT SERPL-CCNC: 7 U/L (ref 1–33)
APPEARANCE UR: CLEAR
AST SERPL-CCNC: 7 U/L (ref 1–32)
BACTERIA #/AREA URNS HPF: NORMAL /HPF
BASOPHILS # BLD AUTO: 0.02 10*3/MM3 (ref 0–0.2)
BASOPHILS NFR BLD AUTO: 0.3 % (ref 0–1.5)
BILIRUB SERPL-MCNC: 0.4 MG/DL (ref 0–1.2)
BILIRUB UR QL STRIP: NEGATIVE
BUN SERPL-MCNC: 16 MG/DL (ref 8–23)
BUN/CREAT SERPL: 14.5 (ref 7–25)
CALCIUM SERPL-MCNC: 9.5 MG/DL (ref 8.6–10.5)
CASTS URNS MICRO: NORMAL
CHLORIDE SERPL-SCNC: 106 MMOL/L (ref 98–107)
CHOLEST SERPL-MCNC: 113 MG/DL (ref 0–200)
CHOLEST/HDLC SERPL: 2.69 {RATIO}
CO2 SERPL-SCNC: 25.7 MMOL/L (ref 22–29)
COLOR UR: YELLOW
CREAT SERPL-MCNC: 1.1 MG/DL (ref 0.57–1)
EGFRCR SERPLBLD CKD-EPI 2021: 51.2 ML/MIN/1.73
EOSINOPHIL # BLD AUTO: 0.14 10*3/MM3 (ref 0–0.4)
EOSINOPHIL NFR BLD AUTO: 2.3 % (ref 0.3–6.2)
EPI CELLS #/AREA URNS HPF: NORMAL /HPF
ERYTHROCYTE [DISTWIDTH] IN BLOOD BY AUTOMATED COUNT: 12.1 % (ref 12.3–15.4)
GLOBULIN SER CALC-MCNC: 3 GM/DL
GLUCOSE SERPL-MCNC: 143 MG/DL (ref 65–99)
GLUCOSE UR QL STRIP: ABNORMAL
HBA1C MFR BLD: 6.5 % (ref 4.8–5.6)
HCT VFR BLD AUTO: 33.9 % (ref 34–46.6)
HDLC SERPL-MCNC: 42 MG/DL (ref 40–60)
HGB BLD-MCNC: 11.3 G/DL (ref 12–15.9)
HGB UR QL STRIP: NEGATIVE
IMM GRANULOCYTES # BLD AUTO: 0.05 10*3/MM3 (ref 0–0.05)
IMM GRANULOCYTES NFR BLD AUTO: 0.8 % (ref 0–0.5)
KETONES UR QL STRIP: ABNORMAL
LDLC SERPL CALC-MCNC: 54 MG/DL (ref 0–100)
LEUKOCYTE ESTERASE UR QL STRIP: NEGATIVE
LYMPHOCYTES # BLD AUTO: 1.33 10*3/MM3 (ref 0.7–3.1)
LYMPHOCYTES NFR BLD AUTO: 21.8 % (ref 19.6–45.3)
MCH RBC QN AUTO: 31.3 PG (ref 26.6–33)
MCHC RBC AUTO-ENTMCNC: 33.3 G/DL (ref 31.5–35.7)
MCV RBC AUTO: 93.9 FL (ref 79–97)
MONOCYTES # BLD AUTO: 0.53 10*3/MM3 (ref 0.1–0.9)
MONOCYTES NFR BLD AUTO: 8.7 % (ref 5–12)
NEUTROPHILS # BLD AUTO: 4.02 10*3/MM3 (ref 1.7–7)
NEUTROPHILS NFR BLD AUTO: 66.1 % (ref 42.7–76)
NITRITE UR QL STRIP: NEGATIVE
NRBC BLD AUTO-RTO: 0.2 /100 WBC (ref 0–0.2)
PH UR STRIP: 6 [PH] (ref 5–8)
PLATELET # BLD AUTO: 178 10*3/MM3 (ref 140–450)
POTASSIUM SERPL-SCNC: 3.3 MMOL/L (ref 3.5–5.2)
PROT SERPL-MCNC: 7.6 G/DL (ref 6–8.5)
PROT UR QL STRIP: ABNORMAL
RBC # BLD AUTO: 3.61 10*6/MM3 (ref 3.77–5.28)
RBC #/AREA URNS HPF: NORMAL /HPF
SODIUM SERPL-SCNC: 143 MMOL/L (ref 136–145)
SP GR UR STRIP: 1.02 (ref 1–1.03)
T4 FREE SERPL-MCNC: 1.08 NG/DL (ref 0.93–1.7)
TRIGL SERPL-MCNC: 89 MG/DL (ref 0–150)
TSH SERPL DL<=0.005 MIU/L-ACNC: 3.34 UIU/ML (ref 0.27–4.2)
UROBILINOGEN UR STRIP-MCNC: ABNORMAL MG/DL
VLDLC SERPL CALC-MCNC: 17 MG/DL (ref 5–40)
WBC # BLD AUTO: 6.09 10*3/MM3 (ref 3.4–10.8)
WBC #/AREA URNS HPF: NORMAL /HPF

## 2023-09-13 NOTE — PROGRESS NOTES
I N T E R N A L  M E D I C I N E  NIKIA LOZANO, OLIVA      ENCOUNTER DATE:  09/13/2023    Renee PARIKH From / 78 y.o. / female        MEDICARE ANNUAL WELLNESS VISIT       Chief Complaint: Medicare Wellness-subsequent       Patient's general assessment of her health since a year ago:     - Compared to one year ago, she feels her physical health is about the same without significant change.    - Compared to one year ago, she feels her mental health is about the same without significant change.      HPI for other active medical problems:     HTN: Today's BP is 128/72 on amlodipine 5 mg daily, lisinopril 20 mg daily, metoprolol tartrate 50 mg BID.  Followed by cardiology, Dr. Espinosa for stress-inducted cardiomyopathy, HR with receovered EF.  Checks BP at home occasionally and it averages 120s/70s.  Remains without chest pain, shortness of breath, palpitations, lower extremity swelling.    Type 2 Diabetes: May 2023 A1C 7.3.  Remains on Farxiga 5 mg daily, Januvia 100 mg daily, pioglitazone 15 mg daily without side effects.  FBS average 120s.  No episodes of hypoglycemia.  Up to date on diabetic vision exam.  Followed by podiatry for diabetic foot exam.    HLD: Remains on Rosuvastatin 10 mg daily.  December 2022 Lipid panel with triglycerides 53; LDL 57.    Ulcerative colitis: Followed by GI, Dr. Burleson/ [facility].  Remains on mesalamine 4.8 g daily.  She declined to undergo colonoscopy and instead opted for flexible sigmoidoscopy in November 2022 with showed external hemorrhoids, moderately severe inflammation from anus to sigmoid colon.  There was moderate to marked chronic active colitis with ulceration of the rectum.  She is now considering updating colonoscopy near the beginning of 2024.      GERD: Symptoms well controlled on pantoprazole 40 mg daily.  Denies dysphagia or early satiety.    November 2020 DEXA showed osteopenia.  She is aware of recommendation to take Vitamin D/ Calcium supplementation.  She  declines to update at this time.      November 2021 mammogram was normal; screening in 1 year recommended.  She is agreeable to update.        HISTORY       Recent Hospitalizations:    Recent hospitalization?: NO    If YES, location, date, and diagnoses:     Location:   Date:   Principle Discharge Dx:   Secondary Dx:       Patient Care Team:    Patient Care Team:  Esequiel Pacheco MD as PCP - General  Ministerio Wells MD as Consulting Physician (Orthopedic Surgery)  Abhay Wooten MD (Inactive) as Consulting Physician (Cardiology)  Cisco Husain MD as Consulting Physician (Otolaryngology)  EZIO Garibay MD as Consulting Physician (Ophthalmology)  Melissa Burleson MD as Consulting Physician (Gastroenterology)      Allergies:  Tetanus toxoids    Medications:  Current Outpatient Medications on File Prior to Visit   Medication Sig Dispense Refill    amLODIPine (NORVASC) 5 MG tablet TAKE 1 TABLET BY MOUTH DAILY 90 tablet 3    Calcium Carb-Cholecalciferol (OYSCO 500 + D) 500-5 MG-MCG tablet per tablet Take 1 tablet by mouth Daily. 90 tablet 3    dapagliflozin (Farxiga) 5 MG tablet tablet Take 1 tablet by mouth Every Morning Before Breakfast. 30 tablet 5    dapagliflozin (Farxiga) 5 MG tablet tablet Take 1 tablet by mouth Every Morning Before Breakfast. 30 tablet 5    dorzolamide-timolol (COSOPT) 22.3-6.8 MG/ML ophthalmic solution Apply 1 drop to eye(s) as directed by provider 2 (Two) Times a Day.      ferrous sulfate 325 (65 FE) MG tablet Take 1 tablet by mouth Daily With Breakfast. (Patient taking differently: Take 1 tablet by mouth Daily With Breakfast. Every otherday) 30 tablet 0    glucose blood (Accu-Chek Saira Plus) test strip Test FBG once every dayE11.59/ DM2 with circularly disorder 100 each 11    hydrocortisone (Cortenema) 100 MG/60ML enema Insert 1 enema into the rectum Every Night. 30 enema 0    Hydrocortisone, Perianal, (ANUSOL-HC) 2.5 % rectal cream Insert  into the rectum At Night As Needed for  Hemorrhoids. 28 g 1    Januvia 100 MG tablet TAKE 1 TABLET BY MOUTH DAILY WITH BREAKFAST 90 tablet 3    lisinopril (PRINIVIL,ZESTRIL) 20 MG tablet Take 1 tablet by mouth Daily. 90 tablet 1    mesalamine (LIALDA) 1.2 g EC tablet Take 4 tablets by mouth Daily With Breakfast. 360 tablet 1    metoprolol tartrate (LOPRESSOR) 50 MG tablet TAKE 1 TABLET BY MOUTH EVERY 12 HOURS 180 tablet 2    pantoprazole (PROTONIX) 40 MG EC tablet Take 1 tablet by mouth Every Morning Before Breakfast. 90 tablet 3    pioglitazone (ACTOS) 15 MG tablet TAKE 1 TABLET BY MOUTH DAILY WITH BREAKFAST 30 tablet 11    rosuvastatin (CRESTOR) 10 MG tablet Take 1 tablet by mouth Daily. 90 tablet 3     No current facility-administered medications on file prior to visit.        PFSH:     The following portions of the patient's history were reviewed and updated as appropriate: Allergies / Current Medications / Past Medical History / Surgical History / Social History / Family History    Problem List:  Patient Active Problem List   Diagnosis    Type 2 diabetes mellitus with circulatory disorder    Osteopenia    Hypertension    Hyperlipidemia    Colon polyp    Gastroesophageal reflux disease    Glaucoma    Coronary artery disease involving native coronary artery of native heart without angina pectoris    Ulcerative colitis with rectal bleeding    Iron deficiency anemia secondary to inadequate dietary iron intake    Iron deficiency anemia due to chronic blood loss    Ulcerative rectosigmoiditis with rectal bleeding    Rectal bleeding       Past Medical History:  Past Medical History:   Diagnosis Date    Allergic rhinitis     Broken heart syndrome     Colitis     Colon polyps     Diabetes mellitus     type 2    Dizziness     Encounter for breast cancer screening other than mammogram     GERD (gastroesophageal reflux disease)     GI (gastrointestinal bleed)     Glaucoma     Herpes zoster without complication 3/2/2022    History of transfusion      Hyperlipidemia     Hypertension     Iron deficiency anemia due to chronic blood loss     Knee fracture, left     Non-STEMI (non-ST elevated myocardial infarction) 06/16/2017    Osteopenia     Shingles     Ulcerative colitis        Past Surgical History:  Past Surgical History:   Procedure Laterality Date    CARDIAC CATHETERIZATION N/A 06/16/2017    Procedure: Left Heart Cath;  Surgeon: Abhay Wooten MD;  Location: [facility] CATH INVASIVE LOCATION;  Service:     CARDIAC CATHETERIZATION N/A 06/16/2017    Procedure: Left ventriculography;  Surgeon: Abhay Wooten MD;  Location: [facility] CATH INVASIVE LOCATION;  Service:     CARDIAC CATHETERIZATION N/A 06/16/2017    Procedure: Coronary angiography;  Surgeon: Abhay Wooten MD;  Location: [facility] CATH INVASIVE LOCATION;  Service:     CATARACT EXTRACTION      COLONOSCOPY  08/14/2018    ENDOSCOPY N/A 02/10/2020    Procedure: ESOPHAGOGASTRODUODENOSCOPY;  Surgeon: Melissa Burleson MD;  Location: [facility] ENDOSCOPY;  Service: Gastroenterology;  Laterality: N/A;  PRE- IRON DEFICIENCY ANEMIA  POST--SCHATZKY'S RING, GASTRITIS,DUODENITIS    EYE SURGERY      cataract surgery    PAP SMEAR      SIGMOIDOSCOPY N/A 10/21/2019    EH, active ulcerative colitis, severe inflammation in rectum, TA    SIGMOIDOSCOPY N/A 11/21/2022    Procedure: SIGMOIDOSCOPY FLEXIBLE to transverse colon with cold biopsies;  Surgeon: Melissa Burleson MD;  Location: [facility] ENDOSCOPY;  Service: Gastroenterology;  Laterality: N/A;  pre: h/o ulcerative colitis and rectal bleeding  post: colitis, hemorrhoids       Social History:  Social History     Socioeconomic History    Marital status:      Spouse name: Eliazar*    Number of children: 1   Tobacco Use    Smoking status: Never    Smokeless tobacco: Never   Vaping Use    Vaping Use: Never used   Substance and Sexual Activity    Alcohol use: No    Drug use: No    Sexual activity: Not Currently     Partners: Male       Family History:  Family History   Problem Relation Age of Onset  "   Heart disease Mother     Hypertension Mother     Diabetes Mother     Heart disease Father     Hypertension Father     Heart disease Sister     Heart disease Brother     No Known Problems Maternal Grandmother     No Known Problems Maternal Grandfather     No Known Problems Paternal Grandmother     No Known Problems Paternal Grandfather     Crohn's disease Niece     Colon cancer Neg Hx     Breast cancer Neg Hx     Dementia Neg Hx          PATIENT ASSESSMENT     Vitals:  Vitals:    09/13/23 0756   BP: 128/72   Pulse: (!) 49   Temp: 97.6 °F (36.4 °C)   SpO2: 99%   Weight: 58.5 kg (129 lb)   Height: 157.5 cm (62.01\")       BP Readings from Last 3 Encounters:   09/13/23 128/72   08/28/23 124/73   06/08/23 148/82     Wt Readings from Last 3 Encounters:   09/13/23 58.5 kg (129 lb)   08/28/23 58.6 kg (129 lb 3.2 oz)   06/08/23 56.7 kg (125 lb)      Body mass index is 23.59 kg/m².    [unfilled]        Review of Systems:    Review of Systems   Constitutional:  Negative for chills, fever and unexpected weight change.   Respiratory:  Negative for cough, chest tightness and shortness of breath.    Cardiovascular:  Negative for chest pain, palpitations and leg swelling.   Neurological:  Negative for dizziness, weakness, light-headedness and headaches.   Psychiatric/Behavioral:  The patient is not nervous/anxious.        Physical Exam:    Physical Exam  Vitals reviewed.   Constitutional:       General: She is not in acute distress.     Appearance: Normal appearance. She is not ill-appearing, toxic-appearing or diaphoretic.   HENT:      Head: Normocephalic and atraumatic.      Right Ear: Tympanic membrane, ear canal and external ear normal. There is no impacted cerumen.      Left Ear: Tympanic membrane, ear canal and external ear normal. There is no impacted cerumen.      Nose: Nose normal. No congestion or rhinorrhea.      Mouth/Throat:      Mouth: Mucous membranes are moist.      Pharynx: Oropharynx is clear. No oropharyngeal " exudate or posterior oropharyngeal erythema.   Eyes:      Extraocular Movements: Extraocular movements intact.      Conjunctiva/sclera: Conjunctivae normal.      Pupils: Pupils are equal, round, and reactive to light.   Cardiovascular:      Rate and Rhythm: Normal rate and regular rhythm.      Heart sounds: Normal heart sounds.   Pulmonary:      Effort: Pulmonary effort is normal. No respiratory distress.      Breath sounds: Normal breath sounds.   Abdominal:      General: Bowel sounds are normal.      Palpations: Abdomen is soft.      Tenderness: There is no abdominal tenderness.   Musculoskeletal:         General: Normal range of motion.      Cervical back: Normal range of motion and neck supple.      Right lower leg: No edema.      Left lower leg: No edema.   Lymphadenopathy:      Cervical: No cervical adenopathy.   Skin:     General: Skin is warm and dry.   Neurological:      General: No focal deficit present.      Mental Status: She is alert and oriented to person, place, and time. Mental status is at baseline.   Psychiatric:         Mood and Affect: Mood normal.         Behavior: Behavior normal.         Thought Content: Thought content normal.         Judgment: Judgment normal.         Reviewed Data:    Labs:   Lab Results   Component Value Date     08/28/2023    K 3.5 08/28/2023    CALCIUM 9.1 08/28/2023    AST 14 08/28/2023    ALT 8 08/28/2023    BUN 21 08/28/2023    CREATININE 1.11 (H) 08/28/2023    CREATININE 1.01 (H) 06/16/2023    CREATININE 1.18 (H) 01/12/2023    EGFRIFNONA 63 01/04/2022    EGFRIFAFRI 72 01/04/2022       Lab Results   Component Value Date    HGBA1C 7.30 (H) 05/10/2023    HGBA1C 7.20 (H) 12/09/2022    HGBA1C 6.9 06/08/2022    MICROALBUR 532.6 05/10/2023       Lab Results   Component Value Date    LDL 57 12/09/2022    LDL 49 02/02/2022    LDL 51 02/24/2021    HDL 41 12/09/2022    TRIG 53 12/09/2022    CHOLHDLRATIO 2.68 12/09/2022       Lab Results   Component Value Date    TSH 2.910  07/26/2021    FREET4 1.19 07/26/2021          Lab Results   Component Value Date    WBC 5.82 08/28/2023    HGB 10.7 (L) 08/28/2023    HGB 11.4 (L) 01/12/2023    HGB 10.8 (L) 01/03/2023     08/28/2023                 No results found for: PSA    Imaging:          Medical Tests:          Screening for Glaucoma:  Previous screening for glaucoma?: Yes      Hearing Loss Screen:  Finger Rub Hearing Test (right ear): passed  Finger Rub Hearing Test (left ear): passed      Urinary Incontinence Screen:  Episodes of urinary incontinence? : No      Depression Screen:      9/13/2023     7:55 AM   PHQ-2/PHQ-9 Depression Screening   Little Interest or Pleasure in Doing Things 0-->not at all   Feeling Down, Depressed or Hopeless 0-->not at all   PHQ-9: Brief Depression Severity Measure Score 0        PHQ-2: 0 (Not depressed)    PHQ-9: 0 (Negative screening for depression)       FUNCTIONAL, FALL RISK, & COGNITIVE SCREENING (Components below):    DATA:        9/13/2023     7:56 AM   Functional & Cognitive Status   Do you have difficulty preparing food and eating? No   Do you have difficulty bathing yourself, getting dressed or grooming yourself? No   Do you have difficulty using the toilet? No   Do you have difficulty moving around from place to place? No   Do you have trouble with steps or getting out of a bed or a chair? No   Current Diet Well Balanced Diet   Dental Exam Up to date   Eye Exam Up to date   Exercise (times per week) 0 times per week   Current Exercises Include No Regular Exercise   Do you need help using the phone?  No   Are you deaf or do you have serious difficulty hearing?  No   Do you need help to go to places out of walking distance? No   Do you need help shopping? No   Do you need help preparing meals?  No   Do you need help with housework?  No   Do you need help with laundry? No   Do you need help taking your medications? No   Do you need help managing money? No   Do you ever drive or ride in a car  without wearing a seat belt? No   Do you have difficulty concentrating, remembering or making decisions? No         A) Assessment of Functional Ability:  (Assessment of ability to perform ADL's (showering/bathing, using toilet, dressing, feeding self, moving self around) and IADL's (use telephone, shop, prepare food, housekeep, do laundry, transport independently, take medications independently, and handle finances)    Degree of functional impairment: NONE (based on assessment noted above)      B) Assessment of Fall Risk:  Fall Risk Assessment was completed, and patient is at low risk for falls.       Need for further evaluation of gait, strength, and balance? : No    Timed Up and Go (TUG):   (>= 12 seconds indicates high risk for falling)    Observable abnormalities included: Normal gait pattern       C. Assessment of Cognitive Function:    Mini-Cog Test:     1) Registration (3 objects): Yes   2) Number of objects recalled: 3   3) Clock Draw: Passed? : N/A       Further evaluation required? : No        COUNSELING       A. Identification of Health Risk Factors:    Risk factors include: cardiovascular risk factors      B. Age-Appropriate Screening Schedule:  (Refer to the list below for future screening recommendations based on patient's age, sex and/or medical conditions. Orders for these recommended tests are listed in the plan section. The patient has been provided with a written plan)    Health Maintenance Topics  Health Maintenance   Topic Date Due    COVID-19 Vaccine (5 - Pfizer series) 09/15/2023 (Originally 8/3/2022)    COLORECTAL CANCER SCREENING  11/15/2023 (Originally 1945)    DXA SCAN  09/13/2024 (Originally 11/2/2022)    INFLUENZA VACCINE  10/01/2023    HEMOGLOBIN A1C  11/10/2023    LIPID PANEL  12/09/2023    URINE MICROALBUMIN  05/10/2024    DIABETIC EYE EXAM  07/18/2024    ANNUAL WELLNESS VISIT  09/13/2024    HEPATITIS C SCREENING  Completed    Pneumococcal Vaccine 65+  Completed    ZOSTER VACCINE   Completed       Health Maintenance Topics Due or Over-Due  There are no preventive care reminders to display for this patient.        C. Advanced Care Planning:    Advance Care Planning   ACP discussion was held with the patient during this visit. Patient does not have an advance directive, information provided.       D. Patient Self-Management and Personalized Health Advice:    She has been provided with personalized counseling/information (including brochures/handouts) about:     -- reducing risk for cardiac/vascular events with pre-existing disease and designing advance directives      She has been recommended for the following preventative services which has been performed today, will be ordered today or ordered/performed on upcoming follow-up visit:     -- screening for osteoporosis deferred by patient, BREAST CANCER screening (mammogram ordered and annual clinical breast exam and monthly self exam recommended), **COLORECTAL cancer screening (colonoscopy/Cologuard discussed or ordered), COVID-19 vaccination (and/or booster) recommendations provided, RSV vaccination recommended at pharmacy , DIABETES screening performed (current/reviewed labs/lab ordered)      E. Miscellaneous Items:    -Aspirin use counseling: Does not need ASA (and currently is not on it)    -Discussed BMI with her. The BMI is in the acceptable range    -Reviewed use of high risk medication in the elderly: YES    -Reviewed for potential of harmful drug interactions in the elderly: YES        WRAP UP       Assessment & Plan:    1) MEDICARE ANNUAL WELLNESS VISIT    2) OTHER MEDICAL CONDITIONS ADDRESSED TODAY:            Problem List Items Addressed This Visit          Cardiac and Vasculature    Hypertension (Chronic)    Overview     Continue lisinopril 20 mg, amlodipine 5 mg qd and metoprolol tartrate 50 mg BID.          Relevant Medications    amLODIPine (NORVASC) 5 MG tablet    metoprolol tartrate (LOPRESSOR) 50 MG tablet    lisinopril  (PRINIVIL,ZESTRIL) 20 MG tablet    Other Relevant Orders    CBC & Differential    Comprehensive Metabolic Panel    TSH+Free T4    Urinalysis With Microscopic If Indicated (No Culture) - Urine, Clean Catch    Hyperlipidemia (Chronic)    Overview     Continue rosuvastatin 10 mg daily.          Relevant Medications    rosuvastatin (CRESTOR) 10 MG tablet    Other Relevant Orders    Comprehensive Metabolic Panel    Lipid Panel With / Chol / HDL Ratio       Endocrine and Metabolic    Type 2 diabetes mellitus with circulatory disorder (Chronic)    Overview     **Complications of diabetes: microalbuminuria and coronary artery disease          Relevant Medications    glucose blood (Accu-Chek Saira Plus) test strip    Januvia 100 MG tablet    pioglitazone (ACTOS) 15 MG tablet    dapagliflozin (Farxiga) 5 MG tablet tablet    dapagliflozin (Farxiga) 5 MG tablet tablet     Other Visit Diagnoses       Encounter for annual wellness visit (AWV) in Medicare patient    -  Primary    Encounter for screening mammogram for malignant neoplasm of breast        Relevant Orders    Mammo screening digital tomosynthesis bilateral w CAD                      Orders Placed This Encounter   Procedures    Mammo screening digital tomosynthesis bilateral w CAD    Comprehensive Metabolic Panel    Hemoglobin A1c    Lipid Panel With / Chol / HDL Ratio    TSH+Free T4    Urinalysis With Microscopic If Indicated (No Culture) - Urine, Clean Catch    CBC & Differential       Discussion / Summary:    Pending A1C results, will plan to increase Farxiga 5 mg to 10 mg daily, since she is tolerating well without side effects.    Follow up with GI and cardiology offices as scheduled.    Medications as of TODAY:              Current Outpatient Medications   Medication Sig Dispense Refill    amLODIPine (NORVASC) 5 MG tablet TAKE 1 TABLET BY MOUTH DAILY 90 tablet 3    Calcium Carb-Cholecalciferol (OYSCO 500 + D) 500-5 MG-MCG tablet per tablet Take 1 tablet by mouth  Daily. 90 tablet 3    dapagliflozin (Farxiga) 5 MG tablet tablet Take 1 tablet by mouth Every Morning Before Breakfast. 30 tablet 5    dapagliflozin (Farxiga) 5 MG tablet tablet Take 1 tablet by mouth Every Morning Before Breakfast. 30 tablet 5    dorzolamide-timolol (COSOPT) 22.3-6.8 MG/ML ophthalmic solution Apply 1 drop to eye(s) as directed by provider 2 (Two) Times a Day.      ferrous sulfate 325 (65 FE) MG tablet Take 1 tablet by mouth Daily With Breakfast. (Patient taking differently: Take 1 tablet by mouth Daily With Breakfast. Every otherday) 30 tablet 0    glucose blood (Accu-Chek Saira Plus) test strip Test FBG once every dayE11.59/ DM2 with circularly disorder 100 each 11    hydrocortisone (Cortenema) 100 MG/60ML enema Insert 1 enema into the rectum Every Night. 30 enema 0    Hydrocortisone, Perianal, (ANUSOL-HC) 2.5 % rectal cream Insert  into the rectum At Night As Needed for Hemorrhoids. 28 g 1    Januvia 100 MG tablet TAKE 1 TABLET BY MOUTH DAILY WITH BREAKFAST 90 tablet 3    lisinopril (PRINIVIL,ZESTRIL) 20 MG tablet Take 1 tablet by mouth Daily. 90 tablet 1    mesalamine (LIALDA) 1.2 g EC tablet Take 4 tablets by mouth Daily With Breakfast. 360 tablet 1    metoprolol tartrate (LOPRESSOR) 50 MG tablet TAKE 1 TABLET BY MOUTH EVERY 12 HOURS 180 tablet 2    pantoprazole (PROTONIX) 40 MG EC tablet Take 1 tablet by mouth Every Morning Before Breakfast. 90 tablet 3    pioglitazone (ACTOS) 15 MG tablet TAKE 1 TABLET BY MOUTH DAILY WITH BREAKFAST 30 tablet 11    rosuvastatin (CRESTOR) 10 MG tablet Take 1 tablet by mouth Daily. 90 tablet 3     No current facility-administered medications for this visit.         FOLLOW-UP:            Return for Next scheduled follow up.                 Future Appointments   Date Time Provider Department Center   11/16/2023 11:00 AM Esequiel Pacheco MD [facility]   6/13/2024  2:30 PM Darling Espinosa MD [facility]           After Visit Summary (AVS) including the  Personalized Prevention  Plan Services (PPPS) was either printed and given to the patient at check-out today and/or sent to [facility] for review.       [unfilled]

## 2023-09-22 NOTE — PROGRESS NOTES
Patient did not check [employer] message.  Please call and give her a message:    Your hemoglobin is basically stable when looking back a year. Slight decrease from 8 months ago. Let's just keep an eye on it for now.

## 2023-09-26 ENCOUNTER — TELEPHONE (OUTPATIENT)
Dept: GASTROENTEROLOGY | Facility: CLINIC | Age: 78
End: 2023-09-26
Payer: MEDICARE

## 2023-09-26 NOTE — TELEPHONE ENCOUNTER
----- Message from Audelia King PA-C sent at 9/22/2023  4:36 PM EDT -----  Patient did not check [employer] message.  Please call and give her a message:    Your hemoglobin is basically stable when looking back a year. Slight decrease from 8 months ago. Let's just keep an eye on it for now.

## 2023-09-27 DIAGNOSIS — E87.6 HYPOKALEMIA: Primary | ICD-10-CM

## 2023-09-27 LAB
BUN SERPL-MCNC: 19 MG/DL (ref 8–23)
BUN/CREAT SERPL: 19.4 (ref 7–25)
CALCIUM SERPL-MCNC: 9.8 MG/DL (ref 8.6–10.5)
CHLORIDE SERPL-SCNC: 106 MMOL/L (ref 98–107)
CO2 SERPL-SCNC: 25 MMOL/L (ref 22–29)
CREAT SERPL-MCNC: 0.98 MG/DL (ref 0.57–1)
EGFRCR SERPLBLD CKD-EPI 2021: 58.8 ML/MIN/1.73
GLUCOSE SERPL-MCNC: 139 MG/DL (ref 65–99)
POTASSIUM SERPL-SCNC: 3.5 MMOL/L (ref 3.5–5.2)
SODIUM SERPL-SCNC: 144 MMOL/L (ref 136–145)

## 2023-10-06 ENCOUNTER — APPOINTMENT (OUTPATIENT)
Dept: WOMENS IMAGING | Facility: HOSPITAL | Age: 78
End: 2023-10-06
Payer: MEDICARE

## 2023-10-06 PROCEDURE — 77063 BREAST TOMOSYNTHESIS BI: CPT | Performed by: RADIOLOGY

## 2023-10-06 PROCEDURE — 77067 SCR MAMMO BI INCL CAD: CPT | Performed by: RADIOLOGY

## 2023-10-09 DIAGNOSIS — I25.10 CORONARY ARTERY DISEASE INVOLVING NATIVE CORONARY ARTERY OF NATIVE HEART WITHOUT ANGINA PECTORIS: ICD-10-CM

## 2023-10-09 DIAGNOSIS — E78.2 MIXED HYPERLIPIDEMIA: ICD-10-CM

## 2023-10-09 RX ORDER — ROSUVASTATIN CALCIUM 10 MG/1
10 TABLET, COATED ORAL DAILY
Qty: 90 TABLET | Refills: 1 | Status: SHIPPED | OUTPATIENT
Start: 2023-10-09

## 2023-11-16 ENCOUNTER — OFFICE VISIT (OUTPATIENT)
Dept: INTERNAL MEDICINE | Age: 78
End: 2023-11-16
Payer: MEDICARE

## 2023-11-16 VITALS
WEIGHT: 130 LBS | HEIGHT: 62 IN | DIASTOLIC BLOOD PRESSURE: 86 MMHG | HEART RATE: 50 BPM | BODY MASS INDEX: 23.92 KG/M2 | OXYGEN SATURATION: 99 % | SYSTOLIC BLOOD PRESSURE: 162 MMHG | TEMPERATURE: 97.3 F

## 2023-11-16 DIAGNOSIS — E11.59 TYPE 2 DIABETES MELLITUS WITH OTHER CIRCULATORY COMPLICATION, WITHOUT LONG-TERM CURRENT USE OF INSULIN: Chronic | ICD-10-CM

## 2023-11-16 DIAGNOSIS — E11.29 TYPE 2 DIABETES MELLITUS WITH MICROALBUMINURIA, WITHOUT LONG-TERM CURRENT USE OF INSULIN: Primary | Chronic | ICD-10-CM

## 2023-11-16 DIAGNOSIS — E78.2 MIXED HYPERLIPIDEMIA: Chronic | ICD-10-CM

## 2023-11-16 DIAGNOSIS — I10 PRIMARY HYPERTENSION: Chronic | ICD-10-CM

## 2023-11-16 DIAGNOSIS — R80.9 TYPE 2 DIABETES MELLITUS WITH MICROALBUMINURIA, WITHOUT LONG-TERM CURRENT USE OF INSULIN: Primary | Chronic | ICD-10-CM

## 2023-11-16 DIAGNOSIS — I25.10 CORONARY ARTERY DISEASE INVOLVING NATIVE CORONARY ARTERY OF NATIVE HEART WITHOUT ANGINA PECTORIS: Chronic | ICD-10-CM

## 2023-11-16 PROCEDURE — 90662 IIV NO PRSV INCREASED AG IM: CPT | Performed by: INTERNAL MEDICINE

## 2023-11-16 PROCEDURE — 99214 OFFICE O/P EST MOD 30 MIN: CPT | Performed by: INTERNAL MEDICINE

## 2023-11-16 PROCEDURE — G0008 ADMIN INFLUENZA VIRUS VAC: HCPCS | Performed by: INTERNAL MEDICINE

## 2023-11-16 PROCEDURE — 3079F DIAST BP 80-89 MM HG: CPT | Performed by: INTERNAL MEDICINE

## 2023-11-16 PROCEDURE — 3077F SYST BP >= 140 MM HG: CPT | Performed by: INTERNAL MEDICINE

## 2023-11-16 RX ORDER — DAPAGLIFLOZIN 10 MG/1
10 TABLET, FILM COATED ORAL
Qty: 90 TABLET | Refills: 1 | Status: SHIPPED | OUTPATIENT
Start: 2023-11-16

## 2023-11-16 NOTE — ASSESSMENT & PLAN NOTE
BP Readings from Last 3 Encounters:   11/16/23 162/86   09/13/23 128/72   08/28/23 124/73      Blood pressure is high here and home.   Will increase Farxiga to 10 mg for diabetes, renal protection, HF benefit.   Should lower blood pressure as well.   MyChart BP link for 2 weeks.    Follow-up 3 months.

## 2023-11-16 NOTE — LETTER
[facility]  Vaccine Consent Form    Patient Name:  Renee PARIKH From  Patient :  1945     Vaccine(s) Ordered    Fluzone High-Dose 65+yrs (2325-0291)        Screening Checklist  The following questions should be completed prior to vaccination. If you answer “yes” to any question, it does not necessarily mean you should not be vaccinated. It just means we may need to clarify or ask more questions. If a question is unclear, please ask your healthcare provider to explain it.    Yes No   Any fever or moderate to severe illness today (mild illness and/or antibiotic treatment are not contraindications)?     Do you have a history of a serious reaction to any previous vaccinations, such as anaphylaxis, encephalopathy within 7 days, Guillain-Slaughter syndrome within 6 weeks, seizure?     Have you received any live vaccine(s) in the past month (MMR, FIOR)?     Do you have an anaphylactic allergy to latex (DTaP, DTaP-IPV, Hep A, Hep B, MenB, RV, Td, Tdap), baker’s yeast (Hep B, HPV), or gelatin (FIOR, MMR)?     Do you have an anaphylactic allergy to neomycin (Rabies, FIOR, MMR, IPV, Hep A), polymyxin B (IPV), or streptomycin (IPV)?      Any cancer, leukemia, AIDS, or other immune system disorder? (FIOR, MMR, RV)     Do you have a parent, brother, or sister with an immune system problem (if immune competence of vaccine recipient clinically verified, can proceed)? (MMR, FIOR)     Any recent steroid treatments for >2 weeks, chemotherapy, or radiation treatment? (FIOR, MMR)     Have you received antibody-containing blood transfusions or IVIG in the past 11 months (recommended interval is dependent on product)? (MMR, FIOR)     Have you taken antiviral drugs (acyclovir, famciclovir, valacyclovir) in the last 24 or 48 hours, respectively (FIOR)?      Are you pregnant or planning to become pregnant within 1 month? (FIOR, MMR, HPV, IPV, MenB; For hep B- refer to Engerix-B)     For infants, have you ever been told your child has had  intussusception or a medical emergency involving obstruction of the intestine (RV)? If not for an infant, can skip this question.         *Ordering Physician/APC should be consulted if “yes” is checked by the patient or guardian above.      I have received, read, and understand the Vaccine Information Statement (VIS) for each vaccine ordered above.  I have considered my health status as well as the health status of my close contacts.  I have taken the opportunity to discuss my vaccine questions with my health care provider.   I have requested that the ordered vaccine(s) be given to me.  I understand the benefits and risks of the vaccines.  I understand that I should remain in the clinic for 15 minutes after receiving the vaccine(s).  _________________________________________________________  Signature of Patient or Parent/Legal Guardian ____________________  Date

## 2023-11-16 NOTE — ASSESSMENT & PLAN NOTE
Lab Results   Component Value Date    HGBA1C 6.50 (H) 09/13/2023    HGBA1C 7.30 (H) 05/10/2023    HGBA1C 7.20 (H) 12/09/2022    CREATININE 0.98 09/27/2023    LDL 54 09/13/2023    MALBCRERATIO 743 (H) 05/10/2023      Lab Results   Component Value Date    CREATININE 0.98 09/27/2023    CREATININE 1.10 (H) 09/13/2023    CREATININE 1.11 (H) 08/28/2023    BUN 19 09/27/2023    EGFRIFNONA 63 01/04/2022    EGFRIFAFRI 72 01/04/2022    MICROALBUR 532.6 05/10/2023       Blood pressure is also high.   Increase Farxiga to 10 mg qAM.

## 2023-11-16 NOTE — PROGRESS NOTES
I N T E R N A L  M E D I C I N E    J U N O H  K I M,  M D      ENCOUNTER DATE:  11/16/2023    Renee J From / 78 y.o. 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)    Overview     **Complications of diabetes: microalbuminuria and coronary artery disease          Current Assessment & Plan     Lab Results   Component Value Date    HGBA1C 6.50 (H) 09/13/2023    HGBA1C 7.30 (H) 05/10/2023    HGBA1C 7.20 (H) 12/09/2022    CREATININE 0.98 09/27/2023    LDL 54 09/13/2023    MALBCRERATIO 743 (H) 05/10/2023      Lab Results   Component Value Date    CREATININE 0.98 09/27/2023    CREATININE 1.10 (H) 09/13/2023    CREATININE 1.11 (H) 08/28/2023    BUN 19 09/27/2023    EGFRIFNONA 63 01/04/2022    EGFRIFAFRI 72 01/04/2022    MICROALBUR 532.6 05/10/2023       Blood pressure is also high.   Increase Farxiga to 10 mg qAM.          Relevant Medications    glucose blood (Accu-Chek Saira Plus) test strip    Januvia 100 MG tablet    pioglitazone (ACTOS) 15 MG tablet    dapagliflozin Propanediol (Farxiga) 10 MG tablet    Hypertension (Chronic)    Overview     lisinopril 20 mg  amlodipine 5 mg qd   metoprolol tartrate 50 mg BID         Current Assessment & Plan     BP Readings from Last 3 Encounters:   11/16/23 162/86   09/13/23 128/72   08/28/23 124/73      Blood pressure is high here and home.   Will increase Farxiga to 10 mg for diabetes, renal protection, HF benefit.   Should lower blood pressure as well.   [employer] BP link for 2 weeks.    Follow-up 3 months.          Relevant Medications    amLODIPine (NORVASC) 5 MG tablet    metoprolol tartrate (LOPRESSOR) 50 MG tablet    lisinopril (PRINIVIL,ZESTRIL) 20 MG tablet    Other Relevant Orders    MyChart BP Flowsheet       Medium    Hyperlipidemia (Chronic)    Overview     Continue rosuvastatin 10  "mg daily.          Relevant Medications    rosuvastatin (CRESTOR) 10 MG tablet    Coronary artery disease involving native coronary artery of native heart without angina pectoris (Chronic)    Overview     *[facility]    Continue statin, bblocker. Not on ASA due to recent LGIB         Relevant Medications    amLODIPine (NORVASC) 5 MG tablet    metoprolol tartrate (LOPRESSOR) 50 MG tablet    rosuvastatin (CRESTOR) 10 MG tablet     Orders Placed This Encounter   Procedures    [employer] BP Flowsheet    Fluzone High-Dose 65+yrs ([id number])     New Medications Ordered This Visit   Medications    dapagliflozin Propanediol (Farxiga) 10 MG tablet     Sig: Take 10 mg by mouth Every Morning Before Breakfast.     Dispense:  90 tablet     Refill:  1       SUMMARY/DISCUSSION        Next Appointment with me: Visit date not found    Return in about 3 months (around 2/16/2024) for Diabetes, Hypertension, **SCHEDULE COMB AWV/MED FU FOR NEXT YR (OCTOBER) (30 MIN)**.        VITAL SIGNS     Vitals:    11/16/23 1110   BP: 162/86   Pulse: 50   Temp: 97.3 °F (36.3 °C)   SpO2: 99%   Weight: 59 kg (130 lb)   Height: 157.5 cm (62.01\")       BP Readings from Last 3 Encounters:   11/16/23 162/86   09/13/23 128/72   08/28/23 124/73     Wt Readings from Last 3 Encounters:   11/16/23 59 kg (130 lb)   09/13/23 58.5 kg (129 lb)   08/28/23 58.6 kg (129 lb 3.2 oz)     Body mass index is 23.77 kg/m².    Blood pressure readings recorded on patient flowsheet:  [employer] Blood Pressure Flowsheet Systolic Diastolic   1/4/2019   9:00  75   1/2/2019   9:00  70   1/1/2019   9:00  66   1/1/2019  11:00  60   12/21/2018   9:00  64   12/20/2018   8:00  65   12/20/2018  11:00  73   12/20/2018   8:00  73   12/19/2018  10:00  59   12/19/2018  10:00  61       MEDICATIONS AT THE TIME OF OFFICE VISIT     Current Outpatient Medications on File Prior to Visit   Medication Sig    amLODIPine (NORVASC) 5 MG " tablet TAKE 1 TABLET BY MOUTH DAILY    dorzolamide-timolol (COSOPT) 22.3-6.8 MG/ML ophthalmic solution Apply 1 drop to eye(s) as directed by provider 2 (Two) Times a Day.    ferrous sulfate 325 (65 FE) MG tablet Take 1 tablet by mouth Daily With Breakfast. (Patient taking differently: Take 1 tablet by mouth Daily With Breakfast. Every otherday)    glucose blood (Accu-Chek Saira Plus) test strip Test FBG once every dayE11.59/ DM2 with circularly disorder    hydrocortisone (Cortenema) 100 MG/60ML enema Insert 1 enema into the rectum Every Night.    Hydrocortisone, Perianal, (ANUSOL-HC) 2.5 % rectal cream Insert  into the rectum At Night As Needed for Hemorrhoids.    Januvia 100 MG tablet TAKE 1 TABLET BY MOUTH DAILY WITH BREAKFAST    lisinopril (PRINIVIL,ZESTRIL) 20 MG tablet Take 1 tablet by mouth Daily.    mesalamine (LIALDA) 1.2 g EC tablet Take 4 tablets by mouth Daily With Breakfast.    metoprolol tartrate (LOPRESSOR) 50 MG tablet TAKE 1 TABLET BY MOUTH EVERY 12 HOURS    pantoprazole (PROTONIX) 40 MG EC tablet Take 1 tablet by mouth Every Morning Before Breakfast.    pioglitazone (ACTOS) 15 MG tablet TAKE 1 TABLET BY MOUTH DAILY WITH BREAKFAST    rosuvastatin (CRESTOR) 10 MG tablet TAKE 1 TABLET BY MOUTH DAILY    dapagliflozin (Farxiga) 5 MG tablet tablet Take 1 tablet by mouth Every Morning Before Breakfast.    Calcium Carb-Cholecalciferol (OYSCO 500 + D) 500-5 MG-MCG tablet per tablet Take 1 tablet by mouth Daily. (Patient not taking: Reported on 11/16/2023)     No current facility-administered medications on file prior to visit.         HISTORY OF PRESENT ILLNESS     Patient denies any acute changes or concerns. Diabetes is currently managed on Januvia, pioglitazone, and Farxiga 5 mg daily which was added in 09/2023 with evidence of microalbuminuria and uncontrolled hypertension. Denies urinary symptoms since starting Farxiga. A1c in 09/2023 was 6.5 percent. Denies hypoglycemia symptoms.     Her blood pressure  today is 168/60 mmHg. Managed on lisinopril, amlodipine, and metoprolol. She monitors her blood pressure at home occasionally. On 11/15/2023, her reading was 150/70's mmHg. She has mild lower extremity swelling after sitting for prolonged periods without elevation. Patient follows with cardiology yearly for congestive heart failure. Her next appointment is in 02/2024.    Hyperlipidemia is stable on rosuvastatin 10 mg daily.    Patient underwent a flexible sigmoidoscopy in 11/2023 with Dr. Burleson. Has a history of ulcerative colitis.     Lab Results   Component Value Date    HGBA1C 6.50 (H) 09/13/2023    HGBA1C 7.30 (H) 05/10/2023    HGBA1C 7.20 (H) 12/09/2022    CREATININE 0.98 09/27/2023    LDL 54 09/13/2023    MALBCRERATIO 743 (H) 05/10/2023     Blood sugar readings recorded on patient's flowsheet:       No data to display               59 kg (130 lb)    REVIEW OF SYSTEMS           PHYSICAL EXAMINATION     Physical Exam  Alert with normal thought and judgment.   Cardiovascular: Normal rate, regular rhythm.  Pulm/Chest: Effort normal, breath sounds normal.  Trace bilateral lower extremities edema       REVIEWED DATA     Labs:       Lab Results   Component Value Date     09/27/2023    K 3.5 09/27/2023    CALCIUM 9.8 09/27/2023    AST 7 09/13/2023    ALT 7 09/13/2023    BUN 19 09/27/2023    CREATININE 0.98 09/27/2023    CREATININE 1.10 (H) 09/13/2023    CREATININE 1.11 (H) 08/28/2023    EGFRIFNONA 63 01/04/2022    EGFRIFAFRI 72 01/04/2022       Lab Results   Component Value Date    HGBA1C 6.50 (H) 09/13/2023    HGBA1C 7.30 (H) 05/10/2023    HGBA1C 7.20 (H) 12/09/2022       Lab Results   Component Value Date    LDL 54 09/13/2023    LDL 57 12/09/2022    LDL 49 02/02/2022    HDL 42 09/13/2023    HDL 41 12/09/2022    TRIG 89 09/13/2023    TRIG 53 12/09/2022       Lab Results   Component Value Date    TSH 3.340 09/13/2023    TSH 2.910 07/26/2021    FREET4 1.08 09/13/2023    FREET4 1.19 07/26/2021       Lab Results    Component Value Date    WBC 6.09 09/13/2023    HGB 11.3 (L) 09/13/2023     09/13/2023       Lab Results   Component Value Date    MALBCRERATIO 743 (H) 05/10/2023            Imaging:           Medical Tests:           Summary of old records / correspondence / consultant report:           Request outside records:     Transcribed from ambient dictation for Esequiel Pacheco MD by Mariola Doshi.  11/16/23   12:24 EST    Patient or patient representative verbalized consent to the visit recording.  I have personally performed the services described in this document as transcribed by the above individual, and it is both accurate and complete.  Esequiel Pacheco MD  11/16/2023  13:59 EST

## 2023-11-30 DIAGNOSIS — E11.9 TYPE 2 DIABETES MELLITUS WITHOUT COMPLICATION, WITHOUT LONG-TERM CURRENT USE OF INSULIN: Chronic | ICD-10-CM

## 2023-11-30 RX ORDER — BLOOD SUGAR DIAGNOSTIC
STRIP MISCELLANEOUS
Qty: 100 EACH | Refills: 2 | Status: SHIPPED | OUTPATIENT
Start: 2023-11-30

## 2024-02-22 ENCOUNTER — OFFICE VISIT (OUTPATIENT)
Dept: INTERNAL MEDICINE | Age: 79
End: 2024-02-22
Payer: MEDICARE

## 2024-02-22 VITALS
WEIGHT: 132 LBS | HEART RATE: 47 BPM | HEIGHT: 62 IN | OXYGEN SATURATION: 98 % | SYSTOLIC BLOOD PRESSURE: 138 MMHG | DIASTOLIC BLOOD PRESSURE: 60 MMHG | TEMPERATURE: 96.9 F | BODY MASS INDEX: 24.29 KG/M2

## 2024-02-22 DIAGNOSIS — I10 PRIMARY HYPERTENSION: Primary | Chronic | ICD-10-CM

## 2024-02-22 DIAGNOSIS — E78.2 MIXED HYPERLIPIDEMIA: Chronic | ICD-10-CM

## 2024-02-22 DIAGNOSIS — R80.9 TYPE 2 DIABETES MELLITUS WITH MICROALBUMINURIA, WITHOUT LONG-TERM CURRENT USE OF INSULIN: Chronic | ICD-10-CM

## 2024-02-22 DIAGNOSIS — E11.29 TYPE 2 DIABETES MELLITUS WITH MICROALBUMINURIA, WITHOUT LONG-TERM CURRENT USE OF INSULIN: Chronic | ICD-10-CM

## 2024-02-22 RX ORDER — DAPAGLIFLOZIN 10 MG/1
10 TABLET, FILM COATED ORAL EVERY MORNING
Qty: 90 TABLET | Refills: 1 | Status: SHIPPED | OUTPATIENT
Start: 2024-02-22

## 2024-02-22 NOTE — PROGRESS NOTES
I N T E R N A L  M E D I C I N E    J U N O H  K I M,  M D      ENCOUNTER DATE:  02/22/2024    Renee PARIKH From / 79 y.o. / female    CHIEF COMPLAINT / REASON FOR OFFICE VISIT     Diabetes and Hypertension      ASSESSMENT & PLAN     Problem List Items Addressed This Visit          High    Type 2 diabetes mellitus with microalbuminuria (Chronic)    Overview     **Complications of diabetes: microalbuminuria and coronary artery disease     Continue:   Januvia 100 mg  Pioglitazone 15 mg  Farxiga 10 mg          Relevant Medications    Januvia 100 MG tablet    pioglitazone (ACTOS) 15 MG tablet    glucose blood (Accu-Chek Saira Plus) test strip    Farxiga 10 MG tablet    Other Relevant Orders    Comprehensive Metabolic Panel    Hemoglobin A1c    Microalbumin / Creatinine Urine Ratio - Urine, Clean Catch    Hypertension - Primary (Chronic)    Overview     Continue:   lisinopril 20 mg  amlodipine 5 mg qd   metoprolol tartrate 50 mg BID         Current Assessment & Plan     Home blood pressure looks good.   Continue current medications.   Re-released [employer] BP flowsheet upon request.     BP Readings from Last 3 Encounters:   02/22/24 138/60   11/16/23 162/86   09/13/23 128/72             Relevant Medications    amLODIPine (NORVASC) 5 MG tablet    metoprolol tartrate (LOPRESSOR) 50 MG tablet    lisinopril (PRINIVIL,ZESTRIL) 20 MG tablet    Other Relevant Orders    [employer] BP Flowsheet    Comprehensive Metabolic Panel       Medium    Hyperlipidemia (Chronic)    Overview     Continue rosuvastatin 10 mg          Relevant Medications    rosuvastatin (CRESTOR) 10 MG tablet    Other Relevant Orders    Lipid Panel With / Chol / HDL Ratio     Orders Placed This Encounter   Procedures    [employer] BP Flowsheet    Comprehensive Metabolic Panel    Hemoglobin A1c    Lipid Panel With / Chol / HDL Ratio    Microalbumin / Creatinine Urine Ratio - Urine, Clean Catch     New Medications Ordered This Visit   Medications    Farxiga 10 MG tablet  "    Sig: Take 10 mg by mouth Every Morning.     Dispense:  90 tablet     Refill:  1     NEEDS NAME BRAND NAME FARXIGA 10 MG. NO NOT SUBSTITUTE TO GENERIC.       SUMMARY/DISCUSSION        Next Appointment with me: Visit date not found    Return in about 4 months (around 6/22/2024) for Reassess chronic medical problems, **SCHEDULE COMBINED AWV AND MEDICAL F/U**.      VITAL SIGNS     Vitals:    02/22/24 1023   BP: 138/60   Pulse: (!) 47   Temp: 96.9 °F (36.1 °C)   SpO2: 98%   Weight: 59.9 kg (132 lb)   Height: 157.5 cm (62.01\")       BP Readings from Last 3 Encounters:   02/22/24 138/60   11/16/23 162/86   09/13/23 128/72     Wt Readings from Last 3 Encounters:   02/22/24 59.9 kg (132 lb)   11/16/23 59 kg (130 lb)   09/13/23 58.5 kg (129 lb)     Body mass index is 24.14 kg/m².    Blood pressure readings recorded on patient flowsheet:  [employer] Blood Pressure Flowsheet Systolic Diastolic   1/4/2019   9:00  75   1/2/2019   9:00  70   1/1/2019   9:00  66   1/1/2019  11:00  60   12/21/2018   9:00  64   12/20/2018   8:00  65   12/20/2018  11:00  73   12/20/2018   8:00  73   12/19/2018  10:00  59   12/19/2018  10:00  61         MEDICATIONS AT THE TIME OF OFFICE VISIT     Current Outpatient Medications on File Prior to Visit   Medication Sig    amLODIPine (NORVASC) 5 MG tablet TAKE 1 TABLET BY MOUTH DAILY    Calcium Carb-Cholecalciferol (OYSCO 500 + D) 500-5 MG-MCG tablet per tablet Take 1 tablet by mouth Daily.    dorzolamide-timolol (COSOPT) 22.3-6.8 MG/ML ophthalmic solution Apply 1 drop to eye(s) as directed by provider 2 (Two) Times a Day.    ferrous sulfate 325 (65 FE) MG tablet Take 1 tablet by mouth Daily With Breakfast. (Patient taking differently: Take 1 tablet by mouth Daily With Breakfast. Every otherday)    glucose blood (Accu-Chek Saira Plus) test strip TEST FASTING BLOOD GLUCOSE ONCE EVERY DAY    hydrocortisone (Cortenema) 100 MG/60ML enema Insert 1 " enema into the rectum Every Night.    Hydrocortisone, Perianal, (ANUSOL-HC) 2.5 % rectal cream Insert  into the rectum At Night As Needed for Hemorrhoids.    Januvia 100 MG tablet TAKE 1 TABLET BY MOUTH DAILY WITH BREAKFAST    lisinopril (PRINIVIL,ZESTRIL) 20 MG tablet Take 1 tablet by mouth Daily.    mesalamine (LIALDA) 1.2 g EC tablet Take 4 tablets by mouth Daily With Breakfast.    metoprolol tartrate (LOPRESSOR) 50 MG tablet TAKE 1 TABLET BY MOUTH EVERY 12 HOURS    pantoprazole (PROTONIX) 40 MG EC tablet Take 1 tablet by mouth Every Morning Before Breakfast.    pioglitazone (ACTOS) 15 MG tablet TAKE 1 TABLET BY MOUTH DAILY WITH BREAKFAST    rosuvastatin (CRESTOR) 10 MG tablet TAKE 1 TABLET BY MOUTH DAILY    [DISCONTINUED] dapagliflozin Propanediol (Farxiga) 10 MG tablet Take 10 mg by mouth Every Morning Before Breakfast.     No current facility-administered medications on file prior to visit.          HISTORY OF PRESENT ILLNESS     Previously increased Farxiga to 10 mg dose. Her insurance would not pay for generic for Farxiga and requests a new prescription for the NAME BRAND. Otherwise blood sugar readings remain stable.   Lab Results   Component Value Date    HGBA1C 6.50 (H) 09/13/2023    HGBA1C 7.30 (H) 05/10/2023    HGBA1C 7.20 (H) 12/09/2022      Hypertension is well controlled at home. Reviewed home blood pressure readings.     On rosuvastatin 10 mg without problems for hyperlipidemia.       Patient Care Team:  Esequiel Pacheco MD as PCP - General  Ministerio Wells MD as Consulting Physician (Orthopedic Surgery)  Abhay Wooten MD (Inactive) as Consulting Physician (Cardiology)  Cisco Husain MD as Consulting Physician (Otolaryngology)  EZIO Garibay MD as Consulting Physician (Ophthalmology)  Melissa Burleson MD as Consulting Physician (Gastroenterology)    REVIEW OF SYSTEMS     Review of Systems   Constitutional neg except per HPI   Resp neg  CV neg     PHYSICAL EXAMINATION     Physical  Exam  General: No acute distress  Psych: Normal thought and judgment   Cardiovascular Rate: normal. Rhythm: regular. Heart sounds: normal   Pulm/Chest: Effort normal, breath sounds normal.   Trace bilateral lower extremities edema       REVIEWED DATA     Labs:     Lab Results   Component Value Date     09/27/2023    K 3.5 09/27/2023    CALCIUM 9.8 09/27/2023    AST 7 09/13/2023    ALT 7 09/13/2023    BUN 19 09/27/2023    CREATININE 0.98 09/27/2023    CREATININE 1.10 (H) 09/13/2023    CREATININE 1.11 (H) 08/28/2023    EGFRIFNONA 63 01/04/2022    EGFRIFAFRI 72 01/04/2022       Lab Results   Component Value Date    HGBA1C 6.50 (H) 09/13/2023    HGBA1C 7.30 (H) 05/10/2023    HGBA1C 7.20 (H) 12/09/2022       Lab Results   Component Value Date    LDL 54 09/13/2023    LDL 57 12/09/2022    LDL 49 02/02/2022    HDL 42 09/13/2023    HDL 41 12/09/2022    TRIG 89 09/13/2023    TRIG 53 12/09/2022       Lab Results   Component Value Date    TSH 3.340 09/13/2023    TSH 2.910 07/26/2021    FREET4 1.08 09/13/2023    FREET4 1.19 07/26/2021       Lab Results   Component Value Date    WBC 6.09 09/13/2023    HGB 11.3 (L) 09/13/2023     09/13/2023       Lab Results   Component Value Date    MALBCRERATIO 743 (H) 05/10/2023            Imaging:           Medical Tests:           Summary of old records / correspondence / consultant report:           Request outside records:

## 2024-02-22 NOTE — ASSESSMENT & PLAN NOTE
Home blood pressure looks good.   Continue current medications.   Re-released [employer] BP flowsheet upon request.     BP Readings from Last 3 Encounters:   02/22/24 138/60   11/16/23 162/86   09/13/23 128/72

## 2024-02-23 LAB
ALBUMIN SERPL-MCNC: 4.4 G/DL (ref 3.5–5.2)
ALBUMIN/CREAT UR: 67 MG/G CREAT (ref 0–29)
ALBUMIN/GLOB SERPL: 1.5 G/DL
ALP SERPL-CCNC: 79 U/L (ref 39–117)
ALT SERPL-CCNC: 12 U/L (ref 1–33)
AST SERPL-CCNC: 14 U/L (ref 1–32)
BILIRUB SERPL-MCNC: 0.4 MG/DL (ref 0–1.2)
BUN SERPL-MCNC: 16 MG/DL (ref 8–23)
BUN/CREAT SERPL: 14 (ref 7–25)
CALCIUM SERPL-MCNC: 9.3 MG/DL (ref 8.6–10.5)
CHLORIDE SERPL-SCNC: 106 MMOL/L (ref 98–107)
CHOLEST SERPL-MCNC: 142 MG/DL (ref 0–200)
CHOLEST/HDLC SERPL: 2.73 {RATIO}
CO2 SERPL-SCNC: 22.2 MMOL/L (ref 22–29)
CREAT SERPL-MCNC: 1.14 MG/DL (ref 0.57–1)
CREAT UR-MCNC: 242.4 MG/DL
EGFRCR SERPLBLD CKD-EPI 2021: 48.8 ML/MIN/1.73
GLOBULIN SER CALC-MCNC: 2.9 GM/DL
GLUCOSE SERPL-MCNC: 141 MG/DL (ref 65–99)
HBA1C MFR BLD: 6.9 % (ref 4.8–5.6)
HDLC SERPL-MCNC: 52 MG/DL (ref 40–60)
LDLC SERPL CALC-MCNC: 69 MG/DL (ref 0–100)
MICROALBUMIN UR-MCNC: 161.5 UG/ML
POTASSIUM SERPL-SCNC: 3.7 MMOL/L (ref 3.5–5.2)
PROT SERPL-MCNC: 7.3 G/DL (ref 6–8.5)
SODIUM SERPL-SCNC: 141 MMOL/L (ref 136–145)
TRIGL SERPL-MCNC: 117 MG/DL (ref 0–150)
VLDLC SERPL CALC-MCNC: 21 MG/DL (ref 5–40)

## 2024-02-28 ENCOUNTER — OFFICE VISIT (OUTPATIENT)
Dept: GASTROENTEROLOGY | Facility: CLINIC | Age: 79
End: 2024-02-28
Payer: MEDICARE

## 2024-02-28 VITALS
HEART RATE: 51 BPM | HEIGHT: 62 IN | DIASTOLIC BLOOD PRESSURE: 74 MMHG | WEIGHT: 132.2 LBS | TEMPERATURE: 96.8 F | BODY MASS INDEX: 24.33 KG/M2 | SYSTOLIC BLOOD PRESSURE: 127 MMHG

## 2024-02-28 DIAGNOSIS — K51.311 ULCERATIVE RECTOSIGMOIDITIS WITH RECTAL BLEEDING: Chronic | ICD-10-CM

## 2024-02-28 DIAGNOSIS — K51.50 ULCERATIVE COLITIS, LEFT SIDED, WITHOUT COMPLICATIONS: Primary | ICD-10-CM

## 2024-02-28 RX ORDER — MESALAMINE 1.2 G/1
4.8 TABLET, DELAYED RELEASE ORAL
Qty: 360 TABLET | Refills: 3 | Status: SHIPPED | OUTPATIENT
Start: 2024-02-28

## 2024-02-28 RX ORDER — PANTOPRAZOLE SODIUM 40 MG/1
40 TABLET, DELAYED RELEASE ORAL
Qty: 90 TABLET | Refills: 3 | Status: SHIPPED | OUTPATIENT
Start: 2024-02-28

## 2024-02-28 NOTE — PROGRESS NOTES
"Chief Complaint  Ulcerative Colitis and Heartburn    Subjective          History of Present Illness    Renee Stevenson is a  79 y.o. female presents for  left-sided ulcerative colitis dx around 2016 and iron deficiency anemia.  She is a patient of Dr. Burleson last seen by me on 8/28/2023.    She takes Lialda 4.8 g daily. It was recommended previously to start biologic therapy but patient declined due to lack of symptoms. She did show moderate to severe active colitis on November 2022 sigmoidoscope despite being on Lialda. She also showed elevated fecal calprotectin at 183 on 5/26/2023.     Today, she reports 2-3 Bms every morning. Denies abdominal pain, blood in stool, or weight loss.     She is on pantoprazole 40 mg daily for GERD. This works well for her.  Denies melena, dyspepsia, reflux, dysphagia.    2/22/2024 CMP with creatinine 1.14, BUN 16, normal LFTs.  9/13/2023 CBC with hemoglobin 11.3.  Normocytic normochromic.    11/2022 flexible sigmoidoscopy showed moderately active colitis from the rectum to 35 cm from anal verge.  She does have a history of tubular adenoma in 2019 and 2018.  Historically has showed inflammation from the sigmoid and rectum.       Objective   Vital Signs:   /74   Pulse 51   Temp 96.8 °F (36 °C)   Ht 157.5 cm (62\")   Wt 60 kg (132 lb 3.2 oz)   BMI 24.18 kg/m²       Physical Exam  Vitals reviewed.   Constitutional:       General: She is awake. She is not in acute distress.     Appearance: Normal appearance. She is well-developed and well-groomed.   HENT:      Head: Normocephalic.   Pulmonary:      Effort: Pulmonary effort is normal. No respiratory distress.   Skin:     Coloration: Skin is not pale.   Neurological:      Mental Status: She is alert and oriented to person, place, and time.      Gait: Gait is intact.   Psychiatric:         Mood and Affect: Mood and affect normal.         Speech: Speech normal.         Behavior: Behavior is cooperative.         Judgment: Judgment " normal.          Result Review :             Assessment and Plan    Diagnoses and all orders for this visit:    1. Ulcerative colitis, left sided, without complications (Primary)    2. Ulcerative rectosigmoiditis with rectal bleeding  -     mesalamine (LIALDA) 1.2 g EC tablet; Take 4 tablets by mouth Daily With Breakfast.  Dispense: 360 tablet; Refill: 3    Other orders  -     pantoprazole (PROTONIX) 40 MG EC tablet; Take 1 tablet by mouth Every Morning Before Breakfast.  Dispense: 90 tablet; Refill: 3    Symptomatically doing well on Lialda 4/day.  We have had extensive conversations in the past regarding recommendations, risk, benefits for biologic therapy but she declines this due to lack of symptoms.    Continue PPI.    We did discuss recommendation for possible repeat colonoscopy this year given her history of tubular adenomas found on 2018 and 2019 colonoscopy and flexible sigmoidoscopy respectively.  She would also be at higher risk for colon cancer given her ongoing inflammation in the colon.  Again we discussed risk first benefits of colonoscopy particularly in response to her age.  She will think about proceeding with colonoscopy and let me know.  Otherwise we can we discussed this next year.    Follow Up   Return in about 1 year (around 2/28/2025).    Dragon dictation used throughout this note.     Audelia King PA-C

## 2024-02-29 DIAGNOSIS — I10 ESSENTIAL HYPERTENSION: Chronic | ICD-10-CM

## 2024-03-01 RX ORDER — LISINOPRIL 20 MG/1
20 TABLET ORAL DAILY
Qty: 90 TABLET | Refills: 1 | Status: SHIPPED | OUTPATIENT
Start: 2024-03-01

## 2024-04-29 DIAGNOSIS — K51.311 ULCERATIVE RECTOSIGMOIDITIS WITH RECTAL BLEEDING: Chronic | ICD-10-CM

## 2024-04-29 RX ORDER — MESALAMINE 1.2 G/1
4.8 TABLET, DELAYED RELEASE ORAL
Qty: 360 TABLET | Refills: 3 | Status: SHIPPED | OUTPATIENT
Start: 2024-04-29

## 2024-05-06 RX ORDER — METOPROLOL TARTRATE 50 MG/1
50 TABLET, FILM COATED ORAL EVERY 12 HOURS
Qty: 180 TABLET | Refills: 2 | Status: SHIPPED | OUTPATIENT
Start: 2024-05-06

## 2024-05-09 DIAGNOSIS — E11.59 TYPE 2 DIABETES MELLITUS WITH OTHER CIRCULATORY COMPLICATION, WITHOUT LONG-TERM CURRENT USE OF INSULIN: ICD-10-CM

## 2024-05-14 DIAGNOSIS — R80.9 TYPE 2 DIABETES MELLITUS WITH MICROALBUMINURIA, WITHOUT LONG-TERM CURRENT USE OF INSULIN: Chronic | ICD-10-CM

## 2024-05-14 DIAGNOSIS — E11.29 TYPE 2 DIABETES MELLITUS WITH MICROALBUMINURIA, WITHOUT LONG-TERM CURRENT USE OF INSULIN: Chronic | ICD-10-CM

## 2024-05-14 RX ORDER — DAPAGLIFLOZIN 10 MG/1
1 TABLET, FILM COATED ORAL
Qty: 90 TABLET | Refills: 1 | Status: SHIPPED | OUTPATIENT
Start: 2024-05-14

## 2024-06-06 DIAGNOSIS — E11.59 TYPE 2 DIABETES MELLITUS WITH OTHER CIRCULATORY COMPLICATION, WITHOUT LONG-TERM CURRENT USE OF INSULIN: ICD-10-CM

## 2024-06-06 DIAGNOSIS — I10 ESSENTIAL HYPERTENSION: Chronic | ICD-10-CM

## 2024-06-07 DIAGNOSIS — E11.59 TYPE 2 DIABETES MELLITUS WITH OTHER CIRCULATORY COMPLICATION, WITHOUT LONG-TERM CURRENT USE OF INSULIN: ICD-10-CM

## 2024-06-07 RX ORDER — AMLODIPINE BESYLATE 5 MG/1
5 TABLET ORAL DAILY
Qty: 90 TABLET | Refills: 0 | Status: SHIPPED | OUTPATIENT
Start: 2024-06-07

## 2024-06-07 RX ORDER — PIOGLITAZONEHYDROCHLORIDE 15 MG/1
15 TABLET ORAL
Qty: 30 TABLET | Refills: 0 | Status: SHIPPED | OUTPATIENT
Start: 2024-06-07

## 2024-06-11 RX ORDER — PIOGLITAZONEHYDROCHLORIDE 15 MG/1
15 TABLET ORAL
Qty: 90 TABLET | Refills: 1 | Status: SHIPPED | OUTPATIENT
Start: 2024-06-11

## 2024-06-13 ENCOUNTER — OFFICE VISIT (OUTPATIENT)
Age: 79
End: 2024-06-13
Payer: MEDICARE

## 2024-06-13 VITALS
SYSTOLIC BLOOD PRESSURE: 110 MMHG | HEIGHT: 62 IN | HEART RATE: 56 BPM | DIASTOLIC BLOOD PRESSURE: 60 MMHG | BODY MASS INDEX: 24.66 KG/M2 | OXYGEN SATURATION: 98 % | WEIGHT: 134 LBS

## 2024-06-13 DIAGNOSIS — I51.81 STRESS-INDUCED CARDIOMYOPATHY: Primary | ICD-10-CM

## 2024-06-13 PROCEDURE — 3078F DIAST BP <80 MM HG: CPT | Performed by: INTERNAL MEDICINE

## 2024-06-13 PROCEDURE — 99214 OFFICE O/P EST MOD 30 MIN: CPT | Performed by: INTERNAL MEDICINE

## 2024-06-13 PROCEDURE — 3074F SYST BP LT 130 MM HG: CPT | Performed by: INTERNAL MEDICINE

## 2024-06-13 PROCEDURE — 93000 ELECTROCARDIOGRAM COMPLETE: CPT | Performed by: INTERNAL MEDICINE

## 2024-06-13 NOTE — PROGRESS NOTES
Subjective:     Encounter Date: 06/13/24      Patient ID: Renee Stevenson is a 79 y.o. female.    Chief Complaint: Takotsubo  HPI:   This is a 79-year-old woman who in 2017 she presented to [facility] with chest pain and dynamic EKG changes.  She had a cardiac catheterization which showed mild to moderate disease of the proximal, mid and distal LAD as well as 2 small diagonal arteries.  She also had mild disease in the circumflex.  Her LV gram showed inferior apical hypokinesis consistent with Takosubo cardiomyopathy with an EF mildly reduced at 45%.   After few months of goal directed medical therapy her EF improved to 65%.    Her other medical problems include diabetes, hypertension and ulcerative colitis.  She ahd GI bleeding during a UC flare requiring pRBC transfusion and  therefore is not on ASA.     Overall she feels well. She has no angina or dyspnea. Her energy is good. Her  has parkinsons and she spends most of her time caring for him. Labs reviewed at goal    She has never smoked, she does not drink.  She is retired.  There is no family history of coronary disease.    The following portions of the patient's history were reviewed and updated as appropriate: allergies, current medications, past family history, past medical history, past social history, past surgical history and problem list.     REVIEW OF SYSTEMS:   All systems reviewed.  Pertinent positives identified in HPI.  All other systems are negative.    Past Medical History:   Diagnosis Date    Allergic rhinitis     Broken heart syndrome     Colitis     Colon polyps     Diabetes mellitus     type 2    Dizziness     Encounter for breast cancer screening other than mammogram     GERD (gastroesophageal reflux disease)     GI (gastrointestinal bleed)     Glaucoma     Herpes zoster without complication 3/2/2022    History of transfusion     Hyperlipidemia     Hypertension     Iron deficiency anemia due to chronic blood loss     Knee  fracture, left     Non-STEMI (non-ST elevated myocardial infarction) 06/16/2017    Osteopenia     Shingles     Ulcerative colitis        Family History   Problem Relation Age of Onset    Heart disease Mother     Hypertension Mother     Diabetes Mother     Heart disease Father     Hypertension Father     Heart disease Sister     Heart disease Brother     No Known Problems Maternal Grandmother     No Known Problems Maternal Grandfather     No Known Problems Paternal Grandmother     No Known Problems Paternal Grandfather     Crohn's disease Niece     Colon cancer Neg Hx     Breast cancer Neg Hx     Dementia Neg Hx        Social History     Socioeconomic History    Marital status:      Spouse name: Eliazar*    Number of children: 1   Tobacco Use    Smoking status: Never    Smokeless tobacco: Never   Vaping Use    Vaping status: Never Used   Substance and Sexual Activity    Alcohol use: No    Drug use: No    Sexual activity: Not Currently     Partners: Male       Allergies   Allergen Reactions    Tetanus Toxoids Swelling     Hand and arm  Hand and arm  Hand and arm       Past Surgical History:   Procedure Laterality Date    CARDIAC CATHETERIZATION N/A 06/16/2017    Procedure: Left Heart Cath;  Surgeon: Abhay Wooten MD;  Location: [facility] CATH INVASIVE LOCATION;  Service:     CARDIAC CATHETERIZATION N/A 06/16/2017    Procedure: Left ventriculography;  Surgeon: Abhay Wooten MD;  Location: [facility] CATH INVASIVE LOCATION;  Service:     CARDIAC CATHETERIZATION N/A 06/16/2017    Procedure: Coronary angiography;  Surgeon: Abhay Wooten MD;  Location: [facility] CATH INVASIVE LOCATION;  Service:     CATARACT EXTRACTION      COLONOSCOPY  08/14/2018    ENDOSCOPY N/A 02/10/2020    Procedure: ESOPHAGOGASTRODUODENOSCOPY;  Surgeon: Melissa Burleson MD;  Location: [facility] ENDOSCOPY;  Service: Gastroenterology;  Laterality: N/A;  PRE- IRON DEFICIENCY ANEMIA  POST--SCHATZKY'S RING, GASTRITIS,DUODENITIS    EYE SURGERY      cataract surgery    PAP  SMEAR      SIGMOIDOSCOPY N/A 10/21/2019    EH, active ulcerative colitis, severe inflammation in rectum, TA    SIGMOIDOSCOPY N/A 11/21/2022    Procedure: SIGMOIDOSCOPY FLEXIBLE to transverse colon with cold biopsies;  Surgeon: Melissa Burleson MD;  Location: [facility] ENDOSCOPY;  Service: Gastroenterology;  Laterality: N/A;  pre: h/o ulcerative colitis and rectal bleeding  post: colitis, hemorrhoids         ECG 12 Lead    Date/Time: 6/13/2024 4:01 PM  Performed by: Darling Espinosa MD    Authorized by: Darling Espinosa MD  Comparison: compared with previous ECG from 6/8/2023  Rhythm: sinus rhythm  Rate: normal  Conduction: conduction normal  ST Segments: ST segments normal  T Waves: T waves normal  T flattening: all  QRS axis: normal  Other: no other findings    Clinical impression: non-specific ECG           Objective:         PHYSICAL EXAM:  GEN: no distress, alert and oriented  Eyes: normal sclerae, normal lids and lashes  HENT: moist mucous membranes,   Lungs: CTAB, no rales or wheezes  Chest: no abnormalities  Neck: no JVD or carotid bruits  CV: RRR, no murmurs, +2 DP and 2+ carotid pulses b/l  Abdomen: soft, nontender, nondistended  Extremities: no edema  Skin: no rash, warm, dry  Heme/Lymph: no bruising  Psych: organized thought, normal behavior and affect        Assessment:          Diagnosis Plan   1. Stress-induced cardiomyopathy             Plan:         1.  Stress-induced cardiomyopathy. HR with recovered EF: Noted on cardiac catheterization in 2017.  Her ejection fraction has normalized.  Continue Toprol 50 and lisinopril 20. Farxiga.  2.  Coronary artery disease: Nonobstructive coronary disease in the LAD and circumflex system noted on cardiac catheterization.  CCS 1.    3.  Hypertension: controlled  4.  Hyperlipidemia: Crestor at goal  5.  Ulcerative colitis: Under control    Dr. Pacheco, thank you very much for referring this kind patient to me. Please call me with any questions or concerns. I will see the  patient again in the office in 1 year.          Darling Espinosa MD  06/13/24  [facility]    Outpatient Encounter Medications as of 6/13/2024   Medication Sig Dispense Refill    amLODIPine (NORVASC) 5 MG tablet Take 1 tablet by mouth Daily. 90 tablet 0    Farxiga 10 MG tablet TAKE 1 TABLET BY MOUTH EVERY MORNING BEFORE BREAKFAST 90 tablet 1    ferrous sulfate 325 (65 FE) MG tablet Take 1 tablet by mouth Daily With Breakfast. (Patient taking differently: Take 1 tablet by mouth Daily With Breakfast. Every otherday) 30 tablet 0    Hydrocortisone, Perianal, (ANUSOL-HC) 2.5 % rectal cream Insert  into the rectum At Night As Needed for Hemorrhoids. 28 g 1    lisinopril (PRINIVIL,ZESTRIL) 20 MG tablet Take 1 tablet by mouth Daily. 90 tablet 1    mesalamine (LIALDA) 1.2 g EC tablet Take 4 tablets by mouth Daily With Breakfast. 360 tablet 3    metoprolol tartrate (LOPRESSOR) 50 MG tablet Take 1 tablet by mouth Every 12 (Twelve) Hours. 180 tablet 2    pantoprazole (PROTONIX) 40 MG EC tablet Take 1 tablet by mouth Every Morning Before Breakfast. 90 tablet 3    pioglitazone (ACTOS) 15 MG tablet TAKE 1 TABLET BY MOUTH DAILY WITH BREAKFAST 90 tablet 1    rosuvastatin (CRESTOR) 10 MG tablet TAKE 1 TABLET BY MOUTH DAILY 90 tablet 1    SITagliptin (Januvia) 100 MG tablet Take 1 tablet by mouth Daily With Breakfast. 90 tablet 3    [DISCONTINUED] amLODIPine (NORVASC) 5 MG tablet TAKE 1 TABLET BY MOUTH DAILY 90 tablet 3    [DISCONTINUED] pioglitazone (ACTOS) 15 MG tablet TAKE 1 TABLET BY MOUTH DAILY WITH BREAKFAST 30 tablet 11    [DISCONTINUED] pioglitazone (ACTOS) 15 MG tablet Take 1 tablet by mouth Daily With Breakfast. 30 tablet 0     No facility-administered encounter medications on file as of 6/13/2024.

## 2024-06-25 ENCOUNTER — OFFICE VISIT (OUTPATIENT)
Dept: INTERNAL MEDICINE | Age: 79
End: 2024-06-25
Payer: MEDICARE

## 2024-06-25 VITALS
SYSTOLIC BLOOD PRESSURE: 140 MMHG | TEMPERATURE: 96.9 F | HEART RATE: 46 BPM | DIASTOLIC BLOOD PRESSURE: 82 MMHG | OXYGEN SATURATION: 97 % | HEIGHT: 62 IN | WEIGHT: 134 LBS | BODY MASS INDEX: 24.66 KG/M2

## 2024-06-25 DIAGNOSIS — I25.10 CORONARY ARTERY DISEASE INVOLVING NATIVE CORONARY ARTERY OF NATIVE HEART WITHOUT ANGINA PECTORIS: Chronic | ICD-10-CM

## 2024-06-25 DIAGNOSIS — E78.2 MIXED HYPERLIPIDEMIA: Chronic | ICD-10-CM

## 2024-06-25 DIAGNOSIS — I10 PRIMARY HYPERTENSION: Chronic | ICD-10-CM

## 2024-06-25 DIAGNOSIS — R80.9 TYPE 2 DIABETES MELLITUS WITH MICROALBUMINURIA: Primary | Chronic | ICD-10-CM

## 2024-06-25 DIAGNOSIS — E11.29 TYPE 2 DIABETES MELLITUS WITH MICROALBUMINURIA: Primary | Chronic | ICD-10-CM

## 2024-06-25 LAB
ALBUMIN SERPL-MCNC: 4.4 G/DL (ref 3.5–5.2)
ALBUMIN/GLOB SERPL: 1.6 G/DL
ALP SERPL-CCNC: 74 U/L (ref 39–117)
ALT SERPL-CCNC: 10 U/L (ref 1–33)
AST SERPL-CCNC: 13 U/L (ref 1–32)
BILIRUB SERPL-MCNC: 0.3 MG/DL (ref 0–1.2)
BUN SERPL-MCNC: 15 MG/DL (ref 8–23)
BUN/CREAT SERPL: 14 (ref 7–25)
CALCIUM SERPL-MCNC: 9.4 MG/DL (ref 8.6–10.5)
CHLORIDE SERPL-SCNC: 109 MMOL/L (ref 98–107)
CO2 SERPL-SCNC: 22 MMOL/L (ref 22–29)
CREAT SERPL-MCNC: 1.07 MG/DL (ref 0.57–1)
EGFRCR SERPLBLD CKD-EPI 2021: 52.6 ML/MIN/1.73
GLOBULIN SER CALC-MCNC: 2.7 GM/DL
GLUCOSE SERPL-MCNC: 150 MG/DL (ref 65–99)
HBA1C MFR BLD: 7.3 % (ref 4.8–5.6)
POTASSIUM SERPL-SCNC: 4.3 MMOL/L (ref 3.5–5.2)
PROT SERPL-MCNC: 7.1 G/DL (ref 6–8.5)
SODIUM SERPL-SCNC: 140 MMOL/L (ref 136–145)

## 2024-06-25 PROCEDURE — 3077F SYST BP >= 140 MM HG: CPT | Performed by: INTERNAL MEDICINE

## 2024-06-25 PROCEDURE — 1160F RVW MEDS BY RX/DR IN RCRD: CPT | Performed by: INTERNAL MEDICINE

## 2024-06-25 PROCEDURE — G2211 COMPLEX E/M VISIT ADD ON: HCPCS | Performed by: INTERNAL MEDICINE

## 2024-06-25 PROCEDURE — 1159F MED LIST DOCD IN RCRD: CPT | Performed by: INTERNAL MEDICINE

## 2024-06-25 PROCEDURE — 3079F DIAST BP 80-89 MM HG: CPT | Performed by: INTERNAL MEDICINE

## 2024-06-25 PROCEDURE — 99214 OFFICE O/P EST MOD 30 MIN: CPT | Performed by: INTERNAL MEDICINE

## 2024-06-25 PROCEDURE — 1126F AMNT PAIN NOTED NONE PRSNT: CPT | Performed by: INTERNAL MEDICINE

## 2024-06-25 NOTE — PROGRESS NOTES
I N T E R N A L  M E D I C I N E    J U N O H  K I M,  M D      ENCOUNTER DATE:  06/25/2024    Renee PARIKH From / 79 y.o. 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)    Overview     **Complications of diabetes: microalbuminuria and coronary artery disease     Continue:   Januvia 100 mg  Pioglitazone 15 mg  Farxiga 10 mg          Relevant Medications    SITagliptin (Januvia) 100 MG tablet    Farxiga 10 MG tablet    pioglitazone (ACTOS) 15 MG tablet    Other Relevant Orders    Hemoglobin A1c    Comprehensive Metabolic Panel    Hypertension (Chronic)    Overview     Continue:   lisinopril 20 mg  amlodipine 5 mg qd   metoprolol tartrate 50 mg BID         Relevant Medications    lisinopril (PRINIVIL,ZESTRIL) 20 MG tablet    metoprolol tartrate (LOPRESSOR) 50 MG tablet    amLODIPine (NORVASC) 5 MG tablet       Medium    Hyperlipidemia (Chronic)    Overview     Continue rosuvastatin 10 mg          Relevant Medications    rosuvastatin (CRESTOR) 10 MG tablet    Coronary artery disease involving native coronary artery of native heart without angina pectoris (Chronic)    Overview     *[facility]    Continue statin, bblocker. Not on ASA due to recent LGIB         Relevant Medications    rosuvastatin (CRESTOR) 10 MG tablet    metoprolol tartrate (LOPRESSOR) 50 MG tablet    amLODIPine (NORVASC) 5 MG tablet     Orders Placed This Encounter   Procedures    Hemoglobin A1c    Comprehensive Metabolic Panel     No orders of the defined types were placed in this encounter.      SUMMARY/DISCUSSION        Next Appointment with me: 9/16/2024    Return for Next scheduled followup for AWV.        VITAL SIGNS     Vitals:    06/25/24 1015   BP: 140/82   Pulse: (!) 46   Temp: 96.9 °F (36.1 °C)   SpO2: 97%   Weight: 60.8 kg (134  "lb)   Height: 157.5 cm (62\")       BP Readings from Last 3 Encounters:   06/25/24 140/82   06/13/24 110/60   02/28/24 127/74     Wt Readings from Last 3 Encounters:   06/25/24 60.8 kg (134 lb)   06/13/24 60.8 kg (134 lb)   02/28/24 60 kg (132 lb 3.2 oz)     Body mass index is 24.51 kg/m².    Blood pressure readings recorded on patient flowsheet:  [facility] Blood Pressure Flowsheet Systolic Diastolic Pulse   6/13/2024   9:04  65 55   6/13/2024  10:06  72 51   6/12/2024  10:00  64 55   6/12/2024   9:00  71 51   5/6/2024   8:45  67 52   5/6/2024   8:58  70 54   4/29/2024   9:13  65 56   4/29/2024   8:16  70 53   3/25/2024  10:04  78 54   3/21/2024   9:47  69 53       MEDICATIONS AT THE TIME OF OFFICE VISIT     Current Outpatient Medications on File Prior to Visit   Medication Sig    amLODIPine (NORVASC) 5 MG tablet Take 1 tablet by mouth Daily.    Farxiga 10 MG tablet TAKE 1 TABLET BY MOUTH EVERY MORNING BEFORE BREAKFAST    ferrous sulfate 325 (65 FE) MG tablet Take 1 tablet by mouth Daily With Breakfast. (Patient taking differently: Take 1 tablet by mouth Daily With Breakfast. Every otherday)    Hydrocortisone, Perianal, (ANUSOL-HC) 2.5 % rectal cream Insert  into the rectum At Night As Needed for Hemorrhoids.    lisinopril (PRINIVIL,ZESTRIL) 20 MG tablet Take 1 tablet by mouth Daily.    mesalamine (LIALDA) 1.2 g EC tablet Take 4 tablets by mouth Daily With Breakfast.    metoprolol tartrate (LOPRESSOR) 50 MG tablet Take 1 tablet by mouth Every 12 (Twelve) Hours.    pantoprazole (PROTONIX) 40 MG EC tablet Take 1 tablet by mouth Every Morning Before Breakfast.    pioglitazone (ACTOS) 15 MG tablet TAKE 1 TABLET BY MOUTH DAILY WITH BREAKFAST    rosuvastatin (CRESTOR) 10 MG tablet TAKE 1 TABLET BY MOUTH DAILY    SITagliptin (Januvia) 100 MG tablet Take 1 tablet by mouth Daily With Breakfast.     No current facility-administered medications on file prior to visit. "         HISTORY OF PRESENT ILLNESS     Has been eating more fruit than usual and home glucose has been running higher than normal. Compliant with diabetic medications.     Blood pressure is well controlled at home. Marginal here today.     Sees cardiologist for CAD without problems. Denies angina or MUNGUIA.     Hyperlipidemia is well controlled on rosuvastatin.     Lab Results   Component Value Date    HGBA1C 6.90 (H) 02/22/2024    HGBA1C 6.50 (H) 09/13/2023    HGBA1C 7.30 (H) 05/10/2023    CREATININE 1.14 (H) 02/22/2024    LDL 69 02/22/2024    MALBCRERATIO 67 (H) 02/22/2024     Blood sugar readings recorded on patient's flowsheet:       No data to display               60.8 kg (134 lb)    Patient Care Team:  Esequiel Pacheco MD as PCP - General  Ministerio Wells MD as Consulting Physician (Orthopedic Surgery)  Abhay Wooten MD (Inactive) as Consulting Physician (Cardiology)  Cisco Husain MD as Consulting Physician (Otolaryngology)  EZIO Garibay MD as Consulting Physician (Ophthalmology)  Melissa Burleson MD as Consulting Physician (Gastroenterology)    REVIEW OF SYSTEMS           PHYSICAL EXAMINATION     Physical Exam  General: No acute distress  Psych: Normal thought and judgment   Cardiovascular Rate: normal. Rhythm: regular. Heart sounds: normal   Pulm/Chest: Effort normal, breath sounds normal.       REVIEWED DATA     Labs:       Lab Results   Component Value Date     02/22/2024    K 3.7 02/22/2024    CALCIUM 9.3 02/22/2024    AST 14 02/22/2024    ALT 12 02/22/2024    BUN 16 02/22/2024    CREATININE 1.14 (H) 02/22/2024    CREATININE 0.98 09/27/2023    CREATININE 1.10 (H) 09/13/2023    EGFRRESULT 48.8 (L) 02/22/2024       Lab Results   Component Value Date    HGBA1C 6.90 (H) 02/22/2024    HGBA1C 6.50 (H) 09/13/2023    HGBA1C 7.30 (H) 05/10/2023       Lab Results   Component Value Date    LDL 69 02/22/2024    LDL 54 09/13/2023    LDL 57 12/09/2022    HDL 52 02/22/2024    HDL 42 09/13/2023    TRIG 117  02/22/2024    TRIG 89 09/13/2023       Lab Results   Component Value Date    TSH 3.340 09/13/2023    TSH 2.910 07/26/2021    FREET4 1.08 09/13/2023    FREET4 1.19 07/26/2021       Lab Results   Component Value Date    WBC 6.09 09/13/2023    HGB 11.3 (L) 09/13/2023     09/13/2023       Lab Results   Component Value Date    MALBCRERATIO 67 (H) 02/22/2024           Imaging:           Medical Tests:           Summary of old records / correspondence / consultant report:           Request outside records:

## 2024-06-26 NOTE — PROGRESS NOTES
CALL PATIENT with results:    A1c level for blood sugar average is higher.   - reduce fruit intake as discussed   - increase pioglitazone to 30 mg in the morning but watch for edema of legs or weight gain   - check a1c prior to followup     Labs for kidney, liver and electrolytes are stable (i.e.  normal, stable, improved or without clinically significant worsening)

## 2024-07-01 DIAGNOSIS — E11.59 TYPE 2 DIABETES MELLITUS WITH OTHER CIRCULATORY COMPLICATION, WITHOUT LONG-TERM CURRENT USE OF INSULIN: ICD-10-CM

## 2024-07-01 RX ORDER — PIOGLITAZONEHYDROCHLORIDE 30 MG/1
30 TABLET ORAL
Start: 2024-07-01

## 2024-07-01 NOTE — ASSESSMENT & PLAN NOTE
Lab Results   Component Value Date    HGBA1C 7.30 (H) 06/25/2024    HGBA1C 6.90 (H) 02/22/2024    HGBA1C 6.50 (H) 09/13/2023      Increase pioglitazone to 30 mg.   A1c prior to follow-up.

## 2024-07-09 ENCOUNTER — TELEPHONE (OUTPATIENT)
Dept: INTERNAL MEDICINE | Age: 79
End: 2024-07-09

## 2024-07-09 NOTE — TELEPHONE ENCOUNTER
PHARMACY CALLED, THEY WANT TO KNOW IF THE PATIENT IS TAKING INSULIN OR NOT. PLEASE ADVISE. [phone] OPTION #3

## 2024-07-16 DIAGNOSIS — I10 ESSENTIAL HYPERTENSION: Chronic | ICD-10-CM

## 2024-07-16 RX ORDER — AMLODIPINE BESYLATE 5 MG/1
5 TABLET ORAL DAILY
Qty: 90 TABLET | Refills: 1 | OUTPATIENT
Start: 2024-07-16

## 2024-07-16 RX ORDER — LISINOPRIL 20 MG/1
20 TABLET ORAL DAILY
Qty: 90 TABLET | Refills: 1 | Status: SHIPPED | OUTPATIENT
Start: 2024-07-16

## 2024-08-27 DIAGNOSIS — I10 ESSENTIAL HYPERTENSION: Chronic | ICD-10-CM

## 2024-08-28 RX ORDER — AMLODIPINE BESYLATE 5 MG/1
5 TABLET ORAL DAILY
Qty: 90 TABLET | Refills: 1 | Status: SHIPPED | OUTPATIENT
Start: 2024-08-28

## 2024-08-28 RX ORDER — LISINOPRIL 20 MG/1
20 TABLET ORAL DAILY
Qty: 90 TABLET | Refills: 1 | Status: SHIPPED | OUTPATIENT
Start: 2024-08-28

## 2024-09-15 NOTE — OUTREACH NOTE
Medical Week 1 Survey      Responses   Facility patient discharged from?  [place]   Does the patient have one of the following disease processes/diagnoses(primary or secondary)?  Other   Is there a successful TCM telephone encounter documented?  No   Week 1 attempt successful?  No   Unsuccessful attempts  Attempt 2          Hannah Thorne RN   Male

## 2024-09-16 ENCOUNTER — OFFICE VISIT (OUTPATIENT)
Dept: INTERNAL MEDICINE | Age: 79
End: 2024-09-16
Payer: MEDICARE

## 2024-09-16 VITALS
HEART RATE: 51 BPM | TEMPERATURE: 97.1 F | WEIGHT: 136 LBS | OXYGEN SATURATION: 98 % | SYSTOLIC BLOOD PRESSURE: 142 MMHG | HEIGHT: 62 IN | BODY MASS INDEX: 25.03 KG/M2 | DIASTOLIC BLOOD PRESSURE: 66 MMHG

## 2024-09-16 DIAGNOSIS — I10 PRIMARY HYPERTENSION: Chronic | ICD-10-CM

## 2024-09-16 DIAGNOSIS — Z00.00 MEDICARE ANNUAL WELLNESS VISIT, SUBSEQUENT: Primary | ICD-10-CM

## 2024-09-16 DIAGNOSIS — E11.29 TYPE 2 DIABETES MELLITUS WITH MICROALBUMINURIA: Chronic | ICD-10-CM

## 2024-09-16 DIAGNOSIS — E11.59 TYPE 2 DIABETES MELLITUS WITH OTHER CIRCULATORY COMPLICATION, WITHOUT LONG-TERM CURRENT USE OF INSULIN: ICD-10-CM

## 2024-09-16 DIAGNOSIS — I25.10 CORONARY ARTERY DISEASE INVOLVING NATIVE CORONARY ARTERY OF NATIVE HEART WITHOUT ANGINA PECTORIS: Chronic | ICD-10-CM

## 2024-09-16 DIAGNOSIS — E78.2 MIXED HYPERLIPIDEMIA: Chronic | ICD-10-CM

## 2024-09-16 DIAGNOSIS — R80.9 TYPE 2 DIABETES MELLITUS WITH MICROALBUMINURIA: Chronic | ICD-10-CM

## 2024-09-16 PROBLEM — K62.5 RECTAL BLEEDING: Status: RESOLVED | Noted: 2022-10-27 | Resolved: 2024-09-16

## 2024-09-16 PROCEDURE — G0008 ADMIN INFLUENZA VIRUS VAC: HCPCS | Performed by: INTERNAL MEDICINE

## 2024-09-16 PROCEDURE — G0439 PPPS, SUBSEQ VISIT: HCPCS | Performed by: INTERNAL MEDICINE

## 2024-09-16 PROCEDURE — 3078F DIAST BP <80 MM HG: CPT | Performed by: INTERNAL MEDICINE

## 2024-09-16 PROCEDURE — 3077F SYST BP >= 140 MM HG: CPT | Performed by: INTERNAL MEDICINE

## 2024-09-16 PROCEDURE — 1170F FXNL STATUS ASSESSED: CPT | Performed by: INTERNAL MEDICINE

## 2024-09-16 PROCEDURE — 1126F AMNT PAIN NOTED NONE PRSNT: CPT | Performed by: INTERNAL MEDICINE

## 2024-09-16 PROCEDURE — 99214 OFFICE O/P EST MOD 30 MIN: CPT | Performed by: INTERNAL MEDICINE

## 2024-09-16 PROCEDURE — 90662 IIV NO PRSV INCREASED AG IM: CPT | Performed by: INTERNAL MEDICINE

## 2024-09-16 RX ORDER — DAPAGLIFLOZIN 10 MG/1
1 TABLET, FILM COATED ORAL
Qty: 90 TABLET | Refills: 1 | Status: SHIPPED | OUTPATIENT
Start: 2024-09-16

## 2024-09-16 RX ORDER — PIOGLITAZONEHYDROCHLORIDE 15 MG/1
15 TABLET ORAL
Qty: 90 TABLET | Refills: 1 | Status: SHIPPED | OUTPATIENT
Start: 2024-09-16

## 2024-10-15 RX ORDER — PANTOPRAZOLE SODIUM 40 MG/1
40 TABLET, DELAYED RELEASE ORAL
Qty: 90 TABLET | Refills: 1 | Status: SHIPPED | OUTPATIENT
Start: 2024-10-15

## 2024-10-15 NOTE — TELEPHONE ENCOUNTER
Refilled medication.  She will be due annual follow-up in February.  Please let her know to make appointment once for open for next year.

## 2024-11-07 DIAGNOSIS — I25.10 CORONARY ARTERY DISEASE INVOLVING NATIVE CORONARY ARTERY OF NATIVE HEART WITHOUT ANGINA PECTORIS: ICD-10-CM

## 2024-11-07 DIAGNOSIS — E78.2 MIXED HYPERLIPIDEMIA: ICD-10-CM

## 2024-11-08 RX ORDER — ROSUVASTATIN CALCIUM 10 MG/1
10 TABLET, COATED ORAL DAILY
Qty: 90 TABLET | Refills: 1 | Status: SHIPPED | OUTPATIENT
Start: 2024-11-08

## 2024-11-15 DIAGNOSIS — E11.59 TYPE 2 DIABETES MELLITUS WITH OTHER CIRCULATORY COMPLICATION, WITHOUT LONG-TERM CURRENT USE OF INSULIN: ICD-10-CM

## 2024-11-15 RX ORDER — PIOGLITAZONEHYDROCHLORIDE 15 MG/1
15 TABLET ORAL
Qty: 90 TABLET | Refills: 1 | Status: SHIPPED | OUTPATIENT
Start: 2024-11-15

## 2024-12-19 ENCOUNTER — TELEPHONE (OUTPATIENT)
Age: 79
End: 2024-12-19
Payer: MEDICARE

## 2025-01-21 ENCOUNTER — OFFICE VISIT (OUTPATIENT)
Dept: INTERNAL MEDICINE | Age: 80
End: 2025-01-21
Payer: MEDICARE

## 2025-01-21 VITALS
OXYGEN SATURATION: 99 % | WEIGHT: 137 LBS | DIASTOLIC BLOOD PRESSURE: 84 MMHG | HEART RATE: 49 BPM | TEMPERATURE: 97.1 F | HEIGHT: 62 IN | SYSTOLIC BLOOD PRESSURE: 132 MMHG | BODY MASS INDEX: 25.21 KG/M2

## 2025-01-21 DIAGNOSIS — M85.80 OSTEOPENIA, UNSPECIFIED LOCATION: ICD-10-CM

## 2025-01-21 DIAGNOSIS — E11.29 TYPE 2 DIABETES MELLITUS WITH MICROALBUMINURIA: Primary | Chronic | ICD-10-CM

## 2025-01-21 DIAGNOSIS — E78.2 MIXED HYPERLIPIDEMIA: Chronic | ICD-10-CM

## 2025-01-21 DIAGNOSIS — R80.9 TYPE 2 DIABETES MELLITUS WITH MICROALBUMINURIA: Primary | Chronic | ICD-10-CM

## 2025-01-21 DIAGNOSIS — I25.10 CORONARY ARTERY DISEASE INVOLVING NATIVE CORONARY ARTERY OF NATIVE HEART WITHOUT ANGINA PECTORIS: Chronic | ICD-10-CM

## 2025-01-21 DIAGNOSIS — Z13.820 SCREENING FOR OSTEOPOROSIS: ICD-10-CM

## 2025-01-21 DIAGNOSIS — I10 PRIMARY HYPERTENSION: Chronic | ICD-10-CM

## 2025-01-21 DIAGNOSIS — Z78.0 POSTMENOPAUSAL STATE: ICD-10-CM

## 2025-01-21 LAB
ALBUMIN SERPL-MCNC: 4.5 G/DL (ref 3.5–5.2)
ALBUMIN/GLOB SERPL: 1.2 G/DL
ALP SERPL-CCNC: 78 U/L (ref 39–117)
ALT SERPL-CCNC: 12 U/L (ref 1–33)
AST SERPL-CCNC: 17 U/L (ref 1–32)
BILIRUB SERPL-MCNC: 0.4 MG/DL (ref 0–1.2)
BUN SERPL-MCNC: 18 MG/DL (ref 8–23)
BUN/CREAT SERPL: 16.2 (ref 7–25)
CALCIUM SERPL-MCNC: 9.4 MG/DL (ref 8.6–10.5)
CHLORIDE SERPL-SCNC: 105 MMOL/L (ref 98–107)
CHOLEST SERPL-MCNC: 134 MG/DL (ref 0–200)
CHOLEST/HDLC SERPL: 3.27 {RATIO}
CO2 SERPL-SCNC: 23.3 MMOL/L (ref 22–29)
CREAT SERPL-MCNC: 1.11 MG/DL (ref 0.57–1)
EGFRCR SERPLBLD CKD-EPI 2021: 50.4 ML/MIN/1.73
GLOBULIN SER CALC-MCNC: 3.7 GM/DL
GLUCOSE SERPL-MCNC: 148 MG/DL (ref 65–99)
HBA1C MFR BLD: 6.9 % (ref 4.8–5.6)
HDLC SERPL-MCNC: 41 MG/DL (ref 40–60)
LDLC SERPL CALC-MCNC: 79 MG/DL (ref 0–100)
POTASSIUM SERPL-SCNC: 3.7 MMOL/L (ref 3.5–5.2)
PROT SERPL-MCNC: 8.2 G/DL (ref 6–8.5)
SODIUM SERPL-SCNC: 141 MMOL/L (ref 136–145)
TRIGL SERPL-MCNC: 68 MG/DL (ref 0–150)
VLDLC SERPL CALC-MCNC: 14 MG/DL (ref 5–40)

## 2025-01-21 PROCEDURE — 3079F DIAST BP 80-89 MM HG: CPT | Performed by: INTERNAL MEDICINE

## 2025-01-21 PROCEDURE — 99214 OFFICE O/P EST MOD 30 MIN: CPT | Performed by: INTERNAL MEDICINE

## 2025-01-21 PROCEDURE — G2211 COMPLEX E/M VISIT ADD ON: HCPCS | Performed by: INTERNAL MEDICINE

## 2025-01-21 PROCEDURE — 1126F AMNT PAIN NOTED NONE PRSNT: CPT | Performed by: INTERNAL MEDICINE

## 2025-01-21 PROCEDURE — 3075F SYST BP GE 130 - 139MM HG: CPT | Performed by: INTERNAL MEDICINE

## 2025-01-21 RX ORDER — BLOOD SUGAR DIAGNOSTIC
STRIP MISCELLANEOUS
Qty: 100 EACH | Refills: 3 | Status: SHIPPED | OUTPATIENT
Start: 2025-01-21

## 2025-01-21 RX ORDER — LISINOPRIL 30 MG/1
30 TABLET ORAL DAILY
Qty: 90 TABLET | Refills: 1 | Status: SHIPPED | OUTPATIENT
Start: 2025-01-21

## 2025-01-21 NOTE — ASSESSMENT & PLAN NOTE
Blood pressure is high at home.    Increase lisinopril to 30 mg daily.      Continue amlodipine 5 mg and metoprolol titrate 50 mg twice daily.

## 2025-01-21 NOTE — ASSESSMENT & PLAN NOTE
Lab Results   Component Value Date    HGBA1C 6.80 (H) 09/09/2024    HGBA1C 7.30 (H) 06/25/2024    HGBA1C 6.90 (H) 02/22/2024       Check A1c and continue current medications.

## 2025-01-21 NOTE — PROGRESS NOTES
J  U  N  O  H    K  I  M ,   M  D      I  N  T  E  R  N  A  L    M  E  D  I  C  I  N  E         ENCOUNTER DATE:  01/21/2025    Renee PARIKH From / 79 y.o. 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         Relevant Medications    SITagliptin (Januvia) 100 MG tablet    dapagliflozin Propanediol (Farxiga) 10 MG tablet    pioglitazone (ACTOS) 15 MG tablet    glucose blood (Accu-Chek Guide Test) test strip    lisinopril (PRINIVIL,ZESTRIL) 30 MG tablet    Other Relevant Orders    Comprehensive Metabolic Panel    Hemoglobin A1c    Hypertension (Chronic)    Overview     Continue:   lisinopril 20 mg  amlodipine 5 mg qd   metoprolol tartrate 50 mg BID         Current Assessment & Plan     Blood pressure is high at home.    Increase lisinopril to 30 mg daily.      Continue amlodipine 5 mg and metoprolol titrate 50 mg twice daily.         Relevant Medications    metoprolol tartrate (LOPRESSOR) 50 MG tablet    amLODIPine (NORVASC) 5 MG tablet    lisinopril (PRINIVIL,ZESTRIL) 30 MG tablet       Medium    Hyperlipidemia (Chronic)    Overview     Continue rosuvastatin 10 mg          Relevant Medications    rosuvastatin (CRESTOR) 10 MG tablet    Other Relevant Orders    Lipid Panel With / Chol / HDL Ratio    Coronary artery disease involving native coronary artery of native heart without angina pectoris (Chronic)    Overview      *[facility]    Continue statin, bblocker. Not on ASA due to recent LGIB         Relevant Medications    metoprolol tartrate (LOPRESSOR) 50 MG tablet    amLODIPine (NORVASC) 5 MG tablet    rosuvastatin (CRESTOR) 10 MG tablet       Unprioritized    Osteopenia (Chronic)    Current Assessment & Plan     DEXA ordered         Relevant Orders    DEXA Bone Density Axial     Other Visit Diagnoses       Screening for osteoporosis        Relevant Orders    DEXA Bone Density Axial    Postmenopausal state        Relevant Orders    DEXA Bone Density Axial          Orders Placed This Encounter   Procedures    DEXA Bone Density Axial     Order Specific Question:   Reason for Exam:     Answer:   h/o osteopenia; surveillance     Order Specific Question:   Release to patient     Answer:   Routine Release [[id number]]     Order Specific Question:   Is patient taking or have taken long term Glucocorticoid (steroids)?     Answer:   No     Order Specific Question:   Does the patient have rheumatoid arthritis?     Answer:   No     Order Specific Question:   Does the patient have secondary osteoporosis?     Answer:   No    Comprehensive Metabolic Panel     Order Specific Question:   Release to patient     Answer:   Routine Release [[id number]]    Lipid Panel With / Chol / HDL Ratio     Order Specific Question:   Release to patient     Answer:   Routine Release [[id number]]    Hemoglobin A1c     Order Specific Question:   Release to patient     Answer:   Routine Release [[id number]]     New Medications Ordered This Visit   Medications    glucose blood (Accu-Chek Guide Test) test strip     Sig: Check FBS once daily. DM2/E 11.29     Dispense:  100 each     Refill:  3    lisinopril (PRINIVIL,ZESTRIL) 30 MG tablet     Sig: Take 1 tablet by mouth Daily.     Dispense:  90 tablet     Refill:  1       SUMMARY/DISCUSSION        TOTAL TIME OF ENCOUNTER:      Next Appointment with me: Visit date not found    Return in about 4 months (around  "5/21/2025) for Reassess chronic medical problems.      VITAL SIGNS     Vitals:    01/21/25 1008   BP: 132/84   Pulse: (!) 49   Temp: 97.1 °F (36.2 °C)   SpO2: 99%   Weight: 62.1 kg (137 lb)   Height: 157.5 cm (62.01\")       BP Readings from Last 3 Encounters:   01/21/25 132/84   12/15/24 133/69   09/16/24 142/66     Wt Readings from Last 3 Encounters:   01/21/25 62.1 kg (137 lb)   12/15/24 61.7 kg (136 lb 0.4 oz)   09/16/24 61.7 kg (136 lb)     Body mass index is 25.05 kg/m².    Blood pressure readings recorded on patient flowsheet:  [facility] Blood Pressure Flowsheet Systolic Diastolic Pulse   1/21/2025   8:15   84  49    1/20/2025   6:50   67  51    1/19/2025   6:48   71  53    1/15/2025   8:09   77  53    1/7/2025   9:08   75  50    1/2/2025   5:59   84  55    12/28/2024   5:35   62  54    12/26/2024   7:16   58  53    12/14/2024   9:29   78  55    12/9/2024   9:29   65  54        Patient-reported       MEDICATIONS AT THE TIME OF OFFICE VISIT     Current Outpatient Medications on File Prior to Visit   Medication Sig    amLODIPine (NORVASC) 5 MG tablet Take 1 tablet by mouth Daily.    dapagliflozin Propanediol (Farxiga) 10 MG tablet Take 10 mg by mouth Every Morning Before Breakfast.    ferrous sulfate 325 (65 FE) MG tablet Take 1 tablet by mouth Daily With Breakfast. (Patient taking differently: Take 1 tablet by mouth Daily With Breakfast. Every otherday)    mesalamine (LIALDA) 1.2 g EC tablet Take 4 tablets by mouth Daily With Breakfast.    metoprolol tartrate (LOPRESSOR) 50 MG tablet Take 1 tablet by mouth Every 12 (Twelve) Hours.    pantoprazole (PROTONIX) 40 MG EC tablet TAKE 1 TABLET BY MOUTH EVERY MORNING BEFORE BREAKFAST    pioglitazone (ACTOS) 15 MG tablet Take 1 tablet by mouth Daily With Breakfast.    rosuvastatin (CRESTOR) 10 MG tablet TAKE 1 TABLET BY MOUTH DAILY    SITagliptin (Januvia) 100 MG tablet Take 1 tablet by mouth Daily With " Breakfast.    [DISCONTINUED] glucose blood (Accu-Chek Saira Plus) test strip 1 each by Other route Daily.    [DISCONTINUED] lisinopril (PRINIVIL,ZESTRIL) 20 MG tablet Take 1 tablet by mouth Daily.     No current facility-administered medications on file prior to visit.         HISTORY OF PRESENT ILLNESS     Patient denies any acute problems or changes.  At home her blood pressures frequently get up to the 150s.  She reports compliance with her blood pressure medications including lisinopril 20 mg, amlodipine 5 mg and metoprolol to tartrate twice daily.  She has history of coronary artery disease but denies any angina or dyspnea exertion and is followed up by her cardiologist.  Most recent A1c for diabetes was 6.8 which was lower than 7.3.  LDL remains well-controlled at 69.    Lab Results   Component Value Date    HGBA1C 6.80 (H) 09/09/2024    HGBA1C 7.30 (H) 06/25/2024    HGBA1C 6.90 (H) 02/22/2024    CREATININE 1.07 (H) 06/25/2024    LDL 69 02/22/2024    MALBCRERATIO 67 (H) 02/22/2024     Blood sugar readings recorded on patient's flowsheet:       No data to display               62.1 kg (137 lb)    Patient Care Team:  Esequiel Pacheco MD as PCP - [facility] as Consulting Physician (Otolaryngology)  EZIO Garibay MD as Consulting Physician (Ophthalmology)  Darling Espinosa MD as Consulting Physician (Cardiology)  Long Skinner DPM as Consulting Physician (Podiatry)  Audelia King PA-C as Physician Assistant (Gastroenterology)    REVIEW OF SYSTEMS     Constitutional neg except per HPI   Resp neg  CV neg       PHYSICAL EXAMINATION     Physical Exam  Feet:      Comments: Diabetic Foot Exam Performed and Monofilament Test Performed    Monofilament test is normal.       General: No acute distress.   Psych: Normal thought and judgment.   Cardiovascular Rate: normal. Rhythm: regular. Heart sounds: normal.    Trace BLE edema         REVIEWED DATA     Labs:       Lab Results   Component  Value Date     06/25/2024    K 4.3 06/25/2024    CALCIUM 9.4 06/25/2024    AST 13 06/25/2024    ALT 10 06/25/2024    BUN 15 06/25/2024    CREATININE 1.07 (H) 06/25/2024    CREATININE 1.14 (H) 02/22/2024    CREATININE 0.98 09/27/2023    EGFRRESULT 52.6 (L) 06/25/2024     Lab Results   Component Value Date    HGBA1C 6.80 (H) 09/09/2024    HGBA1C 7.30 (H) 06/25/2024    HGBA1C 6.90 (H) 02/22/2024     Lab Results   Component Value Date    LDL 69 02/22/2024    LDL 54 09/13/2023    LDL 57 12/09/2022    HDL 52 02/22/2024    HDL 42 09/13/2023    TRIG 117 02/22/2024    TRIG 89 09/13/2023     Lab Results   Component Value Date    TSH 3.340 09/13/2023    TSH 2.910 07/26/2021    FREET4 1.08 09/13/2023    FREET4 1.19 07/26/2021     Lab Results   Component Value Date    WBC 6.09 09/13/2023    HGB 11.3 (L) 09/13/2023     09/13/2023     Lab Results   Component Value Date    MALBCRERATIO 67 (H) 02/22/2024       Imaging:           Medical Tests:           Summary of old records / correspondence / consultant report:           Request outside records:

## 2025-02-10 ENCOUNTER — OFFICE VISIT (OUTPATIENT)
Dept: GASTROENTEROLOGY | Facility: CLINIC | Age: 80
End: 2025-02-10
Payer: MEDICARE

## 2025-02-10 VITALS
HEIGHT: 62 IN | SYSTOLIC BLOOD PRESSURE: 169 MMHG | TEMPERATURE: 96 F | HEART RATE: 56 BPM | OXYGEN SATURATION: 98 % | DIASTOLIC BLOOD PRESSURE: 69 MMHG | BODY MASS INDEX: 25.32 KG/M2 | WEIGHT: 137.6 LBS

## 2025-02-10 DIAGNOSIS — K51.50 ULCERATIVE COLITIS, LEFT SIDED, WITHOUT COMPLICATIONS: Primary | ICD-10-CM

## 2025-02-10 DIAGNOSIS — K21.9 GASTROESOPHAGEAL REFLUX DISEASE WITHOUT ESOPHAGITIS: ICD-10-CM

## 2025-02-10 PROCEDURE — 99213 OFFICE O/P EST LOW 20 MIN: CPT | Performed by: PHYSICIAN ASSISTANT

## 2025-02-10 PROCEDURE — 3078F DIAST BP <80 MM HG: CPT | Performed by: PHYSICIAN ASSISTANT

## 2025-02-10 PROCEDURE — 1159F MED LIST DOCD IN RCRD: CPT | Performed by: PHYSICIAN ASSISTANT

## 2025-02-10 PROCEDURE — 1160F RVW MEDS BY RX/DR IN RCRD: CPT | Performed by: PHYSICIAN ASSISTANT

## 2025-02-10 PROCEDURE — 3077F SYST BP >= 140 MM HG: CPT | Performed by: PHYSICIAN ASSISTANT

## 2025-02-10 RX ORDER — MESALAMINE 1.2 G/1
4.8 TABLET, DELAYED RELEASE ORAL
Qty: 360 TABLET | Refills: 3 | Status: SHIPPED | OUTPATIENT
Start: 2025-02-10

## 2025-02-10 RX ORDER — PANTOPRAZOLE SODIUM 40 MG/1
40 TABLET, DELAYED RELEASE ORAL
Qty: 90 TABLET | Refills: 3 | Status: SHIPPED | OUTPATIENT
Start: 2025-02-10

## 2025-02-10 NOTE — PROGRESS NOTES
"Chief Complaint  Ulcerative Colitis    Subjective          History of Present Illness    Renee Stevenson is a  79 y.o. female presents for left-sided ulcerative colitis dx around 2016 and iron deficiency anemia.  She is a former patient of Dr. Burleson last seen by me on 2/28/2024.    She takes Lialda 4.8 g daily. It was recommended previously to start biologic therapy but patient declined due to lack of symptoms. She did show moderate to severe active colitis on November 2022 sigmoidoscopy despite being on Lialda.     Today she reports BMs every morning. Occasional flares lasting about a day. Resolve with resting/diet changes. Hemorrhoids flare with this.  Denies abdominal pain, melena hematochezia, abnormal weight loss.    1/21/2025 CMP with normal LFTs, creatinine 1.11, BUN 18.    She is on pantoprazole 40 mg daily for GERD. This works well for her.     From 2/28/2024 office note:  11/2022 flexible sigmoidoscopy showed moderately active colitis from the rectum to 35 cm from anal verge.  She does have a history of tubular adenoma in 2019 and 2018.  Historically has showed inflammation from the sigmoid and rectum.     Objective   Vital Signs:   /69 (Patient Position: Sitting, Cuff Size: Adult)   Pulse 56   Temp 96 °F (35.6 °C)   Ht 157.5 cm (62.01\")   Wt 62.4 kg (137 lb 9.6 oz)   SpO2 98%   BMI 25.16 kg/m²       Physical Exam  Vitals reviewed.   Constitutional:       General: She is awake. She is not in acute distress.     Appearance: Normal appearance. She is well-developed and well-groomed.   HENT:      Head: Normocephalic.   Pulmonary:      Effort: Pulmonary effort is normal. No respiratory distress.   Skin:     Coloration: Skin is not pale.   Neurological:      Mental Status: She is alert and oriented to person, place, and time.      Gait: Gait is intact.   Psychiatric:         Mood and Affect: Mood and affect normal.         Speech: Speech normal.         Behavior: Behavior is cooperative.         " Judgment: Judgment normal.          Result Review :             Assessment and Plan    Diagnoses and all orders for this visit:    1. Ulcerative colitis, left sided, without complications (Primary)    2. Gastroesophageal reflux disease without esophagitis    Other orders  -     mesalamine (LIALDA) 1.2 g EC tablet; Take 4 tablets by mouth Daily With Breakfast.  Dispense: 360 tablet; Refill: 3  -     pantoprazole (PROTONIX) 40 MG EC tablet; Take 1 tablet by mouth Every Morning Before Breakfast.  Dispense: 90 tablet; Refill: 3    Continue same.     We did discuss recommendation for possible repeat colonoscopy this year given her history of tubular adenomas found on 2018 and 2019 colonoscopy and flexible sigmoidoscopy respectively. She would also be at higher risk for colon cancer given her ongoing inflammation in the colon. Again we discussed risk vs benefits of colonoscopy particularly in response to her age. She will think about proceeding with colonoscopy and let me know.    Follow Up   Return in about 1 year (around 2/10/2026).    Dragon dictation used throughout this note.     Audelia King PA-C

## 2025-02-11 ENCOUNTER — HOSPITAL ENCOUNTER (OUTPATIENT)
Dept: BONE DENSITY | Facility: HOSPITAL | Age: 80
Discharge: HOME | End: 2025-02-11
Admitting: INTERNAL MEDICINE
Payer: MEDICARE

## 2025-02-11 PROCEDURE — 77080 DXA BONE DENSITY AXIAL: CPT

## 2025-02-17 RX ORDER — METOPROLOL TARTRATE 50 MG
50 TABLET ORAL EVERY 12 HOURS
Qty: 180 TABLET | Refills: 2 | Status: SHIPPED | OUTPATIENT
Start: 2025-02-17

## 2025-02-18 DIAGNOSIS — I10 ESSENTIAL HYPERTENSION: Chronic | ICD-10-CM

## 2025-02-18 RX ORDER — AMLODIPINE BESYLATE 5 MG/1
5 TABLET ORAL DAILY
Qty: 90 TABLET | Refills: 1 | Status: SHIPPED | OUTPATIENT
Start: 2025-02-18

## 2025-04-22 DIAGNOSIS — E11.59 TYPE 2 DIABETES MELLITUS WITH OTHER CIRCULATORY COMPLICATION, WITHOUT LONG-TERM CURRENT USE OF INSULIN: ICD-10-CM

## 2025-04-22 RX ORDER — DAPAGLIFLOZIN 10 MG/1
1 TABLET, FILM COATED ORAL
Qty: 90 TABLET | Refills: 1 | Status: SHIPPED | OUTPATIENT
Start: 2025-04-22

## 2025-05-11 DIAGNOSIS — E11.59 TYPE 2 DIABETES MELLITUS WITH OTHER CIRCULATORY COMPLICATION, WITHOUT LONG-TERM CURRENT USE OF INSULIN: ICD-10-CM

## 2025-05-12 RX ORDER — PIOGLITAZONE 15 MG/1
15 TABLET ORAL
Qty: 90 TABLET | Refills: 1 | Status: SHIPPED | OUTPATIENT
Start: 2025-05-12

## 2025-05-27 ENCOUNTER — OFFICE VISIT (OUTPATIENT)
Dept: INTERNAL MEDICINE | Age: 80
End: 2025-05-27
Payer: MEDICARE

## 2025-05-27 VITALS
WEIGHT: 136 LBS | OXYGEN SATURATION: 99 % | DIASTOLIC BLOOD PRESSURE: 80 MMHG | HEIGHT: 62 IN | SYSTOLIC BLOOD PRESSURE: 120 MMHG | HEART RATE: 48 BPM | BODY MASS INDEX: 25.03 KG/M2 | TEMPERATURE: 97.1 F

## 2025-05-27 DIAGNOSIS — E78.2 MIXED HYPERLIPIDEMIA: Chronic | ICD-10-CM

## 2025-05-27 DIAGNOSIS — I10 PRIMARY HYPERTENSION: Chronic | ICD-10-CM

## 2025-05-27 DIAGNOSIS — R80.9 TYPE 2 DIABETES MELLITUS WITH MICROALBUMINURIA: Primary | Chronic | ICD-10-CM

## 2025-05-27 DIAGNOSIS — E11.29 TYPE 2 DIABETES MELLITUS WITH MICROALBUMINURIA: Primary | Chronic | ICD-10-CM

## 2025-05-27 DIAGNOSIS — I25.10 CORONARY ARTERY DISEASE INVOLVING NATIVE CORONARY ARTERY OF NATIVE HEART WITHOUT ANGINA PECTORIS: Chronic | ICD-10-CM

## 2025-05-27 PROCEDURE — 90677 PCV20 VACCINE IM: CPT | Performed by: INTERNAL MEDICINE

## 2025-05-27 PROCEDURE — 3079F DIAST BP 80-89 MM HG: CPT | Performed by: INTERNAL MEDICINE

## 2025-05-27 PROCEDURE — G0009 ADMIN PNEUMOCOCCAL VACCINE: HCPCS | Performed by: INTERNAL MEDICINE

## 2025-05-27 PROCEDURE — 1126F AMNT PAIN NOTED NONE PRSNT: CPT | Performed by: INTERNAL MEDICINE

## 2025-05-27 PROCEDURE — 99214 OFFICE O/P EST MOD 30 MIN: CPT | Performed by: INTERNAL MEDICINE

## 2025-05-27 PROCEDURE — 3074F SYST BP LT 130 MM HG: CPT | Performed by: INTERNAL MEDICINE

## 2025-05-27 RX ORDER — ROSUVASTATIN CALCIUM 20 MG/1
20 TABLET, COATED ORAL DAILY
Qty: 90 TABLET | Refills: 1 | Status: SHIPPED | OUTPATIENT
Start: 2025-05-27

## 2025-05-27 RX ORDER — FLUOROURACIL 50 MG/G
1 CREAM TOPICAL DAILY
COMMUNITY
Start: 2025-03-17

## 2025-05-27 NOTE — ASSESSMENT & PLAN NOTE
Increase rosuvastatin to 20 mg. Check lab prior to follow-up.     Lab Results   Component Value Date    LDL 79 01/21/2025    LDL 69 02/22/2024    LDL 54 09/13/2023     Lab Results   Component Value Date    HDL 41 01/21/2025    HDL 52 02/22/2024    HDL 42 09/13/2023     Lab Results   Component Value Date    TRIG 68 01/21/2025

## 2025-05-27 NOTE — LETTER
[facility]  Vaccine Consent Form    Patient Name:  Renee PARIKH From  Patient :  1945     Vaccine(s) Ordered    Pneumococcal Conjugate Vaccine 20-Valent All        Screening Checklist  The following questions should be completed prior to vaccination. If you answer “yes” to any question, it does not necessarily mean you should not be vaccinated. It just means we may need to clarify or ask more questions. If a question is unclear, please ask your healthcare provider to explain it.    Yes No   Any fever or moderate to severe illness today (mild illness and/or antibiotic treatment are not contraindications)?     Do you have a history of a serious reaction to any previous vaccinations, such as anaphylaxis, encephalopathy within 7 days, Guillain-Conejos syndrome within 6 weeks, seizure?     Have you received any live vaccine(s) (e.g MMR, FIOR) or any other vaccines in the last month (to ensure duplicate doses aren't given)?     Do you have an anaphylactic allergy to latex (DTaP, DTaP-IPV, Hep A, Hep B, MenB, RV, Td, Tdap), baker’s yeast (Hep B, HPV), polysorbates (RSV, nirsevimab, PCV 20, Rotavirrus, Tdap, Shingrix), or gelatin (FIOR, MMR)?     Do you have an anaphylactic allergy to neomycin (Rabies, FIOR, MMR, IPV, Hep A), polymyxin B (IPV), or streptomycin (IPV)?      Any cancer, leukemia, AIDS, or other immune system disorder? (FIOR, MMR, RV)     Do you have a parent, brother, or sister with an immune system problem (if immune competence of vaccine recipient clinically verified, can proceed)? (MMR, FIOR)     Any recent steroid treatments for >2 weeks, chemotherapy, or radiation treatment? (FIOR, MMR)     Have you received antibody-containing blood transfusions or IVIG in the past 11 months (recommended interval is dependent on product)? (MMR, FIOR)     Have you taken antiviral drugs (acyclovir, famciclovir, valacyclovir for FIOR) in the last 24 or 48 hours, respectively?      Are you pregnant or planning to become  "pregnant within 1 month? (FIOR, MMR, HPV, IPV, MenB, Abrexvy; For Hep B- refer to Engerix-B; For RSV - Abrysvo is indicated for 32-36 weeks of pregnancy from September to January)     For infants, have you ever been told your child has had intussusception or a medical emergency involving obstruction of the intestine (Rotavirus)? If not for an infant, can skip this question.         *Ordering Physicians/APC should be consulted if \"yes\" is checked by the patient or guardian above.  I have received, read, and understand the Vaccine Information Statement (VIS) for each vaccine ordered.  I have considered my or my child's health status as well as the health status of my close contacts.  I have taken the opportunity to discuss my vaccine questions with my or my child's health care provider.   I have requested that the ordered vaccine(s) be given to me or my child.  I understand the benefits and risks of the vaccines.  I understand that I should remain in the clinic for 15 minutes after receiving the vaccine(s).  _________________________________________________________  Signature of Patient or Parent/Legal Guardian ____________________  Date     "

## 2025-05-27 NOTE — ASSESSMENT & PLAN NOTE
Lab Results   Component Value Date    HGBA1C 6.90 (H) 01/21/2025    HGBA1C 6.80 (H) 09/09/2024    HGBA1C 7.30 (H) 06/25/2024    CREATININE 1.11 (H) 01/21/2025    LDL 79 01/21/2025    MALBCRERATIO 67 (H) 02/22/2024      Check lab today. Continue same medications.

## 2025-05-27 NOTE — PROGRESS NOTES
J  U  N  O  H    K  I  M ,   M  D      I  N  T  E  R  N  A  L    M  E  D  I  C  I  N  E         ENCOUNTER DATE:  05/27/2025    Renee PARIKH From / 80 y.o. 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 Continue same medications.          Relevant Medications    glucose blood (Accu-Chek Guide Test) test strip    lisinopril (PRINIVIL,ZESTRIL) 30 MG tablet    Farxiga 10 MG tablet    SITagliptin (Januvia) 100 MG tablet    pioglitazone (ACTOS) 15 MG tablet    Other Relevant Orders    Hemoglobin A1c    Microalbumin / Creatinine Urine Ratio - Urine, Clean Catch    Comprehensive Metabolic Panel    Hemoglobin A1c    Hypertension (Chronic)    Overview   Continue:   lisinopril 30 mg  amlodipine 5 mg qd   metoprolol tartrate 50 mg BID         Current Assessment & Plan   BP Readings from Last 3 Encounters:   05/27/25 120/80   02/10/25 169/69   01/21/25 132/84      Blood pressure is much better. Continue same.         Relevant Medications    lisinopril (PRINIVIL,ZESTRIL) 30 MG tablet    metoprolol tartrate (LOPRESSOR) 50 MG tablet    amLODIPine (NORVASC) 5 MG tablet    Other Relevant Orders    Comprehensive Metabolic Panel       Medium    Hyperlipidemia (Chronic)    Current Assessment & Plan    Increase rosuvastatin to 20 mg. Check lab prior to follow-up.     Lab Results    Component Value Date    LDL 79 01/21/2025    LDL 69 02/22/2024    LDL 54 09/13/2023     Lab Results   Component Value Date    HDL 41 01/21/2025    HDL 52 02/22/2024    HDL 42 09/13/2023     Lab Results   Component Value Date    TRIG 68 01/21/2025             Relevant Medications    rosuvastatin (CRESTOR) 20 MG tablet    Other Relevant Orders    Comprehensive Metabolic Panel    Lipid Panel With / Chol / HDL Ratio    Coronary artery disease involving native coronary artery of native heart without angina pectoris (Chronic)    Overview   *[facility]    Continue statin, bblocker. Not on ASA due to recent LGIB         Relevant Medications    metoprolol tartrate (LOPRESSOR) 50 MG tablet    amLODIPine (NORVASC) 5 MG tablet    rosuvastatin (CRESTOR) 20 MG tablet     Orders Placed This Encounter   Procedures    Pneumococcal Conjugate Vaccine 20-Valent All    Hemoglobin A1c     Release to patient:   Routine Release [[id number]]    Microalbumin / Creatinine Urine Ratio - Urine, Clean Catch     Release to patient:   Routine Release [[id number]]    Comprehensive Metabolic Panel     Standing Status:   Future     Expected Date:   7/26/2025     Expiration Date:   5/27/2026     Release to patient:   Routine Release [[id number]]    Hemoglobin A1c     Standing Status:   Future     Expected Date:   7/26/2025     Expiration Date:   5/27/2026     Release to patient:   Routine Release [[id number]]    Lipid Panel With / Chol / HDL Ratio     Standing Status:   Future     Expected Date:   7/26/2025     Expiration Date:   5/27/2026     Release to patient:   Routine Release [[id number]]     New Medications Ordered This Visit   Medications    rosuvastatin (CRESTOR) 20 MG tablet     Sig: Take 1 tablet by mouth Daily.     Dispense:  90 tablet     Refill:  1       SUMMARY/DISCUSSION        TOTAL TIME OF ENCOUNTER:      Next Appointment with me: 10/10/2025    Return in about 4 months (around 9/27/2025) for Reassess chronic medical  "problems, FASTING LABS 1 WEEK PRIOR TO FOLLOWUP.      VITAL SIGNS     Vitals:    05/27/25 1015   BP: 120/80   Pulse: (!) 48   Temp: 97.1 °F (36.2 °C)   SpO2: 99%   Weight: 61.7 kg (136 lb)   Height: 157.5 cm (62.01\")       BP Readings from Last 3 Encounters:   05/27/25 120/80   02/10/25 169/69   01/21/25 132/84     Wt Readings from Last 3 Encounters:   05/27/25 61.7 kg (136 lb)   02/10/25 62.4 kg (137 lb 9.6 oz)   01/21/25 62.1 kg (137 lb)     Body mass index is 24.87 kg/m².    Blood pressure readings recorded on patient flowsheet:  [facility] Blood Pressure Flowsheet Systolic Diastolic Pulse   5/24/2025   8:37   63  58    5/22/2025   3:14   66  53    5/21/2025   6:11   74  51    5/20/2025   6:10   74  52    5/13/2025   4:56   61  54    5/11/2025   7:11   63  53    5/9/2025   5:52   73  54    5/2/2025   2:03   64  54    4/30/2025  10:03   67  52    4/26/2025   2:33   72  52        Patient-reported       MEDICATIONS AT THE TIME OF OFFICE VISIT     Current Outpatient Medications on File Prior to Visit   Medication Sig    amLODIPine (NORVASC) 5 MG tablet TAKE 1 TABLET BY MOUTH DAILY    Farxiga 10 MG tablet TAKE 1 TABLET BY MOUTH EVERY MORNING BEFORE BREAKFAST    ferrous sulfate 325 (65 FE) MG tablet Take 1 tablet by mouth Daily With Breakfast. (Patient taking differently: Take 1 tablet by mouth Daily With Breakfast. Every otherday)    fluorouracil (EFUDEX) 5 % cream Apply 1 Application topically to the appropriate area as directed Daily.    glucose blood (Accu-Chek Guide Test) test strip Check FBS once daily. DM2/E 11.29    lisinopril (PRINIVIL,ZESTRIL) 30 MG tablet Take 1 tablet by mouth Daily.    mesalamine (LIALDA) 1.2 g EC tablet Take 4 tablets by mouth Daily With Breakfast.    metoprolol tartrate (LOPRESSOR) 50 MG tablet TAKE 1 TABLET BY MOUTH EVERY 12 HOURS    pantoprazole (PROTONIX) 40 MG EC tablet Take 1 tablet by mouth Every Morning Before Breakfast.    " pioglitazone (ACTOS) 15 MG tablet Take 1 tablet by mouth Daily With Breakfast.    SITagliptin (Januvia) 100 MG tablet Take 1 tablet by mouth Daily With Breakfast.    [DISCONTINUED] rosuvastatin (CRESTOR) 10 MG tablet TAKE 1 TABLET BY MOUTH DAILY     No current facility-administered medications on file prior to visit.         HISTORY OF PRESENT ILLNESS     Denies any acute changes or problems. Blood pressure is better with increase in lisinopril to 30 mg. Denies any chest pain or MUNGUIA. LDL cholesterol remains above target on rosuvastatin 10 mg without problems. A1c previously higher at 6.9 compliant with medications/diet.     Lab Results   Component Value Date    HGBA1C 6.90 (H) 01/21/2025    HGBA1C 6.80 (H) 09/09/2024    HGBA1C 7.30 (H) 06/25/2024    CREATININE 1.11 (H) 01/21/2025    LDL 79 01/21/2025    MALBCRERATIO 67 (H) 02/22/2024     Blood sugar readings recorded on patient's flowsheet:       No data to display               61.7 kg (136 lb)    Patient Care Team:  Esequiel Pacheco MD as PCP - General  [facility], Cisco Kate MD as Consulting Physician (Otolaryngology)  EZIO Garibay MD as Consulting Physician (Ophthalmology)  Darling Espinosa MD as Consulting Physician (Cardiology)  Long Skinner DPM as Consulting Physician (Podiatry)  Audelia King PA-C as Physician Assistant (Gastroenterology)    REVIEW OF SYSTEMS     Constitutional neg except per HPI   Resp neg  CV neg       PHYSICAL EXAMINATION     Physical Exam  General: No acute distress.   Psych: Normal thought and judgment.   Cardiovascular Rate: normal. Rhythm: regular. Heart sounds: normal.    Pulm/Chest: Effort normal, breath sounds normal.   No lower extremity edema       REVIEWED DATA     Labs:       Lab Results   Component Value Date     01/21/2025    K 3.7 01/21/2025    CALCIUM 9.4 01/21/2025    AST 17 01/21/2025    ALT 12 01/21/2025    BUN 18 01/21/2025    CREATININE 1.11 (H) 01/21/2025    CREATININE 1.07 (H) 06/25/2024    CREATININE  1.14 (H) 02/22/2024    EGFR 50.4 (L) 01/21/2025     Lab Results   Component Value Date    HGBA1C 6.90 (H) 01/21/2025    HGBA1C 6.80 (H) 09/09/2024    HGBA1C 7.30 (H) 06/25/2024     Lab Results   Component Value Date    MALBCRERATIO 67 (H) 02/22/2024     Lab Results   Component Value Date    LDL 79 01/21/2025    LDL 69 02/22/2024    LDL 54 09/13/2023    HDL 41 01/21/2025    HDL 52 02/22/2024    TRIG 68 01/21/2025    CHOLHDLRATIO 3.27 01/21/2025     Lab Results   Component Value Date    TSH 3.340 09/13/2023    TSH 2.910 07/26/2021    FREET4 1.08 09/13/2023    FREET4 1.19 07/26/2021     Lab Results   Component Value Date    WBC 6.09 09/13/2023    HGB 11.3 (L) 09/13/2023     09/13/2023         Imaging:           Medical Tests:           Summary of old records / correspondence / consultant report:           Request outside records:

## 2025-05-27 NOTE — ASSESSMENT & PLAN NOTE
BP Readings from Last 3 Encounters:   05/27/25 120/80   02/10/25 169/69   01/21/25 132/84      Blood pressure is much better. Continue same.

## 2025-05-28 LAB
ALBUMIN/CREAT UR: 93 MG/G CREAT (ref 0–29)
CREAT UR-MCNC: 69.3 MG/DL
HBA1C MFR BLD: 7.1 % (ref 4.8–5.6)
MICROALBUMIN UR-MCNC: 64.5 UG/ML

## 2025-06-17 ENCOUNTER — OFFICE VISIT (OUTPATIENT)
Age: 80
End: 2025-06-17
Payer: MEDICARE

## 2025-06-17 VITALS
SYSTOLIC BLOOD PRESSURE: 140 MMHG | OXYGEN SATURATION: 97 % | WEIGHT: 139 LBS | BODY MASS INDEX: 25.58 KG/M2 | DIASTOLIC BLOOD PRESSURE: 74 MMHG | HEIGHT: 62 IN | HEART RATE: 57 BPM

## 2025-06-17 DIAGNOSIS — I51.81 STRESS-INDUCED CARDIOMYOPATHY: Primary | ICD-10-CM

## 2025-06-17 PROCEDURE — 93000 ELECTROCARDIOGRAM COMPLETE: CPT | Performed by: NURSE PRACTITIONER

## 2025-06-17 PROCEDURE — 3078F DIAST BP <80 MM HG: CPT | Performed by: NURSE PRACTITIONER

## 2025-06-17 PROCEDURE — 3077F SYST BP >= 140 MM HG: CPT | Performed by: NURSE PRACTITIONER

## 2025-06-17 PROCEDURE — 1160F RVW MEDS BY RX/DR IN RCRD: CPT | Performed by: NURSE PRACTITIONER

## 2025-06-17 PROCEDURE — 99214 OFFICE O/P EST MOD 30 MIN: CPT | Performed by: NURSE PRACTITIONER

## 2025-06-17 PROCEDURE — 1159F MED LIST DOCD IN RCRD: CPT | Performed by: NURSE PRACTITIONER

## 2025-06-17 NOTE — PROGRESS NOTES
Date of Office Visit: 2025  Encounter Provider: OLIVA Omer  Place of Service: [facility]  Patient Name: Renee Stevenson  :1945    Chief complaint: Coronary artery disease, Takotsubo    HPI: Renee Stevenson is a 80 y.o. female who is a patient of Dr. Espinosa and is new to me today.  Has a history of Takotsubo cardiomyopathy.  He had in 2017 she presented to [facility] with chest pain and dynamic EKG changes.  She had mild to moderate disease of the proximal, mid and distal LAD as well as 2 small diagonals she had mild disease in circumflex her LV gram showed inferior apical hypokinesis consistent with a Takotsubo cardiomyopathy her EF is mildly reduced to 45% she has been on goal-directed medical therapy and it improved to 65%.    Other medical problems include diabetes, hypertension, history of GI bleed requiring packed red blood cells therefore not on aspirin and ulcerative colitis.  She is here for yearly follow-up.  She denies any chest pain, pressure or tightness.  She is not exercising regularly she stays busy taking care of her .  She sends her blood pressure once a month to Dr. Pacheco her primary care he recently increased her lisinopril to 30 mg a day.  Blood pressures are mostly in the 120s to low 140s.    Previous testing and notes have been reviewed by me.      Latest Reference Range & Units 25 10:57   Sodium 136 - 145 mmol/L 141   Potassium 3.5 - 5.2 mmol/L 3.7   Chloride 98 - 107 mmol/L 105   CO2 22.0 - 29.0 mmol/L 23.3   BUN 8 - 23 mg/dL 18   Creatinine 0.57 - 1.00 mg/dL 1.11 (H)   BUN/Creatinine Ratio 7.0 - 25.0  16.2   EGFR Result >60.0 mL/min/1.73 50.4 (L)   Glucose 65 - 99 mg/dL 148 (H)   Calcium 8.6 - 10.5 mg/dL 9.4   Alkaline Phosphatase 39 - 117 U/L 78   Total Protein 6.0 - 8.5 g/dL 8.2   Albumin 3.5 - 5.2 g/dL 4.5   A/G Ratio g/dL 1.2   AST (SGOT) 1 - 32 U/L 17   ALT (SGPT) 1 - 33 U/L 12   Total Bilirubin 0.0 - 1.2  mg/dL 0.4   Hemoglobin A1C 4.80 - 5.60 % 6.90 (H)   Total Cholesterol 0 - 200 mg/dL 134   HDL Cholesterol 40 - 60 mg/dL 41   LDL Cholesterol  0 - 100 mg/dL 79   Triglycerides 0 - 150 mg/dL 68   Chol/HDL Ratio  3.27   VLDL Cholesterol Javed 5 - 40 mg/dL 14   Globulin gm/dL 3.7   (H): Data is abnormally high  (L): Data is abnormally low    September 2017 echocardiogram  Left ventricular systolic function is normal. Estimated EF = 65%.  Mild mitral valve regurgitation is present  Mild tricuspid valve regurgitation is present.  Calculated right ventricular systolic pressure from tricuspid regurgitation is 45 mmHg.    Past Medical History:   Diagnosis Date    Allergic rhinitis     Broken heart syndrome     Colitis     Colon polyps     Diabetes mellitus     type 2    Dizziness     Encounter for breast cancer screening other than mammogram     GERD (gastroesophageal reflux disease)     GI (gastrointestinal bleed)     Glaucoma     Herpes zoster without complication 3/2/2022    History of transfusion     Hyperlipidemia     Hypertension     Iron deficiency anemia due to chronic blood loss     Knee fracture, left     Non-STEMI (non-ST elevated myocardial infarction) 06/16/2017    Osteopenia     Shingles     Ulcerative colitis        Past Surgical History:   Procedure Laterality Date    CARDIAC CATHETERIZATION N/A 06/16/2017    Procedure: Left Heart Cath;  Surgeon: Abhay Wooten MD;  Location: [facility] CATH INVASIVE LOCATION;  Service:     CARDIAC CATHETERIZATION N/A 06/16/2017    Procedure: Left ventriculography;  Surgeon: Abhay Wooten MD;  Location: [facility] CATH INVASIVE LOCATION;  Service:     CARDIAC CATHETERIZATION N/A 06/16/2017    Procedure: Coronary angiography;  Surgeon: Abhay Wooten MD;  Location: [facility] CATH INVASIVE LOCATION;  Service:     CATARACT EXTRACTION      COLONOSCOPY  08/14/2018    ENDOSCOPY N/A 02/10/2020    Procedure: ESOPHAGOGASTRODUODENOSCOPY;  Surgeon: Melissa Burleson MD;  Location: [facility] ENDOSCOPY;  Service:  Gastroenterology;  Laterality: N/A;  PRE- IRON DEFICIENCY ANEMIA  POST--SCHATZKY'S RING, GASTRITIS,DUODENITIS    EYE SURGERY      cataract surgery    PAP SMEAR      SIGMOIDOSCOPY N/A 10/21/2019    EH, active ulcerative colitis, severe inflammation in rectum, TA    SIGMOIDOSCOPY N/A 11/21/2022    Procedure: SIGMOIDOSCOPY FLEXIBLE to transverse colon with cold biopsies;  Surgeon: Melissa Burleson MD;  Location: [facility] ENDOSCOPY;  Service: Gastroenterology;  Laterality: N/A;  pre: h/o ulcerative colitis and rectal bleeding  post: colitis, hemorrhoids       Social History     Socioeconomic History    Marital status:      Spouse name: Eliazar*    Number of children: 1   Tobacco Use    Smoking status: Never     Passive exposure: Never    Smokeless tobacco: Never   Vaping Use    Vaping status: Never Used   Substance and Sexual Activity    Alcohol use: No    Drug use: No    Sexual activity: Not Currently     Partners: Male       Family History   Problem Relation Age of Onset    Heart disease Mother     Hypertension Mother     Diabetes Mother     Heart disease Father     Hypertension Father     Heart disease Sister         fiver sisters    Heart disease Brother     Heart disease Brother     Diabetes type II Brother     Crohn's disease Niece     Colon cancer Neg Hx     Breast cancer Neg Hx     Dementia Neg Hx        Review of Systems   Constitutional: Negative for diaphoresis and malaise/fatigue.   Cardiovascular:  Negative for chest pain, claudication, dyspnea on exertion, irregular heartbeat, leg swelling, near-syncope, orthopnea, palpitations, paroxysmal nocturnal dyspnea and syncope.   Respiratory:  Negative for cough, shortness of breath and sleep disturbances due to breathing.    Musculoskeletal:  Negative for falls.   Neurological:  Negative for dizziness and weakness.   Psychiatric/Behavioral:  Negative for altered mental status and substance abuse.        Allergies   Allergen Reactions    Tetanus Toxoids Swelling  "    Hand and arm  Hand and arm  Hand and arm         Current Outpatient Medications:     amLODIPine (NORVASC) 5 MG tablet, TAKE 1 TABLET BY MOUTH DAILY, Disp: 90 tablet, Rfl: 1    Farxiga 10 MG tablet, TAKE 1 TABLET BY MOUTH EVERY MORNING BEFORE BREAKFAST, Disp: 90 tablet, Rfl: 1    ferrous sulfate 325 (65 FE) MG tablet, Take 1 tablet by mouth Daily With Breakfast. (Patient taking differently: Take 1 tablet by mouth Daily With Breakfast. Every otherday), Disp: 30 tablet, Rfl: 0    fluorouracil (EFUDEX) 5 % cream, Apply 1 Application topically to the appropriate area as directed Daily., Disp: , Rfl:     glucose blood (Accu-Chek Guide Test) test strip, Check FBS once daily. DM2/E 11.29, Disp: 100 each, Rfl: 3    lisinopril (PRINIVIL,ZESTRIL) 30 MG tablet, Take 1 tablet by mouth Daily., Disp: 90 tablet, Rfl: 1    mesalamine (LIALDA) 1.2 g EC tablet, Take 4 tablets by mouth Daily With Breakfast., Disp: 360 tablet, Rfl: 3    metoprolol tartrate (LOPRESSOR) 50 MG tablet, TAKE 1 TABLET BY MOUTH EVERY 12 HOURS, Disp: 180 tablet, Rfl: 2    pantoprazole (PROTONIX) 40 MG EC tablet, Take 1 tablet by mouth Every Morning Before Breakfast., Disp: 90 tablet, Rfl: 3    pioglitazone (ACTOS) 15 MG tablet, Take 1 tablet by mouth Daily With Breakfast., Disp: 90 tablet, Rfl: 1    rosuvastatin (CRESTOR) 20 MG tablet, Take 1 tablet by mouth Daily., Disp: 90 tablet, Rfl: 1    SITagliptin (Januvia) 100 MG tablet, Take 1 tablet by mouth Daily With Breakfast., Disp: 90 tablet, Rfl: 3        Objective:     Vitals:    06/17/25 1411   BP: 140/74   Pulse: 57   SpO2: 97%   Weight: 63 kg (139 lb)   Height: 157.5 cm (62.01\")     Body mass index is 25.42 kg/m².    PHYSICAL EXAM:    Constitutional:       General: Not in acute distress.     Appearance: Normal appearance. Well-developed.   Eyes:      Pupils: Pupils are equal, round, and reactive to light.   HENT:      Head: Normocephalic.   Neck:      Vascular: No carotid bruit or JVD.   Pulmonary:      " Effort: Pulmonary effort is normal. No tachypnea.      Breath sounds: Normal breath sounds. No wheezing. No rales.   Cardiovascular:      Normal rate. Regular rhythm.      No gallop.    Pulses:     Intact distal pulses.   Edema:     Peripheral edema absent.   Abdominal:      General: Bowel sounds are normal.      Palpations: Abdomen is soft.      Tenderness: There is no abdominal tenderness.   Musculoskeletal: Normal range of motion.      Cervical back: Normal range of motion and neck supple. No edema. Skin:     General: Skin is warm and dry.   Neurological:      Mental Status: Alert and oriented to person, place, and time.           ECG 12 Lead    Date/Time: 6/17/2025 2:44 PM  Performed by: Mellissa Horn APRN    Authorized by: Mellissa Horn APRN  Comparison: compared with previous ECG from 6/13/2024  Similar to previous ECG  Rhythm: sinus rhythm  Rate: normal  QRS axis: normal  Other findings: non-specific ST-T wave changes    Clinical impression: non-specific ECG      Lipid Panel          1/21/2025    10:57   Lipid Panel   Total Cholesterol 134    Triglycerides 68    HDL Cholesterol 41    VLDL Cholesterol 14    LDL Cholesterol  79          Assessment/Plan:      1.  History of Takotsubo cardiomyopathy with recovered EF.  Continue metoprolol tartrate 50 mg twice daily and lisinopril 30 mg daily and Farxiga    2.  Essential hypertension-continue amlodipine 5 mg daily, lisinopril 30 mg daily and metoprolol tartrate 50 mg twice a day    3.  Dyslipidemia-lipids at goal remains on rosuvastatin 20 mg daily    Encouraged regular exercise such as walking follow-up in the office in 1 year or sooner if needed     Your medication list            Accurate as of June 17, 2025  2:40 PM. If you have any questions, ask your nurse or doctor.                CHANGE how you take these medications        Instructions Last Dose Given Next Dose Due   ferrous sulfate 325 (65 FE) MG tablet  What changed: additional instructions       Take 1 tablet by mouth Daily With Breakfast.              CONTINUE taking these medications        Instructions Last Dose Given Next Dose Due   Accu-Chek Guide Test test strip  Generic drug: glucose blood      Check FBS once daily. DM2/E 11.29       amLODIPine 5 MG tablet  Commonly known as: NORVASC      TAKE 1 TABLET BY MOUTH DAILY       Farxiga 10 MG tablet  Generic drug: dapagliflozin Propanediol      TAKE 1 TABLET BY MOUTH EVERY MORNING BEFORE BREAKFAST       fluorouracil 5 % cream  Commonly known as: EFUDEX      Apply 1 Application topically to the appropriate area as directed Daily.       lisinopril 30 MG tablet  Commonly known as: PRINIVIL,ZESTRIL      Take 1 tablet by mouth Daily.       mesalamine 1.2 g EC tablet  Commonly known as: LIALDA      Take 4 tablets by mouth Daily With Breakfast.       metoprolol tartrate 50 MG tablet  Commonly known as: LOPRESSOR      TAKE 1 TABLET BY MOUTH EVERY 12 HOURS       pantoprazole 40 MG EC tablet  Commonly known as: PROTONIX      Take 1 tablet by mouth Every Morning Before Breakfast.       pioglitazone 15 MG tablet  Commonly known as: ACTOS      Take 1 tablet by mouth Daily With Breakfast.       rosuvastatin 20 MG tablet  Commonly known as: CRESTOR      Take 1 tablet by mouth Daily.       SITagliptin 100 MG tablet  Commonly known as: Januvia      Take 1 tablet by mouth Daily With Breakfast.                  As always, it has been a pleasure to participate in your patient's care.      Sincerely,     Mellissa BAPTISTE

## 2025-06-20 ENCOUNTER — PATIENT ROUNDING (BHMG ONLY) (OUTPATIENT)
Age: 80
End: 2025-06-20
Payer: MEDICARE

## 2025-06-20 NOTE — ED NOTES
Thank you for letting us care for you in your recent visit to our [facility] urgent care center. We would love to hear about your experience with us. Was this the first time you have visited our location?         We’re always looking for ways to make our patients’ experiences even better. Do you have any recommendations on ways we may improve?         I appreciate you taking the time to respond. Please be on the lookout for a survey about your recent visit from [employer] via text or email. We would greatly appreciate if you could fill that out and turn it back in. We want your voice to be heard and we value your feedback.    Thank you for choosing [facility] for your healthcare needs.         If you have concerns or would like to speak to me in person regarding your visit please feel free to give me a call. [phone]         Hope you get well soon and thank you.         Alanna Combs  Practice Manager

## 2025-08-05 DIAGNOSIS — E11.29 TYPE 2 DIABETES MELLITUS WITH MICROALBUMINURIA: Chronic | ICD-10-CM

## 2025-08-05 DIAGNOSIS — R80.9 TYPE 2 DIABETES MELLITUS WITH MICROALBUMINURIA: Chronic | ICD-10-CM

## 2025-08-05 DIAGNOSIS — I10 PRIMARY HYPERTENSION: Chronic | ICD-10-CM

## 2025-08-05 RX ORDER — PANTOPRAZOLE SODIUM 40 MG/1
40 TABLET, DELAYED RELEASE ORAL
Qty: 90 TABLET | Refills: 1 | Status: SHIPPED | OUTPATIENT
Start: 2025-08-05

## 2025-08-06 RX ORDER — LISINOPRIL 30 MG/1
30 TABLET ORAL DAILY
Qty: 90 TABLET | Refills: 1 | Status: SHIPPED | OUTPATIENT
Start: 2025-08-06

## 2025-08-25 DIAGNOSIS — I10 ESSENTIAL HYPERTENSION: Chronic | ICD-10-CM

## 2025-08-25 RX ORDER — AMLODIPINE BESYLATE 5 MG/1
5 TABLET ORAL DAILY
Qty: 90 TABLET | Refills: 1 | Status: SHIPPED | OUTPATIENT
Start: 2025-08-25

## (undated) DEVICE — SENSR O2 OXIMAX FNGR A/ 18IN NONSTR

## (undated) DEVICE — CATH DIAG IMPULSE FR4 5F 100CM

## (undated) DEVICE — SINGLE-USE BIOPSY FORCEPS: Brand: RADIAL JAW 4

## (undated) DEVICE — GW EMR FIX EXCHG J STD .035 3MM 260CM

## (undated) DEVICE — TUBING, SUCTION, 1/4" X 10', STRAIGHT: Brand: MEDLINE

## (undated) DEVICE — KT ORCA ORCAPOD DISP STRL

## (undated) DEVICE — FRCP BX RADJAW4 NDL 2.8 240CM LG OG BX40

## (undated) DEVICE — CANN O2 ETCO2 FITS ALL CONN CO2 SMPL A/ 7IN DISP LF

## (undated) DEVICE — CANN NASL CO2 TRULINK W/O2 A/

## (undated) DEVICE — CATH DIAG IMPULSE PIG 5F 100CM

## (undated) DEVICE — LN SMPL CO2 SHTRM SD STREAM W/M LUER

## (undated) DEVICE — KT MANIFLD CARDIAC

## (undated) DEVICE — BITEBLOCK OMNI BLOC

## (undated) DEVICE — THE TORRENT IRRIGATION SCOPE CONNECTOR IS USED WITH THE TORRENT IRRIGATION TUBING TO PROVIDE IRRIGATION FLUIDS SUCH AS STERILE WATER DURING GASTROINTESTINAL ENDOSCOPIC PROCEDURES WHEN USED IN CONJUNCTION WITH AN IRRIGATION PUMP (OR ELECTROSURGICAL UNIT).: Brand: TORRENT

## (undated) DEVICE — CATH DIAG IMPULSE FL3.5 5F 100CM

## (undated) DEVICE — KT VLV BIOGUARD SXN BIOP AIR/H20 CONN 4PC DISP

## (undated) DEVICE — ADAPT CLN BIOGUARD AIR/H2O DISP

## (undated) DEVICE — PK CATH CARD 40

## (undated) DEVICE — GLIDESHEATH BASIC HYDROPHILIC COATED INTRODUCER SHEATH: Brand: GLIDESHEATH